# Patient Record
Sex: MALE | Race: WHITE | NOT HISPANIC OR LATINO | Employment: OTHER | URBAN - METROPOLITAN AREA
[De-identification: names, ages, dates, MRNs, and addresses within clinical notes are randomized per-mention and may not be internally consistent; named-entity substitution may affect disease eponyms.]

---

## 2017-01-06 ENCOUNTER — HOSPITAL ENCOUNTER (EMERGENCY)
Facility: HOSPITAL | Age: 72
Discharge: HOME | End: 2017-01-06
Attending: EMERGENCY MEDICINE | Admitting: EMERGENCY MEDICINE

## 2017-01-06 ENCOUNTER — APPOINTMENT (OUTPATIENT)
Dept: GENERAL RADIOLOGY | Facility: HOSPITAL | Age: 72
End: 2017-01-06

## 2017-01-06 VITALS
RESPIRATION RATE: 16 BRPM | HEIGHT: 70 IN | SYSTOLIC BLOOD PRESSURE: 130 MMHG | DIASTOLIC BLOOD PRESSURE: 70 MMHG | BODY MASS INDEX: 40.09 KG/M2 | TEMPERATURE: 97.9 F | HEART RATE: 55 BPM | WEIGHT: 280 LBS | OXYGEN SATURATION: 94 %

## 2017-01-06 DIAGNOSIS — R07.2 PRECORDIAL PAIN: Primary | ICD-10-CM

## 2017-01-06 PROBLEM — R07.9 CHEST PAIN: Status: ACTIVE | Noted: 2017-01-06

## 2017-01-06 PROBLEM — R00.1 SINUS BRADYCARDIA: Status: ACTIVE | Noted: 2017-01-06

## 2017-01-06 LAB
ALBUMIN SERPL-MCNC: 4.3 G/DL (ref 3.2–4.8)
ALBUMIN/GLOB SERPL: 1.4 G/DL (ref 1.5–2.5)
ALP SERPL-CCNC: 64 U/L (ref 25–100)
ALT SERPL W P-5'-P-CCNC: 16 U/L (ref 7–40)
ANION GAP SERPL CALCULATED.3IONS-SCNC: 10 MMOL/L (ref 3–11)
AST SERPL-CCNC: 20 U/L (ref 0–33)
BASOPHILS # BLD AUTO: 0.04 10*3/MM3 (ref 0–0.2)
BASOPHILS NFR BLD AUTO: 0.6 % (ref 0–1)
BILIRUB SERPL-MCNC: 0.9 MG/DL (ref 0.3–1.2)
BNP SERPL-MCNC: 119 PG/ML (ref 0–100)
BUN BLD-MCNC: 13 MG/DL (ref 9–23)
BUN/CREAT SERPL: 8.1 (ref 7–25)
CALCIUM SPEC-SCNC: 9.3 MG/DL (ref 8.7–10.4)
CHLORIDE SERPL-SCNC: 100 MMOL/L (ref 99–109)
CO2 SERPL-SCNC: 30 MMOL/L (ref 20–31)
CREAT BLD-MCNC: 1.6 MG/DL (ref 0.6–1.3)
DEPRECATED RDW RBC AUTO: 51.7 FL (ref 37–54)
EOSINOPHIL # BLD AUTO: 0.04 10*3/MM3 (ref 0.1–0.3)
EOSINOPHIL NFR BLD AUTO: 0.6 % (ref 0–3)
ERYTHROCYTE [DISTWIDTH] IN BLOOD BY AUTOMATED COUNT: 15.3 % (ref 11.3–14.5)
GFR SERPL CREATININE-BSD FRML MDRD: 43 ML/MIN/1.73
GLOBULIN UR ELPH-MCNC: 3 GM/DL
GLUCOSE BLD-MCNC: 132 MG/DL (ref 70–100)
HCT VFR BLD AUTO: 36.9 % (ref 38.9–50.9)
HGB BLD-MCNC: 12.1 G/DL (ref 13.1–17.5)
HOLD SPECIMEN: NORMAL
HOLD SPECIMEN: NORMAL
IMM GRANULOCYTES # BLD: 0.02 10*3/MM3 (ref 0–0.03)
IMM GRANULOCYTES NFR BLD: 0.3 % (ref 0–0.6)
LIPASE SERPL-CCNC: 26 U/L (ref 6–51)
LYMPHOCYTES # BLD AUTO: 1.95 10*3/MM3 (ref 0.6–4.8)
LYMPHOCYTES NFR BLD AUTO: 31.5 % (ref 24–44)
MCH RBC QN AUTO: 30.2 PG (ref 27–31)
MCHC RBC AUTO-ENTMCNC: 32.8 G/DL (ref 32–36)
MCV RBC AUTO: 92 FL (ref 80–99)
MONOCYTES # BLD AUTO: 0.48 10*3/MM3 (ref 0–1)
MONOCYTES NFR BLD AUTO: 7.8 % (ref 0–12)
NEUTROPHILS # BLD AUTO: 3.66 10*3/MM3 (ref 1.5–8.3)
NEUTROPHILS NFR BLD AUTO: 59.2 % (ref 41–71)
PLATELET # BLD AUTO: 159 10*3/MM3 (ref 150–450)
PMV BLD AUTO: 10.4 FL (ref 6–12)
POTASSIUM BLD-SCNC: 3.5 MMOL/L (ref 3.5–5.5)
PROT SERPL-MCNC: 7.3 G/DL (ref 5.7–8.2)
RBC # BLD AUTO: 4.01 10*6/MM3 (ref 4.2–5.76)
SODIUM BLD-SCNC: 140 MMOL/L (ref 132–146)
TROPONIN I SERPL-MCNC: 0 NG/ML (ref 0–0.07)
TROPONIN I SERPL-MCNC: 0.01 NG/ML (ref 0–0.07)
WBC NRBC COR # BLD: 6.19 10*3/MM3 (ref 3.5–10.8)
WHOLE BLOOD HOLD SPECIMEN: NORMAL
WHOLE BLOOD HOLD SPECIMEN: NORMAL

## 2017-01-06 PROCEDURE — 93005 ELECTROCARDIOGRAM TRACING: CPT | Performed by: EMERGENCY MEDICINE

## 2017-01-06 PROCEDURE — 83880 ASSAY OF NATRIURETIC PEPTIDE: CPT | Performed by: EMERGENCY MEDICINE

## 2017-01-06 PROCEDURE — 84484 ASSAY OF TROPONIN QUANT: CPT

## 2017-01-06 PROCEDURE — 25010000002 MORPHINE PER 10 MG: Performed by: EMERGENCY MEDICINE

## 2017-01-06 PROCEDURE — 96375 TX/PRO/DX INJ NEW DRUG ADDON: CPT

## 2017-01-06 PROCEDURE — 25010000002 ONDANSETRON PER 1 MG: Performed by: EMERGENCY MEDICINE

## 2017-01-06 PROCEDURE — 99284 EMERGENCY DEPT VISIT MOD MDM: CPT

## 2017-01-06 PROCEDURE — 96374 THER/PROPH/DIAG INJ IV PUSH: CPT

## 2017-01-06 PROCEDURE — 80053 COMPREHEN METABOLIC PANEL: CPT | Performed by: EMERGENCY MEDICINE

## 2017-01-06 PROCEDURE — 96361 HYDRATE IV INFUSION ADD-ON: CPT

## 2017-01-06 PROCEDURE — 83690 ASSAY OF LIPASE: CPT | Performed by: EMERGENCY MEDICINE

## 2017-01-06 PROCEDURE — 99282 EMERGENCY DEPT VISIT SF MDM: CPT | Performed by: INTERNAL MEDICINE

## 2017-01-06 PROCEDURE — 71010 HC CHEST PA OR AP: CPT

## 2017-01-06 PROCEDURE — 85025 COMPLETE CBC W/AUTO DIFF WBC: CPT | Performed by: EMERGENCY MEDICINE

## 2017-01-06 RX ORDER — ASPIRIN 81 MG/1
324 TABLET, CHEWABLE ORAL ONCE
Status: COMPLETED | OUTPATIENT
Start: 2017-01-06 | End: 2017-01-06

## 2017-01-06 RX ORDER — CIPROFLOXACIN 500 MG/1
500 TABLET, FILM COATED ORAL 2 TIMES DAILY
COMMUNITY
End: 2017-02-28

## 2017-01-06 RX ORDER — SODIUM CHLORIDE 0.9 % (FLUSH) 0.9 %
10 SYRINGE (ML) INJECTION AS NEEDED
Status: DISCONTINUED | OUTPATIENT
Start: 2017-01-06 | End: 2017-01-06 | Stop reason: HOSPADM

## 2017-01-06 RX ORDER — MORPHINE SULFATE 4 MG/ML
4 INJECTION, SOLUTION INTRAMUSCULAR; INTRAVENOUS ONCE
Status: COMPLETED | OUTPATIENT
Start: 2017-01-06 | End: 2017-01-06

## 2017-01-06 RX ORDER — ONDANSETRON 2 MG/ML
4 INJECTION INTRAMUSCULAR; INTRAVENOUS ONCE
Status: COMPLETED | OUTPATIENT
Start: 2017-01-06 | End: 2017-01-06

## 2017-01-06 RX ADMIN — ASPIRIN 81 MG 324 MG: 81 TABLET ORAL at 06:23

## 2017-01-06 RX ADMIN — SODIUM CHLORIDE 500 ML: 9 INJECTION, SOLUTION INTRAVENOUS at 06:28

## 2017-01-06 RX ADMIN — MORPHINE SULFATE 4 MG: 4 INJECTION, SOLUTION INTRAMUSCULAR; INTRAVENOUS at 06:24

## 2017-01-06 RX ADMIN — ONDANSETRON 4 MG: 2 INJECTION INTRAMUSCULAR; INTRAVENOUS at 12:56

## 2017-01-06 NOTE — CONSULTS
[facility] at [facility] - Cardiology Consult    Rodo Rodriguez  1945  12/12    Admit Date:  1/6/2017    PCP:  MD Sohail Call MD:  No ref. provider found    01/06/17  Reason for Consult: chest pain     Active Problems:    Chest pain    Sinus bradycardia      Allergies:  is allergic to atorvastatin and sulfa antibiotics.      (Not in a hospital admission)    HPI:      Patient is a 72 yo male with a hx of CAD s/p PCI x 3 that presented to our ER this AM with complaints of chest pain. Patient stated it started early this AM and awoke him from sleep. It is left sided and does not radiate. He described it as an aching feeling. He took 3 sublingual ntg at home and received ASA in ER. Chest pain is now completely resolved. He stated this pain does not feel like the pain he experienced before previous interventions. No associated symptoms with chest pain. Has not had any prior to last night. Last LHC was in 7/2015 that revealed patent stents to LAD and received PTCA to ramus for in-stent restenosis. Reports his HR is generally in the 40s and 50s and does not have any symptoms of bradycardia. Also takes Atenolol 25mg daily. Denies any issues with SOA, n/v, diaphoresis, dizziness, fatigue, presyncope, syncope.   Patient states that prior to each of his multiple PCI's he had exertional upper chest and shoulder/arm pain.  He has that none of that recently.  He is to awoke this morning with epigastric and mid apical epigastric discomfort that spontaneous relieved in approximately 2 hours.  He has not had this before.  Says the pain primarily is located now near his umbilicus.  He has some nausea but no vomiting.  History of PUD.  No dizziness lightheadedness or syncope    Social History     Social History   • Marital status:      Spouse name: N/A   • Number of children: N/A   • Years of education: N/A     Occupational History   • Not on file.     Social History Main Topics   • Smoking  "status: Former Smoker     Packs/day: 1.00     Types: Cigarettes     Quit date: 1991   • Smokeless tobacco: Never Used   • Alcohol use No   • Drug use: No   • Sexual activity: Not on file     Other Topics Concern   • Not on file     Social History Narrative     Family History   Problem Relation Age of Onset   • Diabetes Mother    • Breast cancer Sister      60s   • Colon cancer Sister      59   • Skin cancer Sister 45   • Diabetes Other      Past Surgical History   Procedure Laterality Date   • Carotid stent  1991   • Kidney stone surgery  1976   • Knee surgery Bilateral 2010   • Other surgical history  1967     ulcer   • Ankle surgery     • Back surgery     • Bone marrow biopsy       ROS: Pertinent items are noted in HPI, all other systems reviewed and negative     Objective     height is 70\" (177.8 cm) and weight is 280 lb (127 kg). His oral temperature is 97.9 °F (36.6 °C). His blood pressure is 150/90 and his pulse is 39 (abnormal). His respiration is 15 and oxygen saturation is 96%.      Intake/Output Summary (Last 24 hours) at 01/06/17 1105  Last data filed at 01/06/17 0739   Gross per 24 hour   Intake    500 ml   Output      0 ml   Net    500 ml         Physical Exam:  General Appearance:    Alert, cooperative, in no acute distress   Head:    Normocephalic, without obvious abnormality, atraumatic   Eyes:            Lids and lashes normal, conjunctivae and sclerae normal, no   icterus, no pallor, corneas clear, PERRLA   Ears:    Ears appear intact with no abnormalities noted   Throat:   No oral lesions, no thrush, oral mucosa moist   Neck:   No adenopathy, supple, trachea midline, no thyromegaly, no     carotid bruit, no JVD   Back:     No kyphosis present, no scoliosis present, no skin lesions,       erythema or scars, no tenderness to percussion or                   palpation,   range of motion normal   Lungs:     Clear to auscultation,respirations regular, even and                   unlabored his best sounds " right base     Heart:    Regular rhythm and normal rate, normal S1 and S2, no            murmur, no gallop, no rub, no click       Abdomen:     Normal bowel sounds, no masses, no organomegaly, soft        non-tender, non-distended, no guarding, no rebound                 tenderness       Extremities:   Moves all extremities well,  no cyanosis, no redness, no edema   Pulses:   Pulses palpable and equal bilaterally   Skin:   No bleeding, bruising or rash       Neurologic:   Cranial nerves 2 - 12 grossly intact      I have examined the patient and agree with the above findings.    Results Review:  I personally reviewed the patient's clinical results.    Results from last 7 days  Lab Units 01/06/17  0745   WBC 10*3/mm3 6.19   HEMOGLOBIN g/dL 12.1*   HEMATOCRIT % 36.9*   PLATELETS 10*3/mm3 159       Results from last 7 days  Lab Units 01/06/17  0858   SODIUM mmol/L 140   POTASSIUM mmol/L 3.5   CHLORIDE mmol/L 100   TOTAL CO2 mmol/L 30.0   BUN mg/dL 13   CREATININE mg/dL 1.60*   CALCIUM mg/dL 9.3   BILIRUBIN mg/dL 0.9   ALK PHOS U/L 64   ALT (SGPT) U/L 16   AST (SGOT) U/L 20   GLUCOSE mg/dL 132*                           Tele:  Sinus bradycardia    Assessment/Plan     1.  Chest pain, atypical in nature   - pain did resolve with ntg and is no longer present. Troponins x 2 negative and no acute EKG changes.   - patient is ok to be discharged home with follow-up with primary cardiologist next week as he does wish to defer any intervention at this time.     2.  Asymptomatic Bradycardia  - continue Atenolol for now as patient is not symptomatic. Can discuss with primary cardiologist about discontinuing.  We'll continue current medical therapy.  Is no indication for stopping his atenolol given his coronary artery disease history at this time.  Will discuss with his primary cardiologist.    Coronary artery disease: Status post multiple PCI's.  Now with mid epigastric/abdominal pain which is not similar to his previous angina.   Normal EKG and biomarkers.  Patient can be safely discharged home with outpatient follow-up with his primary cardiologist (Dr. Kole Charlton at [place] [address]) and follow-up with his primary care physician next week.  Scribed for Juan Pablo Luna MD by Beth Champagne PA-C. 1/6/2017  11:06 AM      PATRIC Bar  01/06/17  11:05 AM      I, Juan Pablo Luna have reviewed the note in full and agree with all aspects of the above including physical exam, assessment, labs and plan with changes made accordingly.     Juan Pablo Luna MD  01/06/17  12:53 PM

## 2017-01-06 NOTE — ED PROVIDER NOTES
Subjective   HPI Comments: Pt woke from sleep with left sided nonradiating chest pain about 2 hours ago.  Short of breath.  No dizziness, diaphoresis or nausea.  Took 3-4 NTG and it subsided but has not resolved.  Denies URI symptoms.  Last time he had this kind of pain was about a year ago and at that time he came in and a stent was placed.    Patient is a 71 y.o. male presenting with chest pain.   History provided by:  Patient  Chest Pain   Pain location:  L chest  Pain quality: aching    Pain radiates to:  Does not radiate  Pain severity:  Mild  Onset quality:  Gradual  Duration:  2 hours  Timing:  Constant  Progression:  Improving  Chronicity:  Recurrent  Context: at rest    Relieved by:  Nitroglycerin  Worsened by:  Nothing  Associated symptoms: shortness of breath    Associated symptoms: no abdominal pain, no cough, no fatigue, no headache, no nausea, no numbness, no vomiting and no weakness    Risk factors: coronary artery disease        Review of Systems   Constitutional: Negative for chills and fatigue.   HENT: Negative for rhinorrhea and sore throat.    Respiratory: Positive for shortness of breath. Negative for cough.    Cardiovascular: Positive for chest pain.   Gastrointestinal: Negative for abdominal pain, diarrhea, nausea and vomiting.   Genitourinary: Negative for dysuria.   Skin: Negative for rash.   Neurological: Negative for weakness, numbness and headaches.   All other systems reviewed and are negative.      Past Medical History   Diagnosis Date   • Acute kidney injury    • Acute postthoracotomy pain    • Acute tubular necrosis    • Acute tubular necrosis    • Anemia    • Arthritis    • B12 deficiency    • Bone pain    • Carcinoma of lung      stage IA   • Chest pain      occasional   • Confusion, postoperative    • Coronary disease    • Dehydration    • Depression    • Diabetes mellitus    • Diarrhea      due to meds   • Dry mouth    • Enlarged glands    • Fatigue    • Fractures    • Gastritis       with stomach ulcers   • H/O colonoscopy 03/2016   • HBP (high blood pressure)    • Heartburn    • History of BPH    • Hyperlipidemia    • Impotence    • Joint pain    • Kidney infection    • Kidney stones    • Leaking of urine    • Lung cancer 01/2016   • Pain      postthoracotomy - mild pain    • Peptic ulceration    • Reflux esophagitis    • UTI (urinary tract infection)    • Weakness        Allergies   Allergen Reactions   • Atorvastatin    • Sulfa Antibiotics        Past Surgical History   Procedure Laterality Date   • Carotid stent  1991   • Kidney stone surgery  1976   • Knee surgery Bilateral 2010   • Other surgical history  1967     ulcer   • Ankle surgery     • Back surgery     • Bone marrow biopsy         Family History   Problem Relation Age of Onset   • Diabetes Mother    • Breast cancer Sister      60s   • Colon cancer Sister      59   • Skin cancer Sister 45   • Diabetes Other        Social History     Social History   • Marital status:      Spouse name: N/A   • Number of children: N/A   • Years of education: N/A     Social History Main Topics   • Smoking status: Former Smoker     Packs/day: 1.00     Types: Cigarettes     Quit date: 1991   • Smokeless tobacco: Never Used   • Alcohol use No   • Drug use: No   • Sexual activity: Not Asked     Other Topics Concern   • None     Social History Narrative           Objective   Physical Exam   Constitutional: He appears well-developed and well-nourished. He is cooperative.  Non-toxic appearance. No distress.   HENT:   Head: Normocephalic and atraumatic.   Mouth/Throat: Oropharynx is clear and moist and mucous membranes are normal.   Eyes: Conjunctivae and EOM are normal. No scleral icterus.   Neck: Normal range of motion. Neck supple.   Cardiovascular: Regular rhythm and normal heart sounds.  Bradycardia present.    No murmur heard.  Pulmonary/Chest: Breath sounds normal. No respiratory distress. He has no wheezes. He has no rhonchi. He has no  rales.   Abdominal: Soft. Bowel sounds are normal. There is no tenderness. There is no rebound and no guarding.   Musculoskeletal: Normal range of motion.   Neurological: He is alert. He has normal strength.   Skin: Skin is warm and dry. No rash noted.   Psychiatric: He has a normal mood and affect. His speech is normal and behavior is normal.   Nursing note and vitals reviewed.      Procedures         ED Course  ED Course    EKG sinus loretta, 1st deg block, unchanged from prior EKGs.  Trops negative.  CXR negative.  Cardiology consulted and they recommended outpatient evaluation.              MDM  Number of Diagnoses or Management Options     Amount and/or Complexity of Data Reviewed  Clinical lab tests: ordered and reviewed  Tests in the radiology section of CPT®: ordered and reviewed  Decide to obtain previous medical records or to obtain history from someone other than the patient: yes  Independent visualization of images, tracings, or specimens: yes        Final diagnoses:   Precordial pain            Lance Steward MD  01/06/17 9015

## 2017-02-03 ENCOUNTER — HOSPITAL ENCOUNTER (EMERGENCY)
Facility: HOSPITAL | Age: 72
Discharge: HOME | End: 2017-02-03
Attending: EMERGENCY MEDICINE | Admitting: EMERGENCY MEDICINE

## 2017-02-03 ENCOUNTER — APPOINTMENT (OUTPATIENT)
Dept: MRI IMAGING | Facility: HOSPITAL | Age: 72
End: 2017-02-03

## 2017-02-03 VITALS
SYSTOLIC BLOOD PRESSURE: 108 MMHG | BODY MASS INDEX: 36.36 KG/M2 | RESPIRATION RATE: 20 BRPM | HEIGHT: 70 IN | OXYGEN SATURATION: 94 % | HEART RATE: 74 BPM | DIASTOLIC BLOOD PRESSURE: 72 MMHG | WEIGHT: 254 LBS | TEMPERATURE: 97.5 F

## 2017-02-03 DIAGNOSIS — H83.09 LABYRINTHITIS, UNSPECIFIED LATERALITY: Primary | ICD-10-CM

## 2017-02-03 LAB
ALBUMIN SERPL-MCNC: 4.5 G/DL (ref 3.2–4.8)
ALBUMIN/GLOB SERPL: 1.4 G/DL (ref 1.5–2.5)
ALP SERPL-CCNC: 64 U/L (ref 25–100)
ALT SERPL W P-5'-P-CCNC: 10 U/L (ref 7–40)
ANION GAP SERPL CALCULATED.3IONS-SCNC: 9 MMOL/L (ref 3–11)
AST SERPL-CCNC: 15 U/L (ref 0–33)
BASOPHILS # BLD AUTO: 0.05 10*3/MM3 (ref 0–0.2)
BASOPHILS NFR BLD AUTO: 0.7 % (ref 0–1)
BILIRUB SERPL-MCNC: 0.5 MG/DL (ref 0.3–1.2)
BUN BLD-MCNC: 15 MG/DL (ref 9–23)
BUN/CREAT SERPL: 8.8 (ref 7–25)
CALCIUM SPEC-SCNC: 9.1 MG/DL (ref 8.7–10.4)
CHLORIDE SERPL-SCNC: 97 MMOL/L (ref 99–109)
CO2 SERPL-SCNC: 30 MMOL/L (ref 20–31)
CREAT BLD-MCNC: 1.7 MG/DL (ref 0.6–1.3)
DEPRECATED RDW RBC AUTO: 51.1 FL (ref 37–54)
EOSINOPHIL # BLD AUTO: 0.14 10*3/MM3 (ref 0.1–0.3)
EOSINOPHIL NFR BLD AUTO: 1.8 % (ref 0–3)
ERYTHROCYTE [DISTWIDTH] IN BLOOD BY AUTOMATED COUNT: 15 % (ref 11.3–14.5)
GFR SERPL CREATININE-BSD FRML MDRD: 40 ML/MIN/1.73
GLOBULIN UR ELPH-MCNC: 3.3 GM/DL
GLUCOSE BLD-MCNC: 294 MG/DL (ref 70–100)
HCT VFR BLD AUTO: 39.8 % (ref 38.9–50.9)
HGB BLD-MCNC: 12.8 G/DL (ref 13.1–17.5)
HOLD SPECIMEN: NORMAL
HOLD SPECIMEN: NORMAL
IMM GRANULOCYTES # BLD: 0.03 10*3/MM3 (ref 0–0.03)
IMM GRANULOCYTES NFR BLD: 0.4 % (ref 0–0.6)
LYMPHOCYTES # BLD AUTO: 1.71 10*3/MM3 (ref 0.6–4.8)
LYMPHOCYTES NFR BLD AUTO: 22.5 % (ref 24–44)
MCH RBC QN AUTO: 30 PG (ref 27–31)
MCHC RBC AUTO-ENTMCNC: 32.2 G/DL (ref 32–36)
MCV RBC AUTO: 93.2 FL (ref 80–99)
MONOCYTES # BLD AUTO: 0.56 10*3/MM3 (ref 0–1)
MONOCYTES NFR BLD AUTO: 7.4 % (ref 0–12)
NEUTROPHILS # BLD AUTO: 5.11 10*3/MM3 (ref 1.5–8.3)
NEUTROPHILS NFR BLD AUTO: 67.2 % (ref 41–71)
PLATELET # BLD AUTO: 155 10*3/MM3 (ref 150–450)
PMV BLD AUTO: 10.4 FL (ref 6–12)
POTASSIUM BLD-SCNC: 3.3 MMOL/L (ref 3.5–5.5)
PROT SERPL-MCNC: 7.8 G/DL (ref 5.7–8.2)
RBC # BLD AUTO: 4.27 10*6/MM3 (ref 4.2–5.76)
SODIUM BLD-SCNC: 136 MMOL/L (ref 132–146)
TROPONIN I SERPL-MCNC: 0 NG/ML (ref 0–0.07)
WBC NRBC COR # BLD: 7.6 10*3/MM3 (ref 3.5–10.8)
WHOLE BLOOD HOLD SPECIMEN: NORMAL
WHOLE BLOOD HOLD SPECIMEN: NORMAL

## 2017-02-03 PROCEDURE — 99284 EMERGENCY DEPT VISIT MOD MDM: CPT

## 2017-02-03 PROCEDURE — 0 GADOBENATE DIMEGLUMINE 529 MG/ML SOLUTION: Performed by: EMERGENCY MEDICINE

## 2017-02-03 PROCEDURE — A9577 INJ MULTIHANCE: HCPCS | Performed by: EMERGENCY MEDICINE

## 2017-02-03 PROCEDURE — 85025 COMPLETE CBC W/AUTO DIFF WBC: CPT | Performed by: EMERGENCY MEDICINE

## 2017-02-03 PROCEDURE — 84484 ASSAY OF TROPONIN QUANT: CPT

## 2017-02-03 PROCEDURE — 70553 MRI BRAIN STEM W/O & W/DYE: CPT

## 2017-02-03 PROCEDURE — 80053 COMPREHEN METABOLIC PANEL: CPT | Performed by: EMERGENCY MEDICINE

## 2017-02-03 RX ORDER — NITROFURANTOIN 25; 75 MG/1; MG/1
100 CAPSULE ORAL 2 TIMES DAILY
COMMUNITY
Start: 2017-01-31 | End: 2017-02-07

## 2017-02-03 RX ORDER — MECLIZINE HCL 25MG 25 MG/1
TABLET, CHEWABLE ORAL
Status: DISCONTINUED
Start: 2017-02-03 | End: 2017-02-03 | Stop reason: HOSPADM

## 2017-02-03 RX ORDER — MECLIZINE HCL 25MG 25 MG/1
25 TABLET, CHEWABLE ORAL ONCE
Status: DISCONTINUED | OUTPATIENT
Start: 2017-02-03 | End: 2017-02-03

## 2017-02-03 RX ORDER — SODIUM CHLORIDE 0.9 % (FLUSH) 0.9 %
10 SYRINGE (ML) INJECTION AS NEEDED
Status: DISCONTINUED | OUTPATIENT
Start: 2017-02-03 | End: 2017-02-03 | Stop reason: HOSPADM

## 2017-02-03 RX ORDER — MECLIZINE HYDROCHLORIDE 25 MG/1
25 TABLET ORAL ONCE
Status: COMPLETED | OUTPATIENT
Start: 2017-02-03 | End: 2017-02-03

## 2017-02-03 RX ORDER — SULFAMETHOXAZOLE AND TRIMETHOPRIM 800; 160 MG/1; MG/1
1 TABLET ORAL 2 TIMES DAILY
COMMUNITY
Start: 2017-01-31 | End: 2017-02-10

## 2017-02-03 RX ORDER — MECLIZINE HYDROCHLORIDE 25 MG/1
25 TABLET ORAL 3 TIMES DAILY PRN
Qty: 20 TABLET | Refills: 0 | Status: SHIPPED | OUTPATIENT
Start: 2017-02-03 | End: 2017-04-09

## 2017-02-03 RX ADMIN — GADOBENATE DIMEGLUMINE 15 ML: 529 INJECTION, SOLUTION INTRAVENOUS at 08:46

## 2017-02-03 RX ADMIN — MECLIZINE 25 MG: 25 TABLET ORAL at 11:08

## 2017-02-03 NOTE — ED NOTES
"Wife reports pt has had some \"confusion at home. Like what day it is or if he has or hasn't had his medication. You know, simple things.\"      Sophie Benitez RN  02/03/17 0917    "

## 2017-02-03 NOTE — ED PROVIDER NOTES
Subjective   Patient is a 71 y.o. male presenting with neurologic complaint.   History provided by:  Patient  Neurologic Problem   The patient's primary symptoms include a loss of balance. This is a new problem. The current episode started yesterday. The neurological problem developed suddenly. Last Known Well Instant: Yesterday morning.  The problem has been gradually worsening since onset. Affected Side: He falls to the right when he walks. Associated symptoms include dizziness (Minimal). Pertinent negatives include no abdominal pain, bladder incontinence, fever, headaches, light-headedness, nausea or shortness of breath. Past treatments include nothing. There is no history of a CVA or head trauma.       Review of Systems   Constitutional: Negative.  Negative for fever.   HENT: Negative.    Respiratory: Negative.  Negative for chest tightness and shortness of breath.    Cardiovascular: Negative.    Gastrointestinal: Negative.  Negative for abdominal pain and nausea.        No trouble swallowing   Genitourinary: Negative for bladder incontinence.        Currently being treated for UTI.  Main symptom was difficulty urinating.  No improvement after a round of Cipro but now improving on a combination of Macrobid and Septra.   Neurological: Positive for dizziness (Minimal) and loss of balance. Negative for light-headedness and headaches.   Psychiatric/Behavioral: Negative.    All other systems reviewed and are negative.      Past Medical History   Diagnosis Date   • Acute kidney injury    • Acute postthoracotomy pain    • Acute tubular necrosis    • Acute tubular necrosis    • Anemia    • Arthritis    • B12 deficiency    • Bone pain    • Carcinoma of lung      stage IA   • Chest pain      occasional   • Confusion, postoperative    • Coronary disease    • Dehydration    • Depression    • Diabetes mellitus    • Diarrhea      due to meds   • Dry mouth    • Enlarged glands    • Fatigue    • Fractures    • Gastritis       with stomach ulcers   • H/O colonoscopy 03/2016   • HBP (high blood pressure)    • Heartburn    • History of BPH    • Hyperlipidemia    • Impotence    • Joint pain    • Kidney infection    • Kidney stones    • Leaking of urine    • Lung cancer 01/2016   • Pain      postthoracotomy - mild pain    • Peptic ulceration    • Reflux esophagitis    • UTI (urinary tract infection)    • Weakness        Allergies   Allergen Reactions   • Atorvastatin    • Sulfa Antibiotics        Past Surgical History   Procedure Laterality Date   • Carotid stent  1991   • Kidney stone surgery  1976   • Knee surgery Bilateral 2010   • Other surgical history  1967     ulcer   • Ankle surgery     • Back surgery     • Bone marrow biopsy     • Lung removal, partial Right        Family History   Problem Relation Age of Onset   • Diabetes Mother    • Breast cancer Sister      60s   • Colon cancer Sister      59   • Skin cancer Sister 45   • Diabetes Other        Social History     Social History   • Marital status:      Spouse name: N/A   • Number of children: N/A   • Years of education: N/A     Social History Main Topics   • Smoking status: Former Smoker     Packs/day: 1.00     Types: Cigarettes     Quit date: 1991   • Smokeless tobacco: Never Used   • Alcohol use No   • Drug use: No   • Sexual activity: Defer     Other Topics Concern   • None     Social History Narrative   • None           Objective   Physical Exam   Constitutional: He is oriented to person, place, and time. He appears well-developed and well-nourished. No distress.   HENT:   Head: Atraumatic.   Mouth/Throat: Oropharynx is clear and moist.   Airway patent   Eyes: Conjunctivae are normal.   Neck: Phonation normal. Neck supple.   Cardiovascular: Normal rate and regular rhythm.    Pulmonary/Chest: Effort normal and breath sounds normal. No respiratory distress.   Abdominal: Soft. There is no tenderness.   Musculoskeletal: He exhibits no edema.   Lymphadenopathy:     He has no  cervical adenopathy.   Neurological: He is alert and oriented to person, place, and time. No cranial nerve deficit. He exhibits normal muscle tone.   Skin: Skin is warm and dry.   Psychiatric: He has a normal mood and affect. His behavior is normal.   Nursing note and vitals reviewed.      Procedures         ED Course  ED Course                  MDM    Final diagnoses:   Labyrinthitis, unspecified laterality            Thaddeus Nam MD  02/03/17 1968

## 2017-02-28 ENCOUNTER — LAB (OUTPATIENT)
Dept: LAB | Facility: HOSPITAL | Age: 72
End: 2017-02-28

## 2017-02-28 ENCOUNTER — OFFICE VISIT (OUTPATIENT)
Dept: ONCOLOGY | Facility: CLINIC | Age: 72
End: 2017-02-28

## 2017-02-28 ENCOUNTER — DOCUMENTATION (OUTPATIENT)
Dept: ONCOLOGY | Facility: CLINIC | Age: 72
End: 2017-02-28

## 2017-02-28 VITALS
RESPIRATION RATE: 16 BRPM | DIASTOLIC BLOOD PRESSURE: 70 MMHG | SYSTOLIC BLOOD PRESSURE: 135 MMHG | HEART RATE: 67 BPM | WEIGHT: 259 LBS | TEMPERATURE: 97.8 F | HEIGHT: 70 IN | BODY MASS INDEX: 37.08 KG/M2

## 2017-02-28 DIAGNOSIS — C34.91 CARCINOMA OF RIGHT LUNG (HCC): ICD-10-CM

## 2017-02-28 DIAGNOSIS — D47.2 MONOCLONAL GAMMOPATHY: Primary | ICD-10-CM

## 2017-02-28 DIAGNOSIS — N17.9 ACUTE KIDNEY INJURY (HCC): ICD-10-CM

## 2017-02-28 DIAGNOSIS — D47.2 MONOCLONAL GAMMOPATHY: ICD-10-CM

## 2017-02-28 LAB
ALBUMIN SERPL-MCNC: 4.2 G/DL (ref 3.2–4.8)
ALBUMIN/GLOB SERPL: 1.4 G/DL (ref 1.5–2.5)
ALP SERPL-CCNC: 62 U/L (ref 25–100)
ALT SERPL W P-5'-P-CCNC: 11 U/L (ref 7–40)
ANION GAP SERPL CALCULATED.3IONS-SCNC: 4 MMOL/L (ref 3–11)
AST SERPL-CCNC: 13 U/L (ref 0–33)
BILIRUB SERPL-MCNC: 0.3 MG/DL (ref 0.3–1.2)
BUN BLD-MCNC: 15 MG/DL (ref 9–23)
BUN/CREAT SERPL: 10 (ref 7–25)
CALCIUM SPEC-SCNC: 8.7 MG/DL (ref 8.7–10.4)
CHLORIDE SERPL-SCNC: 100 MMOL/L (ref 99–109)
CO2 SERPL-SCNC: 34 MMOL/L (ref 20–31)
CREAT BLD-MCNC: 1.5 MG/DL (ref 0.6–1.3)
ERYTHROCYTE [DISTWIDTH] IN BLOOD BY AUTOMATED COUNT: 15.3 % (ref 11.3–14.5)
ERYTHROCYTE [SEDIMENTATION RATE] IN BLOOD: 13 MM/HR (ref 0–20)
GFR SERPL CREATININE-BSD FRML MDRD: 46 ML/MIN/1.73
GLOBULIN UR ELPH-MCNC: 3 GM/DL
GLUCOSE BLD-MCNC: 171 MG/DL (ref 70–100)
HCT VFR BLD AUTO: 39.8 % (ref 38.9–50.9)
HGB BLD-MCNC: 12.5 G/DL (ref 13.1–17.5)
LYMPHOCYTES # BLD AUTO: 2.5 10*3/MM3 (ref 0.6–4.8)
LYMPHOCYTES NFR BLD AUTO: 29.6 % (ref 24–44)
MCH RBC QN AUTO: 29 PG (ref 27–31)
MCHC RBC AUTO-ENTMCNC: 31.5 G/DL (ref 32–36)
MCV RBC AUTO: 92.1 FL (ref 80–99)
MONOCYTES # BLD AUTO: 0.6 10*3/MM3 (ref 0–1)
MONOCYTES NFR BLD AUTO: 7.4 % (ref 0–12)
NEUTROPHILS # BLD AUTO: 5.4 10*3/MM3 (ref 1.5–8.3)
NEUTROPHILS NFR BLD AUTO: 63 % (ref 41–71)
PLATELET # BLD AUTO: 181 10*3/MM3 (ref 150–450)
PMV BLD AUTO: 7.5 FL (ref 6–12)
POTASSIUM BLD-SCNC: 3.9 MMOL/L (ref 3.5–5.5)
PROT SERPL-MCNC: 7.2 G/DL (ref 5.7–8.2)
RBC # BLD AUTO: 4.32 10*6/MM3 (ref 4.2–5.76)
SODIUM BLD-SCNC: 138 MMOL/L (ref 132–146)
WBC NRBC COR # BLD: 8.5 10*3/MM3 (ref 3.5–10.8)

## 2017-02-28 PROCEDURE — 80053 COMPREHEN METABOLIC PANEL: CPT | Performed by: INTERNAL MEDICINE

## 2017-02-28 PROCEDURE — 36415 COLL VENOUS BLD VENIPUNCTURE: CPT

## 2017-02-28 PROCEDURE — 99213 OFFICE O/P EST LOW 20 MIN: CPT | Performed by: INTERNAL MEDICINE

## 2017-02-28 PROCEDURE — 84155 ASSAY OF PROTEIN SERUM: CPT | Performed by: INTERNAL MEDICINE

## 2017-02-28 PROCEDURE — 86334 IMMUNOFIX E-PHORESIS SERUM: CPT | Performed by: INTERNAL MEDICINE

## 2017-02-28 PROCEDURE — 83883 ASSAY NEPHELOMETRY NOT SPEC: CPT | Performed by: INTERNAL MEDICINE

## 2017-02-28 PROCEDURE — 84165 PROTEIN E-PHORESIS SERUM: CPT | Performed by: INTERNAL MEDICINE

## 2017-02-28 PROCEDURE — 85025 COMPLETE CBC W/AUTO DIFF WBC: CPT

## 2017-02-28 PROCEDURE — 85652 RBC SED RATE AUTOMATED: CPT | Performed by: INTERNAL MEDICINE

## 2017-02-28 RX ORDER — SULFAMETHOXAZOLE AND TRIMETHOPRIM 800; 160 MG/1; MG/1
TABLET ORAL
Refills: 2 | COMMUNITY
Start: 2017-02-23 | End: 2019-10-19 | Stop reason: HOSPADM

## 2017-02-28 RX ORDER — MEMANTINE HYDROCHLORIDE 10 MG/1
TABLET ORAL
Refills: 2 | COMMUNITY
Start: 2016-12-14 | End: 2017-04-09

## 2017-02-28 RX ORDER — HYDRALAZINE HYDROCHLORIDE 25 MG/1
TABLET, FILM COATED ORAL
Refills: 2 | COMMUNITY
Start: 2017-02-07 | End: 2017-04-11 | Stop reason: HOSPADM

## 2017-02-28 RX ORDER — LIDOCAINE AND PRILOCAINE 25; 25 MG/G; MG/G
CREAM TOPICAL
COMMUNITY
Start: 2017-01-23 | End: 2017-04-09

## 2017-02-28 RX ORDER — CITALOPRAM 20 MG/1
TABLET ORAL
Refills: 5 | COMMUNITY
Start: 2017-02-07 | End: 2019-10-17

## 2017-02-28 RX ORDER — CYANOCOBALAMIN 1000 UG/ML
INJECTION, SOLUTION INTRAMUSCULAR; SUBCUTANEOUS
Refills: 1 | COMMUNITY
Start: 2016-12-29 | End: 2017-04-09

## 2017-02-28 RX ORDER — PROMETHAZINE HYDROCHLORIDE 25 MG/1
TABLET ORAL
Refills: 1 | COMMUNITY
Start: 2017-02-16 | End: 2019-10-17

## 2017-02-28 RX ORDER — RANITIDINE 150 MG/1
TABLET ORAL
Refills: 10 | COMMUNITY
Start: 2017-02-07 | End: 2019-10-17

## 2017-02-28 RX ORDER — BLOOD SUGAR DIAGNOSTIC
STRIP MISCELLANEOUS
Refills: 2 | COMMUNITY
Start: 2017-01-30 | End: 2017-04-09

## 2017-02-28 NOTE — PROGRESS NOTES
CHIEF COMPLAINT: Follow-up lung cancer    Problem List:  Oncology/Hematology History    1. Probable synchronous primary stage IA carcinomas of the lung.  a) Right upper lobe wedge showing moderately differentiated adenocarcinoma and  the right lower lobe showing well differentiated adenocarcinoma presumably  different from each other with a preoperative PET that was negative and  postoperative CT of the head was negative.   2. Acute kidney injury with elevated kappa and lambda light chains of 86 and  44 respectively with a normal kappa to lambda ratio and no monoclonal proteins  in the urine or serum and no increase in plasma cells on bone marrow biopsy  with positive iron stores on bone marrow biopsy.   3. Anemia with hemoglobin into the 8's postoperatively: B12 was low at 208,  lower limit of normal 211 and B12 was started and had just before his admission  for surgery been started on B12 by his primary care. His iron level was normal  at the low end at 26 with a ferritin of 86 and reticular count of 2.3%, total  iron binding capacity normal at 244 and a EGD and colonoscopy by [facility]  showed tubulovillous adenoma and gastritis with a recommendation to repeat  colonoscopy in 2019.   4. Acute tubular necrosis secondary to dehydration presumably postoperatively,  started Procrit with renal doctors along with Ferrlecit.   5. E. coli urinary tract infection postoperatively.   6. Confusion postoperatively. Resolved when placed back on morphine and  clonazepam, which he had been on chronically.         Carcinoma of right lung    2/16/2016 Initial Diagnosis    Carcinoma of right lung         HISTORY OF PRESENT ILLNESS:  The patient is a 71 y.o. male, here for follow up on management of lung cancer.  See above oncology hematology history for details.  February 3, 2017 emergency room with dizziness MRI brain was negative creatinine was 1.7 with a hemoglobin 12.8.  January 6, 2017 he was in the emergency room with  asymptomatic bradycardia and atypical chest pain with a negative chest x-ray.  November 20, 2016 in the emergency room with headache and shortness of breath.  Had a negative ventilation/perfusion scan and negative CT of the head.  October 11 and 14th he was in the emergency room with urinary tract infections nausea vomiting and a CT of the abdomen and pelvis was negative.  August 30, 2016 his kappa light chains were down to 38 with a lambda of 31 and a normal ratio and a sedimentation rate of 16 and no monoclonal spike in the serum.  September 15, 2016 he had a CT of his chest that showed a 4 mm right lower lobe nodule that was noncalcified and slightly larger by comparison at [facility]      Past Medical History   Diagnosis Date   • Acute kidney injury    • Acute postthoracotomy pain    • Acute tubular necrosis    • Acute tubular necrosis    • Anemia    • Arthritis    • B12 deficiency    • Bone pain    • Carcinoma of lung      stage IA   • Chest pain      occasional   • Confusion, postoperative    • Coronary disease    • Dehydration    • Depression    • Diabetes mellitus    • Diarrhea      due to meds   • Dry mouth    • Enlarged glands    • Fatigue    • Fractures    • Gastritis      with stomach ulcers   • H/O colonoscopy 03/2016   • HBP (high blood pressure)    • Heartburn    • History of BPH    • Hyperlipidemia    • Impotence    • Joint pain    • Kidney infection    • Kidney stones    • Leaking of urine    • Lung cancer 01/2016   • Pain      postthoracotomy - mild pain    • Peptic ulceration    • Reflux esophagitis    • UTI (urinary tract infection)    • Weakness      Past Surgical History   Procedure Laterality Date   • Carotid stent  1991   • Kidney stone surgery  1976   • Knee surgery Bilateral 2010   • Other surgical history  1967     ulcer   • Ankle surgery     • Back surgery     • Bone marrow biopsy     • Lung removal, partial Right        Allergies   Allergen Reactions   • Atorvastatin    • Sulfa  "Antibiotics        Family History and Social History reviewed and changed as necessary      REVIEW OF SYSTEM:   Review of Systems   Constitutional: Negative for appetite change, chills, diaphoresis, fatigue, fever and unexpected weight change.   HENT:   Negative for mouth sores, sore throat and trouble swallowing.    Eyes: Negative for icterus.   Respiratory: Negative for cough, hemoptysis and shortness of breath.    Cardiovascular: Negative for chest pain, leg swelling and palpitations.   Gastrointestinal: Negative for abdominal distention, abdominal pain, blood in stool, constipation, diarrhea, nausea and vomiting.   Endocrine: Negative for hot flashes.   Genitourinary: Negative for bladder incontinence, difficulty urinating, dysuria, frequency and hematuria.    Musculoskeletal: Negative for gait problem, neck pain and neck stiffness.   Skin: Negative for rash.   Neurological: Negative for dizziness, gait problem, headaches, light-headedness and numbness.   Hematological: Negative for adenopathy. Does not bruise/bleed easily.   Psychiatric/Behavioral: Negative for depression. The patient is not nervous/anxious.    All other systems reviewed and are negative.       PHYSICAL EXAM    Vitals:    02/28/17 1046   BP: 135/70   Pulse: 67   Resp: 16   Temp: 97.8 °F (36.6 °C)   TempSrc: Temporal Artery    Weight: 259 lb (117 kg)   Height: 70\" (177.8 cm)     Constitutional: Appears well-developed and well-nourished. No distress.   ECOG: (0) Fully active, able to carry on all predisease performance without restriction  HENT:   Head: Normocephalic.   Mouth/Throat: Oropharynx is clear and moist.   Eyes: Conjunctivae are normal. Pupils are equal, round, and reactive to light. No scleral icterus.   Neck: Neck supple. No JVD present. No thyromegaly present.   Cardiovascular: Normal rate, regular rhythm and normal heart sounds.    Pulmonary/Chest: Breath sounds normal. No respiratory distress.   Abdominal: Soft. Exhibits no " distension and no mass. There is no hepatosplenomegaly. There is no tenderness. There is no rebound and no guarding.   Musculoskeletal:Exhibits no edema, tenderness or deformity.   Neurological: Alert and oriented to person, place, and time. Exhibits normal muscle tone.   Skin: No ecchymosis, no petechiae and no rash noted. Not diaphoretic. No cyanosis. Nails show no clubbing.   Psychiatric: Normal mood and affect.   Vitals reviewed.      Lab on 02/28/2017   Component Date Value Ref Range Status   • WBC 02/28/2017 8.50  3.50 - 10.80 10*3/mm3 Final   • RBC 02/28/2017 4.32  4.20 - 5.76 10*6/mm3 Final   • Hemoglobin 02/28/2017 12.5* 13.1 - 17.5 g/dL Final   • Hematocrit 02/28/2017 39.8  38.9 - 50.9 % Final   • RDW 02/28/2017 15.3* 11.3 - 14.5 % Final   • MCV 02/28/2017 92.1  80.0 - 99.0 fL Final   • MCH 02/28/2017 29.0  27.0 - 31.0 pg Final   • MCHC 02/28/2017 31.5* 32.0 - 36.0 g/dL Final   • MPV 02/28/2017 7.5  6.0 - 12.0 fL Final   • Platelets 02/28/2017 181  150 - 450 10*3/mm3 Final   • Neutrophil % 02/28/2017 63.0  41.0 - 71.0 % Final   • Lymphocyte % 02/28/2017 29.6  24.0 - 44.0 % Final   • Monocyte % 02/28/2017 7.4  0.0 - 12.0 % Final   • Neutrophils, Absolute 02/28/2017 5.40  1.50 - 8.30 10*3/mm3 Final   • Lymphocytes, Absolute 02/28/2017 2.50  0.60 - 4.80 10*3/mm3 Final   • Monocytes, Absolute 02/28/2017 0.60  0.00 - 1.00 10*3/mm3 Final   Admission on 02/03/2017, Discharged on 02/03/2017   Component Date Value Ref Range Status   • Extra Tube 02/03/2017 hold for add-on   Final    Auto resulted   • Extra Tube 02/03/2017 Hold for add-ons.   Final    Auto resulted.   • Extra Tube 02/03/2017 hold for add-on   Final    Auto resulted   • Extra Tube 02/03/2017 Hold for add-ons.   Final    Auto resulted.   • Glucose 02/03/2017 294* 70 - 100 mg/dL Final   • BUN 02/03/2017 15  9 - 23 mg/dL Final   • Creatinine 02/03/2017 1.70* 0.60 - 1.30 mg/dL Final   • Sodium 02/03/2017 136  132 - 146 mmol/L Final   • Potassium  02/03/2017 3.3* 3.5 - 5.5 mmol/L Final   • Chloride 02/03/2017 97* 99 - 109 mmol/L Final   • CO2 02/03/2017 30.0  20.0 - 31.0 mmol/L Final   • Calcium 02/03/2017 9.1  8.7 - 10.4 mg/dL Final   • Total Protein 02/03/2017 7.8  5.7 - 8.2 g/dL Final   • Albumin 02/03/2017 4.50  3.20 - 4.80 g/dL Final   • ALT (SGPT) 02/03/2017 10  7 - 40 U/L Final   • AST (SGOT) 02/03/2017 15  0 - 33 U/L Final   • Alkaline Phosphatase 02/03/2017 64  25 - 100 U/L Final   • Total Bilirubin 02/03/2017 0.5  0.3 - 1.2 mg/dL Final   • eGFR Non African Amer 02/03/2017 40* >60 mL/min/1.73 Final   • Globulin 02/03/2017 3.3  gm/dL Final   • A/G Ratio 02/03/2017 1.4* 1.5 - 2.5 g/dL Final   • BUN/Creatinine Ratio 02/03/2017 8.8  7.0 - 25.0 Final   • Anion Gap 02/03/2017 9.0  3.0 - 11.0 mmol/L Final   • WBC 02/03/2017 7.60  3.50 - 10.80 10*3/mm3 Final   • RBC 02/03/2017 4.27  4.20 - 5.76 10*6/mm3 Final   • Hemoglobin 02/03/2017 12.8* 13.1 - 17.5 g/dL Final   • Hematocrit 02/03/2017 39.8  38.9 - 50.9 % Final   • MCV 02/03/2017 93.2  80.0 - 99.0 fL Final   • MCH 02/03/2017 30.0  27.0 - 31.0 pg Final   • MCHC 02/03/2017 32.2  32.0 - 36.0 g/dL Final   • RDW 02/03/2017 15.0* 11.3 - 14.5 % Final   • RDW-SD 02/03/2017 51.1  37.0 - 54.0 fl Final   • MPV 02/03/2017 10.4  6.0 - 12.0 fL Final   • Platelets 02/03/2017 155  150 - 450 10*3/mm3 Final   • Neutrophil % 02/03/2017 67.2  41.0 - 71.0 % Final   • Lymphocyte % 02/03/2017 22.5* 24.0 - 44.0 % Final   • Monocyte % 02/03/2017 7.4  0.0 - 12.0 % Final   • Eosinophil % 02/03/2017 1.8  0.0 - 3.0 % Final   • Basophil % 02/03/2017 0.7  0.0 - 1.0 % Final   • Immature Grans % 02/03/2017 0.4  0.0 - 0.6 % Final   • Neutrophils, Absolute 02/03/2017 5.11  1.50 - 8.30 10*3/mm3 Final   • Lymphocytes, Absolute 02/03/2017 1.71  0.60 - 4.80 10*3/mm3 Final   • Monocytes, Absolute 02/03/2017 0.56  0.00 - 1.00 10*3/mm3 Final   • Eosinophils, Absolute 02/03/2017 0.14  0.10 - 0.30 10*3/mm3 Final   • Basophils, Absolute 02/03/2017  0.05  0.00 - 0.20 10*3/mm3 Final   • Immature Grans, Absolute 02/03/2017 0.03  0.00 - 0.03 10*3/mm3 Final   • Troponin I 02/03/2017 0.00  0.00 - 0.07 ng/mL Final    Serial Number: [id number]    : [id number]       Assessment/Plan     1.  History of lung cancer  2. 4 mm right lower lobe lung nodule  3. Monoclonal gammopathy  4. cki    Discussion: We'll get a CT of his chest now at [facility] and have my nurse practitioner call him the results.  If this is enlarging he will need a PET scan and possible biopsies if feasible.  If stable, we'll scan him at least annually.  We will have him see my nurse practitioner back in 6 months to make sure he is otherwise doing well.  Kappa and lambda light chains are balance and only minimally elevated at this junction.  The kappa chains come down from 86 over year ago to 38 presently in the lambda is down from 4331 and he had no monoclonal spike in the serum and normal immunoglobulin G, A, and M levels as well as a normal sedimentation rate.  I think this is an inconsequential finding and probably unrelated to his creatinine of 1.7.       Errors in dictation may reflect use of voice recognition software and not all errors in transcription may have been detected prior to signing.    Miles Bui MD    02/28/2017

## 2017-03-01 LAB
ALBUMIN SERPL-MCNC: 3.7 G/DL (ref 2.9–4.4)
ALBUMIN/GLOB SERPL: 1.1 {RATIO} (ref 0.7–1.7)
ALPHA1 GLOB FLD ELPH-MCNC: 0.3 G/DL (ref 0–0.4)
ALPHA2 GLOB SERPL ELPH-MCNC: 0.9 G/DL (ref 0.4–1)
B-GLOBULIN SERPL ELPH-MCNC: 1 G/DL (ref 0.7–1.3)
GAMMA GLOB SERPL ELPH-MCNC: 1.3 G/DL (ref 0.4–1.8)
GLOBULIN SER CALC-MCNC: 3.4 G/DL (ref 2.2–3.9)
IGA SERPL-MCNC: 117 MG/DL (ref 61–437)
IGG SERPL-MCNC: 1170 MG/DL (ref 700–1600)
IGM SERPL-MCNC: 35 MG/DL (ref 20–172)
INTERPRETATION SERPL IEP-IMP: NORMAL
KAPPA LC SERPL-MCNC: 41.34 MG/L (ref 3.3–19.4)
KAPPA LC/LAMBDA SER: 1.42 {RATIO} (ref 0.26–1.65)
LAMBDA LC FREE SERPL-MCNC: 29.05 MG/L (ref 5.71–26.3)
Lab: NORMAL
M-SPIKE: NORMAL G/DL
PROT SERPL-MCNC: 7.1 G/DL (ref 6–8.5)

## 2017-04-09 ENCOUNTER — APPOINTMENT (OUTPATIENT)
Dept: CARDIOLOGY | Facility: HOSPITAL | Age: 72
End: 2017-04-09

## 2017-04-09 ENCOUNTER — APPOINTMENT (OUTPATIENT)
Dept: CT IMAGING | Facility: HOSPITAL | Age: 72
End: 2017-04-09

## 2017-04-09 ENCOUNTER — HOSPITAL ENCOUNTER (INPATIENT)
Facility: HOSPITAL | Age: 72
LOS: 2 days | Discharge: HOME | End: 2017-04-11
Attending: EMERGENCY MEDICINE | Admitting: INTERNAL MEDICINE

## 2017-04-09 ENCOUNTER — APPOINTMENT (OUTPATIENT)
Dept: GENERAL RADIOLOGY | Facility: HOSPITAL | Age: 72
End: 2017-04-09

## 2017-04-09 DIAGNOSIS — S09.90XA HEAD INJURY, INITIAL ENCOUNTER: ICD-10-CM

## 2017-04-09 DIAGNOSIS — Z74.09 IMPAIRED FUNCTIONAL MOBILITY, BALANCE, GAIT, AND ENDURANCE: ICD-10-CM

## 2017-04-09 DIAGNOSIS — Z74.09 IMPAIRED MOBILITY AND ADLS: ICD-10-CM

## 2017-04-09 DIAGNOSIS — Z78.9 IMPAIRED MOBILITY AND ADLS: ICD-10-CM

## 2017-04-09 DIAGNOSIS — R22.2 CHEST MASS: Primary | ICD-10-CM

## 2017-04-09 DIAGNOSIS — R09.02 HYPOXIA: ICD-10-CM

## 2017-04-09 DIAGNOSIS — J44.9 CHRONIC OBSTRUCTIVE PULMONARY DISEASE, UNSPECIFIED COPD TYPE (HCC): ICD-10-CM

## 2017-04-09 DIAGNOSIS — N28.9 RENAL INSUFFICIENCY: ICD-10-CM

## 2017-04-09 PROBLEM — E11.9 TYPE 2 DIABETES MELLITUS WITHOUT COMPLICATION, WITHOUT LONG-TERM CURRENT USE OF INSULIN (HCC): Status: ACTIVE | Noted: 2017-04-09

## 2017-04-09 PROBLEM — K21.00 GASTROESOPHAGEAL REFLUX DISEASE WITH ESOPHAGITIS: Status: ACTIVE | Noted: 2017-04-09

## 2017-04-09 PROBLEM — N18.30 CKD (CHRONIC KIDNEY DISEASE), STAGE III (HCC): Status: ACTIVE | Noted: 2017-04-09

## 2017-04-09 PROBLEM — I10 ESSENTIAL HYPERTENSION: Status: ACTIVE | Noted: 2017-04-09

## 2017-04-09 PROBLEM — R91.8 LUNG MASS: Status: ACTIVE | Noted: 2017-04-09

## 2017-04-09 LAB
ALBUMIN SERPL-MCNC: 4.5 G/DL (ref 3.2–4.8)
ALBUMIN/GLOB SERPL: 1.4 G/DL (ref 1.5–2.5)
ALP SERPL-CCNC: 71 U/L (ref 25–100)
ALT SERPL W P-5'-P-CCNC: 12 U/L (ref 7–40)
ANION GAP SERPL CALCULATED.3IONS-SCNC: 7 MMOL/L (ref 3–11)
AST SERPL-CCNC: 19 U/L (ref 0–33)
BACTERIA UR QL AUTO: ABNORMAL /HPF
BASOPHILS # BLD AUTO: 0.04 10*3/MM3 (ref 0–0.2)
BASOPHILS NFR BLD AUTO: 0.5 % (ref 0–1)
BH CV ECHO MEAS - AO ROOT AREA (BSA CORRECTED): 1.3
BH CV ECHO MEAS - AO ROOT AREA: 7.5 CM^2
BH CV ECHO MEAS - AO ROOT DIAM: 3.1 CM
BH CV ECHO MEAS - BSA(HAYCOCK): 2.4 M^2
BH CV ECHO MEAS - BSA: 2.3 M^2
BH CV ECHO MEAS - BZI_BMI: 36.3 KILOGRAMS/M^2
BH CV ECHO MEAS - BZI_METRIC_HEIGHT: 177.8 CM
BH CV ECHO MEAS - BZI_METRIC_WEIGHT: 114.8 KG
BH CV ECHO MEAS - CONTRAST EF (2CH): 79.4 ML/M^2
BH CV ECHO MEAS - CONTRAST EF 4CH: 79 ML/M^2
BH CV ECHO MEAS - EDV(CUBED): 317.2 ML
BH CV ECHO MEAS - EDV(MOD-SP2): 160 ML
BH CV ECHO MEAS - EDV(MOD-SP4): 157 ML
BH CV ECHO MEAS - EDV(TEICH): 240.8 ML
BH CV ECHO MEAS - EF(CUBED): 74.1 %
BH CV ECHO MEAS - EF(MOD-SP2): 79.4 %
BH CV ECHO MEAS - EF(MOD-SP4): 79 %
BH CV ECHO MEAS - EF(TEICH): 64.6 %
BH CV ECHO MEAS - ESV(CUBED): 82.3 ML
BH CV ECHO MEAS - ESV(MOD-SP2): 33 ML
BH CV ECHO MEAS - ESV(MOD-SP4): 33 ML
BH CV ECHO MEAS - ESV(TEICH): 85.4 ML
BH CV ECHO MEAS - FS: 36.2 %
BH CV ECHO MEAS - IVS/LVPW: 0.81
BH CV ECHO MEAS - IVSD: 0.71 CM
BH CV ECHO MEAS - LA DIMENSION: 4.1 CM
BH CV ECHO MEAS - LA/AO: 1.3
BH CV ECHO MEAS - LAT PEAK E' VEL: 15.6 CM/SEC
BH CV ECHO MEAS - LV DIASTOLIC VOL/BSA (35-75): 68.1 ML/M^2
BH CV ECHO MEAS - LV MASS(C)D: 229.1 GRAMS
BH CV ECHO MEAS - LV MASS(C)DI: 99.3 GRAMS/M^2
BH CV ECHO MEAS - LV SYSTOLIC VOL/BSA (12-30): 14.3 ML/M^2
BH CV ECHO MEAS - LVIDD: 6.8 CM
BH CV ECHO MEAS - LVIDS: 4.4 CM
BH CV ECHO MEAS - LVLD AP2: 7.8 CM
BH CV ECHO MEAS - LVLD AP4: 7.9 CM
BH CV ECHO MEAS - LVLS AP2: 6.7 CM
BH CV ECHO MEAS - LVLS AP4: 6.6 CM
BH CV ECHO MEAS - LVPWD: 0.87 CM
BH CV ECHO MEAS - MED PEAK E' VEL: 12.7 CM/SEC
BH CV ECHO MEAS - MV A MAX VEL: 101 CM/SEC
BH CV ECHO MEAS - MV E MAX VEL: 155 CM/SEC
BH CV ECHO MEAS - MV E/A: 1.5
BH CV ECHO MEAS - PA ACC SLOPE: 412 CM/SEC^2
BH CV ECHO MEAS - PA ACC TIME: 0.2 SEC
BH CV ECHO MEAS - PA PR(ACCEL): -12.8 MMHG
BH CV ECHO MEAS - RAP SYSTOLE: 8 MMHG
BH CV ECHO MEAS - RVDD: 3.1 CM
BH CV ECHO MEAS - RVSP: 73.3 MMHG
BH CV ECHO MEAS - SI(CUBED): 101.8 ML/M^2
BH CV ECHO MEAS - SI(MOD-SP2): 55.1 ML/M^2
BH CV ECHO MEAS - SI(MOD-SP4): 53.8 ML/M^2
BH CV ECHO MEAS - SI(TEICH): 67.4 ML/M^2
BH CV ECHO MEAS - SV(CUBED): 234.9 ML
BH CV ECHO MEAS - SV(MOD-SP2): 127 ML
BH CV ECHO MEAS - SV(MOD-SP4): 124 ML
BH CV ECHO MEAS - SV(TEICH): 155.5 ML
BH CV ECHO MEAS - TAPSE (>1.6): 1.9 CM2
BH CV ECHO MEAS - TR MAX VEL: 404 CM/SEC
BH CV XLRA - RV BASE: 5.8 CM
BH CV XLRA - RV LENGTH: 8.5 CM
BH CV XLRA - RV MID: 4.6 CM
BH CV XLRA - TDI S': 22.9 CM/SEC
BILIRUB SERPL-MCNC: 0.4 MG/DL (ref 0.3–1.2)
BILIRUB UR QL STRIP: NEGATIVE
BNP SERPL-MCNC: 527 PG/ML (ref 0–100)
BUN BLD-MCNC: 15 MG/DL (ref 9–23)
BUN/CREAT SERPL: 8.8 (ref 7–25)
CALCIUM SPEC-SCNC: 8.8 MG/DL (ref 8.7–10.4)
CHLORIDE SERPL-SCNC: 101 MMOL/L (ref 99–109)
CLARITY UR: ABNORMAL
CO2 SERPL-SCNC: 29 MMOL/L (ref 20–31)
COLOR UR: YELLOW
CREAT BLD-MCNC: 1.7 MG/DL (ref 0.6–1.3)
DEPRECATED RDW RBC AUTO: 58.9 FL (ref 37–54)
EOSINOPHIL # BLD AUTO: 0.11 10*3/MM3 (ref 0.1–0.3)
EOSINOPHIL NFR BLD AUTO: 1.3 % (ref 0–3)
ERYTHROCYTE [DISTWIDTH] IN BLOOD BY AUTOMATED COUNT: 15.9 % (ref 11.3–14.5)
GFR SERPL CREATININE-BSD FRML MDRD: 40 ML/MIN/1.73
GLOBULIN UR ELPH-MCNC: 3.2 GM/DL
GLUCOSE BLD-MCNC: 202 MG/DL (ref 70–100)
GLUCOSE BLDC GLUCOMTR-MCNC: 113 MG/DL (ref 70–130)
GLUCOSE BLDC GLUCOMTR-MCNC: 153 MG/DL (ref 70–130)
GLUCOSE UR STRIP-MCNC: NEGATIVE MG/DL
HCT VFR BLD AUTO: 36.9 % (ref 38.9–50.9)
HGB BLD-MCNC: 11.3 G/DL (ref 13.1–17.5)
HGB UR QL STRIP.AUTO: NEGATIVE
HOLD SPECIMEN: NORMAL
HOLD SPECIMEN: NORMAL
HYALINE CASTS UR QL AUTO: ABNORMAL /LPF
IMM GRANULOCYTES # BLD: 0.04 10*3/MM3 (ref 0–0.03)
IMM GRANULOCYTES NFR BLD: 0.5 % (ref 0–0.6)
KETONES UR QL STRIP: NEGATIVE
LEFT ATRIUM VOLUME INDEX: 31.6 ML/M2
LEFT ATRIUM VOLUME: 73 CM3
LEUKOCYTE ESTERASE UR QL STRIP.AUTO: ABNORMAL
LYMPHOCYTES # BLD AUTO: 1.61 10*3/MM3 (ref 0.6–4.8)
LYMPHOCYTES NFR BLD AUTO: 18.6 % (ref 24–44)
MCH RBC QN AUTO: 30.8 PG (ref 27–31)
MCHC RBC AUTO-ENTMCNC: 30.6 G/DL (ref 32–36)
MCV RBC AUTO: 100.5 FL (ref 80–99)
MONOCYTES # BLD AUTO: 0.51 10*3/MM3 (ref 0–1)
MONOCYTES NFR BLD AUTO: 5.9 % (ref 0–12)
NEUTROPHILS # BLD AUTO: 6.35 10*3/MM3 (ref 1.5–8.3)
NEUTROPHILS NFR BLD AUTO: 73.2 % (ref 41–71)
NITRITE UR QL STRIP: NEGATIVE
PH UR STRIP.AUTO: 6.5 [PH] (ref 5–8)
PLATELET # BLD AUTO: 173 10*3/MM3 (ref 150–450)
PMV BLD AUTO: 10.2 FL (ref 6–12)
POTASSIUM BLD-SCNC: 4.3 MMOL/L (ref 3.5–5.5)
PROT SERPL-MCNC: 7.7 G/DL (ref 5.7–8.2)
PROT UR QL STRIP: ABNORMAL
RBC # BLD AUTO: 3.67 10*6/MM3 (ref 4.2–5.76)
RBC # UR: ABNORMAL /HPF
REF LAB TEST METHOD: ABNORMAL
SODIUM BLD-SCNC: 137 MMOL/L (ref 132–146)
SP GR UR STRIP: 1.02 (ref 1–1.03)
SQUAMOUS #/AREA URNS HPF: ABNORMAL /HPF
TROPONIN I SERPL-MCNC: 0.01 NG/ML (ref 0–0.07)
UROBILINOGEN UR QL STRIP: ABNORMAL
WBC NRBC COR # BLD: 8.66 10*3/MM3 (ref 3.5–10.8)
WBC UR QL AUTO: ABNORMAL /HPF
WHOLE BLOOD HOLD SPECIMEN: NORMAL
WHOLE BLOOD HOLD SPECIMEN: NORMAL

## 2017-04-09 PROCEDURE — C8929 TTE W OR WO FOL WCON,DOPPLER: HCPCS

## 2017-04-09 PROCEDURE — 25010000002 HEPARIN (PORCINE) PER 1000 UNITS: Performed by: PHYSICIAN ASSISTANT

## 2017-04-09 PROCEDURE — 25010000002 ONDANSETRON PER 1 MG: Performed by: PHYSICIAN ASSISTANT

## 2017-04-09 PROCEDURE — 94640 AIRWAY INHALATION TREATMENT: CPT

## 2017-04-09 PROCEDURE — 70450 CT HEAD/BRAIN W/O DYE: CPT

## 2017-04-09 PROCEDURE — 82962 GLUCOSE BLOOD TEST: CPT

## 2017-04-09 PROCEDURE — 94799 UNLISTED PULMONARY SVC/PX: CPT

## 2017-04-09 PROCEDURE — 25010000002 SULFUR HEXAFLUORIDE MICROSPH 60.7-25 MG RECONSTITUTED SUSPENSION: Performed by: INTERNAL MEDICINE

## 2017-04-09 PROCEDURE — 71010 HC CHEST PA OR AP: CPT

## 2017-04-09 PROCEDURE — 93306 TTE W/DOPPLER COMPLETE: CPT | Performed by: INTERNAL MEDICINE

## 2017-04-09 PROCEDURE — 84484 ASSAY OF TROPONIN QUANT: CPT

## 2017-04-09 PROCEDURE — 87077 CULTURE AEROBIC IDENTIFY: CPT | Performed by: NURSE PRACTITIONER

## 2017-04-09 PROCEDURE — 87186 SC STD MICRODIL/AGAR DIL: CPT | Performed by: NURSE PRACTITIONER

## 2017-04-09 PROCEDURE — 83880 ASSAY OF NATRIURETIC PEPTIDE: CPT

## 2017-04-09 PROCEDURE — 85025 COMPLETE CBC W/AUTO DIFF WBC: CPT

## 2017-04-09 PROCEDURE — 80053 COMPREHEN METABOLIC PANEL: CPT

## 2017-04-09 PROCEDURE — 36415 COLL VENOUS BLD VENIPUNCTURE: CPT

## 2017-04-09 PROCEDURE — 87086 URINE CULTURE/COLONY COUNT: CPT | Performed by: NURSE PRACTITIONER

## 2017-04-09 PROCEDURE — 93005 ELECTROCARDIOGRAM TRACING: CPT

## 2017-04-09 PROCEDURE — 25010000002 SULFUR HEXAFLUORIDE MICROSPH 60.7-25 MG RECONSTITUTED SUSPENSION

## 2017-04-09 PROCEDURE — 63710000001 INSULIN LISPRO (HUMAN) PER 5 UNITS: Performed by: PHYSICIAN ASSISTANT

## 2017-04-09 PROCEDURE — 81001 URINALYSIS AUTO W/SCOPE: CPT | Performed by: NURSE PRACTITIONER

## 2017-04-09 PROCEDURE — 99223 1ST HOSP IP/OBS HIGH 75: CPT | Performed by: INTERNAL MEDICINE

## 2017-04-09 PROCEDURE — 99283 EMERGENCY DEPT VISIT LOW MDM: CPT

## 2017-04-09 PROCEDURE — 87040 BLOOD CULTURE FOR BACTERIA: CPT | Performed by: NURSE PRACTITIONER

## 2017-04-09 RX ORDER — MORPHINE SULFATE 100 MG/1
200 TABLET ORAL EVERY 12 HOURS SCHEDULED
Status: DISCONTINUED | OUTPATIENT
Start: 2017-04-09 | End: 2017-04-11 | Stop reason: HOSPADM

## 2017-04-09 RX ORDER — ATENOLOL 25 MG/1
12.5 TABLET ORAL DAILY
Status: DISCONTINUED | OUTPATIENT
Start: 2017-04-09 | End: 2017-04-10

## 2017-04-09 RX ORDER — DOXAZOSIN MESYLATE 4 MG/1
4 TABLET ORAL NIGHTLY
Status: DISCONTINUED | OUTPATIENT
Start: 2017-04-09 | End: 2017-04-11 | Stop reason: HOSPADM

## 2017-04-09 RX ORDER — ONDANSETRON 2 MG/ML
4 INJECTION INTRAMUSCULAR; INTRAVENOUS EVERY 6 HOURS PRN
Status: DISCONTINUED | OUTPATIENT
Start: 2017-04-09 | End: 2017-04-11 | Stop reason: HOSPADM

## 2017-04-09 RX ORDER — HEPARIN SODIUM 5000 [USP'U]/ML
5000 INJECTION, SOLUTION INTRAVENOUS; SUBCUTANEOUS EVERY 8 HOURS SCHEDULED
Status: DISCONTINUED | OUTPATIENT
Start: 2017-04-09 | End: 2017-04-11 | Stop reason: HOSPADM

## 2017-04-09 RX ORDER — CITALOPRAM 20 MG/1
20 TABLET ORAL DAILY
Status: DISCONTINUED | OUTPATIENT
Start: 2017-04-09 | End: 2017-04-11 | Stop reason: HOSPADM

## 2017-04-09 RX ORDER — PANTOPRAZOLE SODIUM 40 MG/1
40 TABLET, DELAYED RELEASE ORAL 2 TIMES DAILY
COMMUNITY

## 2017-04-09 RX ORDER — PANTOPRAZOLE SODIUM 40 MG/1
40 TABLET, DELAYED RELEASE ORAL EVERY MORNING
Status: DISCONTINUED | OUTPATIENT
Start: 2017-04-10 | End: 2017-04-11 | Stop reason: HOSPADM

## 2017-04-09 RX ORDER — SODIUM CHLORIDE 0.9 % (FLUSH) 0.9 %
10 SYRINGE (ML) INJECTION AS NEEDED
Status: DISCONTINUED | OUTPATIENT
Start: 2017-04-09 | End: 2017-04-11 | Stop reason: HOSPADM

## 2017-04-09 RX ORDER — FAMOTIDINE 20 MG/1
20 TABLET, FILM COATED ORAL 2 TIMES DAILY
Status: DISCONTINUED | OUTPATIENT
Start: 2017-04-09 | End: 2017-04-11 | Stop reason: HOSPADM

## 2017-04-09 RX ORDER — NICOTINE POLACRILEX 4 MG
15 LOZENGE BUCCAL
Status: DISCONTINUED | OUTPATIENT
Start: 2017-04-09 | End: 2017-04-11 | Stop reason: HOSPADM

## 2017-04-09 RX ORDER — SENNA AND DOCUSATE SODIUM 50; 8.6 MG/1; MG/1
2 TABLET, FILM COATED ORAL 2 TIMES DAILY
Status: DISCONTINUED | OUTPATIENT
Start: 2017-04-09 | End: 2017-04-11 | Stop reason: HOSPADM

## 2017-04-09 RX ORDER — PRAVASTATIN SODIUM 40 MG
80 TABLET ORAL NIGHTLY
Status: DISCONTINUED | OUTPATIENT
Start: 2017-04-09 | End: 2017-04-11 | Stop reason: HOSPADM

## 2017-04-09 RX ORDER — ONDANSETRON 4 MG/1
4 TABLET, FILM COATED ORAL EVERY 6 HOURS PRN
Status: DISCONTINUED | OUTPATIENT
Start: 2017-04-09 | End: 2017-04-11 | Stop reason: HOSPADM

## 2017-04-09 RX ORDER — DOCUSATE SODIUM 100 MG/1
100 CAPSULE, LIQUID FILLED ORAL 2 TIMES DAILY
COMMUNITY

## 2017-04-09 RX ORDER — FINASTERIDE 5 MG/1
5 TABLET, FILM COATED ORAL DAILY
Status: DISCONTINUED | OUTPATIENT
Start: 2017-04-09 | End: 2017-04-11 | Stop reason: HOSPADM

## 2017-04-09 RX ORDER — DEXTROSE MONOHYDRATE 25 G/50ML
25 INJECTION, SOLUTION INTRAVENOUS
Status: DISCONTINUED | OUTPATIENT
Start: 2017-04-09 | End: 2017-04-11 | Stop reason: HOSPADM

## 2017-04-09 RX ORDER — ACETAMINOPHEN 325 MG/1
650 TABLET ORAL EVERY 4 HOURS PRN
Status: DISCONTINUED | OUTPATIENT
Start: 2017-04-09 | End: 2017-04-11 | Stop reason: HOSPADM

## 2017-04-09 RX ORDER — BUMETANIDE 0.25 MG/ML
1 INJECTION INTRAMUSCULAR; INTRAVENOUS 2 TIMES DAILY
Status: DISCONTINUED | OUTPATIENT
Start: 2017-04-09 | End: 2017-04-11 | Stop reason: HOSPADM

## 2017-04-09 RX ORDER — IPRATROPIUM BROMIDE AND ALBUTEROL SULFATE 2.5; .5 MG/3ML; MG/3ML
3 SOLUTION RESPIRATORY (INHALATION) ONCE
Status: COMPLETED | OUTPATIENT
Start: 2017-04-09 | End: 2017-04-09

## 2017-04-09 RX ORDER — SODIUM CHLORIDE 0.9 % (FLUSH) 0.9 %
1-10 SYRINGE (ML) INJECTION AS NEEDED
Status: DISCONTINUED | OUTPATIENT
Start: 2017-04-09 | End: 2017-04-11 | Stop reason: HOSPADM

## 2017-04-09 RX ORDER — DIAZEPAM 5 MG/1
10 TABLET ORAL EVERY 8 HOURS PRN
Status: DISCONTINUED | OUTPATIENT
Start: 2017-04-09 | End: 2017-04-11 | Stop reason: HOSPADM

## 2017-04-09 RX ADMIN — IPRATROPIUM BROMIDE AND ALBUTEROL SULFATE 3 ML: .5; 3 SOLUTION RESPIRATORY (INHALATION) at 09:20

## 2017-04-09 RX ADMIN — FAMOTIDINE 20 MG: 20 TABLET ORAL at 18:31

## 2017-04-09 RX ADMIN — CITALOPRAM HYDROBROMIDE 20 MG: 20 TABLET ORAL at 14:43

## 2017-04-09 RX ADMIN — DOXAZOSIN MESYLATE 4 MG: 4 TABLET ORAL at 21:03

## 2017-04-09 RX ADMIN — FINASTERIDE 5 MG: 5 TABLET, FILM COATED ORAL at 14:43

## 2017-04-09 RX ADMIN — PRAVASTATIN SODIUM 80 MG: 40 TABLET ORAL at 21:02

## 2017-04-09 RX ADMIN — MORPHINE SULFATE 200 MG: 100 TABLET, FILM COATED, EXTENDED RELEASE ORAL at 21:01

## 2017-04-09 RX ADMIN — HEPARIN SODIUM 5000 UNITS: 5000 INJECTION, SOLUTION INTRAVENOUS; SUBCUTANEOUS at 21:01

## 2017-04-09 RX ADMIN — ONDANSETRON 4 MG: 2 INJECTION INTRAMUSCULAR; INTRAVENOUS at 18:44

## 2017-04-09 RX ADMIN — Medication 2 TABLET: at 18:31

## 2017-04-09 RX ADMIN — BUMETANIDE 1 MG: 0.25 INJECTION, SOLUTION INTRAMUSCULAR; INTRAVENOUS at 18:31

## 2017-04-09 RX ADMIN — SULFUR HEXAFLUORIDE 3 ML: KIT at 05:20

## 2017-04-09 RX ADMIN — INSULIN LISPRO 2 UNITS: 100 INJECTION, SOLUTION INTRAVENOUS; SUBCUTANEOUS at 18:31

## 2017-04-09 RX ADMIN — ACETAMINOPHEN 650 MG: 325 TABLET, FILM COATED ORAL at 21:02

## 2017-04-09 NOTE — H&P
I performed a history and physical examination of the patient independent of the physician assistant.  I reviewed the patient's laboratory and radiological data.  I discussed the plan care with the patient and his wife as well as with the physician assistant    This is a 71-year-old obese  male former smoker, resident of [facility] with a past medical history significant for long-standing essential hypertension, hyperlipidemia, coronary artery disease with prior angioplasty and stenting, chronic kidney disease stage III possibly due to hypertensive nephrosclerosis, lung cancer diagnosed in February 2016 status post right upper and lower wedge resection for stage I a synchronous adenocarcinoma of the lung without adjuvant radiation or chemotherapy, 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  The heart is enlarged.  Aorta is calcified and ectatic.  There is calcification of coronary arteries.  There is a 4.6×2.2 cm right peritracheal mass and possibly large lymph node.  There is linear scarring in the right upper lobe posteriorly and in the right lower lobe.  There is a right pleural effusion and pleural thickening    Upon presentation to the emergency room his saturation was 87%      PHYSICAL EXAMINATION:  Vital signs reviewed   Gen. appearance: Pleasant  male in no distress. He is awake and alert  HEENT: Pupils reactive and responsive to light. Extraocular muscles are intact.   Chest : Decreased breath sounds over bases.  No  wheezing, rales  Cardiovascular: Regular rate and rhythm. No murmurs rubs or gallop no increased jugular venous pulsation  Abdomen: Soft. Non tender to palpation. No palpable masses. No CVA tenderness. Normal bowel sounds.   Extremities: No skin lesions or rashes. No edema. Intact peripheral pulses  Neurologic examination: Motor tone and strength are normal. Intact deep tendon reflexes. No focal neurological deficit.  Psychiatric: appropriate affect      Impression  1. Acute hypoxia most likely due to decompensated hypertensive diastolic heart failure with volume overload  Incidental finding of right peritracheal mass possibly representing recurrence of his lung cancer  Plan:  Start intravenous Bumex 1 mg twice a day.  Fluid restriction.  Daily weight.  Strict in and out.  Transthoracic echocardiogram    2.  History of stage I a synchronous adenocarcinoma the lung with 2 wedge resection of the right upper lobe and right lower lobe and upper 2016 without the need for adjuvant chemotherapy or radiation treatment now with evidence of a 4.6×2.2 cm right paratracheal mass most likely representing recurrence of his lung cancer  This mass should be easily accessible for transbronchial lung biopsy and we will ask CT surgery to evaluate in a.m.    3.  History of chronic kidney disease. KDIGO G3BA2 most likely due to hypertensive nephrosclerosis    4.  History of coronary artery disease with prior angioplasty and stenting.  Last left heart catheterization from July 2015 revealed patent stents to LAD.  The patient underwent PTCA to ramus for in-stent restenosis  No anginal symptoms    5.  Type 2 diabetes mellitus on oral hypoglycemics    6.  Senile dementia of mild to moderate severity stable without superimposed behavioral disorder

## 2017-04-09 NOTE — ED PROVIDER NOTES
"Subjective   HPI Comments: Worsening difficulty breathing over last 2 weeks. Much worse recently. Had a CTA chest on Friday ordered by his PCP to ro pe but a chest \"mass\" was found. Old records requested. Hx of lung cancer with resection over year ago. Dr. Bui Oncologist. Is not on chemo or radiation.    Has also been falling recently. Left eye and forehead hematoma. Wife tells me the swelling has gotten worse since falling 5 days ago.    Patient is a 71 y.o. male presenting with shortness of breath.   Shortness of Breath   Severity:  Moderate  Onset quality:  Gradual  Timing:  Constant  Progression:  Worsening  Chronicity:  New  Relieved by:  Nothing  Worsened by:  Exertion  Associated symptoms: cough and wheezing    Associated symptoms: no abdominal pain and no fever    Risk factors: hx of cancer        Review of Systems   Constitutional: Negative for fever.   Respiratory: Positive for cough, shortness of breath and wheezing.    Gastrointestinal: Negative for abdominal pain.   All other systems reviewed and are negative.      Past Medical History:   Diagnosis Date   • Acute kidney injury    • Acute postthoracotomy pain    • Acute tubular necrosis    • Acute tubular necrosis    • Anemia    • Arthritis    • B12 deficiency    • Bone pain    • Carcinoma of lung     stage IA   • Chest pain     occasional   • Confusion, postoperative    • Coronary disease    • Dehydration    • Depression    • Diabetes mellitus    • Diarrhea     due to meds   • Dry mouth    • Enlarged glands    • Fatigue    • Fractures    • Gastritis     with stomach ulcers   • H/O colonoscopy 03/2016   • HBP (high blood pressure)    • Heartburn    • History of BPH    • Hyperlipidemia    • Impotence    • Joint pain    • Kidney infection    • Kidney stones    • Leaking of urine    • Lung cancer 01/2016   • Pain     postthoracotomy - mild pain    • Peptic ulceration    • Reflux esophagitis    • UTI (urinary tract infection)    • Weakness        Allergies "   Allergen Reactions   • Atorvastatin    • Sulfa Antibiotics        Past Surgical History:   Procedure Laterality Date   • ANKLE SURGERY     • BACK SURGERY     • BONE MARROW BIOPSY     • CAROTID STENT  1991   • KIDNEY STONE SURGERY  1976   • KNEE SURGERY Bilateral 2010   • LUNG REMOVAL, PARTIAL Right    • OTHER SURGICAL HISTORY  1967    ulcer       Family History   Problem Relation Age of Onset   • Diabetes Mother    • Breast cancer Sister      60s   • Colon cancer Sister      59   • Skin cancer Sister 45   • Diabetes Other        Social History     Social History   • Marital status:      Spouse name: N/A   • Number of children: N/A   • Years of education: N/A     Social History Main Topics   • Smoking status: Former Smoker     Packs/day: 1.00     Types: Cigarettes     Quit date: 1991   • Smokeless tobacco: Never Used   • Alcohol use No   • Drug use: No   • Sexual activity: Defer     Other Topics Concern   • None     Social History Narrative           Objective   Physical Exam   Constitutional: He is oriented to person, place, and time. He appears well-developed and well-nourished. He appears distressed.   HENT:   Head: Normocephalic and atraumatic.       Right Ear: External ear normal.   Left Ear: External ear normal.   Nose: Nose normal.   Mouth/Throat: Oropharynx is clear and moist.   Eyes: Conjunctivae and EOM are normal. Pupils are equal, round, and reactive to light.   Neck: Normal range of motion. Neck supple.   Cardiovascular: Normal rate, regular rhythm, normal heart sounds and intact distal pulses.    Pulmonary/Chest: He is in respiratory distress. He has wheezes.   Abdominal: Soft. Bowel sounds are normal.   Musculoskeletal: Normal range of motion.   Neurological: He is alert and oriented to person, place, and time.   Skin: Skin is warm and dry.   Psychiatric: He has a normal mood and affect. His behavior is normal. Judgment normal.       Critical Care  Performed by: PAMELA HUMPHREYS  Authorized by:  HENNY KELLY   Critical care was necessary to treat or prevent imminent or life-threatening deterioration of the following conditions: respiratory failure.  Critical care was time spent personally by me on the following activities: examination of patient, re-evaluation of patient's condition, pulse oximetry, review of old charts, evaluation of patient's response to treatment and discussions with consultants.               ED Course  ED Course   Comment By Time   Consulted Dr. De Dios and he will admit YIMI Alcala 04/09 1101   CT report from [facility] for pe. Non specific righth paratracheal mass. Recommend PET. YIMI Alcala 04/09 1101                  MDM    Final diagnoses:   Chest mass   Hypoxia   Chronic obstructive pulmonary disease, unspecified COPD type   Renal insufficiency   Head injury, initial encounter            YIMI Alcala  04/09/17 1102

## 2017-04-10 LAB
ANION GAP SERPL CALCULATED.3IONS-SCNC: 7 MMOL/L (ref 3–11)
BUN BLD-MCNC: 14 MG/DL (ref 9–23)
BUN/CREAT SERPL: 8.8 (ref 7–25)
CALCIUM SPEC-SCNC: 9 MG/DL (ref 8.7–10.4)
CHLORIDE SERPL-SCNC: 100 MMOL/L (ref 99–109)
CO2 SERPL-SCNC: 35 MMOL/L (ref 20–31)
CREAT BLD-MCNC: 1.6 MG/DL (ref 0.6–1.3)
DEPRECATED RDW RBC AUTO: 57 FL (ref 37–54)
ERYTHROCYTE [DISTWIDTH] IN BLOOD BY AUTOMATED COUNT: 15.6 % (ref 11.3–14.5)
GFR SERPL CREATININE-BSD FRML MDRD: 43 ML/MIN/1.73
GLUCOSE BLD-MCNC: 112 MG/DL (ref 70–100)
GLUCOSE BLDC GLUCOMTR-MCNC: 105 MG/DL (ref 70–130)
GLUCOSE BLDC GLUCOMTR-MCNC: 122 MG/DL (ref 70–130)
GLUCOSE BLDC GLUCOMTR-MCNC: 129 MG/DL (ref 70–130)
HBA1C MFR BLD: 5.9 % (ref 4.8–5.6)
HCT VFR BLD AUTO: 36.3 % (ref 38.9–50.9)
HGB BLD-MCNC: 11.1 G/DL (ref 13.1–17.5)
MCH RBC QN AUTO: 30.3 PG (ref 27–31)
MCHC RBC AUTO-ENTMCNC: 30.6 G/DL (ref 32–36)
MCV RBC AUTO: 99.2 FL (ref 80–99)
PLATELET # BLD AUTO: 148 10*3/MM3 (ref 150–450)
PMV BLD AUTO: 10 FL (ref 6–12)
POTASSIUM BLD-SCNC: 3.6 MMOL/L (ref 3.5–5.5)
RBC # BLD AUTO: 3.66 10*6/MM3 (ref 4.2–5.76)
SODIUM BLD-SCNC: 142 MMOL/L (ref 132–146)
TSH SERPL DL<=0.05 MIU/L-ACNC: 1.53 MIU/ML (ref 0.35–5.35)
WBC NRBC COR # BLD: 5.12 10*3/MM3 (ref 3.5–10.8)

## 2017-04-10 PROCEDURE — 99222 1ST HOSP IP/OBS MODERATE 55: CPT | Performed by: INTERNAL MEDICINE

## 2017-04-10 PROCEDURE — 85027 COMPLETE CBC AUTOMATED: CPT | Performed by: PHYSICIAN ASSISTANT

## 2017-04-10 PROCEDURE — 83036 HEMOGLOBIN GLYCOSYLATED A1C: CPT | Performed by: PHYSICIAN ASSISTANT

## 2017-04-10 PROCEDURE — 25010000002 ONDANSETRON PER 1 MG: Performed by: PHYSICIAN ASSISTANT

## 2017-04-10 PROCEDURE — 97161 PT EVAL LOW COMPLEX 20 MIN: CPT

## 2017-04-10 PROCEDURE — 80048 BASIC METABOLIC PNL TOTAL CA: CPT | Performed by: PHYSICIAN ASSISTANT

## 2017-04-10 PROCEDURE — 82962 GLUCOSE BLOOD TEST: CPT

## 2017-04-10 PROCEDURE — 84443 ASSAY THYROID STIM HORMONE: CPT | Performed by: PHYSICIAN ASSISTANT

## 2017-04-10 PROCEDURE — 25010000002 HEPARIN (PORCINE) PER 1000 UNITS: Performed by: PHYSICIAN ASSISTANT

## 2017-04-10 PROCEDURE — 99232 SBSQ HOSP IP/OBS MODERATE 35: CPT | Performed by: INTERNAL MEDICINE

## 2017-04-10 RX ADMIN — DOXAZOSIN MESYLATE 4 MG: 4 TABLET ORAL at 21:12

## 2017-04-10 RX ADMIN — PRAVASTATIN SODIUM 80 MG: 40 TABLET ORAL at 21:11

## 2017-04-10 RX ADMIN — FINASTERIDE 5 MG: 5 TABLET, FILM COATED ORAL at 08:31

## 2017-04-10 RX ADMIN — Medication 2 TABLET: at 08:32

## 2017-04-10 RX ADMIN — BUMETANIDE 1 MG: 0.25 INJECTION, SOLUTION INTRAMUSCULAR; INTRAVENOUS at 08:32

## 2017-04-10 RX ADMIN — DIAZEPAM 10 MG: 5 TABLET ORAL at 13:30

## 2017-04-10 RX ADMIN — ACETAMINOPHEN 650 MG: 325 TABLET, FILM COATED ORAL at 13:30

## 2017-04-10 RX ADMIN — CITALOPRAM HYDROBROMIDE 20 MG: 20 TABLET ORAL at 08:32

## 2017-04-10 RX ADMIN — HEPARIN SODIUM 5000 UNITS: 5000 INJECTION, SOLUTION INTRAVENOUS; SUBCUTANEOUS at 06:08

## 2017-04-10 RX ADMIN — BUMETANIDE 1 MG: 0.25 INJECTION, SOLUTION INTRAMUSCULAR; INTRAVENOUS at 17:24

## 2017-04-10 RX ADMIN — FAMOTIDINE 20 MG: 20 TABLET ORAL at 17:24

## 2017-04-10 RX ADMIN — MORPHINE SULFATE 200 MG: 100 TABLET, FILM COATED, EXTENDED RELEASE ORAL at 08:31

## 2017-04-10 RX ADMIN — PANTOPRAZOLE SODIUM 40 MG: 40 TABLET, DELAYED RELEASE ORAL at 06:08

## 2017-04-10 RX ADMIN — ONDANSETRON 4 MG: 4 TABLET, FILM COATED ORAL at 08:32

## 2017-04-10 RX ADMIN — Medication 2 TABLET: at 17:24

## 2017-04-10 RX ADMIN — HEPARIN SODIUM 5000 UNITS: 5000 INJECTION, SOLUTION INTRAVENOUS; SUBCUTANEOUS at 13:18

## 2017-04-10 RX ADMIN — HEPARIN SODIUM 5000 UNITS: 5000 INJECTION, SOLUTION INTRAVENOUS; SUBCUTANEOUS at 21:11

## 2017-04-10 RX ADMIN — FAMOTIDINE 20 MG: 20 TABLET ORAL at 08:31

## 2017-04-10 RX ADMIN — MORPHINE SULFATE 200 MG: 100 TABLET, FILM COATED, EXTENDED RELEASE ORAL at 21:11

## 2017-04-10 RX ADMIN — ONDANSETRON 4 MG: 2 INJECTION INTRAMUSCULAR; INTRAVENOUS at 17:30

## 2017-04-10 NOTE — CONSULTS
INTENSIVIST / PULMONARY INITIAL VISIT (CONSULT / H&P) NOTE     Hospital:  LOS: 1 day   Mr. Rodo Rodriguez, 71 y.o. male is followed for:   Chief Complaint   Patient presents with   • Shortness of Breath     Principal Problem:    Hypoxia  Active Problems:    CKD (chronic kidney disease), stage III    Lung mass    Type 2 diabetes mellitus without complication, without long-term current use of insulin    Essential hypertension    Gastroesophageal reflux disease with esophagitis         History of Present Illness   70 y/o WM w/ h/o HTN, GERD, T2DM, CKD3, COPD, former tobacco 1ppd, NSCLC s/p RUL and RLL Wedge with synchronous 1A adenocarcinoma primaries, admitted 4/9 w/ worsening dyspnea.  Had recent CT Angiogram 4/7 that reportedly showed 4.6x2.2 cm right paratracheal mass w/ right pleural effusion and pleural thickening.  Patient's breathing has improved since admission.  Of note he takes plavix for CAD, last dose Saturday morning, hasn't had a stent for a couple years.    Past Medical History:   Diagnosis Date   • Acute kidney injury    • Acute tubular necrosis    • Anemia    • Arthritis    • B12 deficiency    • Bone pain    • Carcinoma of lung     stage IA   • Chronic kidney disease (CKD), stage III (moderate)    • Confusion, postoperative    • COPD (chronic obstructive pulmonary disease)    • Coronary artery disease s/p stent    • Depression    • Diabetes mellitus    • Fatigue    • Fractures    • GERD (gastroesophageal reflux disease)    • H/O colonoscopy 03/2016   • History of BPH    • Hyperlipidemia    • Hypertension    • Impotence    • Kidney stones    • Leaking of urine    • Lung cancer 01/2016   • Peptic ulcer disease    • Post-thoracotomy pain, mild      Past Surgical History:   Procedure Laterality Date   • ANKLE SURGERY     • BACK SURGERY     • BONE MARROW BIOPSY     • CAROTID STENT  1991   • CORONARY STENT PLACEMENT     • KIDNEY STONE SURGERY  1976   • KNEE SURGERY Bilateral 2010   • LUNG REMOVAL, PARTIAL  Right     Right upper and lower lobe wedge resections (2016)     Family History   Problem Relation Age of Onset   • Diabetes Mother    • Breast cancer Sister      60s   • Colon cancer Sister      59   • Skin cancer Sister 45   • Diabetes Other      Social History     Social History   • Marital status:      Spouse name: N/A   • Number of children: N/A   • Years of education: N/A     Occupational History   • Not on file.     Social History Main Topics   • Smoking status: Former Smoker     Packs/day: 1.00     Types: Cigarettes     Quit date: 1991   • Smokeless tobacco: Never Used   • Alcohol use No   • Drug use: No   • Sexual activity: Defer     Other Topics Concern   • Not on file     Social History Narrative   • No narrative on file     Allergies   Allergen Reactions   • Atorvastatin    • Sulfa Antibiotics      No current facility-administered medications on file prior to encounter.      Current Outpatient Prescriptions on File Prior to Encounter   Medication Sig Dispense Refill   • amLODIPine (NORVASC) 10 MG tablet Take 1 tablet by mouth daily.     • atenolol (TENORMIN) 25 MG tablet Take 12.5 mg by mouth Daily.     • citalopram (CeleXA) 20 MG tablet TAKE 1 TABLET BY MOUTH DAILY  5   • clopidogrel (PLAVIX) 75 MG tablet Take 1 tablet by mouth daily.     • diazepam (VALIUM) 10 MG tablet Take 1 tablet by mouth 4 (Four) Times a Day.     • doxazosin (CARDURA) 4 MG tablet TAKE 1 TABLET BY MOUTH DAILY  5   • finasteride (PROSCAR) 5 MG tablet Take 5 mg by mouth Daily.     • hydrALAZINE (APRESOLINE) 25 MG tablet TAKE 1 TABLET BY MOUTH 3 TIMES A DAY  2   • isosorbide mononitrate (IMDUR) 30 MG 24 hr tablet Take 30 mg by mouth daily.     • linagliptin (TRADJENTA) 5 MG tablet tablet Take 5 mg by mouth daily.     • morphine (MS CONTIN) 100 MG 12 hr tablet Take 200 mg by mouth 2 (Two) Times a Day.     • pravastatin (PRAVACHOL) 80 MG tablet Take 1 tablet by mouth daily.     • promethazine (PHENERGAN) 25 MG tablet TAKE 1  TABLET BY MOUTH EVERY 4 TO 6 HOURS  1   • raNITIdine (ZANTAC) 150 MG tablet TAKE 1 TABLET BY MOUTH TWICE A DAY  10   • sulfamethoxazole-trimethoprim (BACTRIM DS,SEPTRA DS) 800-160 MG per tablet TAKE 1 TABLET BY MOUTH EVERY 12 HOURS  2       ROS:  Per HPI, all other systems were reviewed and were negative        OBJECTIVE     Vitals:    04/10/17 1221   BP: 143/69   Pulse: (!) 47   Resp: 16   Temp: 97.7 °F (36.5 °C)   SpO2: (!) 81%     General Appearance:  Conversant, in no acute distress  Eyes:  No scleral icterus or pallor, PERRLA  Ears, Nose, Mouth, Throat:  Atraumatic, oropharynx clear  Neck:  Trachea midline, thyroid normal  Respiratory:  Clear to auscultation bilaterally, normal effort  Cardiovascular:  Regular rate and rhythm, no murmurs, no peripheral edema  Gastrointestinal:  Soft, non-tender, non-distended, no hepatosplenomegaly  Skin:  Normal temperature, no rash  Psychiatric:  Alert and oriented x 3, normal judgement and insight  Neuro:  No focal neurologic deficits observed    Relevant imaging studies and labs from 04/10/17 were reviewed and interpreted by me    Assessment/Plan   IMPRESSION / PLAN     Inpatient Problem List:  71 y.o.male:  Principal Problem:    Hypoxia  Active Problems:    CKD (chronic kidney disease), stage III    Lung mass    Type 2 diabetes mellitus without complication, without long-term current use of insulin    Essential hypertension    Gastroesophageal reflux disease with esophagitis       Impression:  70 y/o WM w/ h/o HTN, GERD, T2DM, CKD3, COPD, former tobacco 1ppd, NSCLC s/p RUL and RLL Wedge with synchronous 1A adenocarcinoma primaries, admitted 4/9 w/ worsening dyspnea.  Had recent CT Angiogram 4/7 that reportedly showed 4.6x2.2 cm right paratracheal mass w/ right pleural effusion and pleural thickening concerning for malignancy.    Plan:  -needs to be off Plavix 5 days for any procedure  -images not available for review, wife is going to bring CD tonight and I will review it  tomorrow  -likely EBUS Thursday or Friday.  This can be arranged as inpatient or outpatient.  -further recs after reviewed CT tomorrow         Kingsley Hodge MD  Intensive Care Medicine  04/10/17 2:21 PM

## 2017-04-10 NOTE — PROGRESS NOTES
[facility] Medicine Services  INPATIENT PROGRESS NOTE    Date of Admission: 4/9/2017  Length of Stay: 1  Primary Care Physician: Dandy Bailey MD    Subjective   CC: dyspnea    HPI:  Feels better today.  Has been voiding a lot.  No leg swelling  No chest pain      Review Of Systems:   Review of Systems   Quit tob 30 yrs ago  Therese meyer at [facility]  Fell one time, no LOC, two weeks ago    Objective      Temp:  [97.4 °F (36.3 °C)-99.4 °F (37.4 °C)] 98.1 °F (36.7 °C)  Heart Rate:  [40-54] 42  Resp:  [16-22] 16  BP: (114-166)/(47-95) 145/95  Physical Exam    Gen:  WD/WN in bed on RA  Neuro: alert and oriented, clear speech, follows commands, grossly nonfocal  HEENT:   Healing ecchymosis L periorbit, PERRL, OP benign  Neck:  Supple, no LAD  Heart RR loretta no murmur, rub, or gallop  Lungs slightly distant but no w r r,nonlabored at rest on RA.     Abd:  Soft, nontender, no rebound or guarding, pos BS  Extrem:  No c/c/e    Results Review:    I have reviewed the labs, radiology results and diagnostic studies.      Results from last 7 days  Lab Units 04/10/17  0725   WBC 10*3/mm3 5.12   HEMOGLOBIN g/dL 11.1*   PLATELETS 10*3/mm3 148*       Results from last 7 days  Lab Units 04/10/17  0725   SODIUM mmol/L 142   POTASSIUM mmol/L 3.6   CHLORIDE mmol/L 100   TOTAL CO2 mmol/L 35.0*   BUN mg/dL 14   CREATININE mg/dL 1.60*   GLUCOSE mg/dL 112*   CALCIUM mg/dL 9.0       Culture Data: Cultures:    Blood Culture   Date Value Ref Range Status   04/09/2017 No growth at 24 hours  Preliminary   04/09/2017 No growth at 24 hours  Preliminary     Urine Culture   Date Value Ref Range Status   04/09/2017 Culture in progress  Preliminary       Radiology Data:     I have reviewed the medications.    Assessment/Plan     Problem List  Hospital Problem List     * (Principal)Hypoxia    Overview Addendum 4/9/2017  1:22 PM by PATRIC Franklin     - Due to VQ mismatch from pulmonary edema from suspected acute  diastolic HF  - RA sat 87% ER          CKD (chronic kidney disease), stage III    Overview Signed 4/9/2017  1:23 PM by PATRIC Franklin     - Baseline creatinine 1.4-1.6 (4/9/17)         Lung mass    Overview Signed 4/9/2017  1:23 PM by PATRIC Franklin     - Right paratracheal mass noted on CT angiogram (4/7/17)         Type 2 diabetes mellitus without complication, without long-term current use of insulin    Essential hypertension    Gastroesophageal reflux disease with esophagitis           71 yr old s/p lobectomy for synchronous stage i1 adenoCa.  Recent CT-A at OSH with fluids given.  Now acute soa.    Assessment/Plan:    1. Acute hypoxia   -- likely decompensated hypertensive diastolic heart failure with volume overload  -- bumex  -- symptoms improved  Daily wt  - echo:  4 chamber enlargement and PHTN - RVSP over 55    incidental finding of right peritracheal mass 4.6x2.2  --history of stage I a synchronous adenocarcinoma the lung with 2 wedge resection of the right upper lobe and right lower lobe and upper 2016 without the need for adjuvant chemotherapy or radiation treatment   -- CT surgery to evaluate     chronic kidney disease.   -- KDIGO G3BA2 most likely due to hypertensive nephrosclerosis  -- 1.6     coronary artery disease  --  with prior angioplasty and stenting.   -- Last left heart catheterization from July 2015 revealed patent stents to LAD.   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    Await consultant opinion on bx of paratrach mass  Continue diuresis.  F/u w dr mitch De La Garza MD   04/10/17   11:51 AM    Please note that portions of this note may have been completed with a voice  recognition program. Efforts were made to edit the dictations, but occasionally words are mistranscribed.

## 2017-04-10 NOTE — PLAN OF CARE
Problem: Respiratory Insufficiency (Adult)  Goal: Identify Related Risk Factors and Signs and Symptoms  Outcome: Ongoing (interventions implemented as appropriate)  Goal: Acid/Base Balance  Outcome: Ongoing (interventions implemented as appropriate)  Goal: Effective Ventilation  Outcome: Ongoing (interventions implemented as appropriate)    Problem: Fall Risk (Adult)  Goal: Identify Related Risk Factors and Signs and Symptoms  Outcome: Ongoing (interventions implemented as appropriate)  Goal: Absence of Falls  Outcome: Ongoing (interventions implemented as appropriate)    Problem: Patient Care Overview (Adult)  Goal: Plan of Care Review  Outcome: Ongoing (interventions implemented as appropriate)  Goal: Adult Individualization and Mutuality  Outcome: Ongoing (interventions implemented as appropriate)  Goal: Discharge Needs Assessment  Outcome: Ongoing (interventions implemented as appropriate)

## 2017-04-10 NOTE — PLAN OF CARE
Problem: Respiratory Insufficiency (Adult)  Goal: Identify Related Risk Factors and Signs and Symptoms  Outcome: Ongoing (interventions implemented as appropriate)    04/10/17 0416   Respiratory Insufficiency   Related Risk Factors (Respiratory Insufficiency) activity intolerance;obesity   Signs and Symptoms (Respiratory Insufficiency) abnormal breath sounds;shortness of breath       Goal: Acid/Base Balance  Outcome: Ongoing (interventions implemented as appropriate)    04/10/17 0416   Respiratory Insufficiency (Adult)   Acid/Base Balance making progress toward outcome       Goal: Effective Ventilation  Outcome: Ongoing (interventions implemented as appropriate)    04/10/17 0416   Respiratory Insufficiency (Adult)   Effective Ventilation making progress toward outcome         Problem: Fall Risk (Adult)  Goal: Identify Related Risk Factors and Signs and Symptoms  Outcome: Ongoing (interventions implemented as appropriate)    04/10/17 0416   Fall Risk   Fall Risk: Related Risk Factors age-related changes;history of falls;environment unfamiliar   Fall Risk: Signs and Symptoms presence of risk factors       Goal: Absence of Falls  Outcome: Ongoing (interventions implemented as appropriate)    04/10/17 0416   Fall Risk (Adult)   Absence of Falls making progress toward outcome         Problem: Patient Care Overview (Adult)  Goal: Plan of Care Review  Outcome: Ongoing (interventions implemented as appropriate)    04/10/17 0416   Coping/Psychosocial Response Interventions   Plan Of Care Reviewed With patient

## 2017-04-10 NOTE — THERAPY DISCHARGE NOTE
Acute Care - Physical Therapy Initial Eval/Discharge  [facility]     Patient Name: Rodo Rodriguez  : 1945  MRN: [id number]  Today's Date: 4/10/2017   Onset of Illness/Injury or Date of Surgery Date: 17  Date of Referral to PT: 17  Referring Physician: MD Holli      Admit Date: 2017    Visit Dx:    ICD-10-CM ICD-9-CM   1. Chest mass R22.2 786.6   2. Hypoxia R09.02 799.02   3. Chronic obstructive pulmonary disease, unspecified COPD type J44.9 496   4. Renal insufficiency N28.9 593.9   5. Head injury, initial encounter S09.90XA 959.01   6. Impaired functional mobility, balance, gait, and endurance Z74.09 V49.89     Patient Active Problem List   Diagnosis   • Carcinoma of right lung   • Acute kidney injury   • Anemia   • Acute tubular necrosis   • B12 deficiency   • Monoclonal gammopathy   • Chest pain   • Sinus bradycardia   • Hypoxia   • CKD (chronic kidney disease), stage III   • Lung mass   • Type 2 diabetes mellitus without complication, without long-term current use of insulin   • Essential hypertension   • Gastroesophageal reflux disease with esophagitis     Past Medical History:   Diagnosis Date   • Acute kidney injury    • Acute tubular necrosis    • Anemia    • Arthritis    • B12 deficiency    • Bone pain    • Carcinoma of lung     stage IA   • Chronic kidney disease (CKD), stage III (moderate)    • Confusion, postoperative    • COPD (chronic obstructive pulmonary disease)    • Coronary artery disease s/p stent    • Depression    • Diabetes mellitus    • Fatigue    • Fractures    • GERD (gastroesophageal reflux disease)    • H/O colonoscopy 2016   • History of BPH    • Hyperlipidemia    • Hypertension    • Impotence    • Kidney stones    • Leaking of urine    • Lung cancer 2016   • Peptic ulcer disease    • Post-thoracotomy pain, mild      Past Surgical History:   Procedure Laterality Date   • ANKLE SURGERY     • BACK SURGERY     • BONE MARROW BIOPSY     • CAROTID STENT      • CORONARY STENT PLACEMENT     • KIDNEY STONE SURGERY  1976   • KNEE SURGERY Bilateral 2010   • LUNG REMOVAL, PARTIAL Right     Right upper and lower lobe wedge resections (2016)          PT ASSESSMENT (last 72 hours)      PT Evaluation       04/10/17 1340 04/10/17 1226    Rehab Evaluation    Document Type evaluation;discharge summary  -REEMA     Subjective Information no complaints;agree to therapy  -REEMA     Patient Effort, Rehab Treatment excellent  -REEMA     Symptoms Noted During/After Treatment none  -REEMA     General Information    Patient Profile Review yes  -REEMA     Onset of Illness/Injury or Date of Surgery Date 04/09/17  -REEMA     Referring Physician MD Holli  -REEMA     General Observations Pt sitting [facility] upon arrival, physician present, wife present, RN consent for therapy.  -REEMA     Pertinent History Of Current Problem Pt presented to ED with difficulty breathing over last 2 weeks, SOB. Pt with recent fall. Pt with h/o lung cancer and lung resection.  -REEMA     Precautions/Limitations fall precautions  -REEMA     Prior Level of Function independent:;all household mobility;community mobility;gait;ADL's;driving;bathing;dressing;grooming  -REEMA     Equipment Currently Used at Home cane, quad;cane, straight;walker, standard;shower chair   does not use AD at home, only when feels he needs to  -REEMA cane, straight;cane, quad;shower chair;walker, rolling  -ML    Plans/Goals Discussed With patient;spouse/S.O.;agreed upon  -REEMA     Risks Reviewed LOB;dizziness;change in vital signs  -REEMA     Benefits Reviewed patient:;spouse/S.O.:;increase knowledge;increase balance  -REEMA     Living Environment    Lives With spouse;child(shayy), adult;other (see comments)   grandchild  -REEMA spouse;child(shayy), adult;other (see comments)   grandson  -ML    Living Arrangements house  -REEMA house  -ML    Home Accessibility stairs to enter home;stairs within home  -REEMA     Number of Stairs to Enter Home 3  -REEMA     Number of Stairs Within Home 1  -REEMA     Stair  Railings at Home outside, present on right side  -REEMA     Transportation Available  car;family or friend will provide  -    Living Environment Comment pt only has one step within the home that he is independent with using, pt usually only difficulty with getting up to go to bathroom during the night.  -REEMA     Clinical Impression    Date of Referral to PT 04/09/17  -REEMA     PT Diagnosis s/p fall  -REEMA     Patient/Family Goals Statement to return home  -REEMA     Criteria for Skilled Therapeutic Interventions Met no;no problems identified which require skilled intervention;current level of function same as previous level of function  -REEMA     Vital Signs    Post Systolic BP Rehab 149  -REEMA     Post Treatment Diastolic BP 94  -REEMA     Posttreatment Heart Rate (beats/min) 61  -REEMA     Pre SpO2 (%) 96  -REEMA     O2 Delivery Pre Treatment room air  -REEMA     O2 Delivery Intra Treatment room air  -REEMA     Post SpO2 (%) 93  -REEMA     O2 Delivery Post Treatment room air  -REEMA     Pre Patient Position Sitting  -REEMA     Intra Patient Position Standing  -REEMA     Post Patient Position Sitting  -REEMA     Pain Assessment    Pain Assessment No/denies pain  -REEMA     Cognitive Assessment/Intervention    Current Cognitive/Communication Assessment functional  -REEMA     Orientation Status oriented x 4  -REEMA     Follows Commands/Answers Questions 100% of the time  -REEMA     Personal Safety WNL/WFL  -REEMA     Personal Safety Interventions gait belt;nonskid shoes/slippers when out of bed;fall prevention program maintained  -REEMA     ROM (Range of Motion)    General ROM no range of motion deficits identified  -REEMA     MMT (Manual Muscle Testing)    General MMT Assessment no strength deficits identified  -REEMA     Bed Mobility, Assessment/Treatment    Bed Mobility, Comment deferred, pt sitting UIC  -REEMA     Transfer Assessment/Treatment    Transfers, Sit-Stand Gaston supervision required  -REEMA     Transfers, Stand-Sit Gaston supervision required  -REEMA     Toilet  Transfer, Anderson independent  -     Gait Assessment/Treatment    Gait, Anderson Level contact guard assist  -REEMA     Gait, Distance (Feet) 45  -REEMA     Gait, Gait Pattern Analysis swing-through gait  -     Gait, Comment Pt occasionally grazing furniture ior wall in room at times. Wife and pt report this is baseline.  -REEMA     Sensory Assessment/Intervention    Light Touch LUE;RUE;LLE;RLE  -REEMA     LUE Light Touch WNL  -REEMA     RUE Light Touch WNL  -REEMA     LLE Light Touch WNL  -REEMA     RLE Light Touch WNL  -REEMA     Positioning and Restraints    Pre-Treatment Position sitting in chair/recliner  -     Post Treatment Position chair  -REEMA     In Chair notified nsg;sitting;call light within reach;encouraged to call for assist;with family/caregiver  -       04/09/17 1633 04/09/17 1624    General Information    Equipment Currently Used at Home  wheelchair;shower chair;crutches;walker, rolling  -    Living Environment    Lives With child(shayy), dependent;spouse  -     Living Arrangements house  -     Home Accessibility no concerns  -     Stair Railings at Home outside, present on right side  -     Type of Financial/Environmental Concern unable to afford medication(s)  -     Transportation Available car  -     Living Environment Comment na  -       User Key  (r) = Recorded By, (t) = Taken By, (c) = Cosigned By    Initials Name Provider Type    REEMA Silva, PT Physical Therapist    RC Cooper      Kellie Pittman, RN Registered Nurse          Physical Therapy Education     Title: PT OT SLP Therapies (Done)     Topic: Physical Therapy (Done)     Point: Mobility training (Done)    Learning Progress Summary    Learner Readiness Method Response Comment Documented by Status   Patient Acceptance E JOSE F UP issued gait belt and educated on use for safety. Pt and wife report grazing furniture at baseline. PT recommended using walker for more suport.  04/10/17 1415 Done    Family Acceptance E VU,DU issued gait belt and educated on use for safety. Pt and wife report grazing furniture at baseline. PT recommended using walker for more suport. REEMA 04/10/17 1415 Done               Point: Body mechanics (Done)    Learning Progress Summary    Learner Readiness Method Response Comment Documented by Status   Patient Acceptance E VU,DU issued gait belt and educated on use for safety. Pt and wife report grazing furniture at baseline. PT recommended using walker for more suport. REEMA 04/10/17 1415 Done   Family Acceptance E VU,DU issued gait belt and educated on use for safety. Pt and wife report grazing furniture at baseline. PT recommended using walker for more suport. REEMA 04/10/17 1415 Done               Point: Precautions (Done)    Learning Progress Summary    Learner Readiness Method Response Comment Documented by Status   Patient Acceptance E VU,DU issued gait belt and educated on use for safety. Pt and wife report grazing furniture at baseline. PT recommended using walker for more suport.  04/10/17 1415 Done   Family Acceptance E VU,DU issued gait belt and educated on use for safety. Pt and wife report grazing furniture at baseline. PT recommended using walker for more suport.  04/10/17 1415 Done                      User Key     Initials Effective Dates Name Provider Type Discipline     06/19/15 -  Tawanna Silva, PT Physical Therapist PT                PT Recommendation and Plan  Anticipated Discharge Disposition: home with assist  Plan of Care Review  Plan Of Care Reviewed With: patient, spouse  Outcome Summary/Follow up Plan: PT eval completed. Pt presents at baseline of function in which skilled PT in the inaptient setting is not needed at this time. PT recommended and notified nursing that pt would benefit from getting up and walking with nursing as able throughout the day. Nursing and pt support of plan of care. PT to D/C pt from inapatient PT at this time.               Outcome Measures       04/10/17 1340          How much help from another person do you currently need...    Turning from your back to your side while in flat bed without using bedrails? 4  -REEMA      Moving from lying on back to sitting on the side of a flat bed without bedrails? 4  -REEMA      Moving to and from a bed to a chair (including a wheelchair)? 4  -REEMA      Standing up from a chair using your arms (e.g., wheelchair, bedside chair)? 4  -REEMA      Climbing 3-5 steps with a railing? 3  -REEMA      To walk in hospital room? 3  -REEMA      AM-PAC 6 Clicks Score 22  -REEMA      Functional Assessment    Outcome Measure Options AM-PAC 6 Clicks Basic Mobility (PT)  -REEMA        User Key  (r) = Recorded By, (t) = Taken By, (c) = Cosigned By    Initials Name Provider Type    REEMA Silva PT Physical Therapist           Time Calculation:         PT Charges       04/10/17 1420          Time Calculation    Start Time 1340  -REEMA      PT Received On 04/10/17  -      Time Calculation- PT    Total Timed Code Minutes- PT 0 minute(s)  -        User Key  (r) = Recorded By, (t) = Taken By, (c) = Cosigned By    Initials Name Provider Type    REEMA Silva PT Physical Therapist          Therapy Charges for Today     Code Description Service Date Service Provider Modifiers Qty    [id number]  PT EVAL LOW COMPLEXITY 4 4/10/2017 Tawanna Silva PT GP 1          PT G-Codes  Outcome Measure Options: AM-PAC 6 Clicks Basic Mobility (PT)    PT Discharge Summary  Anticipated Discharge Disposition: home with assist  Reason for Discharge: At baseline function  Outcomes Achieved: Other (pt presents at baseline of function)    Tawanna Silva PT  4/10/2017

## 2017-04-10 NOTE — PROGRESS NOTES
Discharge Planning Assessment  [facility]     Patient Name: Rodo Rodriguez  MRN: [id number]  Today's Date: 4/10/2017    Admit Date: 4/9/2017          Discharge Needs Assessment       04/10/17 1228    Living Environment    Lives With spouse;child(shayy), adult;other (see comments)   grandson    Living Arrangements house    Provides Primary Care For no one, unable/limited ability to care for self    Quality Of Family Relationships supportive;helpful    Able to Return to Prior Living Arrangements yes    Discharge Needs Assessment    Concerns To Be Addressed discharge planning concerns    Readmission Within The Last 30 Days no previous admission in last 30 days    Equipment Currently Used at Home cane, straight;cane, quad;shower chair;walker, rolling    Transportation Available car;family or friend will provide    Discharge Disposition still a patient    Discharge Contact Information if Applicable Yvette Rodriguez, wife, [phone]            Discharge Plan       04/10/17 2197    Case Management/Social Work Plan    Plan Home    Patient/Family In Agreement With Plan yes    Additional Comments Met with patient in the room to initiate discharge planning. Patient lives with his wife, daughter, and grandson in a home with main-floor living in [facility]. He has been independent with ADLs but states that in the past month he has been using a cane and walker more frequently due to fatigue/weakness. He is not followed by any home health. Patient's goal is home at discharge, and he would be open to home health or outpatient PT at discharge because he would like to improve his strength and endurance. Patient may require oxygen at discharge. CM will continue to follow for needs as patient's POC evolves.         Discharge Placement     No information found        Expected Discharge Date and Time     Expected Discharge Date Expected Discharge Time    Apr 12, 2017               Demographic Summary       04/10/17 1223    Referral  Information    Referral Source admission list    Reason For Consult discharge planning    Record Reviewed history and physical;medical record    Contact Information    Permission Granted to Share Information With ;family/designee   wife, Yvette    Primary Care Physician Information    Name Dandy Bailey            Functional Status       04/10/17 1225    Functional Status Prior    Ambulation 1-->assistive equipment    Transferring 1-->assistive equipment    Toileting 1-->assistive equipment    Bathing 1-->assistive equipment    Dressing 0-->independent    Eating 0-->independent    Communication 0-->understands/communicates without difficulty    Swallowing 2-->difficulty swallowing foods   States he coughs with solid food    Employment/Financial    Employment/Finance Comments Medical and rx coverage through [employer]; no issues affording meds; uses [facility] in [place]            Psychosocial     None            Abuse/Neglect     None            Legal     None            Substance Abuse     None            Patient Forms     None          Kerry Cooper

## 2017-04-10 NOTE — PLAN OF CARE
Problem: Patient Care Overview (Adult)  Goal: Plan of Care Review    04/10/17 1340   Coping/Psychosocial Response Interventions   Plan Of Care Reviewed With patient;spouse   Outcome Evaluation   Outcome Summary/Follow up Plan PT eval completed. Pt presents at baseline of function in which skilled PT in the inaptient setting is not needed at this time. PT recommended and notified nursing that pt would benefit from getting up and walking with nursing as able throughout the day. Nursing and pt support of plan of care. PT to D/C pt from inapatient PT at this time.

## 2017-04-11 VITALS
SYSTOLIC BLOOD PRESSURE: 139 MMHG | WEIGHT: 246.2 LBS | HEART RATE: 70 BPM | TEMPERATURE: 98.6 F | HEIGHT: 70 IN | OXYGEN SATURATION: 80 % | RESPIRATION RATE: 18 BRPM | BODY MASS INDEX: 35.24 KG/M2 | DIASTOLIC BLOOD PRESSURE: 95 MMHG

## 2017-04-11 LAB
ANION GAP SERPL CALCULATED.3IONS-SCNC: 7 MMOL/L (ref 3–11)
BUN BLD-MCNC: 17 MG/DL (ref 9–23)
BUN/CREAT SERPL: 10 (ref 7–25)
CALCIUM SPEC-SCNC: 9.3 MG/DL (ref 8.7–10.4)
CHLORIDE SERPL-SCNC: 97 MMOL/L (ref 99–109)
CO2 SERPL-SCNC: 37 MMOL/L (ref 20–31)
CREAT BLD-MCNC: 1.7 MG/DL (ref 0.6–1.3)
GFR SERPL CREATININE-BSD FRML MDRD: 40 ML/MIN/1.73
GLUCOSE BLD-MCNC: 98 MG/DL (ref 70–100)
GLUCOSE BLDC GLUCOMTR-MCNC: 126 MG/DL (ref 70–130)
GLUCOSE BLDC GLUCOMTR-MCNC: 135 MG/DL (ref 70–130)
POTASSIUM BLD-SCNC: 3.4 MMOL/L (ref 3.5–5.5)
SODIUM BLD-SCNC: 141 MMOL/L (ref 132–146)

## 2017-04-11 PROCEDURE — 80048 BASIC METABOLIC PNL TOTAL CA: CPT | Performed by: INTERNAL MEDICINE

## 2017-04-11 PROCEDURE — 97530 THERAPEUTIC ACTIVITIES: CPT

## 2017-04-11 PROCEDURE — 99239 HOSP IP/OBS DSCHRG MGMT >30: CPT | Performed by: NURSE PRACTITIONER

## 2017-04-11 PROCEDURE — 82962 GLUCOSE BLOOD TEST: CPT

## 2017-04-11 PROCEDURE — 97165 OT EVAL LOW COMPLEX 30 MIN: CPT

## 2017-04-11 PROCEDURE — 25010000002 HEPARIN (PORCINE) PER 1000 UNITS: Performed by: PHYSICIAN ASSISTANT

## 2017-04-11 RX ORDER — POTASSIUM CHLORIDE 750 MG/1
20 CAPSULE, EXTENDED RELEASE ORAL ONCE
Status: COMPLETED | OUTPATIENT
Start: 2017-04-11 | End: 2017-04-11

## 2017-04-11 RX ADMIN — FINASTERIDE 5 MG: 5 TABLET, FILM COATED ORAL at 09:41

## 2017-04-11 RX ADMIN — FAMOTIDINE 20 MG: 20 TABLET ORAL at 09:38

## 2017-04-11 RX ADMIN — DIAZEPAM 10 MG: 5 TABLET ORAL at 01:55

## 2017-04-11 RX ADMIN — MORPHINE SULFATE 200 MG: 100 TABLET, FILM COATED, EXTENDED RELEASE ORAL at 09:38

## 2017-04-11 RX ADMIN — BUMETANIDE 1 MG: 0.25 INJECTION, SOLUTION INTRAMUSCULAR; INTRAVENOUS at 09:57

## 2017-04-11 RX ADMIN — ACETAMINOPHEN 650 MG: 325 TABLET, FILM COATED ORAL at 01:55

## 2017-04-11 RX ADMIN — PANTOPRAZOLE SODIUM 40 MG: 40 TABLET, DELAYED RELEASE ORAL at 06:36

## 2017-04-11 RX ADMIN — Medication 2 TABLET: at 09:38

## 2017-04-11 RX ADMIN — HEPARIN SODIUM 5000 UNITS: 5000 INJECTION, SOLUTION INTRAVENOUS; SUBCUTANEOUS at 06:36

## 2017-04-11 RX ADMIN — CITALOPRAM HYDROBROMIDE 20 MG: 20 TABLET ORAL at 09:38

## 2017-04-11 RX ADMIN — HEPARIN SODIUM 5000 UNITS: 5000 INJECTION, SOLUTION INTRAVENOUS; SUBCUTANEOUS at 13:36

## 2017-04-11 RX ADMIN — POTASSIUM CHLORIDE 20 MEQ: 750 CAPSULE, EXTENDED RELEASE ORAL at 09:57

## 2017-04-11 RX ADMIN — ONDANSETRON 4 MG: 4 TABLET, FILM COATED ORAL at 09:38

## 2017-04-11 NOTE — PLAN OF CARE
Problem: Respiratory Insufficiency (Adult)  Goal: Identify Related Risk Factors and Signs and Symptoms  Outcome: Ongoing (interventions implemented as appropriate)    04/11/17 0413   Respiratory Insufficiency   Related Risk Factors (Respiratory Insufficiency) activity intolerance;medication effects;motivation lacking;obesity;pain   Signs and Symptoms (Respiratory Insufficiency) abnormal breath sounds;shortness of breath         Problem: Fall Risk (Adult)  Goal: Identify Related Risk Factors and Signs and Symptoms  Outcome: Ongoing (interventions implemented as appropriate)    04/11/17 0413   Fall Risk   Fall Risk: Related Risk Factors culprit medication(s);gait/mobility problems   Fall Risk: Signs and Symptoms presence of risk factors       Goal: Absence of Falls  Outcome: Ongoing (interventions implemented as appropriate)    04/11/17 0413   Fall Risk (Adult)   Absence of Falls making progress toward outcome         Problem: Patient Care Overview (Adult)  Goal: Plan of Care Review  Outcome: Ongoing (interventions implemented as appropriate)    04/11/17 0413   Coping/Psychosocial Response Interventions   Plan Of Care Reviewed With patient   Patient Care Overview   Progress progress toward functional goals is gradual

## 2017-04-11 NOTE — THERAPY DISCHARGE NOTE
Acute Care - Occupational Therapy Initial Eval/Discharge  [facility]     Patient Name: Rodo Rodriguez  : 1945  MRN: [id number]  Today's Date: 2017  Onset of Illness/Injury or Date of Surgery Date: 17  Date of Referral to OT: 17  Referring Physician: PATRIC Guerra      Admit Date: 2017       ICD-10-CM ICD-9-CM   1. Chest mass R22.2 786.6   2. Hypoxia R09.02 799.02   3. Chronic obstructive pulmonary disease, unspecified COPD type J44.9 496   4. Renal insufficiency N28.9 593.9   5. Head injury, initial encounter S09.90XA 959.01   6. Impaired functional mobility, balance, gait, and endurance Z74.09 V49.89   7. Impaired mobility and ADLs Z74.09 799.89     Patient Active Problem List   Diagnosis   • Carcinoma of right lung   • Acute kidney injury   • Anemia   • Acute tubular necrosis   • B12 deficiency   • Monoclonal gammopathy   • Chest pain   • Sinus bradycardia   • Hypoxia   • CKD (chronic kidney disease), stage III   • Lung mass   • Type 2 diabetes mellitus without complication, without long-term current use of insulin   • Essential hypertension   • Gastroesophageal reflux disease with esophagitis     Past Medical History:   Diagnosis Date   • Acute kidney injury    • Acute tubular necrosis    • Anemia    • Arthritis    • B12 deficiency    • Bone pain    • Carcinoma of lung     stage IA   • Chronic kidney disease (CKD), stage III (moderate)    • Confusion, postoperative    • COPD (chronic obstructive pulmonary disease)    • Coronary artery disease s/p stent    • Depression    • Diabetes mellitus    • Fatigue    • Fractures    • GERD (gastroesophageal reflux disease)    • H/O colonoscopy 2016   • History of BPH    • Hyperlipidemia    • Hypertension    • Impotence    • Kidney stones    • Leaking of urine    • Lung cancer 2016   • Peptic ulcer disease    • Post-thoracotomy pain, mild      Past Surgical History:   Procedure Laterality Date   • ANKLE SURGERY     • BACK SURGERY     • BONE  MARROW BIOPSY     • CAROTID STENT  1991   • CORONARY STENT PLACEMENT     • KIDNEY STONE SURGERY  1976   • KNEE SURGERY Bilateral 2010   • LUNG REMOVAL, PARTIAL Right     Right upper and lower lobe wedge resections (2016)          OT ASSESSMENT FLOWSHEET (last 72 hours)      OT Evaluation       04/11/17 1508 04/11/17 1409 04/11/17 0925 04/11/17 0800 04/10/17 1340    Rehab Evaluation    Document Type  evaluation;discharge summary  -CL   evaluation;discharge summary  -REEMA    Subjective Information  agree to therapy;no complaints  -CL   no complaints;agree to therapy  -REEMA    Evaluation Not Performed   patient/family decline, not feeling well   Pt declined d/t nausea, will check back in PM.   -CL      Patient Effort, Rehab Treatment  good  -CL   excellent  -REEMA    Symptoms Noted During/After Treatment     none  -REEMA    General Information    Patient Profile Review  yes  -CL   yes  -REEMA    Onset of Illness/Injury or Date of Surgery Date  04/09/17  -CL   04/09/17  -REEMA    Referring Physician  PATRIC Guerra  -CL   MD Holli  -REEMA    General Observations     Pt sitting [facility] upon arrival, physician present, wife present, RN consent for therapy.  -REEMA    Pertinent History Of Current Problem  Pt presented to ED w/ progressive dyspnea x3 wks. Pt dx w/ lung CA in 02/2016. Pt found to have acute hypoxia and R peritracheal mass. PMH: dementia  -CL   Pt presented to ED with difficulty breathing over last 2 weeks, SOB. Pt with recent fall. Pt with h/o lung cancer and lung resection.  -REEMA    Precautions/Limitations  fall precautions  -CL   fall precautions  -REEMA    Prior Level of Function  independent:;all household mobility;community mobility;transfer;ADL's;dressing;bathing  -CL   independent:;all household mobility;community mobility;gait;ADL's;driving;bathing;dressing;grooming  -REEMA    Equipment Currently Used at Home  commode;wheelchair;walker, rolling;shower chair;cane, quad;cane, straight   Not using any PTA  -CL   cane, quad;cane,  straight;walker, standard;shower chair   does not use AD at home, only when feels he needs to  -REEMA    Plans/Goals Discussed With  patient and family;agreed upon  -CL   patient;spouse/S.O.;agreed upon  -REEMA    Risks Reviewed  patient and family:;LOB;nausea/vomiting;dizziness;increased discomfort;lines disloged;change in vital signs  -CL   LOB;dizziness;change in vital signs  -REEMA    Benefits Reviewed  patient and family:;improve function;increase independence;increase strength;increase balance;decrease pain;increase knowledge  -CL   patient:;spouse/S.O.:;increase knowledge;increase balance  -REEMA    Barriers to Rehab  medically complex  -CL       Living Environment    Lives With  spouse;child(shayy), dependent   grandson  -CL   spouse;child(shayy), adult;other (see comments)   grandchild  -REEMA    Living Arrangements  house  -CL   house  -REEMA    Home Accessibility  tub/shower is not walk in;stairs to enter home  -CL   stairs to enter home;stairs within home  -REEMA    Number of Stairs to Enter Home  3  -CL   3  -REEMA    Number of Stairs Within Home     1  -REEMA    Stair Railings at Home     outside, present on right side  -REEMA    Living Environment Comment  Spouse available for 24/7 assist.   -CL   pt only has one step within the home that he is independent with using, pt usually only difficulty with getting up to go to bathroom during the night.  -REEMA    Clinical Impression    Date of Referral to OT  04/09/17  -CL       OT Diagnosis  Decreased independence in ADLs.   -CL       Patient/Family Goals Statement  Return home w/ assist.   -CL       Criteria for Skilled Therapeutic Interventions Met  no;no problems identified which require skilled intervention   pt preparing for DC home.   -CL       Anticipated Discharge Disposition home with assist  -CL home with assist  -CL       Vital Signs    Post Systolic BP Rehab     149  -REEMA    Post Treatment Diastolic BP     94  -REEMA    Posttreatment Heart Rate (beats/min)     61  -REEMA    Pre SpO2 (%)   92  -CL   96  -REEMA    O2 Delivery Pre Treatment  room air  -CL   room air  -REEMA    Intra SpO2 (%)  84   briefing upon sitting EOB and briefly while ambulating  -CL       O2 Delivery Intra Treatment  room air   O2 sats quickly recover w/ PLB.   -CL   room air  -REEMA    Post SpO2 (%)  95  -CL   93  -REEMA    O2 Delivery Post Treatment  room air  -CL   room air  -REEMA    Pre Patient Position  Supine  -CL   Sitting  -REEMA    Intra Patient Position  Standing  -CL   Standing  -REEMA    Post Patient Position  Sitting  -CL   Sitting  -REEMA    Pain Assessment    Pain Assessment  No/denies pain  -CL   No/denies pain  -REEMA    Cognitive Assessment/Intervention    Current Cognitive/Communication Assessment  functional  -CL   functional  -REEMA    Orientation Status  oriented x 4  -CL   oriented x 4  -REEMA    Follows Commands/Answers Questions  100% of the time  -CL   100% of the time  -REEMA    Personal Safety  WNL/WFL  -CL   WNL/WFL  -REEMA    Personal Safety Interventions  fall prevention program maintained;gait belt;nonskid shoes/slippers when out of bed  -CL   gait belt;nonskid shoes/slippers when out of bed;fall prevention program maintained  -REEMA    ROM (Range of Motion)    General ROM  no range of motion deficits identified  -CL   no range of motion deficits identified  -REEMA    MMT (Manual Muscle Testing)    General MMT Assessment  no strength deficits identified  -CL   no strength deficits identified  -REEMA    Bed Mobility, Assessment/Treatment    Bed Mob, Supine to Sit, [place]  independent  -CL       Bed Mob, Sit to Supine, [place]  not tested  -CL       Bed Mobility, Comment     deferred, pt sitting UIC  -REEMA    Transfer Assessment/Treatment    Transfers, Sit-Stand [place]  supervision required  -CL   supervision required  -REEMA    Transfers, Stand-Sit [place]  supervision required  -CL   supervision required  -REEMA    Toilet Transfer, [place]     independent  -REEMA    Transfer, Comment  Supervision for lines only.   -CL        Functional Mobility    Functional Mobility- Ind. Level  supervision required;verbal cues required  -CL       Functional Mobility-Distance (Feet)  120  -CL       Functional Mobility- Comment  Pt ambulated back and forth in room w/ standing rest breaks only to monitor O2 sats, no c/o of SOA or dyspnea.   -CL       Motor Skills/Interventions    Additional Documentation  Balance Skills Training (Group)  -CL       Balance Skills Training    Sitting-Level of Assistance  Independent  -CL       Sitting-Balance Support  Right upper extremity supported;Left upper extremity supported;Feet supported  -CL       Sitting-Balance Activities  Forward lean;Reaching for objects;Trunk control activities  -CL       Standing-Level of Assistance  Close supervision  -CL       Static Standing Balance Support  No upper extremity supported  -CL       Standing-Balance Activities  Weight Shift A-P;Weight Shift R-L;Reaching for objects  -CL       Gait Balance-Level of Assistance  Close supervision  -CL       Gait Balance Support  assistive device  -CL       Gait Balance Activities  side-stepping;scanning environment R/L  -CL       Sensory Assessment/Intervention    Light Touch  LUE;RUE  -CL  LUE;RUE;LLE;RLE  -TH LUE;RUE;LLE;RLE  -REEMA    LUE Light Touch  WNL  -CL  WNL  -TH WNL  -REEMA    RUE Light Touch  WNL  -CL  WNL  -TH WNL  -REEMA    LLE Light Touch    WNL  -TH WNL  -REEMA    RLE Light Touch    WNL  -TH WNL  -REEMA    Positioning and Restraints    Pre-Treatment Position  sitting in chair/recliner  -CL   sitting in chair/recliner  -REEMA    Post Treatment Position  chair  -CL   chair  -REEMA    In Chair  notified nsg;reclined;call light within reach;encouraged to call for assist;with family/caregiver   notified APRN  -CL   notified nsg;sitting;call light within reach;encouraged to call for assist;with family/caregiver  -REEMA      04/10/17 1226 04/10/17 1225 04/09/17 1633 04/09/17 1624       General Information    Equipment Currently Used at Home cane,  straight;cane, quad;shower chair;walker, rolling  -ML   wheelchair;shower chair;crutches;walker, rolling  -MC     Living Environment    Lives With spouse;child(shayy), adult;other (see comments)   grandson  -  child(shayy), dependent;spouse  -      Living Arrangements house  -ML  house  -MC      Home Accessibility   no concerns  -      Stair Railings at Home   outside, present on right side  -      Type of Financial/Environmental Concern   unable to afford medication(s)  -      Transportation Available car;family or friend will provide  -  car  -      Living Environment Comment   na  -      Functional Level Prior    Ambulation  1-->assistive equipment  -ML  1-->assistive equipment  -MC     Transferring  1-->assistive equipment  -ML  1-->assistive equipment  -MC     Toileting  1-->assistive equipment  -ML  1-->assistive equipment  -MC     Bathing  1-->assistive equipment  -ML  1-->assistive equipment  -MC     Dressing  0-->independent  -ML  0-->independent  -MC     Eating  0-->independent  -ML  0-->independent  -MC     Communication  0-->understands/communicates without difficulty  -ML  0-->understands/communicates without difficulty  -MC     Swallowing  2-->difficulty swallowing foods   States he coughs with solid food  -ML  0-->swallows foods/liquids without difficulty  -MC     Prior Functional Level Comment    na  -       User Key  (r) = Recorded By, (t) = Taken By, (c) = Cosigned By    Initials Name Effective Dates    REEMA Tawanna Silva, PT 06/19/15 -      Kerry Cooper 05/02/16 -      Thelma Saavedra RN 06/16/16 -      Kellie Pittman RN 06/16/16 -      Adali Aquino, OT 06/08/16 -           Occupational Therapy Education     Title: PT OT SLP Therapies (Done)     Topic: Occupational Therapy (Done)     Point: ADL training (Done)    Description: Instruct learner(s) on proper safety adaptation and remediation techniques during self care or transfers.   Instruct in proper use  of assistive devices.    Learning Progress Summary    Learner Readiness Method Response Comment Documented by Status   Patient Acceptance E,D VU Pt educated on PLB, appropriate safety precautions, appropriate t/f techniques, and benefits of therapy. CL 04/11/17 1504 Done   Family Acceptance E,D VU Pt educated on PLB, appropriate safety precautions, appropriate t/f techniques, and benefits of therapy. CL 04/11/17 1504 Done               Point: Precautions (Done)    Description: Instruct learner(s) on prescribed precautions during self-care and functional transfers.    Learning Progress Summary    Learner Readiness Method Response Comment Documented by Status   Patient Acceptance E,D VU Pt educated on PLB, appropriate safety precautions, appropriate t/f techniques, and benefits of therapy. CL 04/11/17 1504 Done   Family Acceptance E,D VU Pt educated on PLB, appropriate safety precautions, appropriate t/f techniques, and benefits of therapy. CL 04/11/17 1504 Done               Point: Body mechanics (Done)    Description: Instruct learner(s) on proper positioning and spine alignment during self-care, functional mobility activities and/or exercises.    Learning Progress Summary    Learner Readiness Method Response Comment Documented by Status   Patient Acceptance E,D VU Pt educated on PLB, appropriate safety precautions, appropriate t/f techniques, and benefits of therapy. CL 04/11/17 1504 Done   Family Acceptance E,D VU Pt educated on PLB, appropriate safety precautions, appropriate t/f techniques, and benefits of therapy. CL 04/11/17 1504 Done                      User Key     Initials Effective Dates Name Provider Type Discipline    CL 06/08/16 -  Adali Aquino OT Occupational Therapist OT                OT Recommendation and Plan  Anticipated Discharge Disposition: home with assist  Plan of Care Review  Plan Of Care Reviewed With: patient  Progress: improving  Outcome Summary/Follow up Plan: OT eval complete. Pt  presents at baseline function, no further IPOT needed at this time. Pt w/ occasional desat w/ functional mobility, though quickly improves w/ PLB, pt to get home O2 and spouse reports owns a pulse ox. Recommend pt DC home w/ assist, possible DC today. OT signing off.            OT Goals       04/11/17 1505          Transfer Training OT LTG    Transfer Training OT LTG, Date Established 04/11/17  -CL      Transfer Training OT LTG, Time to Achieve by discharge  -CL      Transfer Training OT LTG, Activity Type sit to stand/stand to sit  -CL      Transfer Training OT LTG, Luana Level supervision required  -CL      Transfer Training OT LTG, Outcome goal met  -CL      Functional Mobility OT LTG    Functional Mobility Goal OT LTG, Date Established 04/11/17  -CL      Functional Mobility Goal OT LTG, Time to Achieve by discharge  -CL      Functional Mobility Goal OT LTG, Luana Level supervision  -CL      Functional Mobility Goal OT LTG, Distance to Achieve to the bathroom  -CL      Functional Mobility Goal OT LTG, Outcome goal met  -CL        User Key  (r) = Recorded By, (t) = Taken By, (c) = Cosigned By    Initials Name Provider Type    CL Adali Aquino OT Occupational Therapist                Outcome Measures       04/11/17 1409 04/10/17 1340       How much help from another person do you currently need...    Turning from your back to your side while in flat bed without using bedrails?  4  -REEMA     Moving from lying on back to sitting on the side of a flat bed without bedrails?  4  -REEMA     Moving to and from a bed to a chair (including a wheelchair)?  4  -REEMA     Standing up from a chair using your arms (e.g., wheelchair, bedside chair)?  4  -REEMA     Climbing 3-5 steps with a railing?  3  -REEMA     To walk in hospital room?  3  -REEMA     AM-PAC 6 Clicks Score  22  -REEMA     How much help from another is currently needed...    Putting on and taking off regular lower body clothing? 3  -CL      Bathing (including washing,  rinsing, and drying) 3  -CL      Toileting (which includes using toilet bed pan or urinal) 4  -CL      Putting on and taking off regular upper body clothing 4  -CL      Taking care of personal grooming (such as brushing teeth) 4  -CL      Eating meals 4  -CL      Score 22  -CL      Functional Assessment    Outcome Measure Options AM-PAC 6 Clicks Daily Activity (OT)  -CL AM-PAC 6 Clicks Basic Mobility (PT)  -REEMA       User Key  (r) = Recorded By, (t) = Taken By, (c) = Cosigned By    Initials Name Provider Type    REEMA Silva, PT Physical Therapist    CL Adali Aquino OT Occupational Therapist          Time Calculation:         Time Calculation- OT       04/11/17 1509          Time Calculation- OT    OT Start Time 1409  -CL      Total Timed Code Minutes- OT 10 minute(s)  -CL      OT Received On 04/11/17  -CL        User Key  (r) = Recorded By, (t) = Taken By, (c) = Cosigned By    Initials Name Provider Type    CL Adali Aquino OT Occupational Therapist          Therapy Charges for Today     Code Description Service Date Service Provider Modifiers Qty    [id number]  OT EVAL LOW COMPLEXITY 4 4/11/2017 Adali Aquino OT GO 1    [id number]  OT THER SUPP EA 15 MIN 4/11/2017 Adali Aquino OT GO 2    [id number]  OT THERAPEUTIC ACT EA 15 MIN 4/11/2017 Adali Aquino OT GO 1               OT Discharge Summary  Anticipated Discharge Disposition: home with assist  Reason for Discharge: At baseline function, Discharge from facility  Outcomes Achieved: Refer to plan of care for updates on goals achieved  Discharge Destination: Home with assist    Adali Aquino OT  4/11/2017

## 2017-04-11 NOTE — PROGRESS NOTES
CT Reviewed. Enlarged right paratracheal lymph node noted.  Needs EBUS, continue to hold plavix.  Patient scheduled for Thursday at 11 am, needs to arrive by 10 am.  Will resume plavix after procedure.  I took disc of CT to radiology to get scanned in the system.  No further recs until after procedure.    Kingsley Hodge MD  Pulmonology and Critical Care Medicine  04/11/17 1:42 PM  Electronically Signed

## 2017-04-11 NOTE — DISCHARGE SUMMARY
[facility] Medicine Services  DISCHARGE SUMMARY       Date of Admission: 4/9/2017  Date of Discharge:  4/11/2017  Primary Care Physician: Dandy Bailey MD  Consulting Physician(s)     Provider Relationship    Kingsley Hodge MD Consulting Physician          Discharge Diagnoses:  Active Hospital Problems (** Indicates Principal Problem)    Diagnosis Date Noted   • **Hypoxia [R09.02] 04/09/2017     - Due to VQ mismatch from pulmonary edema from suspected acute diastolic HF  - RA sat 87% ER      • CKD (chronic kidney disease), stage III [N18.3] 04/09/2017     - Baseline creatinine 1.4-1.6 (4/9/17)     • Lung mass [R91.8] 04/09/2017     - Right paratracheal mass noted on CT angiogram (4/7/17)     • Type 2 diabetes mellitus without complication, without long-term current use of insulin [E11.9] 04/09/2017   • Essential hypertension [I10] 04/09/2017   • Gastroesophageal reflux disease with esophagitis [K21.0] 04/09/2017      Resolved Hospital Problems    Diagnosis Date Noted Date Resolved   No resolved problems to display.       Presenting Problem/History of Present Illness  Hypoxia [R09.02]     Chief Complaint on Day of Discharge: dyspnea    History of Present Illness on Day of Discharge:   Patient is sitting up in bed and states he is ready to go home.  Breathing is better and at baseline.  Plan for biopsy on Thursday of right paratracheal lymph node via EBUS.  Wife at bedside  Hospital Course  Patient is a 71 y.o. 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  He presented to [facility] ED  with progressive dyspnea and at least 3 months duration with acute worsening over 48 hours accompanied by orthopnea without fever, cough, hemoptysis or chest pain.  Of note patient underwent a CT chest PE protocol on 4/7/17 (ordered by PCP) with hydration protocol with normal saline and his symptoms worsened after that test.  The scan showed an incidental finding of right paratracheal mass.  Patient was admitted to the hospital medicine service for further evaluation and management of hypoxia likely decompensated hypertensive diastolic heart failure with volume overload.  Pulmonary was consulted and reviewed the CT scan from 4/7/17 and plans to proceed with an EBUS on Thursday as an outpatient.  His Plavix is currently on hold for procedure and will resume after procedure.  Patient was diuresed with IV Bumex and has improved clinically.  He is medically stable and ready for discharge home today with wife.  CM was consulted to arrange home oxygen for use at night.  Patient's oxygen level would intermittently drop at night requiring oxygen although he denied shortness of breath.  Patient's wife reported that patient has had multiple falls about one per week.  CT of head done on admission was negative for any acute findings.  Patient will be discharged home on current blood pressure medications.  Some were held upon admission and patient's BP has remained stable.  Beta blocker was stopped due to bradycardia.  Will defer to PCP to adjust medications as needed.  Encouraged wife to monitor BP and oxygen level and will follow up with PCP within 1 week or sooner if needed.  Discharge plans and instructions were reviewed with patient and wife and they both verbalize an understanding.      Procedures Performed:  None         Consults:   Consults     Date and Time Order Name Status Description    4/10/2017 8167 Inpatient Consult to Pulmonology Completed     4/9/2017 8962 Inpatient Consult to Cardiothoracic Surgery           Pertinent Test Results:    Results from last 7 days  Lab Units 04/10/17  0725 04/09/17  0849   WBC 10*3/mm3 5.12 8.66   HEMOGLOBIN g/dL 11.1* 11.3*   HEMATOCRIT % 36.3* 36.9*   PLATELETS 10*3/mm3 148* 173       Results from last 7 days  Lab Units 04/11/17  0535 04/10/17  0725 04/09/17  0849   SODIUM mmol/L 141 142 137   POTASSIUM mmol/L 3.4* 3.6 4.3   CHLORIDE mmol/L 97* 100 101   TOTAL CO2 mmol/L 37.0* 35.0* 29.0   BUN mg/dL 17 14 15   CREATININE mg/dL 1.70* 1.60* 1.70*   GLUCOSE mg/dL 98 112* 202*   CALCIUM mg/dL 9.3 9.0 8.8     Imaging Results (last 72 hours)     Procedure Component Value Units Date/Time    XR Chest 1 View [[id number]] Collected:  04/10/17 0746     Updated:  04/10/17 1053    Narrative:       EXAMINATION: XR CHEST 1 VW-      INDICATION: Shortness of air triage protocol.      COMPARISON: 01/06/2017 portable chest.     FINDINGS: The heart shadow is mildly enlarged. The vasculature is  cephalized. Mild diffuse perihilar interstitial changes are increased  from the previous study. There is stable mild elevation of the right  hemidiaphragm. No definite effusion or evidence of pneumothorax is seen.            Impression:       Mild CHF.     D:  04/09/2017  E:  04/10/2017     This report was finalized on 4/10/2017 10:51 AM by DR. David Rivera MD.       CT Head Without Contrast [[id number]] Collected:  04/09/17 1437     Updated:  04/10/17 1126    Narrative:       EXAMINATION: CT HEAD WO CONTRAST - 04/09/2017     INDICATION: Trauma.      TECHNIQUE: 5 mm unenhanced images through the brain.     The radiation dose reduction device was turned on for each scan per the  ALARA (As Low as Reasonably Achievable) protocol.     COMPARISON: 11/20/2016 head CT scan.     FINDINGS: By report, the previous head CT scan was negative. Subsequent  MRI study from 2/03/2017 was, by report, negative as well. History today  indicates dyspnea and lung cancer. Recent falls. Forehead hematoma.      There is slight stranding of the  "subcutaneous tissues of the forehead,  but no well-defined hematoma here or elsewhere. No gross abnormalities  are seen of the orbits. Paranasal sinuses are mostly clear with mild  mucosal thickening. There is opacification of all of the left mastoid  air cell complex, most of the right mastoid air cell complex, and some  fluid or soft tissue thickening in the left middle ear. Right mastoid  disease appears similar to the November 2016 exam, but left mastoid  disease is clearly increased and presumed otitis media is new. The  calvarium appears intact. No fracture is identified.     The brain appears stable with expected generalized cerebral atrophy for  age. There are some normal variant basal ganglia mineralization. There  is no evidence of hemorrhage, contusion, edema, mass or mass effect,  acute or old infarct, hydrocephalus, or abnormal extra-axial collection.       Impression:       1. Brain appears within normal limits for age.  2. No evidence of acute trauma is seen.  3. Chronic or recurrent right mastoid disease and new and extensive left  mastoid disease with some middle ear disease compared to the 11/20/2016  exam.     DICTATED:     04/09/2017  EDITED:         04/09/2017     This report was finalized on 4/10/2017 11:24 AM by DR. David Rivera MD.           ECHO 4/9/17:  · Left ventricular function is hyperdynamic (EF > 70).  · The left ventricular cavity is moderately dilated.  · Right ventricular cavity is moderately dilated.  · Bi-atrial enlargement is seen.  · Mild mitral valve regurgitation is present  · Mild tricuspid valve regurgitation is present.  · The estimated RVSP is > 55 mmHg.      Condition on Discharge:  Stable at baseline    Physical Exam on Discharge:/95  Pulse 70  Temp 98.6 °F (37 °C) (Oral)   Resp 18  Ht 70\" (177.8 cm)  Wt 246 lb 3.2 oz (112 kg)  SpO2 92%  BMI 35.33 kg/m2  Physical Exam  Gen-no acute distress  HEENT-ecchymosis noted around left eyelid and left " forehead-improving  CV-RRR, S1 S2 normal, no m/r/g  Resp-CTAB, no wheezes, non labored on RA  Abd-soft, NT, ND, +BS  Ext-no edema  Neuro-A&Ox3, no focal deficits  Psych-appropriate mood    Discharge Disposition  Home or Self Care    Discharge Medications   Rodo Rodriguez   Home Medication Instructions BARAK:[id number]    Printed on:04/11/17 8289   Medication Information                      citalopram (CeleXA) 20 MG tablet  TAKE 1 TABLET BY MOUTH DAILY             Coenzyme Q10 (CO Q 10) 100 MG capsule  Take 200 mg by mouth Daily.             diazepam (VALIUM) 10 MG tablet  Take 1 tablet by mouth 4 (Four) Times a Day.             docusate sodium (COLACE) 100 MG capsule  Take 100 mg by mouth 2 (Two) Times a Day.             doxazosin (CARDURA) 4 MG tablet  TAKE 1 TABLET BY MOUTH DAILY             finasteride (PROSCAR) 5 MG tablet  Take 5 mg by mouth Daily.             linagliptin (TRADJENTA) 5 MG tablet tablet  Take 5 mg by mouth daily.             morphine (MS CONTIN) 100 MG 12 hr tablet  Take 200 mg by mouth 2 (Two) Times a Day.             pantoprazole (PROTONIX) 40 MG EC tablet  Take 40 mg by mouth Daily.             pravastatin (PRAVACHOL) 80 MG tablet  Take 1 tablet by mouth daily.             promethazine (PHENERGAN) 25 MG tablet  TAKE 1 TABLET BY MOUTH EVERY 4 TO 6 HOURS             raNITIdine (ZANTAC) 150 MG tablet  TAKE 1 TABLET BY MOUTH TWICE A DAY             sulfamethoxazole-trimethoprim (BACTRIM DS,SEPTRA DS) 800-160 MG per tablet  TAKE 1 TABLET BY MOUTH EVERY 12 HOURS                 Discharge Diet:   Diet Instructions     Diet: Regular, Consistent Carbohydrate, Cardiac; Thin Liquids, No Restrictions       Discharge Diet:   Regular  Consistent Carbohydrate  Cardiac      Fluid Consistency:  Thin Liquids, No Restrictions                 Discharge Care Plan / Instructions:    Activity at Discharge:   Activity Instructions     Activity as Tolerated                     Follow-up Appointments  Future  Appointments  Date Time Provider Department Center   8/30/2017 11:00 AM YIMI Epps [facility]     Additional Instructions for the Follow-ups that You Need to Schedule     Discharge Follow-up with PCP    As directed    Follow Up Details:  within 1 week for hospital follow up       Discharge Follow-up with Specialty    As directed    Specialty:  Scientologist pulmonary   Follow Up:  1 Month                 Test Results Pending at Discharge   Order Current Status    Blood Culture Preliminary result    Blood Culture Preliminary result    Urine Culture Preliminary result           YIMI Katz 04/11/17 2:41 PM    Time: Discharge 45 min    Please note that portions of this note may have been completed with a voice recognition program. Efforts were made to edit the dictations, but occasionally words are mistranscribed.

## 2017-04-11 NOTE — DISCHARGE INSTR - LAB
[facility]  Pulmonology [phone]   [address]  [facility] [address]    Next Steps: Schedule an appointment as soon as possible for a visit in 1 month(s)  May 24, 2017 @1230    Dandy Bailey MD  PCP - General   Family Medicine   [phone] [address] [facility] [address]    Instructions: within 1 week for hospital follow up  4/14/2017 @1100

## 2017-04-11 NOTE — DISCHARGE PLACEMENT REQUEST
"Rodo Rodriguez (71 y.o. Male)     17 0800  98.1 (36.7)  59  18  92/82  room air   80          [address]  [address] [facility] [address]  Phone: [phone]  Fax:  Date Ordered: 2017         Patient: Rodo Rodriguez MRN: [id number]   [address] [facility] [address] : 1945  SSN:    Phone: [phone] Sex: M     Weight: 246 lb 3.2 oz (112 kg)         Ht Readings from Last 1 Encounters:   17 70\" (177.8 cm)         Oxygen Therapy (Order ID: [id number])   Diagnosis: Chronic obstructive pulmonary disease, unspecified COPD type (J44.9 [ICD-10-CM] 496 [ICD-9-CM])   Quantity: 1     Delivery Modality: Nasal Cannula  Liters Per Minute: 2  Duration: During Sleep  Equipment:  Oxygen Concentrator &  &  Portable Gaseous Oxygen System & Portable Oxygen Contents Gaseous &  Conserving Regulator  Length of Need (99 Months = Lifetime): 3 Months            Authorizing Provider:YIMI Katz  Order Entered By: Katia Crespo RN 2017 1:56 PM     Electronically signed by: YIMI Katz (NPI: [id number]) 2017 1:56 PM                Date of Birth Social Security Number Address Home Phone MRN    1945  [address] [facility] [address] [phone] [id number]    Confucianism Marital Status          Anabaptism        Admission Date Admission Type Admitting Provider Attending Provider Department, Room/Bed    17 Emergency Lucita De La Garza MD Mini, Jocelyn, MD [facility] 6B, N632/1    Discharge Date Discharge Disposition Discharge Destination                      Attending Provider: Lucita De La Garza MD     Allergies:  Atorvastatin, Sulfa Antibiotics    Isolation:  Contact   Infection:  ESBL E coli (16)   Code Status:  FULL    Ht:  70\" (177.8 cm)   Wt:  246 lb 3.2 oz (112 kg)    Admission Cmt:  None   Principal Problem:  Hypoxia [R09.02] More...                 Active Insurance as of 2017     " Primary Coverage     Payor Plan Insurance Group Employer/Plan Group    HUMANA MEDICARE REPLACEMENT HUMANA MEDICARE REPL [id number]     Payor Plan Address Payor Plan Phone Number Effective From Effective To    [address] [address] [phone] 1/1/2013     [place] [address]       Subscriber Name Subscriber Birth Date Member ID       PAMELA RODRIGUEZ 1945 [id number]                 Emergency Contacts      (Rel.) Home Phone Work Phone Mobile Phone    Yvette Rodriguez (Spouse) [phone] -- --            Insurance Information                HUMANA MEDICARE REPLACEMENT/HUMANA MEDICARE REPL Phone: [phone]    Subscriber: Pamela Rodriguez Subscriber#: [id number]    Group#: [id number] Precert#:              History & Physical      Narinder Stallworth MD at 4/9/2017  1:06 PM          I performed a history and physical examination of the patient independent of the physician assistant.  I reviewed the patient's laboratory and radiological data.  I discussed the plan care with the patient and his wife as well as with the physician assistant    This is a 71-year-old obese  male former smoker, resident of [facility] with a past medical history significant for long-standing essential hypertension, hyperlipidemia, coronary artery disease with prior angioplasty and stenting, chronic kidney disease stage III possibly due to hypertensive nephrosclerosis, lung cancer diagnosed in February 2016 status post right upper and lower wedge resection for stage I a synchronous adenocarcinoma of the lung without adjuvant radiation or chemotherapy, senile dementia of mild to moderate severity who presents with progressive dyspnea of at least 3 months duration with acute worsening over the past 48 hours accompanied by orthopnea without fever, cough, hemoptysis or chest pain    Of note the patient underwent a CT chest PE protocol on Friday, April 07, 2017 with hydration protocol with normal saline and his symptoms got  worse after that  Review of the report from [facility] shows no evidence of pulmonary embolism or aortic dissection or aneurysm.  The heart is enlarged.  Aorta is calcified and ectatic.  There is calcification of coronary arteries.  There is a 4.6×2.2 cm right peritracheal mass and possibly large lymph node.  There is linear scarring in the right upper lobe posteriorly and in the right lower lobe.  There is a right pleural effusion and pleural thickening    Upon presentation to the emergency room his saturation was 87%      PHYSICAL EXAMINATION:  Vital signs reviewed   Gen. appearance: Pleasant  male in no distress. He is awake and alert  HEENT: Pupils reactive and responsive to light. Extraocular muscles are intact.   Chest : Decreased breath sounds over bases.  No wheezing, rales  Cardiovascular: Regular rate and rhythm. No murmurs rubs or gallop no increased jugular venous pulsation  Abdomen: Soft. Non tender to palpation. No palpable masses. No CVA tenderness. Normal bowel sounds.   Extremities: No skin lesions or rashes. No edema. Intact peripheral pulses  Neurologic examination: Motor tone and strength are normal. Intact deep tendon reflexes. No focal neurological deficit.  Psychiatric: appropriate affect      Impression  1. Acute hypoxia most likely due to decompensated hypertensive diastolic heart failure with volume overload  Incidental finding of right peritracheal mass possibly representing recurrence of his lung cancer  Plan:  Start intravenous Bumex 1 mg twice a day.  Fluid restriction.  Daily weight.  Strict in and out.  Transthoracic echocardiogram    2.  History of stage I a synchronous adenocarcinoma the lung with 2 wedge resection of the right upper lobe and right lower lobe and upper 2016 without the need for adjuvant chemotherapy or radiation treatment now with evidence of a 4.6×2.2 cm right paratracheal mass most likely representing recurrence of his lung cancer  This  mass should be easily accessible for transbronchial lung biopsy and we will ask CT surgery to evaluate in a.m.    3.  History of chronic kidney disease. KDIGO G3BA2 most likely due to hypertensive nephrosclerosis    4.  History of coronary artery disease with prior angioplasty and stenting.  Last left heart catheterization from July 2015 revealed patent stents to LAD.  The patient underwent PTCA to ramus for in-stent restenosis  No anginal symptoms    5.  Type 2 diabetes mellitus on oral hypoglycemics    6.  Senile dementia of mild to moderate severity stable without superimposed behavioral disorder       Electronically signed by Narinder Stallworth MD at 4/9/2017  1:19 PM

## 2017-04-11 NOTE — PLAN OF CARE
Problem: Respiratory Insufficiency (Adult)  Goal: Identify Related Risk Factors and Signs and Symptoms  Outcome: Outcome(s) achieved Date Met:  04/11/17  Goal: Acid/Base Balance  Outcome: Outcome(s) achieved Date Met:  04/11/17  Goal: Effective Ventilation  Outcome: Outcome(s) achieved Date Met:  04/11/17    Problem: Fall Risk (Adult)  Goal: Identify Related Risk Factors and Signs and Symptoms  Outcome: Outcome(s) achieved Date Met:  04/11/17  Goal: Absence of Falls  Outcome: Outcome(s) achieved Date Met:  04/11/17

## 2017-04-11 NOTE — PROGRESS NOTES
Continued Stay Note  [facility]     Patient Name: Rodo Rodriguez  MRN: [id number]  Today's Date: 4/11/2017    Admit Date: 4/9/2017          Discharge Plan       04/11/17 1405    Case Management/Social Work Plan    Plan Home    Patient/Family In Agreement With Plan yes    Additional Comments Spoke with patient at bedside regarding going home on oxygen. He is agreeable and requested [employer] to provide. A referral has been made and they will f/u with patient to arrange set up at home. CM will follow.               Discharge Codes     None        Expected Discharge Date and Time     Expected Discharge Date Expected Discharge Time    Apr 12, 2017             Katia Crespo RN

## 2017-04-11 NOTE — PLAN OF CARE
Problem: Patient Care Overview (Adult)  Goal: Plan of Care Review  Outcome: Ongoing (interventions implemented as appropriate)    04/11/17 1505   Coping/Psychosocial Response Interventions   Plan Of Care Reviewed With patient   Outcome Evaluation   Outcome Summary/Follow up Plan OT kobi complete. Pt presents at baseline function, no further IPOT needed at this time. Pt w/ occasional desat w/ functional mobility, though quickly improves w/ PLB, pt to get home O2 and spouse reports owns a pulse ox. Recommend pt DC home w/ assist, possible DC today. OT signing off.    Patient Care Overview   Progress improving         Problem: Inpatient Occupational Therapy  Goal: Transfer Training Goal 1 LTG- OT  Outcome: Outcome(s) achieved Date Met:  04/11/17 04/11/17 1505   Transfer Training OT LTG   Transfer Training OT LTG, Date Established 04/11/17   Transfer Training OT LTG, Time to Achieve by discharge   Transfer Training OT LTG, Activity Type sit to stand/stand to sit   Transfer Training OT LTG, Bremer Level supervision required   Transfer Training OT LTG, Outcome goal met       Goal: Functional Mobility Goal LTG- OT  Outcome: Outcome(s) achieved Date Met:  04/11/17 04/11/17 1505   Functional Mobility OT LTG   Functional Mobility Goal OT LTG, Date Established 04/11/17   Functional Mobility Goal OT LTG, Time to Achieve by discharge   Functional Mobility Goal OT LTG, Bremer Level supervision   Functional Mobility Goal OT LTG, Distance to Achieve to the bathroom   Functional Mobility Goal OT LTG, Outcome goal met

## 2017-04-12 LAB — BACTERIA SPEC AEROBE CULT: ABNORMAL

## 2017-04-13 ENCOUNTER — ANESTHESIA (OUTPATIENT)
Dept: GASTROENTEROLOGY | Facility: HOSPITAL | Age: 72
End: 2017-04-13

## 2017-04-13 ENCOUNTER — HOSPITAL ENCOUNTER (OUTPATIENT)
Facility: HOSPITAL | Age: 72
Setting detail: HOSPITAL OUTPATIENT SURGERY
Discharge: HOME | End: 2017-04-13
Attending: INTERNAL MEDICINE | Admitting: INTERNAL MEDICINE

## 2017-04-13 ENCOUNTER — ANESTHESIA EVENT (OUTPATIENT)
Dept: GASTROENTEROLOGY | Facility: HOSPITAL | Age: 72
End: 2017-04-13

## 2017-04-13 VITALS
OXYGEN SATURATION: 96 % | SYSTOLIC BLOOD PRESSURE: 164 MMHG | RESPIRATION RATE: 18 BRPM | DIASTOLIC BLOOD PRESSURE: 82 MMHG | HEART RATE: 66 BPM | TEMPERATURE: 97.9 F

## 2017-04-13 DIAGNOSIS — R93.89 ABNORMAL CT SCAN: ICD-10-CM

## 2017-04-13 DIAGNOSIS — C34.91 CARCINOMA OF RIGHT LUNG (HCC): Primary | ICD-10-CM

## 2017-04-13 LAB
GLUCOSE BLDC GLUCOMTR-MCNC: 151 MG/DL (ref 70–130)
GLUCOSE BLDC GLUCOMTR-MCNC: 153 MG/DL (ref 70–130)

## 2017-04-13 PROCEDURE — 82962 GLUCOSE BLOOD TEST: CPT

## 2017-04-13 PROCEDURE — 25010000002 ONDANSETRON PER 1 MG: Performed by: NURSE ANESTHETIST, CERTIFIED REGISTERED

## 2017-04-13 PROCEDURE — 31652 BRONCH EBUS SAMPLNG 1/2 NODE: CPT | Performed by: INTERNAL MEDICINE

## 2017-04-13 PROCEDURE — 25010000002 PROPOFOL 10 MG/ML EMULSION: Performed by: NURSE ANESTHETIST, CERTIFIED REGISTERED

## 2017-04-13 PROCEDURE — C1726 CATH, BAL DIL, NON-VASCULAR: HCPCS | Performed by: INTERNAL MEDICINE

## 2017-04-13 PROCEDURE — 25010000002 NEOSTIGMINE PER 0.5 MG: Performed by: NURSE ANESTHETIST, CERTIFIED REGISTERED

## 2017-04-13 PROCEDURE — 25010000002 DEXAMETHASONE PER 1 MG: Performed by: NURSE ANESTHETIST, CERTIFIED REGISTERED

## 2017-04-13 PROCEDURE — 25010000002 HYDRALAZINE PER 20 MG: Performed by: NURSE ANESTHETIST, CERTIFIED REGISTERED

## 2017-04-13 RX ORDER — ATRACURIUM BESYLATE 10 MG/ML
INJECTION, SOLUTION INTRAVENOUS AS NEEDED
Status: DISCONTINUED | OUTPATIENT
Start: 2017-04-13 | End: 2017-04-13 | Stop reason: SURG

## 2017-04-13 RX ORDER — SODIUM CHLORIDE 0.9 % (FLUSH) 0.9 %
1-10 SYRINGE (ML) INJECTION AS NEEDED
Status: DISCONTINUED | OUTPATIENT
Start: 2017-04-13 | End: 2017-04-13 | Stop reason: HOSPADM

## 2017-04-13 RX ORDER — LIDOCAINE HYDROCHLORIDE 10 MG/ML
INJECTION, SOLUTION INFILTRATION; PERINEURAL AS NEEDED
Status: DISCONTINUED | OUTPATIENT
Start: 2017-04-13 | End: 2017-04-13 | Stop reason: SURG

## 2017-04-13 RX ORDER — FAMOTIDINE 20 MG/1
20 TABLET, FILM COATED ORAL ONCE
Status: CANCELLED | OUTPATIENT
Start: 2017-04-13 | End: 2017-04-13

## 2017-04-13 RX ORDER — CLOPIDOGREL BISULFATE 75 MG/1
75 TABLET ORAL DAILY
Qty: 30 TABLET | Refills: 0 | Status: SHIPPED | OUTPATIENT
Start: 2017-04-13 | End: 2017-05-12 | Stop reason: SDUPTHER

## 2017-04-13 RX ORDER — LIDOCAINE HYDROCHLORIDE 10 MG/ML
1 INJECTION, SOLUTION EPIDURAL; INFILTRATION; INTRACAUDAL; PERINEURAL ONCE
Status: CANCELLED | OUTPATIENT
Start: 2017-04-13 | End: 2017-04-13

## 2017-04-13 RX ORDER — ONDANSETRON 2 MG/ML
4 INJECTION INTRAMUSCULAR; INTRAVENOUS ONCE AS NEEDED
Status: DISCONTINUED | OUTPATIENT
Start: 2017-04-13 | End: 2017-04-13 | Stop reason: HOSPADM

## 2017-04-13 RX ORDER — HYDRALAZINE HYDROCHLORIDE 20 MG/ML
5 INJECTION INTRAMUSCULAR; INTRAVENOUS ONCE
Status: COMPLETED | OUTPATIENT
Start: 2017-04-13 | End: 2017-04-13

## 2017-04-13 RX ORDER — GLYCOPYRROLATE 0.2 MG/ML
INJECTION INTRAMUSCULAR; INTRAVENOUS AS NEEDED
Status: DISCONTINUED | OUTPATIENT
Start: 2017-04-13 | End: 2017-04-13 | Stop reason: SURG

## 2017-04-13 RX ORDER — DEXAMETHASONE SODIUM PHOSPHATE 4 MG/ML
INJECTION, SOLUTION INTRA-ARTICULAR; INTRALESIONAL; INTRAMUSCULAR; INTRAVENOUS; SOFT TISSUE AS NEEDED
Status: DISCONTINUED | OUTPATIENT
Start: 2017-04-13 | End: 2017-04-13 | Stop reason: SURG

## 2017-04-13 RX ORDER — FENTANYL CITRATE 50 UG/ML
50 INJECTION, SOLUTION INTRAMUSCULAR; INTRAVENOUS
Status: DISCONTINUED | OUTPATIENT
Start: 2017-04-13 | End: 2017-04-13 | Stop reason: HOSPADM

## 2017-04-13 RX ORDER — PROPOFOL 10 MG/ML
VIAL (ML) INTRAVENOUS AS NEEDED
Status: DISCONTINUED | OUTPATIENT
Start: 2017-04-13 | End: 2017-04-13 | Stop reason: SURG

## 2017-04-13 RX ORDER — SODIUM CHLORIDE 9 MG/ML
9 INJECTION, SOLUTION INTRAVENOUS CONTINUOUS
Status: CANCELLED | OUTPATIENT
Start: 2017-04-13

## 2017-04-13 RX ORDER — SODIUM CHLORIDE, SODIUM LACTATE, POTASSIUM CHLORIDE, CALCIUM CHLORIDE 600; 310; 30; 20 MG/100ML; MG/100ML; MG/100ML; MG/100ML
9 INJECTION, SOLUTION INTRAVENOUS CONTINUOUS
Status: DISCONTINUED | OUTPATIENT
Start: 2017-04-13 | End: 2017-04-13 | Stop reason: HOSPADM

## 2017-04-13 RX ORDER — FAMOTIDINE 10 MG/ML
20 INJECTION, SOLUTION INTRAVENOUS ONCE
Status: CANCELLED | OUTPATIENT
Start: 2017-04-13 | End: 2017-04-13

## 2017-04-13 RX ORDER — ONDANSETRON 2 MG/ML
INJECTION INTRAMUSCULAR; INTRAVENOUS AS NEEDED
Status: DISCONTINUED | OUTPATIENT
Start: 2017-04-13 | End: 2017-04-13 | Stop reason: SURG

## 2017-04-13 RX ADMIN — ONDANSETRON 4 MG: 2 INJECTION INTRAMUSCULAR; INTRAVENOUS at 11:40

## 2017-04-13 RX ADMIN — Medication 3 MG: at 11:40

## 2017-04-13 RX ADMIN — PROPOFOL 150 MG: 10 INJECTION, EMULSION INTRAVENOUS at 11:11

## 2017-04-13 RX ADMIN — SODIUM CHLORIDE, POTASSIUM CHLORIDE, SODIUM LACTATE AND CALCIUM CHLORIDE: 600; 310; 30; 20 INJECTION, SOLUTION INTRAVENOUS at 11:08

## 2017-04-13 RX ADMIN — DEXAMETHASONE SODIUM PHOSPHATE 8 MG: 4 INJECTION, SOLUTION INTRAMUSCULAR; INTRAVENOUS at 11:20

## 2017-04-13 RX ADMIN — LIDOCAINE HYDROCHLORIDE 50 MG: 10 INJECTION, SOLUTION INFILTRATION; PERINEURAL at 11:11

## 2017-04-13 RX ADMIN — ROBINUL 0.4 MG: 0.2 INJECTION INTRAMUSCULAR; INTRAVENOUS at 11:40

## 2017-04-13 RX ADMIN — HYDRALAZINE HYDROCHLORIDE 5 MG: 20 INJECTION INTRAMUSCULAR; INTRAVENOUS at 12:39

## 2017-04-13 RX ADMIN — ATRACURIUM BESYLATE 50 MG: 10 INJECTION, SOLUTION INTRAVENOUS at 11:11

## 2017-04-13 NOTE — H&P (VIEW-ONLY)
INTENSIVIST / PULMONARY INITIAL VISIT (CONSULT / H&P) NOTE     Hospital:  LOS: 1 day   Mr. Rodo Rodriguez, 71 y.o. male is followed for:   Chief Complaint   Patient presents with   • Shortness of Breath     Principal Problem:    Hypoxia  Active Problems:    CKD (chronic kidney disease), stage III    Lung mass    Type 2 diabetes mellitus without complication, without long-term current use of insulin    Essential hypertension    Gastroesophageal reflux disease with esophagitis         History of Present Illness   70 y/o WM w/ h/o HTN, GERD, T2DM, CKD3, COPD, former tobacco 1ppd, NSCLC s/p RUL and RLL Wedge with synchronous 1A adenocarcinoma primaries, admitted 4/9 w/ worsening dyspnea.  Had recent CT Angiogram 4/7 that reportedly showed 4.6x2.2 cm right paratracheal mass w/ right pleural effusion and pleural thickening.  Patient's breathing has improved since admission.  Of note he takes plavix for CAD, last dose Saturday morning, hasn't had a stent for a couple years.    Past Medical History:   Diagnosis Date   • Acute kidney injury    • Acute tubular necrosis    • Anemia    • Arthritis    • B12 deficiency    • Bone pain    • Carcinoma of lung     stage IA   • Chronic kidney disease (CKD), stage III (moderate)    • Confusion, postoperative    • COPD (chronic obstructive pulmonary disease)    • Coronary artery disease s/p stent    • Depression    • Diabetes mellitus    • Fatigue    • Fractures    • GERD (gastroesophageal reflux disease)    • H/O colonoscopy 03/2016   • History of BPH    • Hyperlipidemia    • Hypertension    • Impotence    • Kidney stones    • Leaking of urine    • Lung cancer 01/2016   • Peptic ulcer disease    • Post-thoracotomy pain, mild      Past Surgical History:   Procedure Laterality Date   • ANKLE SURGERY     • BACK SURGERY     • BONE MARROW BIOPSY     • CAROTID STENT  1991   • CORONARY STENT PLACEMENT     • KIDNEY STONE SURGERY  1976   • KNEE SURGERY Bilateral 2010   • LUNG REMOVAL, PARTIAL  Right     Right upper and lower lobe wedge resections (2016)     Family History   Problem Relation Age of Onset   • Diabetes Mother    • Breast cancer Sister      60s   • Colon cancer Sister      59   • Skin cancer Sister 45   • Diabetes Other      Social History     Social History   • Marital status:      Spouse name: N/A   • Number of children: N/A   • Years of education: N/A     Occupational History   • Not on file.     Social History Main Topics   • Smoking status: Former Smoker     Packs/day: 1.00     Types: Cigarettes     Quit date: 1991   • Smokeless tobacco: Never Used   • Alcohol use No   • Drug use: No   • Sexual activity: Defer     Other Topics Concern   • Not on file     Social History Narrative   • No narrative on file     Allergies   Allergen Reactions   • Atorvastatin    • Sulfa Antibiotics      No current facility-administered medications on file prior to encounter.      Current Outpatient Prescriptions on File Prior to Encounter   Medication Sig Dispense Refill   • amLODIPine (NORVASC) 10 MG tablet Take 1 tablet by mouth daily.     • atenolol (TENORMIN) 25 MG tablet Take 12.5 mg by mouth Daily.     • citalopram (CeleXA) 20 MG tablet TAKE 1 TABLET BY MOUTH DAILY  5   • clopidogrel (PLAVIX) 75 MG tablet Take 1 tablet by mouth daily.     • diazepam (VALIUM) 10 MG tablet Take 1 tablet by mouth 4 (Four) Times a Day.     • doxazosin (CARDURA) 4 MG tablet TAKE 1 TABLET BY MOUTH DAILY  5   • finasteride (PROSCAR) 5 MG tablet Take 5 mg by mouth Daily.     • hydrALAZINE (APRESOLINE) 25 MG tablet TAKE 1 TABLET BY MOUTH 3 TIMES A DAY  2   • isosorbide mononitrate (IMDUR) 30 MG 24 hr tablet Take 30 mg by mouth daily.     • linagliptin (TRADJENTA) 5 MG tablet tablet Take 5 mg by mouth daily.     • morphine (MS CONTIN) 100 MG 12 hr tablet Take 200 mg by mouth 2 (Two) Times a Day.     • pravastatin (PRAVACHOL) 80 MG tablet Take 1 tablet by mouth daily.     • promethazine (PHENERGAN) 25 MG tablet TAKE 1  TABLET BY MOUTH EVERY 4 TO 6 HOURS  1   • raNITIdine (ZANTAC) 150 MG tablet TAKE 1 TABLET BY MOUTH TWICE A DAY  10   • sulfamethoxazole-trimethoprim (BACTRIM DS,SEPTRA DS) 800-160 MG per tablet TAKE 1 TABLET BY MOUTH EVERY 12 HOURS  2       ROS:  Per HPI, all other systems were reviewed and were negative        OBJECTIVE     Vitals:    04/10/17 1221   BP: 143/69   Pulse: (!) 47   Resp: 16   Temp: 97.7 °F (36.5 °C)   SpO2: (!) 81%     General Appearance:  Conversant, in no acute distress  Eyes:  No scleral icterus or pallor, PERRLA  Ears, Nose, Mouth, Throat:  Atraumatic, oropharynx clear  Neck:  Trachea midline, thyroid normal  Respiratory:  Clear to auscultation bilaterally, normal effort  Cardiovascular:  Regular rate and rhythm, no murmurs, no peripheral edema  Gastrointestinal:  Soft, non-tender, non-distended, no hepatosplenomegaly  Skin:  Normal temperature, no rash  Psychiatric:  Alert and oriented x 3, normal judgement and insight  Neuro:  No focal neurologic deficits observed    Relevant imaging studies and labs from 04/10/17 were reviewed and interpreted by me    Assessment/Plan   IMPRESSION / PLAN     Inpatient Problem List:  71 y.o.male:  Principal Problem:    Hypoxia  Active Problems:    CKD (chronic kidney disease), stage III    Lung mass    Type 2 diabetes mellitus without complication, without long-term current use of insulin    Essential hypertension    Gastroesophageal reflux disease with esophagitis       Impression:  72 y/o WM w/ h/o HTN, GERD, T2DM, CKD3, COPD, former tobacco 1ppd, NSCLC s/p RUL and RLL Wedge with synchronous 1A adenocarcinoma primaries, admitted 4/9 w/ worsening dyspnea.  Had recent CT Angiogram 4/7 that reportedly showed 4.6x2.2 cm right paratracheal mass w/ right pleural effusion and pleural thickening concerning for malignancy.    Plan:  -needs to be off Plavix 5 days for any procedure  -images not available for review, wife is going to bring CD tonight and I will review it  tomorrow  -likely EBUS Thursday or Friday.  This can be arranged as inpatient or outpatient.  -further recs after reviewed CT tomorrow         Kingsley Hodge MD  Intensive Care Medicine  04/10/17 2:21 PM

## 2017-04-13 NOTE — PLAN OF CARE
Problem: Bronchoscopy (Adult)  Goal: Signs and Symptoms of Listed Potential Problems Will be Absent or Manageable (Bronchoscopy)  Outcome: Outcome(s) achieved Date Met:  04/13/17 04/13/17 1336   Bronchoscopy   Problems Assessed (Bronchoscopy) pain   Problems Present (Bronchoscopy) none

## 2017-04-13 NOTE — ANESTHESIA PROCEDURE NOTES
Airway  Airway not difficult    General Information and Staff    Patient location during procedure: OR  CRNA: GABRIEL SAMSON    Indications and Patient Condition  Indications for airway management: airway protection    Preoxygenated: yes  MILS not maintained throughout  Mask difficulty assessment: 1 - vent by mask    Final Airway Details  Final airway type: endotracheal airway      Successful airway: ETT  Cuffed: yes   Successful intubation technique: direct laryngoscopy  Endotracheal tube insertion site: oral  Blade: Morris  Blade size: #2  ETT size: 9.0 mm  Cormack-Lehane Classification: grade I - full view of glottis  Placement verified by: chest auscultation and capnometry   Inital cuff pressure (cm H2O): 22  Measured from: lips  ETT to lips (cm): 20  Number of attempts at approach: 1    Additional Comments  Negative epigastric sounds, Breath sound equal bilaterally with symmetric chest rise and fall.  Teeth Intact

## 2017-04-13 NOTE — ANESTHESIA POSTPROCEDURE EVALUATION
Patient: Rodo Rodriguez    Procedure Summary     Date Anesthesia Start Anesthesia Stop Room / Location    04/13/17 1108 1200  [facility] /  [facility]       Procedure Diagnosis Surgeon Provider    BRONCHOSCOPY WITH ENDOBRONCHIAL ULTRASOUND (N/A Bronchus) No diagnosis on file. MD Jonh Sandoval MD          Anesthesia Type: general  Last vitals  BP     Temp      Pulse     Resp      SpO2        Post Anesthesia Care and Evaluation    Patient location during evaluation: PACU  Patient participation: complete - patient participated  Level of consciousness: awake and alert  Pain score: 0  Pain management: adequate  Airway patency: patent  Anesthetic complications: No anesthetic complications  PONV Status: none  Cardiovascular status: hemodynamically stable and acceptable  Respiratory status: nonlabored ventilation, acceptable and nasal cannula  Hydration status: acceptable

## 2017-04-13 NOTE — INTERVAL H&P NOTE
CT w/ enlarged right paratracheal LN, proceed w/ bronch.    Kingsley Hodge MD  Pulmonology and Critical Care Medicine  04/13/17 11:04 AM  Electronically Signed

## 2017-04-13 NOTE — PERIOPERATIVE NURSING NOTE
Pt and wife says that he was just discharged from hospital with home O2.  They did not bring their oxygen here today.  He has been wearing it since he has been home and feeling better.   notified for need for transport O2 to home.

## 2017-04-13 NOTE — PROGRESS NOTES
Case Management/Social Work    Patient Name:  Rodo Rodriguez  YOB: 1945  MRN: [id number]  Admit Date:  4/13/2017    Received order from Dr. Hodge to arrange home O2 with current vendor for O2 at 2L/NC continuously with portable O2 for use while ambulating in the home and for MD office visits.  Call to Anila at [facility].  Provided Room air saturation of 87% and physician's order.  Portable O2 delivered to bedside.  Continued Stay Note  [facility]     Patient Name: Rodo Rodriguez  MRN: [id number]  Today's Date: 4/13/2017    Admit Date: 4/13/2017          Discharge Plan     None              Discharge Codes     None            Gerri Ramey RN            Electronically signed by:  Gerri Ramey RN  04/13/17 11:22 AM

## 2017-04-13 NOTE — SIGNIFICANT NOTE
04/13/17 0943   Vital Signs   Heart Rate 68   Heart Rate Source Monitor   Resp 24   Resp Rate Source Visual   Oxygen Therapy   SpO2 (!) 87 %   Pulse Oximetry Type Continuous   O2 Device room air

## 2017-04-13 NOTE — OP NOTE
PREOPERATIVE DIAGNOSIS:  Enlarged right peritracheal lymph node.    POSTOPERATIVE DIAGNOSIS: Enlarged right peritracheal lymph node.    PROCEDURES PERFORMED:    1.  Diagnostic flexible bronchoscopy.  2.  A 19-guage linear endobronchial ultrasound transbronchial needle aspiration of lymph node station 4R.    SURGEON:  Kingsley Hodge MD    INDICATIONS FOR PROCEDURE:  Rule out malignancy.    DESCRIPTION OF PROCEDURE:  The patient was placed under general endotracheal anesthesia. The bronchoscope was introduced through the endotracheal tube. The distal trachea, mainstem bronchi, right upper lobe, right middle lobe, right lower lobe, left upper lobe, lingula, and left lower lobe were examined. The airways were anatomically normal to the segmental level except for some mild areas of hypervascularity.  The regular bronchoscope was then removed.  The EBUS scope was placed.  Lymph node stations 4R, 4L, 7, 10R, 10L, 11R, and 11L were examined.  The only enlarged lymph node   that could be identified was station 4R. We performed multiple passes with the 19 gauge EBUS needle with good procurement of tissue.  There was minimal bleeding.  The patient tolerated the procedure well. No immediate postprocedure complications.    RECOMMENDATIONS: Followup final pathology.

## 2017-04-13 NOTE — ANESTHESIA PREPROCEDURE EVALUATION
Anesthesia Evaluation     Patient summary reviewed and Nursing notes reviewed   NPO Status: > 8 hours   Airway   Mallampati: III  TM distance: >3 FB  Neck ROM: full  possible difficult intubation  Dental      Pulmonary    (+) COPD (No RX MENDOZA- O2 NC- sats 95%),   Smoker: stable NO MDI   Cardiovascular     (+) hypertension, CAD (recent stents 2010 approx ; several before), hyperlipidemia  MENDOZA: 5 years.    ROS comment: EKGJunctional rhythm  When compared with ECG of 06-JAN-2017 08:35,  Junctional rhythm has replaced Sinus rhythm  Nonspecific T wave abnormality now evident in Inferior leads  Nonspecific T wave abnormality no longer evident in Anterior leads        ECHO   · EF > 70).  ·The L and R  vent Cav moderately dilated.  ·Bi-atrial enlargement is seen.  ·Mild MR  ·The estimated RVSP is > 55 mmHg.    Neuro/Psych  GI/Hepatic/Renal/Endo    (+)  GERD, chronic renal disease (Stage III ) CRI, diabetes mellitus,     Musculoskeletal     Abdominal    Substance History      OB/GYN          Other        ROS/Med Hx Other: CREATNINE 1.7  Black coffee 0600                           Anesthesia Plan    ASA 4     general   (Glidescope SB)  intravenous induction   Anesthetic plan and risks discussed with patient.    Plan discussed with CRNA.

## 2017-04-14 LAB
BACTERIA SPEC AEROBE CULT: NORMAL
BACTERIA SPEC AEROBE CULT: NORMAL

## 2017-04-17 ENCOUNTER — TELEPHONE (OUTPATIENT)
Dept: PULMONOLOGY | Facility: CLINIC | Age: 72
End: 2017-04-17

## 2017-04-17 DIAGNOSIS — R59.0 MEDIASTINAL LYMPHADENOPATHY: Primary | ICD-10-CM

## 2017-04-17 LAB
LAB AP CASE REPORT: NORMAL
Lab: NORMAL
PATH REPORT.FINAL DX SPEC: NORMAL

## 2017-04-17 NOTE — TELEPHONE ENCOUNTER
"Called with results of bronchoscopy.  Spoke with his wife.  \"benign\" lymph node.  Will see back in a few months with a repeat CT Chest.    Kingsley Hodge MD  Pulmonology and Critical Care Medicine  04/17/17 10:52 AM  Electronically Signed    "

## 2017-05-12 RX ORDER — CLOPIDOGREL BISULFATE 75 MG/1
TABLET ORAL
Qty: 30 TABLET | Refills: 0 | Status: SHIPPED | OUTPATIENT
Start: 2017-05-12 | End: 2017-06-15 | Stop reason: SDUPTHER

## 2017-06-15 RX ORDER — CLOPIDOGREL BISULFATE 75 MG/1
TABLET ORAL
Qty: 30 TABLET | Refills: 0 | Status: SHIPPED | OUTPATIENT
Start: 2017-06-15 | End: 2017-07-20 | Stop reason: SDUPTHER

## 2017-07-21 RX ORDER — CLOPIDOGREL BISULFATE 75 MG/1
TABLET ORAL
Qty: 30 TABLET | Refills: 0 | Status: SHIPPED | OUTPATIENT
Start: 2017-07-21 | End: 2017-09-04 | Stop reason: SDUPTHER

## 2017-08-28 ENCOUNTER — OFFICE VISIT (OUTPATIENT)
Dept: PULMONOLOGY | Facility: CLINIC | Age: 72
End: 2017-08-28

## 2017-08-28 VITALS
DIASTOLIC BLOOD PRESSURE: 86 MMHG | OXYGEN SATURATION: 93 % | WEIGHT: 254 LBS | SYSTOLIC BLOOD PRESSURE: 150 MMHG | RESPIRATION RATE: 16 BRPM | HEART RATE: 64 BPM | TEMPERATURE: 97.3 F | HEIGHT: 70 IN | BODY MASS INDEX: 36.36 KG/M2

## 2017-08-28 DIAGNOSIS — G47.33 OSA (OBSTRUCTIVE SLEEP APNEA): ICD-10-CM

## 2017-08-28 DIAGNOSIS — R93.89 ABNORMAL CT OF THE CHEST: ICD-10-CM

## 2017-08-28 DIAGNOSIS — R06.02 SOB (SHORTNESS OF BREATH): Primary | ICD-10-CM

## 2017-08-28 DIAGNOSIS — G47.30 SLEEP-DISORDERED BREATHING: ICD-10-CM

## 2017-08-28 DIAGNOSIS — J44.9 CHRONIC OBSTRUCTIVE PULMONARY DISEASE, UNSPECIFIED COPD TYPE (HCC): ICD-10-CM

## 2017-08-28 DIAGNOSIS — R91.8 LUNG MASS: Primary | ICD-10-CM

## 2017-08-28 PROCEDURE — 99214 OFFICE O/P EST MOD 30 MIN: CPT | Performed by: INTERNAL MEDICINE

## 2017-08-28 PROCEDURE — 71020 CHG CHEST X-RAY 2 VW: CPT | Performed by: INTERNAL MEDICINE

## 2017-08-28 NOTE — PROGRESS NOTES
CC:  Follow up for COPD and abnormal CT    HPI:    70 y/o WM w/ h/o HTN, GERD, T2DM, CKD3, COPD, 45 py smoking quit 1991, prior lung cancer (synchronous primaries 1A adenocarcinomas s/p RUL & RLL wedge (2/2016) who was admitted to the hospital for diastolic HF.  Part of his evaluation revealed an enlarged 4.6x2.2 cm right paratracheal mass per report at OSH.  After holding plavix for 5 days, on 4/13/17 I performed EBUS on the 4R lymph node which was benign.  Patient returns today for follow up.  His breathing is stable.  No new complaints.    PMH  HTN  GERD  T2DM  CKD 3  COPD  45 py smoking - quit 1991  Diastolic HF  Stage 1A lung cancer x 2 (synchronous primaries)  BPH    PSH  RUL and RLL Wedge Resection  Carotid stent  Lithotripsy  Knee  Ankle  Back    SH  45 py smoking, quit 1991    FH  No lung disease    ROS  Per HPI o/w reviewed and negative    Physical Exam   Constitutional: Oriented to person, place, and time. Appears well-developed and well-nourished.   Head: Normocephalic and atraumatic.   Nose: Nose normal.   Mouth/Throat: Oropharynx is clear and moist.   Eyes: Conjunctivae are normal.   Neck: No tracheal deviation present.   Cardiovascular: Normal rate, regular rhythm, normal heart sounds and intact distal pulses.  Exam reveals no gallop and no friction rub.    No murmur heard.  Pulmonary/Chest: Effort normal and breath sounds normal. No stridor. No respiratory distress. No wheezes. No rales. No tenderness.   Abdominal: Soft. Bowel sounds are normal. No distension. No tenderness. There is no guarding.   Musculoskeletal: Normal range of motion. No edema.   Lymphadenopathy:  No cervical adenopathy.   Neurological: Alert and oriented to person, place, and time.  No Focal Neurological Deficits Observed   Skin: Skin is warm and dry. No rash noted.   Psychiatric: Normal mood and affect.  Behavior is normal. Judgment normal.     DIAGNOSTIC DATA (reviewed and interpreted by me):    CXR 8/28/17 - no acute lung  disease, elevated right caridad-diaphragm    Impression & Plan    1)  Paratracheal Mass - benign on EBUS w/ good sample.  Will follow with repeat CT now then [facility].  Unfortunately his disc from OSH wouldn't scan in the system here.    2) COPD - start Anoro    3) Sleep Disordered Breathing - home sleep study    RTC 3 months    Kingsley Hodge MD  Pulmonology and Critical Care Medicine  08/28/17 1:30 PM  Electronically Signed

## 2017-09-01 ENCOUNTER — HOSPITAL ENCOUNTER (OUTPATIENT)
Dept: CT IMAGING | Facility: HOSPITAL | Age: 72
Discharge: HOME | End: 2017-09-01
Attending: INTERNAL MEDICINE | Admitting: INTERNAL MEDICINE

## 2017-09-01 DIAGNOSIS — R91.8 LUNG MASS: ICD-10-CM

## 2017-09-01 PROCEDURE — 71250 CT THORAX DX C-: CPT

## 2017-09-05 RX ORDER — CLOPIDOGREL BISULFATE 75 MG/1
TABLET ORAL
Qty: 30 TABLET | Refills: 11 | Status: SHIPPED | OUTPATIENT
Start: 2017-09-05 | End: 2018-09-07 | Stop reason: SDUPTHER

## 2017-10-17 DIAGNOSIS — Z85.118 HISTORY OF LUNG CANCER: ICD-10-CM

## 2017-10-17 DIAGNOSIS — R91.1 LUNG NODULE: Primary | ICD-10-CM

## 2018-01-29 ENCOUNTER — OFFICE VISIT (OUTPATIENT)
Dept: PULMONOLOGY | Facility: CLINIC | Age: 73
End: 2018-01-29

## 2018-01-29 VITALS
DIASTOLIC BLOOD PRESSURE: 88 MMHG | HEIGHT: 70 IN | HEART RATE: 68 BPM | OXYGEN SATURATION: 95 % | BODY MASS INDEX: 38.08 KG/M2 | SYSTOLIC BLOOD PRESSURE: 164 MMHG | RESPIRATION RATE: 16 BRPM | WEIGHT: 266 LBS | TEMPERATURE: 98.5 F

## 2018-01-29 DIAGNOSIS — J44.9 CHRONIC OBSTRUCTIVE PULMONARY DISEASE, UNSPECIFIED COPD TYPE (HCC): Primary | ICD-10-CM

## 2018-01-29 DIAGNOSIS — G47.30 SLEEP-DISORDERED BREATHING: ICD-10-CM

## 2018-01-29 DIAGNOSIS — R91.1 LUNG NODULE: ICD-10-CM

## 2018-01-29 DIAGNOSIS — R91.1 LUNG NODULE: Primary | ICD-10-CM

## 2018-01-29 PROCEDURE — 99214 OFFICE O/P EST MOD 30 MIN: CPT | Performed by: INTERNAL MEDICINE

## 2018-01-29 RX ORDER — MECLIZINE HYDROCHLORIDE 25 MG/1
25 TABLET ORAL AS NEEDED
Refills: 1 | COMMUNITY
Start: 2018-01-23 | End: 2019-10-17

## 2018-01-29 RX ORDER — BUMETANIDE 1 MG/1
1 TABLET ORAL DAILY
Refills: 5 | Status: ON HOLD | COMMUNITY
Start: 2018-01-17 | End: 2020-04-06 | Stop reason: SDUPTHER

## 2018-01-29 NOTE — PROGRESS NOTES
CC:  Follow up for COPD and abnormal CT    HPI:    70 y/o WM w/ h/o HTN, GERD, T2DM, CKD3, COPD, 45 py smoking quit 1991, prior lung cancer (synchronous primaries 1A adenocarcinomas s/p RUL & RLL wedge (2/2016) who was admitted to the hospital for diastolic HF.  Part of his evaluation revealed an enlarged 4.6x2.2 cm right paratracheal mass per report at OSH.  After holding plavix for 5 days, on 4/13/17 I performed EBUS on the 4R lymph node which was benign.      INTERVAL HISTORY:    Returns today for follow up.  Patient continues to be SOB even with minimal activity (showering).  Does get wheezing with exertion.  Unable to pay for Anoro.  No new complaints.    PMH  HTN  GERD  T2DM  CKD 3  COPD  45 py smoking - quit 1991  Diastolic HF  Stage 1A lung cancer x 2 (synchronous primaries)  BPH    PSH  RUL and RLL Wedge Resection  Carotid stent  Lithotripsy  Knee  Ankle  Back    SH  45 py smoking, quit 1991    FH  No lung disease    ROS  Per HPI o/w reviewed and negative    Physical Exam   Constitutional: Oriented to person, place, and time. Appears well-developed and well-nourished.   Head: Normocephalic and atraumatic.   Nose: Nose normal.   Mouth/Throat: Oropharynx is clear and moist.   Eyes: Conjunctivae are normal.   Neck: No tracheal deviation present.   Cardiovascular: Normal rate, regular rhythm, normal heart sounds and intact distal pulses.  Exam reveals no gallop and no friction rub.    No murmur heard.  Pulmonary/Chest: Effort normal and breath sounds normal. No stridor. No respiratory distress. No wheezes. No rales. No tenderness.   Abdominal: Soft. Bowel sounds are normal. No distension. No tenderness. There is no guarding.   Musculoskeletal: Normal range of motion. No edema.   Lymphadenopathy:  No cervical adenopathy.   Neurological: Alert and oriented to person, place, and time.  No Focal Neurological Deficits Observed   Skin: Skin is warm and dry. No rash noted.   Psychiatric: Normal mood and affect.   Behavior is normal. Judgment normal.     DIAGNOSTIC DATA (reviewed and interpreted by me):    CXR 8/28/17 - no acute lung disease, elevated right caridad-diaphragm    CT Chest 9/1/17 - right paratracheal mass resolved, 6-7 mm nodule RLL, scarring along staple lines    Impression & Plan    1)  Paratracheal Mass / Lung Nodule - benign on EBUS w/ good sample, appears resolved on repeat imaging though there is a new RLL nodule that needs follow up.  Will get CT now (has been over 3 months), then repeat 6 months from now if stable.    2) COPD - unable to pay for anoro, get PA for Stiolto, PFT next visit    3) Sleep Disordered Breathing - refuses sleep study despite multiple risk factors    RTC 6 months    Kingsley Hodge MD  Pulmonology and Critical Care Medicine  01/29/18 3:30 PM  Electronically Signed

## 2018-02-01 ENCOUNTER — HOSPITAL ENCOUNTER (OUTPATIENT)
Dept: CT IMAGING | Facility: HOSPITAL | Age: 73
Discharge: HOME | End: 2018-02-01
Attending: INTERNAL MEDICINE | Admitting: INTERNAL MEDICINE

## 2018-02-01 DIAGNOSIS — R91.1 LUNG NODULE: ICD-10-CM

## 2018-02-01 PROCEDURE — 71250 CT THORAX DX C-: CPT

## 2018-06-28 NOTE — TELEPHONE ENCOUNTER
Jyoti from [facility] LVM to refill Rx Plavix 75 mg. Spoke with Jyoti and advised her to reach out to pt's PCP to refill Rx. Jyoti verbalized understanding.

## 2018-07-25 RX ORDER — CLOPIDOGREL BISULFATE 75 MG/1
TABLET ORAL
Qty: 30 TABLET | Refills: 9 | OUTPATIENT
Start: 2018-07-25

## 2018-07-30 ENCOUNTER — HOSPITAL ENCOUNTER (OUTPATIENT)
Dept: CT IMAGING | Facility: HOSPITAL | Age: 73
Discharge: HOME | End: 2018-07-30
Attending: INTERNAL MEDICINE | Admitting: INTERNAL MEDICINE

## 2018-07-30 DIAGNOSIS — R91.1 LUNG NODULE: ICD-10-CM

## 2018-07-30 PROCEDURE — 71250 CT THORAX DX C-: CPT

## 2018-08-01 ENCOUNTER — OFFICE VISIT (OUTPATIENT)
Dept: PULMONOLOGY | Facility: CLINIC | Age: 73
End: 2018-08-01

## 2018-08-01 VITALS
TEMPERATURE: 98.4 F | SYSTOLIC BLOOD PRESSURE: 134 MMHG | DIASTOLIC BLOOD PRESSURE: 86 MMHG | WEIGHT: 268 LBS | HEIGHT: 70 IN | BODY MASS INDEX: 38.37 KG/M2 | HEART RATE: 78 BPM | OXYGEN SATURATION: 96 %

## 2018-08-01 DIAGNOSIS — R91.1 LUNG NODULE: Primary | ICD-10-CM

## 2018-08-01 DIAGNOSIS — J41.0 SIMPLE CHRONIC BRONCHITIS (HCC): ICD-10-CM

## 2018-08-01 DIAGNOSIS — R06.02 SOB (SHORTNESS OF BREATH): Primary | ICD-10-CM

## 2018-08-01 DIAGNOSIS — R93.89 ABNORMAL CT SCAN, CHEST: ICD-10-CM

## 2018-08-01 PROCEDURE — 94060 EVALUATION OF WHEEZING: CPT | Performed by: INTERNAL MEDICINE

## 2018-08-01 PROCEDURE — 94729 DIFFUSING CAPACITY: CPT | Performed by: INTERNAL MEDICINE

## 2018-08-01 PROCEDURE — 94726 PLETHYSMOGRAPHY LUNG VOLUMES: CPT | Performed by: INTERNAL MEDICINE

## 2018-08-01 PROCEDURE — 99214 OFFICE O/P EST MOD 30 MIN: CPT | Performed by: INTERNAL MEDICINE

## 2018-08-01 RX ORDER — ALBUTEROL SULFATE 90 UG/1
4 AEROSOL, METERED RESPIRATORY (INHALATION) ONCE
Status: COMPLETED | OUTPATIENT
Start: 2018-08-01 | End: 2018-08-01

## 2018-08-01 RX ORDER — ALBUTEROL SULFATE 90 UG/1
2 AEROSOL, METERED RESPIRATORY (INHALATION) EVERY 4 HOURS PRN
Qty: 6.7 G | Refills: 11 | Status: SHIPPED | OUTPATIENT
Start: 2018-08-01 | End: 2019-02-28 | Stop reason: SDUPTHER

## 2018-08-01 RX ADMIN — ALBUTEROL SULFATE 4 PUFF: 90 AEROSOL, METERED RESPIRATORY (INHALATION) at 11:07

## 2018-08-01 NOTE — PROGRESS NOTES
CC:  Follow up for COPD and abnormal CT    HPI:    70 y/o WM w/ h/o HTN, GERD, T2DM, CKD3, COPD, 45 py smoking quit 1991, prior lung cancer (synchronous primaries 1A adenocarcinomas s/p RUL & RLL wedge (2/2016) who was admitted to the hospital for diastolic HF.  Part of his evaluation revealed an enlarged 4.6x2.2 cm right paratracheal mass per report at OSH.  After holding plavix for 5 days, on 4/13/17 I performed EBUS on the 4R lymph node which was benign.      INTERVAL HISTORY:    Still complains of shortness of breath.  No interest in CPAP.  Remains tobacco free. Unable to afford any inhalers even with $30 co-pay.    PMH  HTN  GERD  T2DM  CKD 3  COPD  45 py smoking - quit 1991  Diastolic HF  Stage 1A lung cancer x 2 (synchronous primaries)  BPH    PSH  RUL and RLL Wedge Resection  Carotid stent  Lithotripsy  Knee  Ankle  Back    SH  45 py smoking, quit 1991    FH  No lung disease    ROS  Per HPI o/w reviewed and negative    Physical Exam   Constitutional: Oriented to person, place, and time. Appears well-developed and well-nourished.   Head: Normocephalic and atraumatic.   Nose: Nose normal.   Mouth/Throat: Oropharynx is clear and moist.   Eyes: Conjunctivae are normal.   Neck: No tracheal deviation present.   Cardiovascular: Normal rate, regular rhythm, normal heart sounds and intact distal pulses.  Exam reveals no gallop and no friction rub.    + murmur heard.  Pulmonary/Chest: Effort normal and breath sounds normal. No stridor. No respiratory distress. No wheezes. No rales. No tenderness.   Abdominal: Soft. Bowel sounds are normal. No distension. No tenderness. There is no guarding.   Musculoskeletal: Normal range of motion. No edema.   Lymphadenopathy:  No cervical adenopathy.   Neurological: Alert and oriented to person, place, and time.  No Focal Neurological Deficits Observed   Skin: Skin is warm and dry. No rash noted.   Psychiatric: Normal mood and affect.  Behavior is normal. Judgment normal.      DIAGNOSTIC DATA (reviewed and interpreted by me):    CXR 8/28/17 - no acute lung disease, elevated right caridad-diaphragm    CT Chest 9/1/17 - right paratracheal mass resolved, 6-7 mm nodule RLL, scarring along staple lines    CT Chest 7/30/18 - stable scarring, RLL nodule, subtle lingular gg nodule    PFT 8/1/18 - moderate obstruction, no change w BD, air trapping, unable to do DLCO    Impression & Plan    1)  Paratracheal Mass / Lung Nodule / NSCLC - benign on EBUS w/ good sample, appears resolved on repeat imaging though there is a new RLL nodule and lingular GG nodule that remain stable..  At this point will get CT every 6 months x 5 years for lung cancer (resected 2/2016).  Will complete that 2021 assuming no changes.    2) COPD - unable to afford any inhalers.  Rarely has wheezing.  Right now will hold off other than prescribing a rescue inhaler.    3) Sleep Disordered Breathing - no interest in pursuing CPAP    4) Murmur - well known, no concerns on Echo from 2017    RTC 6 months w/ CT    Kingsley Hodge MD  Pulmonology and Critical Care Medicine  08/01/18 11:22 AM  Electronically Signed

## 2018-09-10 RX ORDER — CLOPIDOGREL BISULFATE 75 MG/1
TABLET ORAL
Qty: 30 TABLET | Refills: 0 | Status: SHIPPED | OUTPATIENT
Start: 2018-09-10 | End: 2018-10-05 | Stop reason: SDUPTHER

## 2018-10-05 RX ORDER — CLOPIDOGREL BISULFATE 75 MG/1
75 TABLET ORAL DAILY
Qty: 90 TABLET | Refills: 0 | Status: ON HOLD | OUTPATIENT
Start: 2018-10-05 | End: 2020-04-06 | Stop reason: SDUPTHER

## 2018-10-05 NOTE — TELEPHONE ENCOUNTER
Fax request for Rx Clopidogrel 75 mg for 90 day supply. Refilled Rx Clopidogrel 75 mg per chart via fax for 90 day supply.

## 2018-10-11 RX ORDER — CLOPIDOGREL BISULFATE 75 MG/1
TABLET ORAL
Qty: 30 TABLET | Refills: 0 | OUTPATIENT
Start: 2018-10-11

## 2018-10-15 RX ORDER — CLOPIDOGREL BISULFATE 75 MG/1
TABLET ORAL
Qty: 30 TABLET | Refills: 0 | OUTPATIENT
Start: 2018-10-15

## 2018-10-23 RX ORDER — CLOPIDOGREL BISULFATE 75 MG/1
TABLET ORAL
Qty: 30 TABLET | Refills: 0 | OUTPATIENT
Start: 2018-10-23

## 2018-11-05 RX ORDER — CLOPIDOGREL BISULFATE 75 MG/1
TABLET ORAL
Qty: 30 TABLET | Refills: 0 | Status: SHIPPED | OUTPATIENT
Start: 2018-11-05 | End: 2019-02-28 | Stop reason: SDUPTHER

## 2019-02-13 ENCOUNTER — HOSPITAL ENCOUNTER (OUTPATIENT)
Dept: CT IMAGING | Facility: HOSPITAL | Age: 74
Discharge: HOME | End: 2019-02-13
Attending: INTERNAL MEDICINE | Admitting: INTERNAL MEDICINE

## 2019-02-13 DIAGNOSIS — R91.1 LUNG NODULE: ICD-10-CM

## 2019-02-13 PROCEDURE — 71250 CT THORAX DX C-: CPT

## 2019-02-28 ENCOUNTER — OFFICE VISIT (OUTPATIENT)
Dept: PULMONOLOGY | Facility: CLINIC | Age: 74
End: 2019-02-28

## 2019-02-28 VITALS
HEART RATE: 88 BPM | SYSTOLIC BLOOD PRESSURE: 176 MMHG | DIASTOLIC BLOOD PRESSURE: 84 MMHG | RESPIRATION RATE: 18 BRPM | OXYGEN SATURATION: 91 % | BODY MASS INDEX: 37.08 KG/M2 | TEMPERATURE: 99 F | HEIGHT: 70 IN | WEIGHT: 259 LBS

## 2019-02-28 DIAGNOSIS — R91.8 LUNG MASS: ICD-10-CM

## 2019-02-28 DIAGNOSIS — K21.00 GASTROESOPHAGEAL REFLUX DISEASE WITH ESOPHAGITIS: ICD-10-CM

## 2019-02-28 DIAGNOSIS — J44.9 CHRONIC OBSTRUCTIVE PULMONARY DISEASE, UNSPECIFIED COPD TYPE (HCC): ICD-10-CM

## 2019-02-28 DIAGNOSIS — R06.02 SHORTNESS OF BREATH: Primary | ICD-10-CM

## 2019-02-28 PROCEDURE — 99214 OFFICE O/P EST MOD 30 MIN: CPT | Performed by: NURSE PRACTITIONER

## 2019-02-28 RX ORDER — ALBUTEROL SULFATE 90 UG/1
2 AEROSOL, METERED RESPIRATORY (INHALATION) EVERY 4 HOURS PRN
Qty: 6.7 G | Refills: 11 | Status: SHIPPED | OUTPATIENT
Start: 2019-02-28

## 2019-02-28 RX ORDER — NITROGLYCERIN 0.4 MG/1
TABLET SUBLINGUAL
Refills: 5 | COMMUNITY
Start: 2019-02-01

## 2019-02-28 RX ORDER — CYCLOBENZAPRINE HCL 10 MG
TABLET ORAL
Refills: 11 | COMMUNITY
Start: 2019-01-21 | End: 2019-10-17

## 2019-02-28 RX ORDER — LEVOFLOXACIN 500 MG/1
TABLET, FILM COATED ORAL
Refills: 0 | COMMUNITY
Start: 2019-01-14 | End: 2019-10-17

## 2019-02-28 NOTE — PROGRESS NOTES
[facility]monary Follow up    CHIEF COMPLAINT    COPD/history of lung cancer post radiation    HISTORY OF PRESENT ILLNESS    Joseph Rodriguez is a 73 y.o.male here today for follow-up after a 6-month CT scan that he had performed earlier this month.  He was last seen in our office in August by Dr. Hodge. 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  He has CT scans performed every 6 months to follow-up on this.  He states that he has been doing well since his last visit.  He denies any respiratory illnesses or exacerbations.    He continues to have shortness of breath with activity but recovers quickly with rest.  In the past he has been offered inhalers but has declined treatment.  His income is low and he does not have extra funds for inhalers.  He has never used a rescue inhaler for shortness of breath.  He states that he would be willing to try this if it would help.    Since his last visit he has had a colonoscopy performed and was told that it was negative and he would need a repeat colonoscopy in 2 years.  He has not seen his PCP in some time and states that he has had some issues with high blood pressure recently.  He states that he is under increased stress at home and contributes this to his high blood pressure.    He denies fever, chills, sputum production, hemoptysis, night sweats, weight loss, chest pain or palpitations.  He denies any sinus or allergy difficulties.  He takes Protonix daily for GERD and denies reflux symptoms.  Patient Active Problem List   Diagnosis   • Carcinoma of right lung (CMS/HCC)   • Acute kidney injury (CMS/HCC)   • Anemia   • Acute tubular necrosis (CMS/HCC)   • B12 deficiency   • Monoclonal gammopathy   • Chest pain   • Sinus bradycardia   • Hypoxia   • CKD (chronic kidney disease), stage III (CMS/HCC)   • Lung mass   • Type 2 diabetes mellitus without complication, without long-term current use of insulin  (CMS/[facility])   • Essential hypertension   • Gastroesophageal reflux disease with esophagitis   • Chronic obstructive pulmonary disease (CMS/[facility])   • Lung nodule   • Sleep-disordered breathing       Allergies   Allergen Reactions   • Atorvastatin    • Sulfa Antibiotics        Current Outpatient Medications:   •  albuterol sulfate  (90 Base) MCG/ACT inhaler, Inhale 2 puffs Every 4 (Four) Hours As Needed for Wheezing., Disp: 6.7 g, Rfl: 11  •  bumetanide (BUMEX) 1 MG tablet, Take 1 mg by mouth Daily., Disp: , Rfl: 5  •  citalopram (CeleXA) 20 MG tablet, TAKE 1 TABLET BY MOUTH DAILY, Disp: , Rfl: 5  •  clopidogrel (PLAVIX) 75 MG tablet, Take 1 tablet by mouth Daily., Disp: 90 tablet, Rfl: 0  •  Coenzyme Q10 (CO Q 10) 100 MG capsule, Take 200 mg by mouth Daily., Disp: , Rfl:   •  cyclobenzaprine (FLEXERIL) 10 MG tablet, TAKE 1/2 - 1 BY MOUTH 3 TIMES A DAY AS NEEDED FOR PAIN, Disp: , Rfl: 11  •  diazepam (VALIUM) 10 MG tablet, Take 1 tablet by mouth 4 (Four) Times a Day., Disp: , Rfl:   •  docusate sodium (COLACE) 100 MG capsule, Take 100 mg by mouth 2 (Two) Times a Day., Disp: , Rfl:   •  doxazosin (CARDURA) 4 MG tablet, TAKE 1 TABLET BY MOUTH DAILY, Disp: , Rfl: 5  •  levoFLOXacin (LEVAQUIN) 500 MG tablet, TAKE 1 TABLET BY MOUTH EVERY DAY FOR 10 DAYS, Disp: , Rfl: 0  •  linagliptin (TRADJENTA) 5 MG tablet tablet, Take 5 mg by mouth daily., Disp: , Rfl:   •  meclizine (ANTIVERT) 25 MG tablet, Take 25 mg by mouth As Needed., Disp: , Rfl: 1  •  morphine (MS CONTIN) 100 MG 12 hr tablet, Take 200 mg by mouth 2 (Two) Times a Day., Disp: , Rfl:   •  nitroglycerin (NITROSTAT) 0.4 MG SL tablet, DISSOLVE 1 TABLET(S) UNDER THE TONGUE MAY REPEAT EVERY 5 MINUTES. MAXIMUM OF 3 DOSES IN 15 MINUTES, Disp: , Rfl: 5  •  pantoprazole (PROTONIX) 40 MG EC tablet, Take 40 mg by mouth Daily., Disp: , Rfl:   •  pravastatin (PRAVACHOL) 80 MG tablet, Take 80 mg by mouth Daily., Disp: , Rfl:   •  promethazine (PHENERGAN) 25 MG tablet, TAKE 1  TABLET BY MOUTH EVERY 4 TO 6 HOURS, Disp: , Rfl: 1  •  raNITIdine (ZANTAC) 150 MG tablet, TAKE 1 TABLET BY MOUTH TWICE A DAY, Disp: , Rfl: 10  •  sulfamethoxazole-trimethoprim (BACTRIM DS,SEPTRA DS) 800-160 MG per tablet, TAKE 1 TABLET BY MOUTH EVERY 12 HOURS, Disp: , Rfl: 2  •  tiotropium bromide-olodaterol (STIOLTO RESPIMAT) 2.5-2.5 MCG/ACT aerosol solution inhaler, Inhale 2 puffs Daily., Disp: 1 inhaler, Rfl: 11  MEDICATION LIST AND ALLERGIES REVIEWED.    Social History     Tobacco Use   • Smoking status: Former Smoker     Packs/day: 1.00     Types: Cigarettes     Last attempt to quit:      Years since quittin.1   • Smokeless tobacco: Never Used   Substance Use Topics   • Alcohol use: No   • Drug use: No       FAMILY AND SOCIAL HISTORY REVIEWED.    Review of Systems   Constitutional: Negative for activity change, appetite change, fatigue, fever and unexpected weight change.   HENT: Negative for congestion, postnasal drip, rhinorrhea, sinus pressure, sore throat and voice change.    Eyes: Negative for visual disturbance.   Respiratory: Positive for cough and shortness of breath. Negative for chest tightness and wheezing.    Cardiovascular: Negative for chest pain, palpitations and leg swelling.   Gastrointestinal: Negative for abdominal distention, abdominal pain, nausea and vomiting.   Endocrine: Negative for cold intolerance and heat intolerance.   Genitourinary: Negative for difficulty urinating and urgency.   Musculoskeletal: Negative for arthralgias, back pain and neck pain.   Skin: Negative for color change and pallor.   Allergic/Immunologic: Negative for environmental allergies and food allergies.   Neurological: Negative for dizziness, syncope, weakness and light-headedness.   Hematological: Negative for adenopathy. Does not bruise/bleed easily.   Psychiatric/Behavioral: Negative for agitation and behavioral problems.   .    /84 (BP Location: Left arm, Patient Position: Sitting, Cuff Size:  "Adult)   Pulse 88   Temp 99 °F (37.2 °C)   Resp 18   Ht 177.8 cm (70\")   Wt 117 kg (259 lb)   SpO2 91% Comment: room air at rest  BMI 37.16 kg/m²       There is no immunization history on file for this patient.    Physical Exam   Constitutional: He is oriented to person, place, and time. He appears well-developed and well-nourished.   HENT:   Head: Normocephalic and atraumatic.   Eyes: Pupils are equal, round, and reactive to light.   Neck: Normal range of motion. Neck supple. No thyromegaly present.   Cardiovascular: Normal rate, regular rhythm and intact distal pulses. Exam reveals no gallop and no friction rub.   Murmur heard.  Pulmonary/Chest: Effort normal and breath sounds normal. No respiratory distress. He has no wheezes. He has no rales. He exhibits no tenderness.   Abdominal: Soft. Bowel sounds are normal. There is no tenderness.   Musculoskeletal: Normal range of motion.   Lymphadenopathy:     He has no cervical adenopathy.   Neurological: He is alert and oriented to person, place, and time.   Skin: Skin is warm and dry. Capillary refill takes less than 2 seconds. He is not diaphoretic.   Psychiatric: He has a normal mood and affect. His behavior is normal.   Nursing note and vitals reviewed.        RESULTS    Ct Chest Without Contrast    Result Date: 2/13/2019  Stable postsurgical scarring and micronodularity within the right lung base without progressive parenchymal disease.  D:  02/13/2019 E:  02/13/2019    This report was finalized on 2/13/2019 4:35 PM by Dr. Álvaro Cooper.        PROBLEM LIST    Problem List Items Addressed This Visit        Respiratory    Lung mass    Overview     - Right paratracheal mass noted on CT angiogram (4/7/17)         Chronic obstructive pulmonary disease (CMS/HCC)    Relevant Medications    albuterol sulfate  (90 Base) MCG/ACT inhaler       Digestive    Gastroesophageal reflux disease with esophagitis      Other Visit Diagnoses     Shortness of breath    -  " Primary    Relevant Medications    albuterol sulfate  (90 Base) MCG/ACT inhaler            DISCUSSION    Mr. Rodriguez was here for follow-up of his CT scan that he had earlier this month.  We reviewed the scan in detail today and I showed him the images.  His CT scan remained stable and he will need a repeat CT scan in 6 months.  He would like to have the scan performed in [place] if possible.  I will place this order and we will schedule this for him.    His blood pressure was 174/85 in the office today and I encouraged him to make an appointment with his PCP to have this evaluated.  He states that he has been running high at home but he does not check it frequently.  I advised him to get an appointment sooner than later because this is something that needs to be assessed.    He is willing to try a rescue inhaler for his COPD.  I discussed with him how to use this inhaler and to use it when he has shortness of breath.  He states that he will try this.  He will follow-up in 6 months or sooner if he has worsening of symptoms.  I spent 25 minutes with the patient. I spent > 50% percent of this time counseling and discussing diagnosis, prognosis, diagnostic testing, evaluation, current status, treatment options and management.    Ivaniatracey Delcid, APRN  02/28/201911:36 AM  Electronically signed     Please note that portions of this note were completed with a voice recognition program. Efforts were made to edit the dictations, but occasionally words are mistranscribed.      CC: Dandy Bailey MD

## 2019-08-12 RX ORDER — ALBUTEROL SULFATE 90 UG/1
AEROSOL, METERED RESPIRATORY (INHALATION)
Qty: 18 INHALER | Refills: 11 | Status: SHIPPED | OUTPATIENT
Start: 2019-08-12 | End: 2019-10-17

## 2019-08-13 ENCOUNTER — HOSPITAL ENCOUNTER (OUTPATIENT)
Dept: CT IMAGING | Facility: HOSPITAL | Age: 74
Discharge: HOME | End: 2019-08-13
Admitting: NURSE PRACTITIONER

## 2019-08-13 DIAGNOSIS — R91.8 LUNG MASS: ICD-10-CM

## 2019-08-13 PROCEDURE — 71250 CT THORAX DX C-: CPT

## 2019-08-15 DIAGNOSIS — C34.91 CARCINOMA OF RIGHT LUNG (HCC): Primary | ICD-10-CM

## 2019-10-17 ENCOUNTER — APPOINTMENT (OUTPATIENT)
Dept: GENERAL RADIOLOGY | Facility: HOSPITAL | Age: 74
End: 2019-10-17

## 2019-10-17 ENCOUNTER — APPOINTMENT (OUTPATIENT)
Dept: CT IMAGING | Facility: HOSPITAL | Age: 74
End: 2019-10-17

## 2019-10-17 ENCOUNTER — HOSPITAL ENCOUNTER (INPATIENT)
Facility: HOSPITAL | Age: 74
LOS: 2 days | Discharge: HOME | End: 2019-10-19
Attending: EMERGENCY MEDICINE | Admitting: HOSPITALIST

## 2019-10-17 DIAGNOSIS — Z85.118 HISTORY OF LUNG CANCER: ICD-10-CM

## 2019-10-17 DIAGNOSIS — J44.1 COPD EXACERBATION (HCC): Primary | ICD-10-CM

## 2019-10-17 DIAGNOSIS — N18.9 CHRONIC KIDNEY DISEASE, UNSPECIFIED CKD STAGE: ICD-10-CM

## 2019-10-17 DIAGNOSIS — J96.01 ACUTE RESPIRATORY FAILURE WITH HYPOXIA (HCC): ICD-10-CM

## 2019-10-17 DIAGNOSIS — E87.6 HYPOKALEMIA: ICD-10-CM

## 2019-10-17 PROBLEM — I25.10 CORONARY ARTERY DISEASE: Chronic | Status: ACTIVE | Noted: 2019-10-17

## 2019-10-17 PROBLEM — R79.89 ELEVATED LACTIC ACID LEVEL: Status: ACTIVE | Noted: 2019-10-17

## 2019-10-17 PROBLEM — E87.1 HYPONATREMIA: Status: ACTIVE | Noted: 2019-10-17

## 2019-10-17 PROBLEM — D72.829 LEUKOCYTOSIS: Status: ACTIVE | Noted: 2019-10-17

## 2019-10-17 PROBLEM — E87.20 LACTIC ACIDOSIS: Status: ACTIVE | Noted: 2019-10-17

## 2019-10-17 PROBLEM — E66.9 OBESITY (BMI 30-39.9): Chronic | Status: ACTIVE | Noted: 2019-10-17

## 2019-10-17 LAB
ALBUMIN SERPL-MCNC: 4.5 G/DL (ref 3.5–5.2)
ALBUMIN/GLOB SERPL: 1.3 G/DL
ALP SERPL-CCNC: 98 U/L (ref 39–117)
ALT SERPL W P-5'-P-CCNC: 12 U/L (ref 1–41)
ANION GAP SERPL CALCULATED.3IONS-SCNC: 13 MMOL/L (ref 5–15)
AST SERPL-CCNC: 17 U/L (ref 1–40)
BACTERIA UR QL AUTO: NORMAL /HPF
BASOPHILS # BLD AUTO: 0.02 10*3/MM3 (ref 0–0.2)
BASOPHILS NFR BLD AUTO: 0.3 % (ref 0–1.5)
BILIRUB SERPL-MCNC: 0.3 MG/DL (ref 0.2–1.2)
BILIRUB UR QL STRIP: NEGATIVE
BUN BLD-MCNC: 25 MG/DL (ref 8–23)
BUN/CREAT SERPL: 13.6 (ref 7–25)
CALCIUM SPEC-SCNC: 8.8 MG/DL (ref 8.6–10.5)
CHLORIDE SERPL-SCNC: 90 MMOL/L (ref 98–107)
CLARITY UR: CLEAR
CO2 SERPL-SCNC: 30 MMOL/L (ref 22–29)
COLOR UR: YELLOW
CREAT BLD-MCNC: 1.84 MG/DL (ref 0.76–1.27)
D-LACTATE SERPL-SCNC: 1.1 MMOL/L (ref 0.5–2)
D-LACTATE SERPL-SCNC: 3.1 MMOL/L (ref 0.5–2)
DEPRECATED RDW RBC AUTO: 52.4 FL (ref 37–54)
EOSINOPHIL # BLD AUTO: 0.01 10*3/MM3 (ref 0–0.4)
EOSINOPHIL NFR BLD AUTO: 0.2 % (ref 0.3–6.2)
ERYTHROCYTE [DISTWIDTH] IN BLOOD BY AUTOMATED COUNT: 16.2 % (ref 12.3–15.4)
GFR SERPL CREATININE-BSD FRML MDRD: 36 ML/MIN/1.73
GLOBULIN UR ELPH-MCNC: 3.5 GM/DL
GLUCOSE BLD-MCNC: 383 MG/DL (ref 65–99)
GLUCOSE BLDC GLUCOMTR-MCNC: 302 MG/DL (ref 70–130)
GLUCOSE UR STRIP-MCNC: ABNORMAL MG/DL
HCT VFR BLD AUTO: 37.6 % (ref 37.5–51)
HGB BLD-MCNC: 11.4 G/DL (ref 13–17.7)
HGB UR QL STRIP.AUTO: ABNORMAL
HOLD SPECIMEN: NORMAL
HYALINE CASTS UR QL AUTO: NORMAL /LPF
IMM GRANULOCYTES # BLD AUTO: 0.07 10*3/MM3 (ref 0–0.05)
IMM GRANULOCYTES NFR BLD AUTO: 1.2 % (ref 0–0.5)
KETONES UR QL STRIP: NEGATIVE
LEUKOCYTE ESTERASE UR QL STRIP.AUTO: NEGATIVE
LYMPHOCYTES # BLD AUTO: 1.28 10*3/MM3 (ref 0.7–3.1)
LYMPHOCYTES NFR BLD AUTO: 21.3 % (ref 19.6–45.3)
MCH RBC QN AUTO: 26.8 PG (ref 26.6–33)
MCHC RBC AUTO-ENTMCNC: 30.3 G/DL (ref 31.5–35.7)
MCV RBC AUTO: 88.5 FL (ref 79–97)
MONOCYTES # BLD AUTO: 0.23 10*3/MM3 (ref 0.1–0.9)
MONOCYTES NFR BLD AUTO: 3.8 % (ref 5–12)
NEUTROPHILS # BLD AUTO: 4.39 10*3/MM3 (ref 1.7–7)
NEUTROPHILS NFR BLD AUTO: 73.2 % (ref 42.7–76)
NITRITE UR QL STRIP: NEGATIVE
NRBC BLD AUTO-RTO: 0 /100 WBC (ref 0–0.2)
NT-PROBNP SERPL-MCNC: 195.9 PG/ML (ref 5–900)
PH UR STRIP.AUTO: 7.5 [PH] (ref 5–8)
PLATELET # BLD AUTO: 155 10*3/MM3 (ref 140–450)
PMV BLD AUTO: 10 FL (ref 6–12)
POTASSIUM BLD-SCNC: 3.3 MMOL/L (ref 3.5–5.2)
PROCALCITONIN SERPL-MCNC: 0.13 NG/ML (ref 0.1–0.25)
PROT SERPL-MCNC: 8 G/DL (ref 6–8.5)
PROT UR QL STRIP: ABNORMAL
RBC # BLD AUTO: 4.25 10*6/MM3 (ref 4.14–5.8)
RBC # UR: NORMAL /HPF
REF LAB TEST METHOD: NORMAL
SODIUM BLD-SCNC: 133 MMOL/L (ref 136–145)
SP GR UR STRIP: 1.02 (ref 1–1.03)
SQUAMOUS #/AREA URNS HPF: NORMAL /HPF
TROPONIN T SERPL-MCNC: <0.01 NG/ML (ref 0–0.03)
UROBILINOGEN UR QL STRIP: ABNORMAL
WBC NRBC COR # BLD: 6 10*3/MM3 (ref 3.4–10.8)
WBC UR QL AUTO: NORMAL /HPF
WHOLE BLOOD HOLD SPECIMEN: NORMAL
WHOLE BLOOD HOLD SPECIMEN: NORMAL

## 2019-10-17 PROCEDURE — 94799 UNLISTED PULMONARY SVC/PX: CPT

## 2019-10-17 PROCEDURE — 99284 EMERGENCY DEPT VISIT MOD MDM: CPT

## 2019-10-17 PROCEDURE — 93005 ELECTROCARDIOGRAM TRACING: CPT

## 2019-10-17 PROCEDURE — 25010000002 AZITHROMYCIN PER 500 MG: Performed by: PHYSICIAN ASSISTANT

## 2019-10-17 PROCEDURE — 85025 COMPLETE CBC W/AUTO DIFF WBC: CPT | Performed by: EMERGENCY MEDICINE

## 2019-10-17 PROCEDURE — G0378 HOSPITAL OBSERVATION PER HR: HCPCS

## 2019-10-17 PROCEDURE — 83880 ASSAY OF NATRIURETIC PEPTIDE: CPT | Performed by: EMERGENCY MEDICINE

## 2019-10-17 PROCEDURE — 25010000002 METHYLPREDNISOLONE PER 40 MG: Performed by: NURSE PRACTITIONER

## 2019-10-17 PROCEDURE — 83605 ASSAY OF LACTIC ACID: CPT | Performed by: EMERGENCY MEDICINE

## 2019-10-17 PROCEDURE — 93005 ELECTROCARDIOGRAM TRACING: CPT | Performed by: EMERGENCY MEDICINE

## 2019-10-17 PROCEDURE — 84484 ASSAY OF TROPONIN QUANT: CPT | Performed by: EMERGENCY MEDICINE

## 2019-10-17 PROCEDURE — 81001 URINALYSIS AUTO W/SCOPE: CPT | Performed by: PHYSICIAN ASSISTANT

## 2019-10-17 PROCEDURE — 25010000002 METHYLPREDNISOLONE PER 125 MG: Performed by: PHYSICIAN ASSISTANT

## 2019-10-17 PROCEDURE — 71250 CT THORAX DX C-: CPT

## 2019-10-17 PROCEDURE — 80053 COMPREHEN METABOLIC PANEL: CPT | Performed by: EMERGENCY MEDICINE

## 2019-10-17 PROCEDURE — 87040 BLOOD CULTURE FOR BACTERIA: CPT | Performed by: EMERGENCY MEDICINE

## 2019-10-17 PROCEDURE — 71045 X-RAY EXAM CHEST 1 VIEW: CPT

## 2019-10-17 PROCEDURE — 82962 GLUCOSE BLOOD TEST: CPT

## 2019-10-17 PROCEDURE — 84145 PROCALCITONIN (PCT): CPT | Performed by: PHYSICIAN ASSISTANT

## 2019-10-17 PROCEDURE — 63710000001 INSULIN LISPRO (HUMAN) PER 5 UNITS: Performed by: NURSE PRACTITIONER

## 2019-10-17 PROCEDURE — 25010000002 CEFTRIAXONE PER 250 MG: Performed by: PHYSICIAN ASSISTANT

## 2019-10-17 PROCEDURE — 99223 1ST HOSP IP/OBS HIGH 75: CPT | Performed by: HOSPITALIST

## 2019-10-17 PROCEDURE — 94640 AIRWAY INHALATION TREATMENT: CPT

## 2019-10-17 RX ORDER — DOXYCYCLINE 100 MG/1
100 CAPSULE ORAL EVERY 12 HOURS SCHEDULED
Status: DISCONTINUED | OUTPATIENT
Start: 2019-10-17 | End: 2019-10-19 | Stop reason: HOSPADM

## 2019-10-17 RX ORDER — ACETAMINOPHEN 160 MG/5ML
650 SOLUTION ORAL EVERY 4 HOURS PRN
Status: DISCONTINUED | OUTPATIENT
Start: 2019-10-17 | End: 2019-10-19 | Stop reason: HOSPADM

## 2019-10-17 RX ORDER — METHYLPREDNISOLONE SODIUM SUCCINATE 125 MG/2ML
125 INJECTION, POWDER, LYOPHILIZED, FOR SOLUTION INTRAMUSCULAR; INTRAVENOUS ONCE
Status: COMPLETED | OUTPATIENT
Start: 2019-10-17 | End: 2019-10-17

## 2019-10-17 RX ORDER — PREDNISONE 10 MG/1
10 TABLET ORAL
COMMUNITY
Start: 2019-10-16 | End: 2019-10-19 | Stop reason: HOSPADM

## 2019-10-17 RX ORDER — SODIUM CHLORIDE 0.9 % (FLUSH) 0.9 %
10 SYRINGE (ML) INJECTION AS NEEDED
Status: DISCONTINUED | OUTPATIENT
Start: 2019-10-17 | End: 2019-10-19 | Stop reason: HOSPADM

## 2019-10-17 RX ORDER — FINASTERIDE 5 MG/1
5 TABLET, FILM COATED ORAL DAILY
Status: DISCONTINUED | OUTPATIENT
Start: 2019-10-18 | End: 2019-10-19 | Stop reason: HOSPADM

## 2019-10-17 RX ORDER — BUMETANIDE 1 MG/1
1 TABLET ORAL DAILY
Status: DISCONTINUED | OUTPATIENT
Start: 2019-10-18 | End: 2019-10-19 | Stop reason: HOSPADM

## 2019-10-17 RX ORDER — FINASTERIDE 5 MG/1
5 TABLET, FILM COATED ORAL DAILY
COMMUNITY

## 2019-10-17 RX ORDER — ASPIRIN 81 MG/1
81 TABLET ORAL DAILY
COMMUNITY
End: 2020-02-04 | Stop reason: HOSPADM

## 2019-10-17 RX ORDER — DONEPEZIL HYDROCHLORIDE 10 MG/1
10 TABLET, FILM COATED ORAL NIGHTLY
COMMUNITY

## 2019-10-17 RX ORDER — ACETAMINOPHEN 325 MG/1
650 TABLET ORAL EVERY 4 HOURS PRN
Status: DISCONTINUED | OUTPATIENT
Start: 2019-10-17 | End: 2019-10-19 | Stop reason: HOSPADM

## 2019-10-17 RX ORDER — NITROGLYCERIN 0.4 MG/1
0.4 TABLET SUBLINGUAL
Status: DISCONTINUED | OUTPATIENT
Start: 2019-10-17 | End: 2019-10-19 | Stop reason: HOSPADM

## 2019-10-17 RX ORDER — POTASSIUM CHLORIDE 750 MG/1
40 CAPSULE, EXTENDED RELEASE ORAL ONCE
Status: COMPLETED | OUTPATIENT
Start: 2019-10-17 | End: 2019-10-17

## 2019-10-17 RX ORDER — CLOPIDOGREL BISULFATE 75 MG/1
75 TABLET ORAL DAILY
Status: DISCONTINUED | OUTPATIENT
Start: 2019-10-18 | End: 2019-10-19 | Stop reason: HOSPADM

## 2019-10-17 RX ORDER — CHOLECALCIFEROL (VITAMIN D3) 125 MCG
5 CAPSULE ORAL NIGHTLY
COMMUNITY

## 2019-10-17 RX ORDER — METHYLPREDNISOLONE SODIUM SUCCINATE 40 MG/ML
40 INJECTION, POWDER, LYOPHILIZED, FOR SOLUTION INTRAMUSCULAR; INTRAVENOUS EVERY 12 HOURS
Status: DISCONTINUED | OUTPATIENT
Start: 2019-10-17 | End: 2019-10-18

## 2019-10-17 RX ORDER — GABAPENTIN 600 MG/1
600 TABLET ORAL NIGHTLY
COMMUNITY
End: 2019-10-17

## 2019-10-17 RX ORDER — DOCUSATE SODIUM 100 MG/1
100 CAPSULE, LIQUID FILLED ORAL DAILY PRN
Status: DISCONTINUED | OUTPATIENT
Start: 2019-10-17 | End: 2019-10-19 | Stop reason: HOSPADM

## 2019-10-17 RX ORDER — SODIUM CHLORIDE 0.9 % (FLUSH) 0.9 %
10 SYRINGE (ML) INJECTION EVERY 12 HOURS SCHEDULED
Status: DISCONTINUED | OUTPATIENT
Start: 2019-10-17 | End: 2019-10-19 | Stop reason: HOSPADM

## 2019-10-17 RX ORDER — TERAZOSIN 5 MG/1
5 CAPSULE ORAL NIGHTLY
Status: DISCONTINUED | OUTPATIENT
Start: 2019-10-17 | End: 2019-10-19 | Stop reason: HOSPADM

## 2019-10-17 RX ORDER — IPRATROPIUM BROMIDE AND ALBUTEROL SULFATE 2.5; .5 MG/3ML; MG/3ML
3 SOLUTION RESPIRATORY (INHALATION) ONCE
Status: COMPLETED | OUTPATIENT
Start: 2019-10-17 | End: 2019-10-17

## 2019-10-17 RX ORDER — ACETAMINOPHEN 650 MG/1
650 SUPPOSITORY RECTAL EVERY 4 HOURS PRN
Status: DISCONTINUED | OUTPATIENT
Start: 2019-10-17 | End: 2019-10-19 | Stop reason: HOSPADM

## 2019-10-17 RX ORDER — IPRATROPIUM BROMIDE AND ALBUTEROL SULFATE 2.5; .5 MG/3ML; MG/3ML
3 SOLUTION RESPIRATORY (INHALATION)
Status: DISCONTINUED | OUTPATIENT
Start: 2019-10-17 | End: 2019-10-19 | Stop reason: HOSPADM

## 2019-10-17 RX ORDER — DONEPEZIL HYDROCHLORIDE 10 MG/1
10 TABLET, FILM COATED ORAL NIGHTLY
Status: DISCONTINUED | OUTPATIENT
Start: 2019-10-17 | End: 2019-10-19 | Stop reason: HOSPADM

## 2019-10-17 RX ORDER — LEVOFLOXACIN 500 MG/1
500 TABLET, FILM COATED ORAL DAILY
COMMUNITY
Start: 2019-10-16 | End: 2019-10-17

## 2019-10-17 RX ORDER — ASPIRIN 81 MG/1
81 TABLET ORAL DAILY
Status: DISCONTINUED | OUTPATIENT
Start: 2019-10-18 | End: 2019-10-19 | Stop reason: HOSPADM

## 2019-10-17 RX ORDER — DIAZEPAM 5 MG/1
10 TABLET ORAL EVERY 6 HOURS PRN
Status: DISCONTINUED | OUTPATIENT
Start: 2019-10-17 | End: 2019-10-19 | Stop reason: HOSPADM

## 2019-10-17 RX ORDER — PENICILLIN V POTASSIUM 500 MG/1
500 TABLET ORAL EVERY 8 HOURS
COMMUNITY
Start: 2019-10-12 | End: 2019-10-19 | Stop reason: HOSPADM

## 2019-10-17 RX ORDER — SODIUM CHLORIDE 9 MG/ML
100 INJECTION, SOLUTION INTRAVENOUS CONTINUOUS
Status: DISCONTINUED | OUTPATIENT
Start: 2019-10-17 | End: 2019-10-18

## 2019-10-17 RX ORDER — DEXTROSE MONOHYDRATE 25 G/50ML
25 INJECTION, SOLUTION INTRAVENOUS
Status: DISCONTINUED | OUTPATIENT
Start: 2019-10-17 | End: 2019-10-19 | Stop reason: HOSPADM

## 2019-10-17 RX ORDER — NICOTINE POLACRILEX 4 MG
15 LOZENGE BUCCAL
Status: DISCONTINUED | OUTPATIENT
Start: 2019-10-17 | End: 2019-10-19 | Stop reason: HOSPADM

## 2019-10-17 RX ADMIN — METHYLPREDNISOLONE SODIUM SUCCINATE 40 MG: 40 INJECTION, POWDER, FOR SOLUTION INTRAMUSCULAR; INTRAVENOUS at 23:06

## 2019-10-17 RX ADMIN — AZITHROMYCIN 500 MG: 500 INJECTION, POWDER, LYOPHILIZED, FOR SOLUTION INTRAVENOUS at 14:34

## 2019-10-17 RX ADMIN — DONEPEZIL HYDROCHLORIDE 10 MG: 10 TABLET, FILM COATED ORAL at 21:18

## 2019-10-17 RX ADMIN — SODIUM CHLORIDE, PRESERVATIVE FREE 10 ML: 5 INJECTION INTRAVENOUS at 21:18

## 2019-10-17 RX ADMIN — SODIUM CHLORIDE 1000 ML: 9 INJECTION, SOLUTION INTRAVENOUS at 13:29

## 2019-10-17 RX ADMIN — METHYLPREDNISOLONE SODIUM SUCCINATE 125 MG: 125 INJECTION, POWDER, FOR SOLUTION INTRAMUSCULAR; INTRAVENOUS at 13:01

## 2019-10-17 RX ADMIN — CEFTRIAXONE 1 G: 1 INJECTION, POWDER, FOR SOLUTION INTRAMUSCULAR; INTRAVENOUS at 16:06

## 2019-10-17 RX ADMIN — INSULIN LISPRO 5 UNITS: 100 INJECTION, SOLUTION INTRAVENOUS; SUBCUTANEOUS at 21:22

## 2019-10-17 RX ADMIN — DIAZEPAM 10 MG: 5 TABLET ORAL at 21:22

## 2019-10-17 RX ADMIN — TERAZOSIN HYDROCHLORIDE ANHYDROUS 5 MG: 5 CAPSULE ORAL at 21:19

## 2019-10-17 RX ADMIN — IPRATROPIUM BROMIDE AND ALBUTEROL SULFATE 3 ML: 2.5; .5 SOLUTION RESPIRATORY (INHALATION) at 13:27

## 2019-10-17 RX ADMIN — SODIUM CHLORIDE 100 ML/HR: 9 INJECTION, SOLUTION INTRAVENOUS at 21:18

## 2019-10-17 RX ADMIN — IPRATROPIUM BROMIDE AND ALBUTEROL SULFATE 3 ML: 2.5; .5 SOLUTION RESPIRATORY (INHALATION) at 20:22

## 2019-10-17 RX ADMIN — POTASSIUM CHLORIDE 40 MEQ: 750 CAPSULE, EXTENDED RELEASE ORAL at 16:07

## 2019-10-17 RX ADMIN — DOXYCYCLINE 100 MG: 100 CAPSULE ORAL at 21:18

## 2019-10-17 NOTE — H&P
"    [facility] Medicine Services  HISTORY AND PHYSICAL    Patient Name: Joseph Rodriguez  : 1945  MRN: [id number]  Primary Care Physician: Dandy Bailey MD  Date of admission: 10/17/2019      Subjective   Subjective     Chief Complaint:  SOA and cough    HPI:  Joseph Rodriguez is a 74 y.o. male with hx of HTN, HLD, obesity, GERD, COPD, Ex smoker (quit ), CAD s/p multiple stents, CKDIII, DM type II and Lung cancer s/p RUL lobectomy in 2016.  No home oxygen use.  Pt takes bactrim prophylaxis bid. Pt was started on gabapentin 1 week ago by PCP for BLE pain.  To ER the next day with confusion/dizziness.  Gabapentin dcd.  Then developed cough and soa the next day.  Progerssive worseing. Seen by PCP yesterday and was started on Levaquin and prednisone and had an outpt CXR done in local ER.  Was called today and told CXR appeared as PNA and possible mass.  To ER for eval.  CXR no acute finding.  CT chest no PNA.  CT did show several new \"micronodules\" not there on 19. Exam consistent with acute resp failure with hypoxia and COPD exac with sats of 86% on RA.  Was given azith/rocephin, steroids, and nebs/oxygen in ER with some improvement.  Will admit to hospitalist for further mgmt.        Review of Systems   Constitutional: Positive for activity change, appetite change and fatigue. Negative for chills, diaphoresis, fever and unexpected weight change.   HENT: Positive for congestion. Negative for rhinorrhea, sinus pain, sneezing, sore throat and trouble swallowing.    Eyes: Negative.    Respiratory: Positive for cough, chest tightness, shortness of breath and wheezing. Negative for apnea and stridor.    Cardiovascular: Negative for chest pain and leg swelling.   Gastrointestinal: Positive for nausea. Negative for abdominal distention, blood in stool, diarrhea and vomiting.   Endocrine: Negative.    Genitourinary: Negative.    Musculoskeletal: Positive for arthralgias.   Skin: " Negative.    Allergic/Immunologic: Negative.    Neurological: Negative for tremors, speech difficulty and headaches.   Hematological: Negative.    Psychiatric/Behavioral: Negative.           All other systems reviewed and are negative.     Personal History     Past Medical History:   Diagnosis Date   • Acute kidney injury (CMS/HCC)    • Acute tubular necrosis (CMS/HCC)    • Anemia    • Arthritis    • B12 deficiency    • Bone pain    • Carcinoma of lung (CMS/HCC)     stage IA   • Chronic kidney disease (CKD), stage III (moderate) (CMS/HCC)    • Confusion, postoperative    • COPD (chronic obstructive pulmonary disease) (CMS/HCC)    • Coronary artery disease s/p stent    • Depression    • Diabetes mellitus (CMS/HCC)    • Fatigue    • Fractures    • GERD (gastroesophageal reflux disease)    • H/O colonoscopy 03/2016   • History of BPH    • Hyperlipidemia    • Hypertension    • Impotence    • Kidney stones    • Leaking of urine    • Lung cancer (CMS/HCC) 01/2016   • Lung mass    • Obesity    • Peptic ulcer disease    • Post-thoracotomy pain, mild        Past Surgical History:   Procedure Laterality Date   • ANKLE SURGERY     • BACK SURGERY     • BONE MARROW BIOPSY     • BRONCHOSCOPY N/A 4/13/2017    Procedure: BRONCHOSCOPY WITH ENDOBRONCHIAL ULTRASOUND;  Surgeon: Kingsley Hodge MD;  Location: [facility] ENDOSCOPY;  Service:    • CAROTID STENT  1991   • CORONARY STENT PLACEMENT     • KIDNEY STONE SURGERY  1976   • KNEE SURGERY Bilateral 2010   • LUNG REMOVAL, PARTIAL Right     Right upper and lower lobe wedge resections (2016)   • THORACOTOMY      RT THORACOTOMY.  WIDE WEDGE EXCISION OG RT UPPER LOBE. WIDE WEDGE EXCISION OF RIGHT LOWER LOBE.       Family History: family history includes Aneurysm in his father; Breast cancer in his sister; Cancer in his sister; Colon cancer in his sister; Diabetes in his brother, brother, mother, and another family member; Heart attack in his brother; Heart disease in his brother  and brother; Skin cancer (age of onset: 45) in his sister. Otherwise pertinent FHx was reviewed and unremarkable.     Social History:  reports that he quit smoking about 28 years ago. His smoking use included cigarettes. He smoked 1.00 pack per day. He has never used smokeless tobacco. He reports that he does not drink alcohol or use drugs.  Social History     Social History Narrative    , lives with wife. Denies home oxygen, hh or dme.         Medications:    Available home medication information reviewed.  Medications Prior to Admission   Medication Sig Dispense Refill Last Dose   • albuterol sulfate  (90 Base) MCG/ACT inhaler Inhale 2 puffs Every 4 (Four) Hours As Needed for Wheezing. 6.7 g 11    • aspirin 81 MG EC tablet Take 81 mg by mouth Daily.      • bumetanide (BUMEX) 1 MG tablet Take 1 mg by mouth Daily.  5 Taking   • clopidogrel (PLAVIX) 75 MG tablet Take 1 tablet by mouth Daily. 90 tablet 0 Taking   • Coenzyme Q10 (CO Q 10) 100 MG capsule Take 200 mg by mouth Daily.   Taking   • diazepam (VALIUM) 10 MG tablet Take 1 tablet by mouth 4 (Four) Times a Day.   Taking   • donepezil (ARICEPT) 10 MG tablet Take 10 mg by mouth Every Night.      • doxazosin (CARDURA) 4 MG tablet TAKE 1 TABLET BY MOUTH DAILY  5 Taking   • finasteride (PROSCAR) 5 MG tablet Take 5 mg by mouth Daily.      • linagliptin (TRADJENTA) 5 MG tablet tablet Take 5 mg by mouth daily.   Taking   • melatonin 5 MG tablet tablet Take 5 mg by mouth Every Night.      • morphine (MS CONTIN) 100 MG 12 hr tablet Take 200 mg by mouth 2 (Two) Times a Day.   Taking   • nitroglycerin (NITROSTAT) 0.4 MG SL tablet DISSOLVE 1 TABLET(S) UNDER THE TONGUE MAY REPEAT EVERY 5 MINUTES. MAXIMUM OF 3 DOSES IN 15 MINUTES  5 Taking   • pantoprazole (PROTONIX) 40 MG EC tablet Take 40 mg by mouth Daily.   Taking   • penicillin v potassium (VEETID) 500 MG tablet Take 500 mg by mouth Every 8 (Eight) Hours.      • pravastatin (PRAVACHOL) 80 MG tablet Take 80  mg by mouth Daily.   Taking   • predniSONE (DELTASONE) 10 MG tablet Take 10 mg by mouth. 4 tabs daily for 4 days, 2 tabs daily for 4 days, and then 1 tab daily for 4 days      • sulfamethoxazole-trimethoprim (BACTRIM DS,SEPTRA DS) 800-160 MG per tablet TAKE 1 TABLET BY MOUTH EVERY 12 HOURS  2 Taking   • docusate sodium (COLACE) 100 MG capsule Take 100 mg by mouth 2 (Two) Times a Day.   Taking       Allergies   Allergen Reactions   • Atorvastatin    • Sulfa Antibiotics        Objective   Objective     Vital Signs:   Temp:  [97.5 °F (36.4 °C)] 97.5 °F (36.4 °C)  Heart Rate:  [66-97] 69  Resp:  [24] 24  BP: (142-182)/() 182/88        Physical Exam   Constitutional: No acute distress, awake, alert. Sitting up in chair in ER.  Wife at bs.   Eyes: PERRLA, sclerae anicteric, no conjunctival injection  HENT: NCAT, mucous membranes moist  Neck: Supple, no thyromegaly, no lymphadenopathy, trachea midline  Respiratory: occ scatter exp wheeze.  Coarse rhonchi throughout, nonlabored respirations on RA currently with sats 88-92   Cardiovascular: RRR, no murmurs, rubs, or gallops, palpable pedal pulses bilaterally  Gastrointestinal: Positive bowel sounds, soft, nontender, nondistended. Obesity   Musculoskeletal: No bilateral ankle edema, no clubbing or cyanosis to extremities.  RODRIGUEZ spontaneously  Psychiatric: Appropriate affect, cooperative and calm   Neurologic: Oriented x 3 but slightly slow responses, strength symmetric in all extremities, Cranial Nerves grossly intact to confrontation, speech clear and appropriate.  Follows commands   Skin: No rashes      Results Reviewed:  I have personally reviewed current lab and radiology data.    Results from last 7 days   Lab Units 10/17/19  1226   WBC 10*3/mm3 6.00   HEMOGLOBIN g/dL 11.4*   HEMATOCRIT % 37.6   PLATELETS 10*3/mm3 155     Results from last 7 days   Lab Units 10/17/19  1737 10/17/19  1226   SODIUM mmol/L  --  133*   POTASSIUM mmol/L  --  3.3*   CHLORIDE mmol/L  --   90*   CO2 mmol/L  --  30.0*   BUN mg/dL  --  25*   CREATININE mg/dL  --  1.84*   GLUCOSE mg/dL  --  383*   CALCIUM mg/dL  --  8.8   ALT (SGPT) U/L  --  12   AST (SGOT) U/L  --  17   TROPONIN T ng/mL  --  <0.010   PROBNP pg/mL  --  195.9   LACTATE mmol/L 1.1 3.1*   PROCALCITONIN ng/mL  --  0.13     Estimated Creatinine Clearance: 44.2 mL/min (A) (by C-G formula based on SCr of 1.84 mg/dL (H)).  Brief Urine Lab Results  (Last result in the past 365 days)      Color   Clarity   Blood   Leuk Est   Nitrite   Protein   CREAT   Urine HCG        10/17/19 1627 Yellow Clear Small (1+) Negative Negative 100 mg/dL (2+)             Imaging Results (last 24 hours)     Procedure Component Value Units Date/Time    XR Chest 1 View [[id number]] Collected:  10/17/19 1224     Updated:  10/17/19 0658    Narrative:       EXAMINATION: XR CHEST 1 VW-10/17/2019:      INDICATION: SOA, triage protocol.      COMPARISON: 08/28/2017.     FINDINGS: There is a normal cardiac silhouette. There is mild elevation  of the right diaphragm with blunting of the right costophrenic angle.  All findings are stable and unchanged when compared to the previous  examination.           Impression:       Stable chronic pulmonary changes.     D:  10/17/2019  E:  10/17/2019     This report was finalized on 10/17/2019 3:56 PM by Dr. Jong Booker MD.       CT Chest Without Contrast [[id number]] Collected:  10/17/19 1412     Updated:  10/17/19 9212    Narrative:       EXAMINATION: CT CHEST WO CONTRAST - 10/17/2019     INDICATION: Cough.     TECHNIQUE: CT scan of the chest performed without contrast.     The radiation dose reduction device was turned on for each scan per the  ALARA (As Low as Reasonably Achievable) protocol.     COMPARISON: 08/13/2019     FINDINGS: There is no axillary lymphadenopathy. There is no mediastinal  or hilar adenopathy. There is no pericardial or pleural effusion.  Limited images of the upper abdomen reveal no significant finding.  Images  "displayed at lung window settings demonstrate postsurgical and  postinflammatory change in the right upper lung region posteromedially  and in the retrohilar region on the right. There is a subcentimeter  nodule in the right lower lobe which is stable. There are several small  micronodules in the lingula which represent interval change.       Impression:       There are postoperative and postinflammatory changes in the  right lung as described above, stable and unchanged when compared to the  previous examination. There is a pattern of several small \"micronodules\"  in the lingula. These were not present on the previous exam of  08/13/2019. A pattern such as this usually reflects postinflammatory  scarring. There is no evidence of consolidation, mass or effusion.     DICTATED:   10/17/2019  EDITED/ls :   10/17/2019         This report was finalized on 10/17/2019 3:56 PM by Dr. Jong Booker MD.           Results for orders placed during the hospital encounter of 04/09/17   Adult Transthoracic Echo Complete With Contrast    Narrative · Left ventricular function is hyperdynamic (EF > 70).  · The left ventricular cavity is moderately dilated.  · Right ventricular cavity is moderately dilated.  · Bi-atrial enlargement is seen.  · Mild mitral valve regurgitation is present  · Mild tricuspid valve regurgitation is present.  · The estimated RVSP is > 55 mmHg.          Assessment/Plan   Assessment / Plan     Active Hospital Problems    Diagnosis POA   • **COPD exacerbation (CMS/HCC) [J44.1] Yes   • Coronary artery disease [I25.10] Unknown   • Obesity (BMI 30-39.9) [E66.9] Unknown   • History of lung cancer [Z85.118] Not Applicable     S/p Rt upper lobe lobectomy 2016      • Hypokalemia [E87.6] Unknown   • Acute respiratory failure with hypoxia (CMS/HCC) [J96.01] Unknown   • Elevated lactic acid level [R79.89] Unknown   • Hyponatremia [E87.1] Unknown   • Lactic acidosis [E87.2] Unknown   • Leukocytosis [D72.829] Unknown   • " "Sleep-disordered breathing [G47.30] Yes   • CKD (chronic kidney disease), stage III (CMS/HCC) [N18.3] Yes     - Baseline creatinine 1.4-1.6 (4/9/17)     • Hypoxia [R09.02] Yes     - Due to VQ mismatch from pulmonary edema from suspected acute diastolic HF  - RA sat 87% ER      • Type 2 diabetes mellitus without complication, without long-term current use of insulin (CMS/HCC) [E11.9] Yes   • Gastroesophageal reflux disease with esophagitis [K21.0] Yes   • Essential hypertension [I10] Yes   • Carcinoma of right lung (CMS/HCC) [C34.91] Yes            73 yo male with hx of HTN, HLD, obesity, GERD, COPD, Ex smoker (quit 1991), CAD s/p multiple stents, CKDIII, DM type II and Lung cancer s/p RUL lobectomy in 2016.  No home oxygen use.  Pt takes bactrim prophylaxis bid.  Pt was seen in ER today with cc of SOA and cough x 5 days.   Progressive worseing. Seen by PCP yesterday and was started on Levaquin and prednisone.  Outpt CXR done in local ER.  Was called today and told CXR appeared as PNA and possible mass.  To ER for eval.  CXR no acute finding.  CT chest no PNA.  Exam consistent with acute resp failure with hypoxia and COPD exac with sats of 86% on RA.  Was given azith/rocephin in ER.  Will admit to hospitalist for further mgmt.      Assessment/Plan:    Acute respiratory failure with hypoxia  Acute COPD exac/acute bronchitis  Several new \"micronodules\" on CT (c/t film from 8/13/19)  Hx of lung cancer s/p RUL lobectomy  --no home oxygen use  --s/p roceph/azith in ER.  No PNA evident. Will change to doxy monotherapy for probable bronchitis.  Patient is afebrile, nontoxic, and had reassuring procalcitonin level.  He does have leukocytosis, which is likely related to steroid therapy given by outside facility.  --continue steroids, nebs, oxygen, flutter valve/respirex  --blood cxs pending   --sputum cx  --was followed by pulm Dr JM Hodge, will need f/u of nodules  --COPD navigator consultation in the morning    Lower " extremity size discrepancy   -Right calf larger than left calf  -Right lower extremity duplex to rule out DVT    Hypoglycemia   -No history of diabetes, last A1c was 2 years ago and within normal limits.  This may be reactive/nonfasting, however will check hemoglobin A1c to rule out diabetes    Hx CKD III with mild elevation of Creatinine  --s/p IVFs  --monitor labs     Elevated lactate  --3.1, mild dehydration  --no f/c  --s/p 1 liter IVF in ER, continue IVFs overnight and monitor  --reflex lactate pending    Hypokalemia  --replaced in ER  --ck am labs     Hyponatremia  -Mild, possibly due to congestive heart failure/diuretics therapy, monitor for now    HTN/HLD  Hx CAD s/p stents  --home meds     Obesity  -Patient likely has underlying obstructive sleep apnea, recommend outpatient polysomnography      DVT prophylaxis:    Teds/seqs  Home meds    CODE STATUS:    Code Status and Medical Interventions:   Ordered at: 10/17/19 8098     Level Of Support Discussed With:    Patient     Code Status:    CPR     Medical Interventions (Level of Support Prior to Arrest):    Full       Admission Status:  I believe this patient meets OBSERVATION status, however if further evaluation or treatment plans warrant, status may change.  Based upon current information, I predict patient's care encounter to be less than or equal to 2 midnights.      Electronically signed by YIMI Alves, 10/17/19, 5:30 PM.        Brief Attending Admission Attestation     I have seen and examined the patient, performing an independent face-to-face diagnostic evaluation with plan of care reviewed and developed with the advanced practice clinician (APC).      Brief Summary Statement:   Joseph Rodriguez is a 74 y.o. male with past medical history significant for morbid obesity, remote tobacco abuse-quit in 1991, non-oxygen dependent COPD, CAD-status post multiple stents, CKD 3, diabetes type 2, hypertension/hyperlipidemia, GERD, and lung  cancer- status post right upper lobectomy in 2016, who reported that he had not been feeling well for the last 4 to 5 days, specifically complaining of malaise, nonproductive cough, and shortness of air.  He stated that he went to his PCP and was given some medications that made him fall.  He cannot recall which medications.  According to his records, he was on penicillin, which was later Bactrim DS and started on steroid as well 4 days ago.  Despite the antibiotics and steroid, patient did not see any improvement in his symptoms.  He continues to report shortness of air and cough/chest congestions, however he is unable to cough up any phlegm.  He denies any chest pain, however reported a discomfort over the left chest that is chronic and relieved with over-the-counter cream (Mobcil?)  Which he purchased on [employer].  He denies any pleurisy, fever, or chills.      Remainder of detailed HPI is as noted above and has been reviewed and/or edited by me for completeness.      Attending Physical Exam:  General Assessment: No acute cardiopulmonary distress. Well developed and well nourished.  Nontoxic appearing.    HEENT: NCAT, PERRL, MM moist    Neck: Supple, no carotid bruit bilat    CVS: RRR, S1S2 normal, no murmurs    Resp: Good air movement bilaterally, mild crackles bilaterally, no wheezing, respiration is nonlabored, however patient is satting at around 87 to 88% on room air.    Abd: Obese, soft, NT, ND, normal BS, no guarding or peritoneal signs    Ext: 1+ pitting edema, right lower leg is larger than left lower leg    Neuro: Oriented x4, face symmetric, speech clear, moving all extremities and symmetric    Skin: W/D/I. No rash.    Psych: Affect is appropriate      Brief Assessment/Plan :  See above for further detailed assessment and plan developed with APC which I have reviewed and/or edited for completeness.      Electronically signed by Priscila Vallejo MD, 10/17/19, 8:05 PM.

## 2019-10-17 NOTE — ED PROVIDER NOTES
"Subjective   Joseph Rodriguez is a 74 y.o. male who presents to the ED with c/o shortness of breath. In 2016 the patient had right upper and lower lobe wedge resections and a thoracotomy due to lung cancer for which he did not receive chemo radiation therapy and there has not been any recurrent cancer. 1 week ago he began having shortness of breath with a mildly productive cough and difficulty breathing. 6 days ago the patient was seen at [facility] for an \"infection\" and prescribed  \"Penicillin\". The patient went to [facility] yesterday and had a chest x-ray performed then saw his primary care provider, Dr. Bailey, at his office. Dr. Bailey prescribed him Levaquin and Prednisone. He was contacted today by [facility] with the x-ray results revealing a pneumonia diagnosis, prompting him to present to the ED. The patient complains of chest pain due to his shortness of breath and cough but denies abnormal urinary symptoms, fever, leg swelling, vomiting, diarrhea, and abdominal pain. He currently does not wear oxygen at home and he does not have CPAP, BiPAP, or a nebulizer. He uses a Ventolin inhaler as needed for his wheezing. The patient has a past medical history of hypertension, hyperlipidemia, CAD, CKD and diabetes mellitus (non insulin) as well as a past surgical history of carotid stents and coronary stent placement. He states he received a flu vaccine earlier last week. There are no other acute complaints at this time.        History provided by:  Patient  Shortness of Breath   Severity:  Moderate  Onset quality:  Sudden  Duration:  1 week  Timing:  Constant  Progression:  Worsening  Chronicity:  Recurrent  Context: URI    Context comment:  Patient was recently diagnosed with pneumonia  Relieved by:  None tried  Worsened by:  Nothing  Ineffective treatments:  None tried  Associated symptoms: chest pain, cough (mildly productive) and wheezing    Associated symptoms: no abdominal pain, no fever and " no vomiting    Risk factors: hx of cancer, obesity and tobacco use (former smoker)    Risk factors: no recent alcohol use        Review of Systems   Constitutional: Positive for chills and fatigue. Negative for fever.   HENT: Negative for congestion.    Respiratory: Positive for cough (mildly productive), shortness of breath and wheezing.         Patient complains of difficulty breathing.   Cardiovascular: Positive for chest pain. Negative for leg swelling.   Gastrointestinal: Negative for abdominal pain, nausea and vomiting.   Genitourinary: Negative for decreased urine volume, difficulty urinating, dysuria, flank pain, frequency, hematuria and urgency.   Psychiatric/Behavioral: Negative for confusion.   All other systems reviewed and are negative.      Past Medical History:   Diagnosis Date   • Acute kidney injury (CMS/HCC)    • Acute tubular necrosis (CMS/HCC)    • Anemia    • Arthritis    • B12 deficiency    • Bone pain    • Carcinoma of lung (CMS/HCC)     stage IA   • Chronic kidney disease (CKD), stage III (moderate) (CMS/HCC)    • Confusion, postoperative    • COPD (chronic obstructive pulmonary disease) (CMS/HCC)    • Coronary artery disease s/p stent    • Depression    • Diabetes mellitus (CMS/HCC)    • Fatigue    • Fractures    • GERD (gastroesophageal reflux disease)    • H/O colonoscopy 03/2016   • History of BPH    • Hyperlipidemia    • Hypertension    • Impotence    • Kidney stones    • Leaking of urine    • Lung cancer (CMS/HCC) 01/2016   • Lung mass    • Obesity    • Peptic ulcer disease    • Post-thoracotomy pain, mild        Allergies   Allergen Reactions   • Atorvastatin    • Sulfa Antibiotics        Past Surgical History:   Procedure Laterality Date   • ANKLE SURGERY     • BACK SURGERY     • BONE MARROW BIOPSY     • BRONCHOSCOPY N/A 4/13/2017    Procedure: BRONCHOSCOPY WITH ENDOBRONCHIAL ULTRASOUND;  Surgeon: Kingsley Hodge MD;  Location: [facility] ENDOSCOPY;  Service:    • CAROTID  STENT     • CORONARY STENT PLACEMENT     • KIDNEY STONE SURGERY     • KNEE SURGERY Bilateral    • LUNG REMOVAL, PARTIAL Right     Right upper and lower lobe wedge resections ()   • THORACOTOMY      RT THORACOTOMY.  WIDE WEDGE EXCISION OG RT UPPER LOBE. WIDE WEDGE EXCISION OF RIGHT LOWER LOBE.       Family History   Problem Relation Age of Onset   • Diabetes Mother    • Breast cancer Sister         60s   • Colon cancer Sister         59   • Skin cancer Sister 45   • Cancer Sister    • Diabetes Other    • Aneurysm Father    • Heart attack Brother    • Heart disease Brother    • Heart disease Brother    • Diabetes Brother    • Diabetes Brother        Social History     Socioeconomic History   • Marital status:      Spouse name: Not on file   • Number of children: Not on file   • Years of education: Not on file   • Highest education level: Not on file   Tobacco Use   • Smoking status: Former Smoker     Packs/day: 1.00     Types: Cigarettes     Last attempt to quit:      Years since quittin.8   • Smokeless tobacco: Never Used   Substance and Sexual Activity   • Alcohol use: No   • Drug use: No   • Sexual activity: Defer   Social History Narrative    , lives with wife. Denies home oxygen, hh or dme.           Objective   Physical Exam   Constitutional: He is oriented to person, place, and time. He appears well-developed and well-nourished. No distress.   HENT:   Head: Normocephalic and atraumatic.   Eyes: Conjunctivae are normal. No scleral icterus.   Neck: Normal range of motion. Neck supple.   Cardiovascular: Normal rate, regular rhythm and normal heart sounds.   No murmur heard.  Pulmonary/Chest: Effort normal. Tachypnea noted. No respiratory distress. He has wheezes.   Diffuse wheezes.  Tachypneic.   Abdominal: Soft. There is no tenderness.   Musculoskeletal: Normal range of motion. He exhibits no edema.   Neurological: He is alert and oriented to person, place, and time.   Skin:  Skin is warm and dry.   Psychiatric: He has a normal mood and affect. His behavior is normal.   Nursing note and vitals reviewed.      Procedures         ED Course  ED Course as of Oct 19 0710   Thu Oct 17, 2019   1425 Paged hospitalist for admission   [RT]   1432 Joselin SPIVEY is bedside re-evaluating the patient and updating him on the plan for admission.  [BS]   7105 No call back from hospitalist, paged again   [RT]   1621 Spoke to hospitalist Dr. DAVILA regarding admission.  [RT]      ED Course User Index  [BS] ReeneDale venegas  [RT] Joselin Rebolledo PA      Re-examined patient several times in ED. Pt's O2 dropped to 86% on RA and patient does not wear O2 at home. Pt given Duo Neb and Solumedrol. Rocephin and Azithromycin initiated. Potassium replaced and patient hydrated with NS. Discussed results and plan for admission.     Discussed patient with Dr. Steven who is agreeable with ED course.     Discussed patient with hospitalist Dr. DAVILA    Reviewed old records.     Recent Results (from the past 24 hour(s))   POC Glucose Once    Collection Time: 10/18/19  7:47 AM   Result Value Ref Range    Glucose 223 (H) 70 - 130 mg/dL   POC Glucose Once    Collection Time: 10/18/19 11:13 AM   Result Value Ref Range    Glucose 223 (H) 70 - 130 mg/dL   Duplex Venous Lower Extremity - Right CAR    Collection Time: 10/18/19  4:41 PM   Result Value Ref Range    BSA 2.3 m^2     CV ECHO CARLOS - BZI_BMI 37.4 kilograms/m^2     CV ECHO CARLOS - BSA(HAYCOCK) 2.5 m^2     CV ECHO CARLOS - BZI_METRIC_WEIGHT 118.4 kg     CV ECHO CARLOS - BZI_METRIC_HEIGHT 177.8 cm    Right Common Femoral Spont Y     Right Common Femoral Phasic Y     Right Common Femoral Augment Y     Right Common Femoral Compress C     Right Saphenofemoral Junction Spont Y     Right Saphenofemoral Junction Phasic Y     Right Saphenofemoral Junction Augment Y     Right Saphenofemoral Junction Compress C     Right Profunda Femoral Spont Y     Right Profunda Femoral Phasic Y     Right  Profunda Femoral Augment Y     Right Proximal Femoral Spont Y     Right Proximal Femoral Phasic Y     Right Proximal Femoral Augment Y     Right Proximal Femoral Compress C     Right Mid Femoral Spont Y     Right Mid Femoral Phasic Y     Right Mid Femoral Augment Y     Right Mid Femoral Compress C     Right Distal Femoral Spont Y     Right Distal Femoral Phasic Y     Right Distal Femoral Augment Y     Right Distal Femoral Compress C     Right Popliteal Spont Y     Right Popliteal Phasic Y     Right Popliteal Augment Y     Right Popliteal Compress C     Right Posterior Tibial Augment Y     Right Posterior Tibial Compress C     Right Peroneal Augment Y     Right Peroneal Compress C     Right GastronemiusSoleal Compress C     Right Greater Saph AK Compress C     Right Greater Saph BK Compress C     Right Lesser Saph Compress C     Left Common Femoral Spont Y     Left Common Femoral Phasic Y     Left Common Femoral Augment Y     Left Common Femoral Compress C     Left Saphenofemoral Junction Spont Y     Left Saphenofemoral Junction Phasic Y     Left Saphenofemoral Junction Augment Y     Left Saphenofemoral Junction Compress C    POC Glucose Once    Collection Time: 10/18/19  4:48 PM   Result Value Ref Range    Glucose 345 (H) 70 - 130 mg/dL   POC Glucose Once    Collection Time: 10/18/19  9:08 PM   Result Value Ref Range    Glucose 255 (H) 70 - 130 mg/dL     Note: In addition to lab results from this visit, the labs listed above may include labs taken at another facility or during a different encounter within the last 24 hours. Please correlate lab times with ED admission and discharge times for further clarification of the services performed during this visit.    XR Chest 1 View   Final Result   Stable chronic pulmonary changes.       D:  10/17/2019   E:  10/17/2019       This report was finalized on 10/17/2019 3:56 PM by Dr. Jong Booker MD.          CT Chest Without Contrast   Final Result   There are postoperative  "and postinflammatory changes in the   right lung as described above, stable and unchanged when compared to the   previous examination. There is a pattern of several small \"micronodules\"   in the lingula. These were not present on the previous exam of   08/13/2019. A pattern such as this usually reflects postinflammatory   scarring. There is no evidence of consolidation, mass or effusion.       DICTATED:   10/17/2019   EDITED/ls :   10/17/2019            This report was finalized on 10/17/2019 3:56 PM by Dr. Jong Booker MD.            Vitals:    10/18/19 1906 10/18/19 1920 10/18/19 2212 10/19/19 0341   BP:  140/82 (!) 191/88 169/83   BP Location:  Left arm  Left arm   Patient Position:  Lying  Lying   Pulse: 63 69 65 56   Resp: 17 18 16   Temp:  97.9 °F (36.6 °C)  98.1 °F (36.7 °C)   TempSrc:  Oral  Oral   SpO2: 96% 92%  94%   Weight:       Height:         Medications   sodium chloride 0.9 % flush 10 mL (not administered)   aspirin EC tablet 81 mg (81 mg Oral Given 10/18/19 0804)   bumetanide (BUMEX) tablet 1 mg (1 mg Oral Given 10/18/19 0804)   clopidogrel (PLAVIX) tablet 75 mg (75 mg Oral Given 10/18/19 0804)   diazePAM (VALIUM) tablet 10 mg (10 mg Oral Given 10/18/19 1741)   docusate sodium (COLACE) capsule 100 mg (not administered)   donepezil (ARICEPT) tablet 10 mg (10 mg Oral Given 10/18/19 2212)   terazosin (HYTRIN) capsule 5 mg (5 mg Oral Given 10/18/19 2212)   finasteride (PROSCAR) tablet 5 mg (5 mg Oral Given 10/18/19 0805)   nitroglycerin (NITROSTAT) SL tablet 0.4 mg (not administered)   sodium chloride 0.9 % flush 10 mL ( Intravenous Canceled Entry 10/19/19 0438)   sodium chloride 0.9 % flush 10 mL (not administered)   acetaminophen (TYLENOL) tablet 650 mg (not administered)     Or   acetaminophen (TYLENOL) 160 MG/5ML solution 650 mg (not administered)     Or   acetaminophen (TYLENOL) suppository 650 mg (not administered)   doxycycline (MONODOX) capsule 100 mg (100 mg Oral Given 10/18/19 5192) "   dextrose (GLUTOSE) oral gel 15 g (not administered)   dextrose (D50W) 25 g/ 50mL Intravenous Solution 25 g (not administered)   glucagon (human recombinant) (GLUCAGEN DIAGNOSTIC) injection 1 mg (not administered)   insulin lispro (humaLOG) injection 0-7 Units (4 Units Subcutaneous Given 10/18/19 2211)   ipratropium-albuterol (DUO-NEB) nebulizer solution 3 mL ( Nebulization Canceled Entry 10/18/19 2030)   Pharmacy Consult - MTM ( Does not apply Not Given 10/18/19 0959)   predniSONE (DELTASONE) tablet 30 mg (30 mg Oral Given 10/18/19 0958)   heparin (porcine) 5000 UNIT/ML injection 5,000 Units (5,000 Units Subcutaneous Given 10/19/19 0700)   Morphine (MS CONTIN) 12 hr tablet 100 mg (100 mg Oral Given 10/18/19 2212)   ipratropium-albuterol (DUO-NEB) nebulizer solution 3 mL (3 mL Nebulization Given 10/17/19 1327)   methylPREDNISolone sodium succinate (SOLU-Medrol) injection 125 mg (125 mg Intravenous Given 10/17/19 1301)   sodium chloride 0.9 % bolus 1,000 mL (0 mL Intravenous Stopped 10/17/19 1554)   potassium chloride (MICRO-K) CR capsule 40 mEq (40 mEq Oral Given 10/17/19 1607)   azithromycin (ZITHROMAX) 500 mg in 250mL NS IVPB (0 mg Intravenous Stopped 10/17/19 1554)   cefTRIAXone (ROCEPHIN) 1 g/100 mL 0.9% NS (MBP) (0 g Intravenous Stopped 10/17/19 1637)     ECG/EMG Results (last 24 hours)     Procedure Component Value Units Date/Time    ECG 12 Lead [[id number]] Collected:  10/17/19 1209     Updated:  10/17/19 1251        ECG 12 Lead   Preliminary Result   Test Reason : chest p[ain   Blood Pressure : **/** mmHG   Vent. Rate : 071 BPM     Atrial Rate : 068 BPM      P-R Int : 000 ms          QRS Dur : 088 ms       QT Int : 402 ms       P-R-T Axes : 000 044 052 degrees      QTc Int : 436 ms      Atrial fibrillation   Cannot rule out Anterior infarct , age undetermined   Abnormal ECG   When compared with ECG of 17-OCT-2019 12:09,   Previous ECG has undetermined rhythm, needs review      Referred By:              Confirmed By:       ECG 12 Lead   Final Result   Test Reason : SOA Protocol   Blood Pressure : **/** mmHG   Vent. Rate : 083 BPM     Atrial Rate : 080 BPM      P-R Int : 000 ms          QRS Dur : 090 ms       QT Int : 352 ms       P-R-T Axes : 083 049 066 degrees      QTc Int : 413 ms      sinus rhythm with marked sinus arrhythmia   Otherwise normal ECG      Confirmed by MD Maurizio, Emeterio ([id number]) on 10/17/2019 9:37:19 PM      Referred By:  EDMD           Confirmed By:Emeterio Steven MD                          [facility]    Final diagnoses:   COPD exacerbation (CMS/HCC)   Acute respiratory failure with hypoxia (CMS/HCC)   Chronic kidney disease, unspecified CKD stage   Hypokalemia   History of lung cancer       Documentation assistance provided by philippe Duran.  Information recorded by the philippe was done at my direction and has been verified and validated by me.     Dale Duran  10/17/19 1327       Joselin Rebolledo PA  10/19/19 5662

## 2019-10-18 ENCOUNTER — APPOINTMENT (OUTPATIENT)
Dept: CARDIOLOGY | Facility: HOSPITAL | Age: 74
End: 2019-10-18

## 2019-10-18 LAB
ANION GAP SERPL CALCULATED.3IONS-SCNC: 12 MMOL/L (ref 5–15)
BASOPHILS # BLD AUTO: 0.01 10*3/MM3 (ref 0–0.2)
BASOPHILS NFR BLD AUTO: 0.2 % (ref 0–1.5)
BH CV ECHO MEAS - BSA(HAYCOCK): 2.5 M^2
BH CV ECHO MEAS - BSA: 2.3 M^2
BH CV ECHO MEAS - BZI_BMI: 37.4 KILOGRAMS/M^2
BH CV ECHO MEAS - BZI_METRIC_HEIGHT: 177.8 CM
BH CV ECHO MEAS - BZI_METRIC_WEIGHT: 118.4 KG
BH CV LOWER VASCULAR LEFT COMMON FEMORAL AUGMENT: NORMAL
BH CV LOWER VASCULAR LEFT COMMON FEMORAL COMPRESS: NORMAL
BH CV LOWER VASCULAR LEFT COMMON FEMORAL PHASIC: NORMAL
BH CV LOWER VASCULAR LEFT COMMON FEMORAL SPONT: NORMAL
BH CV LOWER VASCULAR LEFT SAPHENOFEMORAL JUNCTION AUGMENT: NORMAL
BH CV LOWER VASCULAR LEFT SAPHENOFEMORAL JUNCTION COMPRESS: NORMAL
BH CV LOWER VASCULAR LEFT SAPHENOFEMORAL JUNCTION PHASIC: NORMAL
BH CV LOWER VASCULAR LEFT SAPHENOFEMORAL JUNCTION SPONT: NORMAL
BH CV LOWER VASCULAR RIGHT COMMON FEMORAL AUGMENT: NORMAL
BH CV LOWER VASCULAR RIGHT COMMON FEMORAL COMPRESS: NORMAL
BH CV LOWER VASCULAR RIGHT COMMON FEMORAL PHASIC: NORMAL
BH CV LOWER VASCULAR RIGHT COMMON FEMORAL SPONT: NORMAL
BH CV LOWER VASCULAR RIGHT DISTAL FEMORAL AUGMENT: NORMAL
BH CV LOWER VASCULAR RIGHT DISTAL FEMORAL COMPRESS: NORMAL
BH CV LOWER VASCULAR RIGHT DISTAL FEMORAL PHASIC: NORMAL
BH CV LOWER VASCULAR RIGHT DISTAL FEMORAL SPONT: NORMAL
BH CV LOWER VASCULAR RIGHT GASTRONEMIUS COMPRESS: NORMAL
BH CV LOWER VASCULAR RIGHT GREATER SAPH AK COMPRESS: NORMAL
BH CV LOWER VASCULAR RIGHT GREATER SAPH BK COMPRESS: NORMAL
BH CV LOWER VASCULAR RIGHT LESSER SAPH COMPRESS: NORMAL
BH CV LOWER VASCULAR RIGHT MID FEMORAL AUGMENT: NORMAL
BH CV LOWER VASCULAR RIGHT MID FEMORAL COMPRESS: NORMAL
BH CV LOWER VASCULAR RIGHT MID FEMORAL PHASIC: NORMAL
BH CV LOWER VASCULAR RIGHT MID FEMORAL SPONT: NORMAL
BH CV LOWER VASCULAR RIGHT PERONEAL AUGMENT: NORMAL
BH CV LOWER VASCULAR RIGHT PERONEAL COMPRESS: NORMAL
BH CV LOWER VASCULAR RIGHT POPLITEAL AUGMENT: NORMAL
BH CV LOWER VASCULAR RIGHT POPLITEAL COMPRESS: NORMAL
BH CV LOWER VASCULAR RIGHT POPLITEAL PHASIC: NORMAL
BH CV LOWER VASCULAR RIGHT POPLITEAL SPONT: NORMAL
BH CV LOWER VASCULAR RIGHT POSTERIOR TIBIAL AUGMENT: NORMAL
BH CV LOWER VASCULAR RIGHT POSTERIOR TIBIAL COMPRESS: NORMAL
BH CV LOWER VASCULAR RIGHT PROFUNDA FEMORAL AUGMENT: NORMAL
BH CV LOWER VASCULAR RIGHT PROFUNDA FEMORAL PHASIC: NORMAL
BH CV LOWER VASCULAR RIGHT PROFUNDA FEMORAL SPONT: NORMAL
BH CV LOWER VASCULAR RIGHT PROXIMAL FEMORAL AUGMENT: NORMAL
BH CV LOWER VASCULAR RIGHT PROXIMAL FEMORAL COMPRESS: NORMAL
BH CV LOWER VASCULAR RIGHT PROXIMAL FEMORAL PHASIC: NORMAL
BH CV LOWER VASCULAR RIGHT PROXIMAL FEMORAL SPONT: NORMAL
BH CV LOWER VASCULAR RIGHT SAPHENOFEMORAL JUNCTION AUGMENT: NORMAL
BH CV LOWER VASCULAR RIGHT SAPHENOFEMORAL JUNCTION COMPRESS: NORMAL
BH CV LOWER VASCULAR RIGHT SAPHENOFEMORAL JUNCTION PHASIC: NORMAL
BH CV LOWER VASCULAR RIGHT SAPHENOFEMORAL JUNCTION SPONT: NORMAL
BUN BLD-MCNC: 25 MG/DL (ref 8–23)
BUN/CREAT SERPL: 14.8 (ref 7–25)
CALCIUM SPEC-SCNC: 8.3 MG/DL (ref 8.6–10.5)
CHLORIDE SERPL-SCNC: 99 MMOL/L (ref 98–107)
CHOLEST SERPL-MCNC: 162 MG/DL (ref 0–200)
CO2 SERPL-SCNC: 28 MMOL/L (ref 22–29)
CREAT BLD-MCNC: 1.69 MG/DL (ref 0.76–1.27)
DEPRECATED RDW RBC AUTO: 52.8 FL (ref 37–54)
EOSINOPHIL # BLD AUTO: 0 10*3/MM3 (ref 0–0.4)
EOSINOPHIL NFR BLD AUTO: 0 % (ref 0.3–6.2)
ERYTHROCYTE [DISTWIDTH] IN BLOOD BY AUTOMATED COUNT: 16.4 % (ref 12.3–15.4)
GFR SERPL CREATININE-BSD FRML MDRD: 40 ML/MIN/1.73
GLUCOSE BLD-MCNC: 237 MG/DL (ref 65–99)
GLUCOSE BLDC GLUCOMTR-MCNC: 223 MG/DL (ref 70–130)
GLUCOSE BLDC GLUCOMTR-MCNC: 223 MG/DL (ref 70–130)
GLUCOSE BLDC GLUCOMTR-MCNC: 255 MG/DL (ref 70–130)
GLUCOSE BLDC GLUCOMTR-MCNC: 345 MG/DL (ref 70–130)
HBA1C MFR BLD: 9.2 % (ref 4.8–5.6)
HCT VFR BLD AUTO: 34.3 % (ref 37.5–51)
HDLC SERPL-MCNC: 49 MG/DL (ref 40–60)
HGB BLD-MCNC: 10.4 G/DL (ref 13–17.7)
IMM GRANULOCYTES # BLD AUTO: 0.04 10*3/MM3 (ref 0–0.05)
IMM GRANULOCYTES NFR BLD AUTO: 0.6 % (ref 0–0.5)
LDLC SERPL CALC-MCNC: 90 MG/DL (ref 0–100)
LDLC/HDLC SERPL: 1.84 {RATIO}
LYMPHOCYTES # BLD AUTO: 1.72 10*3/MM3 (ref 0.7–3.1)
LYMPHOCYTES NFR BLD AUTO: 27.1 % (ref 19.6–45.3)
MCH RBC QN AUTO: 27 PG (ref 26.6–33)
MCHC RBC AUTO-ENTMCNC: 30.3 G/DL (ref 31.5–35.7)
MCV RBC AUTO: 89.1 FL (ref 79–97)
MONOCYTES # BLD AUTO: 0.38 10*3/MM3 (ref 0.1–0.9)
MONOCYTES NFR BLD AUTO: 6 % (ref 5–12)
NEUTROPHILS # BLD AUTO: 4.19 10*3/MM3 (ref 1.7–7)
NEUTROPHILS NFR BLD AUTO: 66.1 % (ref 42.7–76)
NRBC BLD AUTO-RTO: 0 /100 WBC (ref 0–0.2)
PLATELET # BLD AUTO: 153 10*3/MM3 (ref 140–450)
PMV BLD AUTO: 10.3 FL (ref 6–12)
POTASSIUM BLD-SCNC: 3.6 MMOL/L (ref 3.5–5.2)
RBC # BLD AUTO: 3.85 10*6/MM3 (ref 4.14–5.8)
SODIUM BLD-SCNC: 139 MMOL/L (ref 136–145)
TRIGL SERPL-MCNC: 113 MG/DL (ref 0–150)
VLDLC SERPL-MCNC: 22.6 MG/DL
WBC NRBC COR # BLD: 6.34 10*3/MM3 (ref 3.4–10.8)

## 2019-10-18 PROCEDURE — 93010 ELECTROCARDIOGRAM REPORT: CPT | Performed by: INTERNAL MEDICINE

## 2019-10-18 PROCEDURE — 93971 EXTREMITY STUDY: CPT | Performed by: INTERNAL MEDICINE

## 2019-10-18 PROCEDURE — 93971 EXTREMITY STUDY: CPT

## 2019-10-18 PROCEDURE — 85025 COMPLETE CBC W/AUTO DIFF WBC: CPT | Performed by: NURSE PRACTITIONER

## 2019-10-18 PROCEDURE — 63710000001 PREDNISONE PER 1 MG: Performed by: HOSPITALIST

## 2019-10-18 PROCEDURE — 93005 ELECTROCARDIOGRAM TRACING: CPT | Performed by: HOSPITALIST

## 2019-10-18 PROCEDURE — 94799 UNLISTED PULMONARY SVC/PX: CPT

## 2019-10-18 PROCEDURE — 99233 SBSQ HOSP IP/OBS HIGH 50: CPT | Performed by: HOSPITALIST

## 2019-10-18 PROCEDURE — 80048 BASIC METABOLIC PNL TOTAL CA: CPT | Performed by: NURSE PRACTITIONER

## 2019-10-18 PROCEDURE — 80061 LIPID PANEL: CPT | Performed by: NURSE PRACTITIONER

## 2019-10-18 PROCEDURE — 25010000002 HEPARIN (PORCINE) PER 1000 UNITS: Performed by: HOSPITALIST

## 2019-10-18 PROCEDURE — 82962 GLUCOSE BLOOD TEST: CPT

## 2019-10-18 PROCEDURE — 63710000001 PREDNISONE PER 5 MG: Performed by: HOSPITALIST

## 2019-10-18 PROCEDURE — 83036 HEMOGLOBIN GLYCOSYLATED A1C: CPT | Performed by: NURSE PRACTITIONER

## 2019-10-18 RX ORDER — MORPHINE SULFATE 100 MG/1
100 TABLET ORAL EVERY 12 HOURS SCHEDULED
Status: DISCONTINUED | OUTPATIENT
Start: 2019-10-18 | End: 2019-10-19 | Stop reason: HOSPADM

## 2019-10-18 RX ORDER — HEPARIN SODIUM 5000 [USP'U]/ML
5000 INJECTION, SOLUTION INTRAVENOUS; SUBCUTANEOUS EVERY 8 HOURS SCHEDULED
Status: DISCONTINUED | OUTPATIENT
Start: 2019-10-18 | End: 2019-10-19 | Stop reason: HOSPADM

## 2019-10-18 RX ADMIN — TERAZOSIN HYDROCHLORIDE ANHYDROUS 5 MG: 5 CAPSULE ORAL at 22:12

## 2019-10-18 RX ADMIN — DIAZEPAM 10 MG: 5 TABLET ORAL at 17:41

## 2019-10-18 RX ADMIN — FINASTERIDE 5 MG: 5 TABLET, FILM COATED ORAL at 08:05

## 2019-10-18 RX ADMIN — BUMETANIDE 1 MG: 1 TABLET ORAL at 08:04

## 2019-10-18 RX ADMIN — HEPARIN SODIUM 5000 UNITS: 5000 INJECTION, SOLUTION INTRAVENOUS; SUBCUTANEOUS at 22:11

## 2019-10-18 RX ADMIN — ASPIRIN 81 MG: 81 TABLET, COATED ORAL at 08:04

## 2019-10-18 RX ADMIN — MORPHINE SULFATE 100 MG: 100 TABLET, EXTENDED RELEASE ORAL at 22:12

## 2019-10-18 RX ADMIN — DOXYCYCLINE 100 MG: 100 CAPSULE ORAL at 22:12

## 2019-10-18 RX ADMIN — INSULIN LISPRO 3 UNITS: 100 INJECTION, SOLUTION INTRAVENOUS; SUBCUTANEOUS at 08:04

## 2019-10-18 RX ADMIN — SODIUM CHLORIDE 100 ML/HR: 9 INJECTION, SOLUTION INTRAVENOUS at 07:24

## 2019-10-18 RX ADMIN — INSULIN LISPRO 5 UNITS: 100 INJECTION, SOLUTION INTRAVENOUS; SUBCUTANEOUS at 17:40

## 2019-10-18 RX ADMIN — DOXYCYCLINE 100 MG: 100 CAPSULE ORAL at 08:04

## 2019-10-18 RX ADMIN — DONEPEZIL HYDROCHLORIDE 10 MG: 10 TABLET, FILM COATED ORAL at 22:12

## 2019-10-18 RX ADMIN — IPRATROPIUM BROMIDE AND ALBUTEROL SULFATE 3 ML: 2.5; .5 SOLUTION RESPIRATORY (INHALATION) at 08:07

## 2019-10-18 RX ADMIN — INSULIN LISPRO 3 UNITS: 100 INJECTION, SOLUTION INTRAVENOUS; SUBCUTANEOUS at 13:17

## 2019-10-18 RX ADMIN — INSULIN LISPRO 4 UNITS: 100 INJECTION, SOLUTION INTRAVENOUS; SUBCUTANEOUS at 22:11

## 2019-10-18 RX ADMIN — PREDNISONE 30 MG: 10 TABLET ORAL at 09:58

## 2019-10-18 RX ADMIN — HEPARIN SODIUM 5000 UNITS: 5000 INJECTION, SOLUTION INTRAVENOUS; SUBCUTANEOUS at 14:39

## 2019-10-18 RX ADMIN — IPRATROPIUM BROMIDE AND ALBUTEROL SULFATE 3 ML: 2.5; .5 SOLUTION RESPIRATORY (INHALATION) at 12:50

## 2019-10-18 RX ADMIN — CLOPIDOGREL BISULFATE 75 MG: 75 TABLET ORAL at 08:04

## 2019-10-18 RX ADMIN — IPRATROPIUM BROMIDE AND ALBUTEROL SULFATE 3 ML: 2.5; .5 SOLUTION RESPIRATORY (INHALATION) at 19:06

## 2019-10-18 RX ADMIN — MORPHINE SULFATE 100 MG: 100 TABLET, EXTENDED RELEASE ORAL at 14:39

## 2019-10-18 NOTE — PLAN OF CARE
Problem: Pain, Chronic (Adult)  Goal: Identify Related Risk Factors and Signs and Symptoms  Outcome: Ongoing (interventions implemented as appropriate)      Problem: Fall Risk (Adult)  Goal: Identify Related Risk Factors and Signs and Symptoms  Outcome: Ongoing (interventions implemented as appropriate)      Problem: Patient Care Overview  Goal: Plan of Care Review  Outcome: Ongoing (interventions implemented as appropriate)

## 2019-10-18 NOTE — PROGRESS NOTES
[facility] Medicine Services  PROGRESS NOTE    Patient Name: Joseph Rodirguez  : 1945  MRN: [id number]    Date of Admission: 10/17/2019  Primary Care Physician: Dandy Bailey MD    Subjective   Subjective     CC:  Dyspnea    HPI:  Feels much better. Continues with cough/congestion, but less tight/wheezy. No f/c. No n/v. No diarrhea. Denies dysuria.    Review of Systems   Constitutional: Positive for activity change, appetite change and fatigue.   HENT: Positive for congestion.    Eyes: Negative.    Respiratory: Positive for cough, chest tightness, shortness of breath and wheezing.    Cardiovascular: Positive for leg swelling.   Gastrointestinal: Negative.    Genitourinary: Negative.    Neurological: Negative.    Hematological: Negative.        All other systems reviewed and negative.     Objective   Objective     Vital Signs:   Temp:  [97.5 °F (36.4 °C)-98.1 °F (36.7 °C)] 97.9 °F (36.6 °C)  Heart Rate:  [62-97] 62  Resp:  [18-24] 18  BP: (124-182)/() 129/89        Physical Exam:  NAD, alert and oriented x 3, normal affect, pleasant  OP clear, MMM  Neck supple  No LAD  RRR  Rales B, with faint expiratory wheezing  +BS, ND, NT, soft, obese  No c/c, tr edema B  No obvious rashes  RODRIGUEZ, no gross neurologic deficits    Results Reviewed:    Results from last 7 days   Lab Units 10/18/19  0700 10/17/19  1226   WBC 10*3/mm3 6.34 6.00   HEMOGLOBIN g/dL 10.4* 11.4*   HEMATOCRIT % 34.3* 37.6   PLATELETS 10*3/mm3 153 155   PROCALCITONIN ng/mL  --  0.13     Results from last 7 days   Lab Units 10/17/19  1226   SODIUM mmol/L 133*   POTASSIUM mmol/L 3.3*   CHLORIDE mmol/L 90*   CO2 mmol/L 30.0*   BUN mg/dL 25*   CREATININE mg/dL 1.84*   GLUCOSE mg/dL 383*   CALCIUM mg/dL 8.8   ALT (SGPT) U/L 12   AST (SGOT) U/L 17   TROPONIN T ng/mL <0.010   PROBNP pg/mL 195.9     Estimated Creatinine Clearance: 46 mL/min (A) (by C-G formula based on SCr of 1.84 mg/dL (H)).    Microbiology Results  "Abnormal     None          Imaging Results (last 24 hours)     Procedure Component Value Units Date/Time    XR Chest 1 View [[id number]] Collected:  10/17/19 1224     Updated:  10/17/19 1559    Narrative:       EXAMINATION: XR CHEST 1 VW-10/17/2019:      INDICATION: SOA, triage protocol.      COMPARISON: 08/28/2017.     FINDINGS: There is a normal cardiac silhouette. There is mild elevation  of the right diaphragm with blunting of the right costophrenic angle.  All findings are stable and unchanged when compared to the previous  examination.           Impression:       Stable chronic pulmonary changes.     D:  10/17/2019  E:  10/17/2019     This report was finalized on 10/17/2019 3:56 PM by Dr. Jong Booker MD.       CT Chest Without Contrast [[id number]] Collected:  10/17/19 1412     Updated:  10/17/19 1559    Narrative:       EXAMINATION: CT CHEST WO CONTRAST - 10/17/2019     INDICATION: Cough.     TECHNIQUE: CT scan of the chest performed without contrast.     The radiation dose reduction device was turned on for each scan per the  ALARA (As Low as Reasonably Achievable) protocol.     COMPARISON: 08/13/2019     FINDINGS: There is no axillary lymphadenopathy. There is no mediastinal  or hilar adenopathy. There is no pericardial or pleural effusion.  Limited images of the upper abdomen reveal no significant finding.  Images displayed at lung window settings demonstrate postsurgical and  postinflammatory change in the right upper lung region posteromedially  and in the retrohilar region on the right. There is a subcentimeter  nodule in the right lower lobe which is stable. There are several small  micronodules in the lingula which represent interval change.       Impression:       There are postoperative and postinflammatory changes in the  right lung as described above, stable and unchanged when compared to the  previous examination. There is a pattern of several small \"micronodules\"  in the lingula. These were not " present on the previous exam of  08/13/2019. A pattern such as this usually reflects postinflammatory  scarring. There is no evidence of consolidation, mass or effusion.     DICTATED:   10/17/2019  EDITED/ls :   10/17/2019         This report was finalized on 10/17/2019 3:56 PM by Dr. Jong Booker MD.             Results for orders placed during the hospital encounter of 04/09/17   Adult Transthoracic Echo Complete With Contrast    Narrative · Left ventricular function is hyperdynamic (EF > 70).  · The left ventricular cavity is moderately dilated.  · Right ventricular cavity is moderately dilated.  · Bi-atrial enlargement is seen.  · Mild mitral valve regurgitation is present  · Mild tricuspid valve regurgitation is present.  · The estimated RVSP is > 55 mmHg.          I have reviewed the medications:  Scheduled Meds:  aspirin 81 mg Oral Daily   bumetanide 1 mg Oral Daily   clopidogrel 75 mg Oral Daily   donepezil 10 mg Oral Nightly   doxycycline 100 mg Oral Q12H   finasteride 5 mg Oral Daily   insulin lispro 0-7 Units Subcutaneous 4x Daily With Meals & Nightly   ipratropium-albuterol 3 mL Nebulization 4x Daily - RT   pharmacy consult - MTM  Does not apply Daily   predniSONE 30 mg Oral Daily With Breakfast   sodium chloride 10 mL Intravenous Q12H   terazosin 5 mg Oral Nightly     Continuous Infusions:   PRN Meds:.•  acetaminophen **OR** acetaminophen **OR** acetaminophen  •  dextrose  •  dextrose  •  diazePAM  •  docusate sodium  •  glucagon (human recombinant)  •  nitroglycerin  •  sodium chloride  •  sodium chloride      Assessment/Plan   Assessment / Plan     Active Hospital Problems    Diagnosis  POA   • **COPD exacerbation (CMS/HCC) [J44.1]  Yes   • Coronary artery disease [I25.10]  Unknown   • Obesity (BMI 30-39.9) [E66.9]  Unknown   • History of lung cancer [Z85.118]  Not Applicable   • Hypokalemia [E87.6]  Unknown   • Acute respiratory failure with hypoxia (CMS/HCC) [J96.01]  Unknown   • Elevated lactic  acid level [R79.89]  Unknown   • Hyponatremia [E87.1]  Unknown   • Lactic acidosis [E87.2]  Unknown   • Leukocytosis [D72.829]  Unknown   • Sleep-disordered breathing [G47.30]  Yes   • CKD (chronic kidney disease), stage III (CMS/HCC) [N18.3]  Yes   • Hypoxia [R09.02]  Yes   • Type 2 diabetes mellitus without complication, without long-term current use of insulin (CMS/HCC) [E11.9]  Yes   • Gastroesophageal reflux disease with esophagitis [K21.0]  Yes   • Essential hypertension [I10]  Yes   • Carcinoma of right lung (CMS/HCC) [C34.91]  Yes      Resolved Hospital Problems   No resolved problems to display.        Brief Hospital Course to date:  Joseph Rodriguez is a 74 y.o. male here with HTN, HL, obesity, GERD, COPD, CAD, CKD, DM, and lung cancer s/p lobectomy in 2016 here with COPD with AE and bronchitis.    COPD with AE/bronchitis  --tapering antibiotics  --on po doxycycline  --wean oxygen    Micronodules on chest CT  --follow up with pulmonary    LLE edema/size discrepency  --duplex pending    Hyperglycemia, DM bases on A1C  --elevated here, but on steroids    CKD  --stable    Hypokalemia  --replace prn    Hyponatremia  --monitor    HTN/HL    Hx of CAD/stents      DVT Prophylaxis:  BECKIE    Disposition: I expect the patient to be discharged TBD.    CODE STATUS:   Code Status and Medical Interventions:   Ordered at: 10/17/19 6907     Level Of Support Discussed With:    Patient     Code Status:    CPR     Medical Interventions (Level of Support Prior to Arrest):    Full         Electronically signed by David Coffman MD, 10/18/19, 8:37 AM.

## 2019-10-18 NOTE — PROGRESS NOTES
Discharge Planning Assessment  [facility]     Patient Name: Joseph Rodriguez  MRN: [id number]  Today's Date: 10/18/2019    Admit Date: 10/17/2019    Discharge Needs Assessment     Row Name 10/18/19 1424       Living Environment    Lives With  spouse;grandchild(shayy); grandson lives there part-time    Current Living Arrangements  home/apartment/condo    Primary Care Provided by  self    Provides Primary Care For  child(shayy)    Family Caregiver if Needed  spouse    Quality of Family Relationships  helpful;involved;supportive    Able to Return to Prior Arrangements  yes       Resource/Environmental Concerns    Resource/Environmental Concerns  none       Transition Planning    Patient/Family Anticipates Transition to  home with family    Patient/Family Anticipated Services at Transition  none    Transportation Anticipated  family or friend will provide       Discharge Needs Assessment    Readmission Within the Last 30 Days  no previous admission in last 30 days    Concerns to be Addressed  no discharge needs identified;denies needs/concerns at this time    Equipment Currently Used at Home  shower chair    Current Discharge Risk  chronically ill        Discharge Plan     Row Name 10/18/19 1424       Plan    Plan  Home    Patient/Family in Agreement with Plan  yes    Plan Comments  Met with patient and his wife in the room to initiate discharge planning. Patient lives with his wife and 10-y.o. grandson in a home in [facility]. He is independent with ADLs and mobility and does not use any DME. His goal is home at discharge, and family will provide his ride. Patient does not anticipate any dishcarge needs at this time. CM will continue to follow.     Final Discharge Disposition Code  01 - home or self-care        Destination      No service coordination in this encounter.      Durable Medical Equipment      No service coordination in this encounter.      Dialysis/Infusion      No service coordination in this encounter.       Home Medical Care      No service coordination in this encounter.      Therapy      No service coordination in this encounter.      Community Resources      No service coordination in this encounter.        Expected Discharge Date and Time     Expected Discharge Date Expected Discharge Time    Oct 20, 2019         Demographic Summary     Row Name 10/18/19 1423       General Information    Admission Type  inpatient    Referral Source  admission list    Reason for Consult  discharge planning    General Information Comments  PCP is Dandy Bailey       Contact Information    Permission Granted to Share Info With  ;family/designee wife, Yvette Rodriguez        Functional Status     Row Name 10/18/19 1423       Functional Status    Usual Activity Tolerance  good       Functional Status, IADL    Medications  independent    Meal Preparation  independent    Housekeeping  independent    Laundry  independent    Shopping  independent       Employment/    Employment/ Comments  Confirmed with patient that he has medical and rx coverage through [employer].         Psychosocial    No documentation.       Abuse/Neglect    No documentation.       Legal    No documentation.       Substance Abuse    No documentation.       Patient Forms    No documentation.           Kerry Cooper, RN

## 2019-10-18 NOTE — CONSULTS
Referring Provider: MD Genevieve  Reason for Consultation: AECOPD    Subjective .   Education:  NN spoke with pt at BS.  Pt alert and able to answer questions appropriately.  Pt O2 sat   95 % on 1 L currently, no home O2 use.  Pt reports the ability to ambulate  at baseline with no impairment .  Pt states use of rescue inhaler rarely.  Former smoker, quit date 1991.  Up to date on flu and PNA vaccines.  Pt reports no previous hx of formal COPD teaching, no understanding of action plan, or NC.  COPD education completed in the form of explanation, handouts, and videos.  COPD Taking Control started at [facility].  No new concerns or questions voiced at this time.  NN will continue to follow as needed.    Age: 74 y.o.  Sex: male  Smoker Status: former, 45 pack years  Pulmonologist: SHUBHAM Hodge MD  FEV1 (PFT): 59%  Home O2: RA    Objective     SpO2 SpO2: 95 % (10/18/19 0807)  Device Device (Oxygen Therapy): nasal cannula (10/18/19 0807)  Flow Flow (L/min): 1 (10/18/19 0807)  Incentive Spirometer    IS Predicted Level (mL)     Number of Repetitions     Level Incentive Spirometer (mL)    Patient Tolerance     Inhaler Treatment Status    Treatment Route        Home Medications:  Medications Prior to Admission   Medication Sig Dispense Refill Last Dose   • albuterol sulfate  (90 Base) MCG/ACT inhaler Inhale 2 puffs Every 4 (Four) Hours As Needed for Wheezing. 6.7 g 11    • aspirin 81 MG EC tablet Take 81 mg by mouth Daily.      • bumetanide (BUMEX) 1 MG tablet Take 1 mg by mouth Daily.  5 Taking   • clopidogrel (PLAVIX) 75 MG tablet Take 1 tablet by mouth Daily. 90 tablet 0 Taking   • Coenzyme Q10 (CO Q 10) 100 MG capsule Take 200 mg by mouth Daily.   Taking   • diazepam (VALIUM) 10 MG tablet Take 1 tablet by mouth 4 (Four) Times a Day.   Taking   • donepezil (ARICEPT) 10 MG tablet Take 10 mg by mouth Every Night.      • doxazosin (CARDURA) 4 MG tablet TAKE 1 TABLET BY MOUTH DAILY  5 Taking   • finasteride (PROSCAR) 5  MG tablet Take 5 mg by mouth Daily.      • linagliptin (TRADJENTA) 5 MG tablet tablet Take 5 mg by mouth daily.   Taking   • melatonin 5 MG tablet tablet Take 5 mg by mouth Every Night.      • morphine (MS CONTIN) 100 MG 12 hr tablet Take 200 mg by mouth 2 (Two) Times a Day.   Taking   • nitroglycerin (NITROSTAT) 0.4 MG SL tablet DISSOLVE 1 TABLET(S) UNDER THE TONGUE MAY REPEAT EVERY 5 MINUTES. MAXIMUM OF 3 DOSES IN 15 MINUTES  5 Taking   • pantoprazole (PROTONIX) 40 MG EC tablet Take 40 mg by mouth Daily.   Taking   • penicillin v potassium (VEETID) 500 MG tablet Take 500 mg by mouth Every 8 (Eight) Hours.      • pravastatin (PRAVACHOL) 80 MG tablet Take 80 mg by mouth Daily.   Taking   • predniSONE (DELTASONE) 10 MG tablet Take 10 mg by mouth. 4 tabs daily for 4 days, 2 tabs daily for 4 days, and then 1 tab daily for 4 days      • sulfamethoxazole-trimethoprim (BACTRIM DS,SEPTRA DS) 800-160 MG per tablet TAKE 1 TABLET BY MOUTH EVERY 12 HOURS  2 Taking   • docusate sodium (COLACE) 100 MG capsule Take 100 mg by mouth 2 (Two) Times a Day.   Taking       Discussion: Per current GOLD Standards, please consider: patient refuses maintenance therapy, pulmonary follow up scheduled for 10/28/2019 @ 9:30 am.      Discussed with attending MD Marla Carrillo RN

## 2019-10-19 VITALS
SYSTOLIC BLOOD PRESSURE: 166 MMHG | RESPIRATION RATE: 20 BRPM | WEIGHT: 261.2 LBS | BODY MASS INDEX: 37.39 KG/M2 | DIASTOLIC BLOOD PRESSURE: 81 MMHG | HEART RATE: 51 BPM | OXYGEN SATURATION: 100 % | HEIGHT: 70 IN | TEMPERATURE: 97.8 F

## 2019-10-19 LAB
GLUCOSE BLDC GLUCOMTR-MCNC: 133 MG/DL (ref 70–130)
GLUCOSE BLDC GLUCOMTR-MCNC: 303 MG/DL (ref 70–130)

## 2019-10-19 PROCEDURE — 25010000002 HEPARIN (PORCINE) PER 1000 UNITS: Performed by: HOSPITALIST

## 2019-10-19 PROCEDURE — 99239 HOSP IP/OBS DSCHRG MGMT >30: CPT | Performed by: HOSPITALIST

## 2019-10-19 PROCEDURE — 94799 UNLISTED PULMONARY SVC/PX: CPT

## 2019-10-19 PROCEDURE — 63710000001 PREDNISONE PER 1 MG: Performed by: HOSPITALIST

## 2019-10-19 PROCEDURE — 82962 GLUCOSE BLOOD TEST: CPT

## 2019-10-19 PROCEDURE — 63710000001 PREDNISONE PER 5 MG: Performed by: HOSPITALIST

## 2019-10-19 RX ORDER — PREDNISONE 10 MG/1
TABLET ORAL
Qty: 13 TABLET | Refills: 0 | Status: SHIPPED | OUTPATIENT
Start: 2019-10-19 | End: 2019-10-26

## 2019-10-19 RX ORDER — DOXYCYCLINE 100 MG/1
100 CAPSULE ORAL EVERY 12 HOURS SCHEDULED
Qty: 10 CAPSULE | Refills: 0 | Status: SHIPPED | OUTPATIENT
Start: 2019-10-19 | End: 2019-10-24

## 2019-10-19 RX ORDER — GLIPIZIDE 5 MG/1
2.5 TABLET ORAL
Qty: 30 TABLET | Refills: 2 | Status: SHIPPED | OUTPATIENT
Start: 2019-10-19 | End: 2019-10-20 | Stop reason: SDUPTHER

## 2019-10-19 RX ADMIN — DOCUSATE SODIUM 100 MG: 100 CAPSULE, LIQUID FILLED ORAL at 08:11

## 2019-10-19 RX ADMIN — MORPHINE SULFATE 100 MG: 100 TABLET, EXTENDED RELEASE ORAL at 08:14

## 2019-10-19 RX ADMIN — HEPARIN SODIUM 5000 UNITS: 5000 INJECTION, SOLUTION INTRAVENOUS; SUBCUTANEOUS at 07:00

## 2019-10-19 RX ADMIN — DOXYCYCLINE 100 MG: 100 CAPSULE ORAL at 08:11

## 2019-10-19 RX ADMIN — FINASTERIDE 5 MG: 5 TABLET, FILM COATED ORAL at 08:11

## 2019-10-19 RX ADMIN — BUMETANIDE 1 MG: 1 TABLET ORAL at 08:11

## 2019-10-19 RX ADMIN — SODIUM CHLORIDE, PRESERVATIVE FREE 10 ML: 5 INJECTION INTRAVENOUS at 08:11

## 2019-10-19 RX ADMIN — INSULIN LISPRO 5 UNITS: 100 INJECTION, SOLUTION INTRAVENOUS; SUBCUTANEOUS at 12:10

## 2019-10-19 RX ADMIN — DIAZEPAM 10 MG: 5 TABLET ORAL at 08:10

## 2019-10-19 RX ADMIN — CLOPIDOGREL BISULFATE 75 MG: 75 TABLET ORAL at 08:10

## 2019-10-19 RX ADMIN — ASPIRIN 81 MG: 81 TABLET, COATED ORAL at 08:10

## 2019-10-19 RX ADMIN — PREDNISONE 30 MG: 10 TABLET ORAL at 08:10

## 2019-10-19 RX ADMIN — IPRATROPIUM BROMIDE AND ALBUTEROL SULFATE 3 ML: 2.5; .5 SOLUTION RESPIRATORY (INHALATION) at 07:26

## 2019-10-19 NOTE — PLAN OF CARE
Problem: Pain, Chronic (Adult)  Goal: Identify Related Risk Factors and Signs and Symptoms  Outcome: Ongoing (interventions implemented as appropriate)      Problem: Fall Risk (Adult)  Goal: Identify Related Risk Factors and Signs and Symptoms  Outcome: Ongoing (interventions implemented as appropriate)      Problem: Patient Care Overview  Goal: Plan of Care Review  Outcome: Ongoing (interventions implemented as appropriate)      Problem: Chronic Obstructive Pulmonary Disease (Adult)  Goal: Signs and Symptoms of Listed Potential Problems Will be Absent, Minimized or Managed (Chronic Obstructive Pulmonary Disease)  Outcome: Ongoing (interventions implemented as appropriate)

## 2019-10-19 NOTE — DISCHARGE SUMMARY
[facility] Medicine Services  DISCHARGE SUMMARY    Patient Name: Joseph Rodriguez  : 1945  MRN: [id number]    Date of Admission: 10/17/2019  Date of Discharge:  10/19/2019  Primary Care Physician: Dandy Bailey MD    Consults     No orders found from 2019 to 10/18/2019.          Hospital Course     Presenting Problem:   COPD exacerbation (CMS/HCC) [J44.1]  COPD exacerbation (CMS/HCC) [J44.1]    Active Hospital Problems    Diagnosis  POA   • **COPD exacerbation (CMS/HCC) [J44.1]  Yes   • Coronary artery disease [I25.10]  Unknown   • Obesity (BMI 30-39.9) [E66.9]  Unknown   • History of lung cancer [Z85.118]  Not Applicable   • Hypokalemia [E87.6]  Unknown   • Acute respiratory failure with hypoxia (CMS/[facility]) [J96.01]  Unknown   • Elevated lactic acid level [R79.89]  Unknown   • Hyponatremia [E87.1]  Unknown   • Lactic acidosis [E87.2]  Unknown   • Leukocytosis [D72.829]  Unknown   • Sleep-disordered breathing [G47.30]  Yes   • CKD (chronic kidney disease), stage III (CMS/[facility]) [N18.3]  Yes   • Hypoxia [R09.02]  Yes   • Type 2 diabetes mellitus without complication, without long-term current use of insulin (CMS/[facility]) [E11.9]  Yes   • Gastroesophageal reflux disease with esophagitis [K21.0]  Yes   • Essential hypertension [I10]  Yes   • Carcinoma of right lung (CMS/[facility]) [C34.91]  Yes      Resolved Hospital Problems   No resolved problems to display.          Hospital Course:  Joseph Rodriguez is a 74 y.o. male here with acute hypoxic respiratory failure secondary to COPD with acute exacerbation and bronchitis. He was treated with antibiotics, nebulizers, steroids, and oxygen. He has been weaned to room air. His wheezing has resolved. He has scant clear sputum only. He will complete a steroid taper and antibiotics as written.    His CT demonstrated micro-nodules and appropriate follow up with pulmonary has been arranged and the patient has been counseled regarding follow  up.    He has DM, that is uncontrolled, likely due to dietary non-compliance. He is on tradjenta at home and while he likely needs insulin he is unwilling to begin injection therapy and glipizide has been added to his regimen.  He has been advised to follow up with his PCP this week. He glucose levels will be elevated with his steroid taper and he has been counseled.    He has CKD and while his creatinine was elevated I suspect this is likely his baseline and he will need close follow up.      Discharge Follow Up Recommendations for labs/diagnostics:  As written below    Day of Discharge     HPI:   Mild cough. No wheezing. No CP. Wants to go home.    Review of Systems  Gen- No fevers, chills  CV- No chest pain, palpitations  Resp- as above  GI- No N/V/D, abd pain        Otherwise ROS is negative except as mentioned in the HPI.    Vital Signs:   Temp:  [97.8 °F (36.6 °C)-98.1 °F (36.7 °C)] 97.8 °F (36.6 °C)  Heart Rate:  [51-79] 51  Resp:  [16-20] 20  BP: (140-191)/(81-88) 166/81     Physical Exam:  NAD, alert and oriented  OP clear, MMM  Neck supple  No LAD  RRR  CTAB  +BS, ND, NT  RODRIGUEZ  NO c/c/e  No obvious rashes  Normal affect  Pertinent  and/or Most Recent Results     Results from last 7 days   Lab Units 10/18/19  0700 10/17/19  1226   WBC 10*3/mm3 6.34 6.00   HEMOGLOBIN g/dL 10.4* 11.4*   HEMATOCRIT % 34.3* 37.6   PLATELETS 10*3/mm3 153 155   SODIUM mmol/L 139 133*   POTASSIUM mmol/L 3.6 3.3*   CHLORIDE mmol/L 99 90*   CO2 mmol/L 28.0 30.0*   BUN mg/dL 25* 25*   CREATININE mg/dL 1.69* 1.84*   GLUCOSE mg/dL 237* 383*   CALCIUM mg/dL 8.3* 8.8     Results from last 7 days   Lab Units 10/17/19  1226   BILIRUBIN mg/dL 0.3   ALK PHOS U/L 98   ALT (SGPT) U/L 12   AST (SGOT) U/L 17     Results from last 7 days   Lab Units 10/18/19  0700   CHOLESTEROL mg/dL 162   TRIGLYCERIDES mg/dL 113   HDL CHOL mg/dL 49     Results from last 7 days   Lab Units 10/18/19  0700 10/17/19  1737 10/17/19  1226   HEMOGLOBIN A1C % 9.20*  --    --    PROBNP pg/mL  --   --  195.9   TROPONIN T ng/mL  --   --  <0.010   PROCALCITONIN ng/mL  --   --  0.13   LACTATE mmol/L  --  1.1 3.1*       Brief Urine Lab Results  (Last result in the past 365 days)      Color   Clarity   Blood   Leuk Est   Nitrite   Protein   CREAT   Urine HCG        10/17/19 1627 Yellow Clear Small (1+) Negative Negative 100 mg/dL (2+)               Microbiology Results Abnormal     Procedure Component Value - Date/Time    Blood Culture - Blood, Arm, Left [[id number]] Collected:  10/17/19 1226    Lab Status:  Preliminary result Specimen:  Blood from Arm, Left Updated:  10/18/19 1345     Blood Culture No growth at 24 hours    Blood Culture - Blood, Arm, Right [[id number]] Collected:  10/17/19 1226    Lab Status:  Preliminary result Specimen:  Blood from Arm, Right Updated:  10/18/19 1330     Blood Culture No growth at 24 hours          Imaging Results (all)     Procedure Component Value Units Date/Time    XR Chest 1 View [[id number]] Collected:  10/17/19 1224     Updated:  10/17/19 155    Narrative:       EXAMINATION: XR CHEST 1 VW-10/17/2019:      INDICATION: SOA, triage protocol.      COMPARISON: 08/28/2017.     FINDINGS: There is a normal cardiac silhouette. There is mild elevation  of the right diaphragm with blunting of the right costophrenic angle.  All findings are stable and unchanged when compared to the previous  examination.           Impression:       Stable chronic pulmonary changes.     D:  10/17/2019  E:  10/17/2019     This report was finalized on 10/17/2019 3:56 PM by Dr. Jong Booker MD.       CT Chest Without Contrast [[id number]] Collected:  10/17/19 1412     Updated:  10/17/19 1558    Narrative:       EXAMINATION: CT CHEST WO CONTRAST - 10/17/2019     INDICATION: Cough.     TECHNIQUE: CT scan of the chest performed without contrast.     The radiation dose reduction device was turned on for each scan per the  ALARA (As Low as Reasonably Achievable) protocol.     COMPARISON:  "08/13/2019     FINDINGS: There is no axillary lymphadenopathy. There is no mediastinal  or hilar adenopathy. There is no pericardial or pleural effusion.  Limited images of the upper abdomen reveal no significant finding.  Images displayed at lung window settings demonstrate postsurgical and  postinflammatory change in the right upper lung region posteromedially  and in the retrohilar region on the right. There is a subcentimeter  nodule in the right lower lobe which is stable. There are several small  micronodules in the lingula which represent interval change.       Impression:       There are postoperative and postinflammatory changes in the  right lung as described above, stable and unchanged when compared to the  previous examination. There is a pattern of several small \"micronodules\"  in the lingula. These were not present on the previous exam of  08/13/2019. A pattern such as this usually reflects postinflammatory  scarring. There is no evidence of consolidation, mass or effusion.     DICTATED:   10/17/2019  EDITED/ls :   10/17/2019         This report was finalized on 10/17/2019 3:56 PM by Dr. Jong Booker MD.             Results for orders placed during the hospital encounter of 10/17/19   Duplex Venous Lower Extremity - Right CAR    Narrative · No evidence of deep or superficial venous thrombosis in the right lower   extremity.          Results for orders placed during the hospital encounter of 10/17/19   Duplex Venous Lower Extremity - Right CAR    Narrative · No evidence of deep or superficial venous thrombosis in the right lower   extremity.          Results for orders placed during the hospital encounter of 04/09/17   Adult Transthoracic Echo Complete With Contrast    Narrative · Left ventricular function is hyperdynamic (EF > 70).  · The left ventricular cavity is moderately dilated.  · Right ventricular cavity is moderately dilated.  · Bi-atrial enlargement is seen.  · Mild mitral valve regurgitation " is present  · Mild tricuspid valve regurgitation is present.  · The estimated RVSP is > 55 mmHg.           Order Current Status    Blood Culture - Blood, Arm, Left Preliminary result    Blood Culture - Blood, Arm, Right Preliminary result        Discharge Details        Discharge Medications      New Medications      Instructions Start Date   doxycycline 100 MG capsule  Commonly known as:  MONODOX   100 mg, Oral, Every 12 Hours Scheduled      glipizide 5 MG tablet  Commonly known as:  GLUCOTROL   2.5 mg, Oral, 2 Times Daily Before Meals      pharmacy consult - MTM   Does not apply, Daily   Start Date:  10/20/2019     PHARMACY MEDS TO BED CONSULT   Does not apply, Daily         Changes to Medications      Instructions Start Date   predniSONE 10 MG tablet  Commonly known as:  DELTASONE  What changed:    · how much to take  · when to take this  · additional instructions   30 mg, Oral, See Admin Instructions, 3 tab daily for 2 days, 2 tab daily for 2 days, 1 tab daily for 3 days then stop         Continue These Medications      Instructions Start Date   albuterol sulfate  (90 Base) MCG/ACT inhaler  Commonly known as:  PROVENTIL HFA;VENTOLIN HFA;PROAIR HFA   2 puffs, Inhalation, Every 4 Hours PRN      aspirin 81 MG EC tablet   81 mg, Oral, Daily      bumetanide 1 MG tablet  Commonly known as:  BUMEX   1 mg, Oral, Daily      clopidogrel 75 MG tablet  Commonly known as:  PLAVIX   75 mg, Oral, Daily      Co Q 10 100 MG capsule   200 mg, Oral, Daily      diazePAM 10 MG tablet  Commonly known as:  VALIUM   1 tablet, Oral, 4 Times Daily      docusate sodium 100 MG capsule  Commonly known as:  COLACE   100 mg, Oral, 2 Times Daily      donepezil 10 MG tablet  Commonly known as:  ARICEPT   10 mg, Oral, Nightly      doxazosin 4 MG tablet  Commonly known as:  CARDURA   TAKE 1 TABLET BY MOUTH DAILY      finasteride 5 MG tablet  Commonly known as:  PROSCAR   5 mg, Oral, Daily      linagliptin 5 MG tablet tablet  Commonly known  as:  TRADJENTA   5 mg, Oral, Daily      melatonin 5 MG tablet tablet   5 mg, Oral, Nightly      Morphine 100 MG 12 hr tablet  Commonly known as:  MS CONTIN   200 mg, Oral, 2 Times Daily      nitroglycerin 0.4 MG SL tablet  Commonly known as:  NITROSTAT   DISSOLVE 1 TABLET(S) UNDER THE TONGUE MAY REPEAT EVERY 5 MINUTES. MAXIMUM OF 3 DOSES IN 15 MINUTES      pantoprazole 40 MG EC tablet  Commonly known as:  PROTONIX   40 mg, Oral, Daily      pravastatin 80 MG tablet  Commonly known as:  PRAVACHOL   80 mg, Oral, Daily         Stop These Medications    penicillin v potassium 500 MG tablet  Commonly known as:  VEETID     sulfamethoxazole-trimethoprim 800-160 MG per tablet  Commonly known as:  BACTRIM DS,SEPTRA DS            Allergies   Allergen Reactions   • Atorvastatin    • Sulfa Antibiotics          Discharge Disposition:  Home or Self Care    Diet:  Hospital:  Diet Order   Procedures   • Diet Regular; Cardiac, Renal, Consistent Carbohydrate     Discharge:   Diet Instructions     Diet: Consistent Carbohydrate      Discharge Diet:  Consistent Carbohydrate          Discharge Activity:   Activity Instructions     Activity as Tolerated              CODE STATUS:    Code Status and Medical Interventions:   Ordered at: 10/17/19 1725     Level Of Support Discussed With:    Patient     Code Status:    CPR     Medical Interventions (Level of Support Prior to Arrest):    Full         Future Appointments   Date Time Provider Department Center   10/28/2019  9:30 AM [facility], PFT LAB 2 [facility] None   10/28/2019 10:00 AM Ivania Delcid APRN [facility] None       Additional Instructions for the Follow-ups that You Need to Schedule     Discharge Follow-up with PCP   As directed       Currently Documented PCP:    Dandy Bailey MD    PCP Phone Number:    [phone]     Follow Up Details:  This week, for DM management         Discharge Follow-up with Specified Provider: [facility] 10/28 as ordered/scheduled    As directed      To:  [facility] 10/28 as ordered/scheduled               Time Spent on Discharge:  38 minutes    Electronically signed by David Coffman MD, 10/19/19, 9:42 AM.

## 2019-10-20 ENCOUNTER — READMISSION MANAGEMENT (OUTPATIENT)
Dept: CALL CENTER | Facility: HOSPITAL | Age: 74
End: 2019-10-20

## 2019-10-20 RX ORDER — GLIPIZIDE 5 MG/1
2.5 TABLET ORAL
Qty: 30 TABLET | Refills: 2 | Status: ON HOLD | OUTPATIENT
Start: 2019-10-20 | End: 2020-04-02

## 2019-10-20 NOTE — OUTREACH NOTE
Prep Survey      Responses   Facility patient discharged from?  [place]   Is patient eligible?  Yes   Discharge diagnosis  COPD exacerbation    Does the patient have one of the following disease processes/diagnoses(primary or secondary)?  COPD/Pneumonia   Does the patient have Home health ordered?  No   Is there a DME ordered?  No   Prep survey completed?  Yes          Tiffanie Golden RN

## 2019-10-22 ENCOUNTER — READMISSION MANAGEMENT (OUTPATIENT)
Dept: CALL CENTER | Facility: HOSPITAL | Age: 74
End: 2019-10-22

## 2019-10-22 LAB
BACTERIA SPEC AEROBE CULT: NORMAL
BACTERIA SPEC AEROBE CULT: NORMAL

## 2019-10-22 NOTE — OUTREACH NOTE
COPD/PN Week 1 Survey      Responses   Facility patient discharged from?  [place]   Does the patient have one of the following disease processes/diagnoses(primary or secondary)?  COPD/Pneumonia   Is there a successful TCM telephone encounter documented?  No   Was the primary reason for admission:  COPD exacerbation   Week 1 attempt successful?  Yes   Call start time  1347   Call end time  1351   Discharge diagnosis  COPD exacerbation    Is patient permission given to speak with other caregiver?  Yes   Person spoke with today (if not patient) and relationship  Spouse   Meds reviewed with patient/caregiver?  Yes   Is the patient having any side effects they believe may be caused by any medication additions or changes?  No   Does the patient have all medications ordered at discharge?  Yes   Is the patient taking all medications as directed (includes completed medication regime)?  Yes   Does the patient have a primary care provider?   Yes   Does the patient have an appointment with their PCP or pulmonologist within 7 days of discharge?  Yes   Has the patient kept scheduled appointments due by today?  Yes   Has home health visited the patient within 72 hours of discharge?  N/A   Psychosocial issues?  No   Did the patient receive a copy of their discharge instructions?  Yes   Nursing interventions  Reviewed instructions with patient   What is the patient's perception of their health status since discharge?  Improving   Nursing Interventions  Nurse provided patient education   Are the patient's immunizations up to date?   Yes   Nursing interventions  Educated on importance of maintaining up to date immunizations as advised by provider   Is the patient/caregiver able to teach back the hierarchy of who to call/visit for symptoms/problems? PCP, Specialist, Home health nurse, Urgent Care, ED, 911  Yes   Is the patient able to teach back COPD zones?  Yes   Patient reports what zone on this call?  Green Zone   Green Zone   Reports doing well, Breathing without shortness of breath   Green Zone interventions:  Take daily medications, Avoid indoor/outdoor triggers   Week 1 call completed?  Yes          Fabian Linder RN

## 2019-10-28 ENCOUNTER — OFFICE VISIT (OUTPATIENT)
Dept: PULMONOLOGY | Facility: CLINIC | Age: 74
End: 2019-10-28

## 2019-10-28 VITALS
HEIGHT: 70 IN | BODY MASS INDEX: 37.68 KG/M2 | TEMPERATURE: 97.5 F | SYSTOLIC BLOOD PRESSURE: 142 MMHG | HEART RATE: 71 BPM | WEIGHT: 263.2 LBS | OXYGEN SATURATION: 93 % | DIASTOLIC BLOOD PRESSURE: 82 MMHG

## 2019-10-28 DIAGNOSIS — C34.91 CARCINOMA OF RIGHT LUNG (HCC): ICD-10-CM

## 2019-10-28 DIAGNOSIS — K21.00 GASTROESOPHAGEAL REFLUX DISEASE WITH ESOPHAGITIS: ICD-10-CM

## 2019-10-28 DIAGNOSIS — J44.9 CHRONIC OBSTRUCTIVE PULMONARY DISEASE, UNSPECIFIED COPD TYPE (HCC): Primary | ICD-10-CM

## 2019-10-28 PROCEDURE — 99214 OFFICE O/P EST MOD 30 MIN: CPT | Performed by: NURSE PRACTITIONER

## 2019-10-28 PROCEDURE — 94060 EVALUATION OF WHEEZING: CPT | Performed by: NURSE PRACTITIONER

## 2019-10-28 RX ORDER — ALBUTEROL SULFATE 90 UG/1
4 AEROSOL, METERED RESPIRATORY (INHALATION) ONCE
Status: COMPLETED | OUTPATIENT
Start: 2019-10-28 | End: 2019-10-28

## 2019-10-28 RX ADMIN — ALBUTEROL SULFATE 4 PUFF: 90 AEROSOL, METERED RESPIRATORY (INHALATION) at 09:47

## 2019-10-28 NOTE — PROGRESS NOTES
[facility], [place], operated by [facility] Follow up    CHIEF COMPLAINT    Hospital follow-up/COPD    HISTORY OF PRESENT ILLNESS    Joseph Rodriguez is a 74 y.o.male here today for a hospital follow-up.  He was hospitalized at [facility] on 10/17 to 10/19 for a COPD exacerbation.  He was treated with antibiotics and steroids.  His symptoms improved and he was discharged home on steroids and antibiotics.  He is completed both of these and states that he is breathing much better.    He was started on Symbicort on 10/16 and states that his insurance is covering this well.  He does not know which dose he was placed on by his PCP.  He uses his Ventolin inhaler a couple times per day for shortness of breath.  He does have shortness of breath with exertion and recovers quickly at rest.    He continues to have daytime somnolence and fatigue.  He wears 2 L nasal cannula occasionally at home.  He refuses to have a sleep study performed.  He had a sleep study in the past but refused to wear CPAP as well.    He denies fever, chills, sputum production, hemoptysis, night sweats, weight loss, chest pain or palpitations.  He denies any lower extremity edema.  He denies any sinus or allergy symptoms.  He denies any reflux symptoms.  He is on Protonix daily.    He is up-to-date on his current vaccinations.  Patient Active Problem List   Diagnosis   • Carcinoma of right lung (CMS/HCC)   • Acute kidney injury (CMS/HCC)   • Anemia   • Acute tubular necrosis (CMS/HCC)   • B12 deficiency   • Monoclonal gammopathy   • Chest pain   • Sinus bradycardia   • Hypoxia   • CKD (chronic kidney disease), stage III (CMS/HCC)   • Lung mass   • Type 2 diabetes mellitus without complication, without long-term current use of insulin (CMS/HCC)   • Essential hypertension   • Gastroesophageal reflux disease with esophagitis   • Chronic obstructive pulmonary disease (CMS/HCC)   • Lung nodule   • Sleep-disordered breathing   • COPD exacerbation (CMS/HCC)   • Coronary  artery disease   • Obesity (BMI 30-39.9)   • History of lung cancer   • Hypokalemia   • Acute respiratory failure with hypoxia (CMS/HCC)   • Elevated lactic acid level   • Hyponatremia   • Lactic acidosis   • Leukocytosis       Allergies   Allergen Reactions   • Atorvastatin    • Gabapentin Dizziness   • Sulfa Antibiotics        Current Outpatient Medications:   •  albuterol sulfate  (90 Base) MCG/ACT inhaler, Inhale 2 puffs Every 4 (Four) Hours As Needed for Wheezing., Disp: 6.7 g, Rfl: 11  •  aspirin 81 MG EC tablet, Take 81 mg by mouth Daily., Disp: , Rfl:   •  bumetanide (BUMEX) 1 MG tablet, Take 1 mg by mouth Daily., Disp: , Rfl: 5  •  clopidogrel (PLAVIX) 75 MG tablet, Take 1 tablet by mouth Daily., Disp: 90 tablet, Rfl: 0  •  Coenzyme Q10 (CO Q 10) 100 MG capsule, Take 200 mg by mouth Daily., Disp: , Rfl:   •  diazepam (VALIUM) 10 MG tablet, Take 1 tablet by mouth 4 (Four) Times a Day., Disp: , Rfl:   •  docusate sodium (COLACE) 100 MG capsule, Take 100 mg by mouth 2 (Two) Times a Day., Disp: , Rfl:   •  donepezil (ARICEPT) 10 MG tablet, Take 10 mg by mouth Every Night., Disp: , Rfl:   •  doxazosin (CARDURA) 4 MG tablet, TAKE 1 TABLET BY MOUTH DAILY, Disp: , Rfl: 5  •  finasteride (PROSCAR) 5 MG tablet, Take 5 mg by mouth Daily., Disp: , Rfl:   •  glipizide (GLUCOTROL) 5 MG tablet, Take 0.5 tablets by mouth 2 (Two) Times a Day Before Meals., Disp: 30 tablet, Rfl: 2  •  linagliptin (TRADJENTA) 5 MG tablet tablet, Take 5 mg by mouth daily., Disp: , Rfl:   •  melatonin 5 MG tablet tablet, Take 5 mg by mouth Every Night., Disp: , Rfl:   •  morphine (MS CONTIN) 100 MG 12 hr tablet, Take 200 mg by mouth 2 (Two) Times a Day., Disp: , Rfl:   •  nitroglycerin (NITROSTAT) 0.4 MG SL tablet, DISSOLVE 1 TABLET(S) UNDER THE TONGUE MAY REPEAT EVERY 5 MINUTES. MAXIMUM OF 3 DOSES IN 15 MINUTES, Disp: , Rfl: 5  •  pantoprazole (PROTONIX) 40 MG EC tablet, Take 40 mg by mouth Daily., Disp: , Rfl:   •  pharmacy consult -  "MTM, Daily., Disp: , Rfl:   •  PHARMACY MEDS TO BED CONSULT, Daily., Disp: , Rfl:   •  pravastatin (PRAVACHOL) 80 MG tablet, Take 80 mg by mouth Daily., Disp: , Rfl:   No current facility-administered medications for this visit.   MEDICATION LIST AND ALLERGIES REVIEWED.    Social History     Tobacco Use   • Smoking status: Former Smoker     Packs/day: 1.00     Types: Cigarettes     Last attempt to quit:      Years since quittin.8   • Smokeless tobacco: Never Used   Substance Use Topics   • Alcohol use: No   • Drug use: No       FAMILY AND SOCIAL HISTORY REVIEWED.    Review of Systems   Constitutional: Positive for fatigue. Negative for activity change, appetite change, fever and unexpected weight change.   HENT: Positive for hearing loss. Negative for congestion, postnasal drip, rhinorrhea, sinus pressure, sore throat and voice change.    Eyes: Negative for visual disturbance.   Respiratory: Positive for shortness of breath and wheezing. Negative for cough and chest tightness.    Cardiovascular: Negative for chest pain, palpitations and leg swelling.   Gastrointestinal: Negative for abdominal distention, abdominal pain, nausea and vomiting.   Endocrine: Positive for heat intolerance. Negative for cold intolerance.   Genitourinary: Negative for difficulty urinating and urgency.   Musculoskeletal: Positive for arthralgias and back pain. Negative for neck pain.   Skin: Negative for color change and pallor.   Allergic/Immunologic: Negative for environmental allergies and food allergies.   Neurological: Negative for dizziness, syncope, weakness and light-headedness.   Hematological: Negative for adenopathy. Does not bruise/bleed easily.   Psychiatric/Behavioral: Negative for agitation and behavioral problems.   .    /82   Pulse 71   Temp 97.5 °F (36.4 °C)   Ht 177.8 cm (70\")   Wt 119 kg (263 lb 3.2 oz)   SpO2 93% Comment: resting, room air  BMI 37.77 kg/m²     Immunization History   Administered " "Date(s) Administered   • Influenza, Unspecified 10/01/2019       Physical Exam   Constitutional: He is oriented to person, place, and time. He appears well-developed and well-nourished.   HENT:   Head: Normocephalic and atraumatic.   Eyes: Pupils are equal, round, and reactive to light.   Neck: Normal range of motion. Neck supple. No thyromegaly present.   Cardiovascular: Normal rate, regular rhythm, normal heart sounds and intact distal pulses. Exam reveals no gallop and no friction rub.   No murmur heard.  Pulmonary/Chest: Effort normal and breath sounds normal. No respiratory distress. He has no wheezes. He has no rales. He exhibits no tenderness.   Abdominal: Soft. Bowel sounds are normal. There is no tenderness.   Musculoskeletal: Normal range of motion.   Lymphadenopathy:     He has no cervical adenopathy.   Neurological: He is alert and oriented to person, place, and time.   Skin: Skin is warm and dry. Capillary refill takes less than 2 seconds. He is not diaphoretic.   Psychiatric: He has a normal mood and affect. His behavior is normal.   Nursing note and vitals reviewed.        RESULTS    PFTS in the office today, read by me.  FVC 3.26 80% predicted, FEV1 1.65 56% predicted, FEV1/FVC 51% predicted, moderate to severe obstruction with no postbronchodilator response.    Ct Chest Without Contrast    Result Date: 10/17/2019  There are postoperative and postinflammatory changes in the right lung as described above, stable and unchanged when compared to the previous examination. There is a pattern of several small \"micronodules\" in the lingula. These were not present on the previous exam of 08/13/2019. A pattern such as this usually reflects postinflammatory scarring. There is no evidence of consolidation, mass or effusion.  DICTATED:   10/17/2019 EDITED/ls :   10/17/2019   This report was finalized on 10/17/2019 3:56 PM by Dr. Jong Booker MD.      PROBLEM LIST    Problem List Items Addressed This Visit        " Respiratory    Carcinoma of right lung (CMS/HCC)    Overview              Relevant Orders    CT Chest Without Contrast    Chronic obstructive pulmonary disease (CMS/HCC) - Primary    Relevant Medications    albuterol sulfate HFA (PROVENTIL HFA;VENTOLIN HFA;PROAIR HFA) inhaler 4 puff (Completed)    Other Relevant Orders    Spirometry With Bronchodilator (Completed)       Digestive    Gastroesophageal reflux disease with esophagitis            DISCUSSION    Mr. Rodriguez was here for a hospital follow-up.  He was treated at [facility] for COPD exacerbation.  He seems to have fully recovered from this hospitalization.  He states that his breathing is back to baseline.  We reviewed his PFTs in detail today and he continues to have moderate to severe obstruction.  He will continue Symbicort 2 puffs twice a day for his COPD.  I did advise him to call our office with the correct dosage of his Symbicort.  He was unable to tell me what dose he was on currently.  He will continue to use his albuterol as needed for shortness of breath.    We reviewed his most recent CT scan that was performed in October which revealed no new nodules or masses concerning for malignancy.  His next CT scan will be due in April 2020.  He is agreeable to have the CT scan performed.      He will continue Protonix daily for GERD.  We also discussed reflux precautions in the office today such as elevating the head of the bed at night, not eating to 3 hours before bed and avoiding foods that trigger reflux symptoms.    He will follow-up in 3 to 6 months or sooner if his symptoms worsen.  He will call with any additional concerns or questions.  I spent 25 minutes with the patient and family. I spent > 50% percent of this time counseling and discussing diagnosis, prognosis, diagnostic testing, evaluation, current status, treatment options and management.    Ivania Delcid, APRN  10/28/100561:54 AM  Electronically signed     Please note that  portions of this note were completed with a voice recognition program. Efforts were made to edit the dictations, but occasionally words are mistranscribed.      CC: Dandy Bailey MD

## 2019-10-29 ENCOUNTER — READMISSION MANAGEMENT (OUTPATIENT)
Dept: CALL CENTER | Facility: HOSPITAL | Age: 74
End: 2019-10-29

## 2019-11-06 ENCOUNTER — READMISSION MANAGEMENT (OUTPATIENT)
Dept: CALL CENTER | Facility: HOSPITAL | Age: 74
End: 2019-11-06

## 2019-11-06 NOTE — OUTREACH NOTE
COPD/PN Week 3 Survey      Responses   Facility patient discharged from?  [facility]   Does the patient have one of the following disease processes/diagnoses(primary or secondary)?  COPD/Pneumonia   Was the primary reason for admission:  COPD exacerbation   Week 3 attempt successful?  No   Unsuccessful attempts  Attempt 1          Fabian Linder RN

## 2019-11-07 ENCOUNTER — READMISSION MANAGEMENT (OUTPATIENT)
Dept: CALL CENTER | Facility: HOSPITAL | Age: 74
End: 2019-11-07

## 2019-11-07 NOTE — OUTREACH NOTE
COPD/PN Week 3 Survey      Responses   Facility patient discharged from?  [facility]   Does the patient have one of the following disease processes/diagnoses(primary or secondary)?  COPD/Pneumonia   Was the primary reason for admission:  COPD exacerbation   Week 3 attempt successful?  Yes   Call start time  1534   Call end time  1535   Discharge diagnosis  COPD exacerbation    Is patient permission given to speak with other caregiver?  Yes   List who call center can speak with  spouse- Yvette   Person spoke with today (if not patient) and relationship  spouse- Yvette   What is the patient's perception of their health status since discharge?  Improving   Additional teach back comments  Per spouse, pt is doing really well, no questions or concerns at this time.   Week 3 call completed?  Yes   Revoked  No further contact(revokes)-requires comment   Graduated/Revoked comments  per spouse, pt would like no further calls.          Irene Hutchins RN

## 2020-01-07 NOTE — PROGRESS NOTES
After patient visit today it was noted that he had CT chest 1/5/17 at [facility].  Report reviewed with Dr. Bui, pulmonary nodules stable, will repeat in 1 year.  Called to let patient know, no answer, left vm.  
complains of pain/discomfort

## 2020-01-30 ENCOUNTER — TELEPHONE (OUTPATIENT)
Dept: PULMONOLOGY | Facility: CLINIC | Age: 75
End: 2020-01-30

## 2020-01-30 DIAGNOSIS — J44.9 CHRONIC OBSTRUCTIVE PULMONARY DISEASE, UNSPECIFIED COPD TYPE (HCC): Primary | ICD-10-CM

## 2020-01-30 RX ORDER — BUDESONIDE AND FORMOTEROL FUMARATE DIHYDRATE 160; 4.5 UG/1; UG/1
2 AEROSOL RESPIRATORY (INHALATION) 2 TIMES DAILY
Qty: 1 INHALER | Refills: 11 | Status: SHIPPED | OUTPATIENT
Start: 2020-01-30

## 2020-02-02 ENCOUNTER — APPOINTMENT (OUTPATIENT)
Dept: CT IMAGING | Facility: HOSPITAL | Age: 75
End: 2020-02-02

## 2020-02-02 ENCOUNTER — APPOINTMENT (OUTPATIENT)
Dept: MRI IMAGING | Facility: HOSPITAL | Age: 75
End: 2020-02-02

## 2020-02-02 ENCOUNTER — HOSPITAL ENCOUNTER (INPATIENT)
Facility: HOSPITAL | Age: 75
LOS: 2 days | Discharge: HOME | End: 2020-02-04
Attending: EMERGENCY MEDICINE | Admitting: INTERNAL MEDICINE

## 2020-02-02 ENCOUNTER — APPOINTMENT (OUTPATIENT)
Dept: CARDIOLOGY | Facility: HOSPITAL | Age: 75
End: 2020-02-02

## 2020-02-02 ENCOUNTER — APPOINTMENT (OUTPATIENT)
Dept: GENERAL RADIOLOGY | Facility: HOSPITAL | Age: 75
End: 2020-02-02

## 2020-02-02 DIAGNOSIS — I65.23 CAROTID STENOSIS, ASYMPTOMATIC, BILATERAL: ICD-10-CM

## 2020-02-02 DIAGNOSIS — R41.841 COGNITIVE COMMUNICATION DEFICIT: ICD-10-CM

## 2020-02-02 DIAGNOSIS — Z74.09 IMPAIRED MOBILITY AND ADLS: ICD-10-CM

## 2020-02-02 DIAGNOSIS — R42 DIZZINESS: Primary | ICD-10-CM

## 2020-02-02 DIAGNOSIS — Z78.9 IMPAIRED MOBILITY AND ADLS: ICD-10-CM

## 2020-02-02 DIAGNOSIS — R27.8 TRUNCAL ATAXIA: ICD-10-CM

## 2020-02-02 PROBLEM — R79.89 ELEVATED LACTIC ACID LEVEL: Status: RESOLVED | Noted: 2019-10-17 | Resolved: 2020-02-02

## 2020-02-02 PROBLEM — J44.1 COPD EXACERBATION (HCC): Status: RESOLVED | Noted: 2019-10-17 | Resolved: 2020-02-02

## 2020-02-02 PROBLEM — R07.9 CHEST PAIN: Status: RESOLVED | Noted: 2017-01-06 | Resolved: 2020-02-02

## 2020-02-02 PROBLEM — R29.6 FREQUENT FALLS: Status: ACTIVE | Noted: 2020-02-02

## 2020-02-02 PROBLEM — N40.0 BPH (BENIGN PROSTATIC HYPERPLASIA): Chronic | Status: ACTIVE | Noted: 2020-02-02

## 2020-02-02 PROBLEM — E87.6 HYPOKALEMIA: Status: RESOLVED | Noted: 2019-10-17 | Resolved: 2020-02-02

## 2020-02-02 PROBLEM — D72.829 LEUKOCYTOSIS: Status: RESOLVED | Noted: 2019-10-17 | Resolved: 2020-02-02

## 2020-02-02 PROBLEM — R09.02 HYPOXIA: Status: RESOLVED | Noted: 2017-04-09 | Resolved: 2020-02-02

## 2020-02-02 PROBLEM — J96.01 ACUTE RESPIRATORY FAILURE WITH HYPOXIA (HCC): Status: RESOLVED | Noted: 2019-10-17 | Resolved: 2020-02-02

## 2020-02-02 PROBLEM — R00.1 SINUS BRADYCARDIA: Status: RESOLVED | Noted: 2017-01-06 | Resolved: 2020-02-02

## 2020-02-02 PROBLEM — E87.20 LACTIC ACIDOSIS: Status: RESOLVED | Noted: 2019-10-17 | Resolved: 2020-02-02

## 2020-02-02 PROBLEM — E87.1 HYPONATREMIA: Status: RESOLVED | Noted: 2019-10-17 | Resolved: 2020-02-02

## 2020-02-02 LAB
ALBUMIN SERPL-MCNC: 3.8 G/DL (ref 3.5–5.2)
ALBUMIN/GLOB SERPL: 1.1 G/DL
ALP SERPL-CCNC: 79 U/L (ref 39–117)
ALT SERPL W P-5'-P-CCNC: 10 U/L (ref 1–41)
ALT SERPL W P-5'-P-CCNC: 10 U/L (ref 1–41)
AMPHET+METHAMPHET UR QL: NEGATIVE
AMPHETAMINES UR QL: NEGATIVE
ANION GAP SERPL CALCULATED.3IONS-SCNC: 15 MMOL/L (ref 5–15)
APTT PPP: 28.4 SECONDS (ref 24–37)
AST SERPL-CCNC: 8 U/L (ref 1–40)
AST SERPL-CCNC: 8 U/L (ref 1–40)
BARBITURATES UR QL SCN: NEGATIVE
BASOPHILS # BLD AUTO: 0.04 10*3/MM3 (ref 0–0.2)
BASOPHILS # BLD AUTO: 0.05 10*3/MM3 (ref 0–0.2)
BASOPHILS NFR BLD AUTO: 0.7 % (ref 0–1.5)
BASOPHILS NFR BLD AUTO: 0.8 % (ref 0–1.5)
BENZODIAZ UR QL SCN: POSITIVE
BH CV ECHO MEAS - AO MAX PG (FULL): 12.1 MMHG
BH CV ECHO MEAS - AO MAX PG: 18.9 MMHG
BH CV ECHO MEAS - AO MEAN PG (FULL): 6.6 MMHG
BH CV ECHO MEAS - AO MEAN PG: 9.8 MMHG
BH CV ECHO MEAS - AO ROOT AREA (BSA CORRECTED): 1.3
BH CV ECHO MEAS - AO ROOT AREA: 7 CM^2
BH CV ECHO MEAS - AO ROOT DIAM: 3 CM
BH CV ECHO MEAS - AO V2 MAX: 217.2 CM/SEC
BH CV ECHO MEAS - AO V2 MEAN: 142.4 CM/SEC
BH CV ECHO MEAS - AO V2 VTI: 41.6 CM
BH CV ECHO MEAS - AVA(I,A): 1.9 CM^2
BH CV ECHO MEAS - AVA(I,D): 1.9 CM^2
BH CV ECHO MEAS - AVA(V,A): 1.9 CM^2
BH CV ECHO MEAS - AVA(V,D): 1.9 CM^2
BH CV ECHO MEAS - BSA(HAYCOCK): 2.5 M^2
BH CV ECHO MEAS - BSA(HAYCOCK): 2.5 M^2
BH CV ECHO MEAS - BSA: 2.4 M^2
BH CV ECHO MEAS - BSA: 2.4 M^2
BH CV ECHO MEAS - BZI_BMI: 38 KILOGRAMS/M^2
BH CV ECHO MEAS - BZI_BMI: 38 KILOGRAMS/M^2
BH CV ECHO MEAS - BZI_METRIC_HEIGHT: 177.8 CM
BH CV ECHO MEAS - BZI_METRIC_HEIGHT: 177.8 CM
BH CV ECHO MEAS - BZI_METRIC_WEIGHT: 120.2 KG
BH CV ECHO MEAS - BZI_METRIC_WEIGHT: 120.2 KG
BH CV ECHO MEAS - EDV(CUBED): 144.8 ML
BH CV ECHO MEAS - EDV(MOD-SP2): 106 ML
BH CV ECHO MEAS - EDV(MOD-SP4): 116 ML
BH CV ECHO MEAS - EDV(TEICH): 132.5 ML
BH CV ECHO MEAS - EF(CUBED): 81.7 %
BH CV ECHO MEAS - EF(MOD-BP): 54 %
BH CV ECHO MEAS - EF(MOD-SP2): 53.8 %
BH CV ECHO MEAS - EF(MOD-SP4): 54.3 %
BH CV ECHO MEAS - EF(TEICH): 74 %
BH CV ECHO MEAS - ESV(CUBED): 26.5 ML
BH CV ECHO MEAS - ESV(MOD-SP2): 49 ML
BH CV ECHO MEAS - ESV(MOD-SP4): 53 ML
BH CV ECHO MEAS - ESV(TEICH): 34.5 ML
BH CV ECHO MEAS - FS: 43.2 %
BH CV ECHO MEAS - IVS/LVPW: 0.97
BH CV ECHO MEAS - IVSD: 1.2 CM
BH CV ECHO MEAS - LA DIMENSION: 5 CM
BH CV ECHO MEAS - LA/AO: 1.9
BH CV ECHO MEAS - LAD MAJOR: 6.9 CM
BH CV ECHO MEAS - LAT PEAK E' VEL: 9 CM/SEC
BH CV ECHO MEAS - LATERAL E/E' RATIO: 11.5
BH CV ECHO MEAS - LV DIASTOLIC VOL/BSA (35-75): 49.3 ML/M^2
BH CV ECHO MEAS - LV MASS(C)D: 253.3 GRAMS
BH CV ECHO MEAS - LV MASS(C)DI: 107.7 GRAMS/M^2
BH CV ECHO MEAS - LV MAX PG: 6.8 MMHG
BH CV ECHO MEAS - LV MEAN PG: 3.2 MMHG
BH CV ECHO MEAS - LV SYSTOLIC VOL/BSA (12-30): 22.5 ML/M^2
BH CV ECHO MEAS - LV V1 MAX: 130.3 CM/SEC
BH CV ECHO MEAS - LV V1 MEAN: 81.6 CM/SEC
BH CV ECHO MEAS - LV V1 VTI: 25.6 CM
BH CV ECHO MEAS - LVIDD: 5.3 CM
BH CV ECHO MEAS - LVIDS: 3 CM
BH CV ECHO MEAS - LVLD AP2: 8.1 CM
BH CV ECHO MEAS - LVLD AP4: 8.2 CM
BH CV ECHO MEAS - LVLS AP2: 6.8 CM
BH CV ECHO MEAS - LVLS AP4: 7.1 CM
BH CV ECHO MEAS - LVOT AREA (M): 3.1 CM^2
BH CV ECHO MEAS - LVOT AREA: 3.1 CM^2
BH CV ECHO MEAS - LVOT DIAM: 2 CM
BH CV ECHO MEAS - LVPWD: 1.2 CM
BH CV ECHO MEAS - MED PEAK E' VEL: 8.8 CM/SEC
BH CV ECHO MEAS - MEDIAL E/E' RATIO: 11.8
BH CV ECHO MEAS - MV A MAX VEL: 98.2 CM/SEC
BH CV ECHO MEAS - MV DEC TIME: 0.3 SEC
BH CV ECHO MEAS - MV E MAX VEL: 105.1 CM/SEC
BH CV ECHO MEAS - MV E/A: 1.1
BH CV ECHO MEAS - PA ACC SLOPE: 533.2 CM/SEC^2
BH CV ECHO MEAS - PA ACC TIME: 0.18 SEC
BH CV ECHO MEAS - PA PR(ACCEL): -1.8 MMHG
BH CV ECHO MEAS - RAP SYSTOLE: 3 MMHG
BH CV ECHO MEAS - RVSP: 17 MMHG
BH CV ECHO MEAS - SI(AO): 124.7 ML/M^2
BH CV ECHO MEAS - SI(CUBED): 50.3 ML/M^2
BH CV ECHO MEAS - SI(LVOT): 33.8 ML/M^2
BH CV ECHO MEAS - SI(MOD-SP2): 24.2 ML/M^2
BH CV ECHO MEAS - SI(MOD-SP4): 26.8 ML/M^2
BH CV ECHO MEAS - SI(TEICH): 41.7 ML/M^2
BH CV ECHO MEAS - SV(AO): 293.3 ML
BH CV ECHO MEAS - SV(CUBED): 118.3 ML
BH CV ECHO MEAS - SV(LVOT): 79.5 ML
BH CV ECHO MEAS - SV(MOD-SP2): 57 ML
BH CV ECHO MEAS - SV(MOD-SP4): 63 ML
BH CV ECHO MEAS - SV(TEICH): 98 ML
BH CV ECHO MEAS - TAPSE (>1.6): 3.1 CM2
BH CV ECHO MEAS - TR MAX PG: 14 MMHG
BH CV ECHO MEAS - TR MAX VEL: 183.5 CM/SEC
BH CV ECHO MEASUREMENTS AVERAGE E/E' RATIO: 11.81
BH CV VAS BP RIGHT ARM: NORMAL MMHG
BH CV XLRA - RV BASE: 5.3 CM
BH CV XLRA - RV LENGTH: 8.9 CM
BH CV XLRA - RV MID: 4.2 CM
BH CV XLRA - TDI S': 15 CM/SEC
BH CV XLRA MEAS LEFT CCA RATIO VEL: 114 CM/SEC
BH CV XLRA MEAS LEFT DIST CCA EDV: 23.3 CM/SEC
BH CV XLRA MEAS LEFT DIST CCA PSV: 88 CM/SEC
BH CV XLRA MEAS LEFT DIST ICA EDV: 28.5 CM/SEC
BH CV XLRA MEAS LEFT DIST ICA PSV: 99.2 CM/SEC
BH CV XLRA MEAS LEFT ICA RATIO VEL: 167 CM/SEC
BH CV XLRA MEAS LEFT ICA/CCA RATIO: 1.5
BH CV XLRA MEAS LEFT MID CCA EDV: 23 CM/SEC
BH CV XLRA MEAS LEFT MID CCA PSV: 114.5 CM/SEC
BH CV XLRA MEAS LEFT MID ICA EDV: 59.9 CM/SEC
BH CV XLRA MEAS LEFT MID ICA PSV: 167.9 CM/SEC
BH CV XLRA MEAS LEFT PROX CCA EDV: 20.3 CM/SEC
BH CV XLRA MEAS LEFT PROX CCA PSV: 118.7 CM/SEC
BH CV XLRA MEAS LEFT PROX ECA EDV: 18.7 CM/SEC
BH CV XLRA MEAS LEFT PROX ECA PSV: 122.8 CM/SEC
BH CV XLRA MEAS LEFT PROX ICA EDV: 16.3 CM/SEC
BH CV XLRA MEAS LEFT PROX ICA PSV: 77.9 CM/SEC
BH CV XLRA MEAS LEFT PROX SCLA PSV: 196.4 CM/SEC
BH CV XLRA MEAS LEFT VERTEBRAL A EDV: 13.4 CM/SEC
BH CV XLRA MEAS LEFT VERTEBRAL A PSV: 47.9 CM/SEC
BH CV XLRA MEAS RIGHT BULB EDV: 13.3 CM/SEC
BH CV XLRA MEAS RIGHT BULB PSV: 75.6 CM/SEC
BH CV XLRA MEAS RIGHT CCA RATIO VEL: 125 CM/SEC
BH CV XLRA MEAS RIGHT DIST CCA EDV: 14.2 CM/SEC
BH CV XLRA MEAS RIGHT DIST CCA PSV: 46.2 CM/SEC
BH CV XLRA MEAS RIGHT DIST ICA EDV: 32.6 CM/SEC
BH CV XLRA MEAS RIGHT DIST ICA PSV: 151.5 CM/SEC
BH CV XLRA MEAS RIGHT ICA RATIO VEL: 193 CM/SEC
BH CV XLRA MEAS RIGHT ICA/CCA RATIO: 1.5
BH CV XLRA MEAS RIGHT MID CCA EDV: 13.8 CM/SEC
BH CV XLRA MEAS RIGHT MID CCA PSV: 125.7 CM/SEC
BH CV XLRA MEAS RIGHT MID ICA EDV: 33.7 CM/SEC
BH CV XLRA MEAS RIGHT MID ICA PSV: 194.2 CM/SEC
BH CV XLRA MEAS RIGHT PROX CCA EDV: 20.6 CM/SEC
BH CV XLRA MEAS RIGHT PROX CCA PSV: 169.9 CM/SEC
BH CV XLRA MEAS RIGHT PROX ECA EDV: 11.2 CM/SEC
BH CV XLRA MEAS RIGHT PROX ECA PSV: 151.5 CM/SEC
BH CV XLRA MEAS RIGHT PROX ICA EDV: 47.1 CM/SEC
BH CV XLRA MEAS RIGHT PROX ICA PSV: 167.2 CM/SEC
BH CV XLRA MEAS RIGHT PROX SCLA PSV: 175.8 CM/SEC
BH CV XLRA MEAS RIGHT VERTEBRAL A EDV: 11 CM/SEC
BH CV XLRA MEAS RIGHT VERTEBRAL A PSV: 57.4 CM/SEC
BILIRUB SERPL-MCNC: 0.3 MG/DL (ref 0.2–1.2)
BUN BLD-MCNC: 21 MG/DL (ref 8–23)
BUN/CREAT SERPL: 11.6 (ref 7–25)
BUPRENORPHINE SERPL-MCNC: NEGATIVE NG/ML
CALCIUM SPEC-SCNC: 8.2 MG/DL (ref 8.6–10.5)
CANNABINOIDS SERPL QL: NEGATIVE
CHLORIDE SERPL-SCNC: 95 MMOL/L (ref 98–107)
CHOLEST SERPL-MCNC: 143 MG/DL (ref 0–200)
CO2 SERPL-SCNC: 28 MMOL/L (ref 22–29)
COCAINE UR QL: NEGATIVE
CREAT BLD-MCNC: 1.81 MG/DL (ref 0.76–1.27)
CREAT BLDA-MCNC: 1.9 MG/DL (ref 0.6–1.3)
DEPRECATED RDW RBC AUTO: 50.8 FL (ref 37–54)
DEPRECATED RDW RBC AUTO: 51.3 FL (ref 37–54)
EOSINOPHIL # BLD AUTO: 0.08 10*3/MM3 (ref 0–0.4)
EOSINOPHIL # BLD AUTO: 0.08 10*3/MM3 (ref 0–0.4)
EOSINOPHIL NFR BLD AUTO: 1.3 % (ref 0.3–6.2)
EOSINOPHIL NFR BLD AUTO: 1.4 % (ref 0.3–6.2)
ERYTHROCYTE [DISTWIDTH] IN BLOOD BY AUTOMATED COUNT: 15.6 % (ref 12.3–15.4)
ERYTHROCYTE [DISTWIDTH] IN BLOOD BY AUTOMATED COUNT: 15.9 % (ref 12.3–15.4)
ETHANOL BLD-MCNC: <10 MG/DL (ref 0–10)
GFR SERPL CREATININE-BSD FRML MDRD: 37 ML/MIN/1.73
GLOBULIN UR ELPH-MCNC: 3.5 GM/DL
GLUCOSE BLD-MCNC: 318 MG/DL (ref 65–99)
GLUCOSE BLDC GLUCOMTR-MCNC: 281 MG/DL (ref 70–130)
GLUCOSE BLDC GLUCOMTR-MCNC: 310 MG/DL (ref 70–130)
GLUCOSE BLDC GLUCOMTR-MCNC: 349 MG/DL (ref 70–130)
GLUCOSE BLDC GLUCOMTR-MCNC: 436 MG/DL (ref 70–130)
GLUCOSE BLDC GLUCOMTR-MCNC: 455 MG/DL (ref 70–130)
HCT VFR BLD AUTO: 31.6 % (ref 37.5–51)
HCT VFR BLD AUTO: 33.3 % (ref 37.5–51)
HDLC SERPL-MCNC: 38 MG/DL (ref 40–60)
HGB BLD-MCNC: 10 G/DL (ref 13–17.7)
HGB BLD-MCNC: 10.6 G/DL (ref 13–17.7)
HOLD SPECIMEN: NORMAL
HOLD SPECIMEN: NORMAL
IMM GRANULOCYTES # BLD AUTO: 0.03 10*3/MM3 (ref 0–0.05)
IMM GRANULOCYTES # BLD AUTO: 0.03 10*3/MM3 (ref 0–0.05)
IMM GRANULOCYTES NFR BLD AUTO: 0.5 % (ref 0–0.5)
IMM GRANULOCYTES NFR BLD AUTO: 0.5 % (ref 0–0.5)
INR PPP: 1.1 (ref 0.8–1.2)
LDLC SERPL CALC-MCNC: 79 MG/DL (ref 0–100)
LDLC/HDLC SERPL: 2.07 {RATIO}
LEFT ATRIUM VOLUME INDEX: 25.1 ML/M^2
LEFT ATRIUM VOLUME: 59 ML
LV EF 2D ECHO EST: 60 %
LYMPHOCYTES # BLD AUTO: 1.4 10*3/MM3 (ref 0.7–3.1)
LYMPHOCYTES # BLD AUTO: 1.62 10*3/MM3 (ref 0.7–3.1)
LYMPHOCYTES NFR BLD AUTO: 24.6 % (ref 19.6–45.3)
LYMPHOCYTES NFR BLD AUTO: 26.7 % (ref 19.6–45.3)
MAGNESIUM SERPL-MCNC: 2.2 MG/DL (ref 1.6–2.4)
MAXIMAL PREDICTED HEART RATE: 147 BPM
MCH RBC QN AUTO: 28.6 PG (ref 26.6–33)
MCH RBC QN AUTO: 28.8 PG (ref 26.6–33)
MCHC RBC AUTO-ENTMCNC: 31.6 G/DL (ref 31.5–35.7)
MCHC RBC AUTO-ENTMCNC: 31.8 G/DL (ref 31.5–35.7)
MCV RBC AUTO: 90.3 FL (ref 79–97)
MCV RBC AUTO: 90.5 FL (ref 79–97)
METHADONE UR QL SCN: NEGATIVE
MONOCYTES # BLD AUTO: 0.37 10*3/MM3 (ref 0.1–0.9)
MONOCYTES # BLD AUTO: 0.51 10*3/MM3 (ref 0.1–0.9)
MONOCYTES NFR BLD AUTO: 6.1 % (ref 5–12)
MONOCYTES NFR BLD AUTO: 9 % (ref 5–12)
NEUTROPHILS # BLD AUTO: 3.62 10*3/MM3 (ref 1.7–7)
NEUTROPHILS # BLD AUTO: 3.92 10*3/MM3 (ref 1.7–7)
NEUTROPHILS NFR BLD AUTO: 63.8 % (ref 42.7–76)
NEUTROPHILS NFR BLD AUTO: 64.6 % (ref 42.7–76)
NRBC BLD AUTO-RTO: 0 /100 WBC (ref 0–0.2)
NRBC BLD AUTO-RTO: 0 /100 WBC (ref 0–0.2)
NT-PROBNP SERPL-MCNC: 195 PG/ML (ref 5–900)
OPIATES UR QL: POSITIVE
OXYCODONE UR QL SCN: NEGATIVE
PCP UR QL SCN: NEGATIVE
PLATELET # BLD AUTO: 140 10*3/MM3 (ref 140–450)
PLATELET # BLD AUTO: 148 10*3/MM3 (ref 140–450)
PMV BLD AUTO: 10.1 FL (ref 6–12)
PMV BLD AUTO: 9.9 FL (ref 6–12)
POTASSIUM BLD-SCNC: 3.3 MMOL/L (ref 3.5–5.2)
PROPOXYPH UR QL: NEGATIVE
PROT SERPL-MCNC: 7.3 G/DL (ref 6–8.5)
PROTHROMBIN TIME: 12.9 SECONDS (ref 12.8–15.2)
RBC # BLD AUTO: 3.5 10*6/MM3 (ref 4.14–5.8)
RBC # BLD AUTO: 3.68 10*6/MM3 (ref 4.14–5.8)
RIGHT ARM BP: NORMAL MMHG
SODIUM BLD-SCNC: 138 MMOL/L (ref 136–145)
STRESS TARGET HR: 125 BPM
TRICYCLICS UR QL SCN: NEGATIVE
TRIGL SERPL-MCNC: 132 MG/DL (ref 0–150)
TROPONIN T SERPL-MCNC: <0.01 NG/ML (ref 0–0.03)
TSH SERPL DL<=0.05 MIU/L-ACNC: 1.99 UIU/ML (ref 0.27–4.2)
VLDLC SERPL-MCNC: 26.4 MG/DL
WBC NRBC COR # BLD: 5.68 10*3/MM3 (ref 3.4–10.8)
WBC NRBC COR # BLD: 6.07 10*3/MM3 (ref 3.4–10.8)
WHOLE BLOOD HOLD SPECIMEN: NORMAL
WHOLE BLOOD HOLD SPECIMEN: NORMAL

## 2020-02-02 PROCEDURE — 83880 ASSAY OF NATRIURETIC PEPTIDE: CPT | Performed by: NURSE PRACTITIONER

## 2020-02-02 PROCEDURE — 71045 X-RAY EXAM CHEST 1 VIEW: CPT

## 2020-02-02 PROCEDURE — G0378 HOSPITAL OBSERVATION PER HR: HCPCS

## 2020-02-02 PROCEDURE — 0 IOPAMIDOL PER 1 ML: Performed by: EMERGENCY MEDICINE

## 2020-02-02 PROCEDURE — 82962 GLUCOSE BLOOD TEST: CPT

## 2020-02-02 PROCEDURE — 70551 MRI BRAIN STEM W/O DYE: CPT

## 2020-02-02 PROCEDURE — 93880 EXTRACRANIAL BILAT STUDY: CPT | Performed by: INTERNAL MEDICINE

## 2020-02-02 PROCEDURE — 83735 ASSAY OF MAGNESIUM: CPT | Performed by: NURSE PRACTITIONER

## 2020-02-02 PROCEDURE — 93306 TTE W/DOPPLER COMPLETE: CPT

## 2020-02-02 PROCEDURE — 85610 PROTHROMBIN TIME: CPT

## 2020-02-02 PROCEDURE — 80061 LIPID PANEL: CPT | Performed by: NURSE PRACTITIONER

## 2020-02-02 PROCEDURE — 84460 ALANINE AMINO (ALT) (SGPT): CPT | Performed by: EMERGENCY MEDICINE

## 2020-02-02 PROCEDURE — 84443 ASSAY THYROID STIM HORMONE: CPT | Performed by: NURSE PRACTITIONER

## 2020-02-02 PROCEDURE — 63710000001 INSULIN LISPRO (HUMAN) PER 5 UNITS: Performed by: NURSE PRACTITIONER

## 2020-02-02 PROCEDURE — 70498 CT ANGIOGRAPHY NECK: CPT

## 2020-02-02 PROCEDURE — 70496 CT ANGIOGRAPHY HEAD: CPT

## 2020-02-02 PROCEDURE — 99285 EMERGENCY DEPT VISIT HI MDM: CPT

## 2020-02-02 PROCEDURE — 80307 DRUG TEST PRSMV CHEM ANLYZR: CPT | Performed by: FAMILY MEDICINE

## 2020-02-02 PROCEDURE — 85730 THROMBOPLASTIN TIME PARTIAL: CPT | Performed by: EMERGENCY MEDICINE

## 2020-02-02 PROCEDURE — 80053 COMPREHEN METABOLIC PANEL: CPT | Performed by: NURSE PRACTITIONER

## 2020-02-02 PROCEDURE — 94799 UNLISTED PULMONARY SVC/PX: CPT

## 2020-02-02 PROCEDURE — 84450 TRANSFERASE (AST) (SGOT): CPT | Performed by: EMERGENCY MEDICINE

## 2020-02-02 PROCEDURE — 93306 TTE W/DOPPLER COMPLETE: CPT | Performed by: INTERNAL MEDICINE

## 2020-02-02 PROCEDURE — 85025 COMPLETE CBC W/AUTO DIFF WBC: CPT | Performed by: EMERGENCY MEDICINE

## 2020-02-02 PROCEDURE — 85025 COMPLETE CBC W/AUTO DIFF WBC: CPT | Performed by: NURSE PRACTITIONER

## 2020-02-02 PROCEDURE — 70450 CT HEAD/BRAIN W/O DYE: CPT

## 2020-02-02 PROCEDURE — 94640 AIRWAY INHALATION TREATMENT: CPT

## 2020-02-02 PROCEDURE — 63710000001 INSULIN DETEMIR PER 5 UNITS: Performed by: FAMILY MEDICINE

## 2020-02-02 PROCEDURE — 99223 1ST HOSP IP/OBS HIGH 75: CPT | Performed by: PSYCHIATRY & NEUROLOGY

## 2020-02-02 PROCEDURE — 84484 ASSAY OF TROPONIN QUANT: CPT | Performed by: EMERGENCY MEDICINE

## 2020-02-02 PROCEDURE — 82565 ASSAY OF CREATININE: CPT

## 2020-02-02 PROCEDURE — 99223 1ST HOSP IP/OBS HIGH 75: CPT | Performed by: INTERNAL MEDICINE

## 2020-02-02 PROCEDURE — 99221 1ST HOSP IP/OBS SF/LOW 40: CPT | Performed by: PHYSICIAN ASSISTANT

## 2020-02-02 PROCEDURE — 93880 EXTRACRANIAL BILAT STUDY: CPT

## 2020-02-02 PROCEDURE — 93005 ELECTROCARDIOGRAM TRACING: CPT | Performed by: EMERGENCY MEDICINE

## 2020-02-02 PROCEDURE — 99232 SBSQ HOSP IP/OBS MODERATE 35: CPT | Performed by: FAMILY MEDICINE

## 2020-02-02 PROCEDURE — 25010000002 HEPARIN (PORCINE) PER 1000 UNITS: Performed by: FAMILY MEDICINE

## 2020-02-02 RX ORDER — DONEPEZIL HYDROCHLORIDE 10 MG/1
10 TABLET, FILM COATED ORAL NIGHTLY
Status: DISCONTINUED | OUTPATIENT
Start: 2020-02-02 | End: 2020-02-04 | Stop reason: HOSPADM

## 2020-02-02 RX ORDER — ONDANSETRON 2 MG/ML
4 INJECTION INTRAMUSCULAR; INTRAVENOUS EVERY 6 HOURS PRN
Status: DISCONTINUED | OUTPATIENT
Start: 2020-02-02 | End: 2020-02-04 | Stop reason: HOSPADM

## 2020-02-02 RX ORDER — ASPIRIN 81 MG/1
81 TABLET ORAL DAILY
Status: DISCONTINUED | OUTPATIENT
Start: 2020-02-03 | End: 2020-02-03

## 2020-02-02 RX ORDER — CITALOPRAM 20 MG/1
20 TABLET ORAL DAILY
COMMUNITY

## 2020-02-02 RX ORDER — ROSUVASTATIN CALCIUM 20 MG/1
40 TABLET, COATED ORAL NIGHTLY
Status: DISCONTINUED | OUTPATIENT
Start: 2020-02-02 | End: 2020-02-04 | Stop reason: HOSPADM

## 2020-02-02 RX ORDER — ASPIRIN 325 MG
325 TABLET ORAL ONCE
Status: COMPLETED | OUTPATIENT
Start: 2020-02-02 | End: 2020-02-02

## 2020-02-02 RX ORDER — SODIUM CHLORIDE 0.9 % (FLUSH) 0.9 %
10 SYRINGE (ML) INJECTION AS NEEDED
Status: DISCONTINUED | OUTPATIENT
Start: 2020-02-02 | End: 2020-02-04 | Stop reason: HOSPADM

## 2020-02-02 RX ORDER — LORAZEPAM 0.5 MG/1
0.5 TABLET ORAL EVERY 6 HOURS
Status: DISCONTINUED | OUTPATIENT
Start: 2020-02-02 | End: 2020-02-02

## 2020-02-02 RX ORDER — BUMETANIDE 1 MG/1
1 TABLET ORAL DAILY
Status: DISCONTINUED | OUTPATIENT
Start: 2020-02-02 | End: 2020-02-04 | Stop reason: HOSPADM

## 2020-02-02 RX ORDER — RANITIDINE 150 MG/1
150 TABLET ORAL 2 TIMES DAILY
COMMUNITY
End: 2020-02-04 | Stop reason: HOSPADM

## 2020-02-02 RX ORDER — BUDESONIDE AND FORMOTEROL FUMARATE DIHYDRATE 160; 4.5 UG/1; UG/1
2 AEROSOL RESPIRATORY (INHALATION) 2 TIMES DAILY
Status: DISCONTINUED | OUTPATIENT
Start: 2020-02-02 | End: 2020-02-04 | Stop reason: HOSPADM

## 2020-02-02 RX ORDER — CHOLECALCIFEROL (VITAMIN D3) 125 MCG
5 CAPSULE ORAL NIGHTLY
Status: DISCONTINUED | OUTPATIENT
Start: 2020-02-02 | End: 2020-02-04 | Stop reason: HOSPADM

## 2020-02-02 RX ORDER — ACETAMINOPHEN 650 MG/1
650 SUPPOSITORY RECTAL EVERY 4 HOURS PRN
Status: DISCONTINUED | OUTPATIENT
Start: 2020-02-02 | End: 2020-02-04 | Stop reason: HOSPADM

## 2020-02-02 RX ORDER — TERAZOSIN 5 MG/1
5 CAPSULE ORAL NIGHTLY
Status: DISCONTINUED | OUTPATIENT
Start: 2020-02-02 | End: 2020-02-04 | Stop reason: HOSPADM

## 2020-02-02 RX ORDER — BISACODYL 5 MG/1
5 TABLET, DELAYED RELEASE ORAL DAILY PRN
Status: DISCONTINUED | OUTPATIENT
Start: 2020-02-02 | End: 2020-02-04 | Stop reason: HOSPADM

## 2020-02-02 RX ORDER — ACETAMINOPHEN 325 MG/1
650 TABLET ORAL EVERY 4 HOURS PRN
Status: DISCONTINUED | OUTPATIENT
Start: 2020-02-02 | End: 2020-02-04 | Stop reason: HOSPADM

## 2020-02-02 RX ORDER — ONDANSETRON 4 MG/1
4 TABLET, FILM COATED ORAL EVERY 6 HOURS PRN
Status: DISCONTINUED | OUTPATIENT
Start: 2020-02-02 | End: 2020-02-04 | Stop reason: HOSPADM

## 2020-02-02 RX ORDER — DIAZEPAM 5 MG/1
5 TABLET ORAL EVERY 6 HOURS PRN
Status: DISCONTINUED | OUTPATIENT
Start: 2020-02-02 | End: 2020-02-04 | Stop reason: HOSPADM

## 2020-02-02 RX ORDER — MECLIZINE HYDROCHLORIDE 25 MG/1
25 TABLET ORAL EVERY 8 HOURS SCHEDULED
Status: DISCONTINUED | OUTPATIENT
Start: 2020-02-02 | End: 2020-02-04 | Stop reason: HOSPADM

## 2020-02-02 RX ORDER — SODIUM CHLORIDE 0.9 % (FLUSH) 0.9 %
10 SYRINGE (ML) INJECTION EVERY 12 HOURS SCHEDULED
Status: DISCONTINUED | OUTPATIENT
Start: 2020-02-02 | End: 2020-02-04 | Stop reason: HOSPADM

## 2020-02-02 RX ORDER — PANTOPRAZOLE SODIUM 40 MG/1
40 TABLET, DELAYED RELEASE ORAL
Status: DISCONTINUED | OUTPATIENT
Start: 2020-02-02 | End: 2020-02-04 | Stop reason: HOSPADM

## 2020-02-02 RX ORDER — SULFAMETHOXAZOLE AND TRIMETHOPRIM 400; 80 MG/1; MG/1
1 TABLET ORAL DAILY
COMMUNITY
End: 2020-02-04 | Stop reason: HOSPADM

## 2020-02-02 RX ORDER — HEPARIN SODIUM 5000 [USP'U]/ML
5000 INJECTION, SOLUTION INTRAVENOUS; SUBCUTANEOUS EVERY 8 HOURS SCHEDULED
Status: DISCONTINUED | OUTPATIENT
Start: 2020-02-02 | End: 2020-02-03

## 2020-02-02 RX ORDER — DOCUSATE SODIUM 100 MG/1
100 CAPSULE, LIQUID FILLED ORAL 2 TIMES DAILY
Status: DISCONTINUED | OUTPATIENT
Start: 2020-02-02 | End: 2020-02-04 | Stop reason: HOSPADM

## 2020-02-02 RX ORDER — LORAZEPAM 0.5 MG/1
0.5 TABLET ORAL EVERY 8 HOURS
Status: DISCONTINUED | OUTPATIENT
Start: 2020-02-03 | End: 2020-02-02

## 2020-02-02 RX ORDER — FINASTERIDE 5 MG/1
5 TABLET, FILM COATED ORAL DAILY
Status: DISCONTINUED | OUTPATIENT
Start: 2020-02-02 | End: 2020-02-04 | Stop reason: HOSPADM

## 2020-02-02 RX ORDER — ALBUTEROL SULFATE 2.5 MG/3ML
2.5 SOLUTION RESPIRATORY (INHALATION) EVERY 4 HOURS PRN
Status: DISCONTINUED | OUTPATIENT
Start: 2020-02-02 | End: 2020-02-04 | Stop reason: HOSPADM

## 2020-02-02 RX ORDER — MORPHINE SULFATE 100 MG/1
100 TABLET ORAL EVERY 12 HOURS SCHEDULED
Status: DISCONTINUED | OUTPATIENT
Start: 2020-02-02 | End: 2020-02-02

## 2020-02-02 RX ORDER — CLOPIDOGREL BISULFATE 75 MG/1
75 TABLET ORAL DAILY
Status: DISCONTINUED | OUTPATIENT
Start: 2020-02-02 | End: 2020-02-04 | Stop reason: HOSPADM

## 2020-02-02 RX ORDER — DEXTROSE MONOHYDRATE 25 G/50ML
25 INJECTION, SOLUTION INTRAVENOUS
Status: DISCONTINUED | OUTPATIENT
Start: 2020-02-02 | End: 2020-02-04 | Stop reason: HOSPADM

## 2020-02-02 RX ORDER — CYCLOBENZAPRINE HCL 10 MG
10 TABLET ORAL 2 TIMES DAILY PRN
COMMUNITY
End: 2020-02-04 | Stop reason: HOSPADM

## 2020-02-02 RX ORDER — NICOTINE POLACRILEX 4 MG
15 LOZENGE BUCCAL
Status: DISCONTINUED | OUTPATIENT
Start: 2020-02-02 | End: 2020-02-04 | Stop reason: HOSPADM

## 2020-02-02 RX ADMIN — SODIUM CHLORIDE, PRESERVATIVE FREE 10 ML: 5 INJECTION INTRAVENOUS at 09:19

## 2020-02-02 RX ADMIN — ROSUVASTATIN CALCIUM 40 MG: 20 TABLET, COATED ORAL at 20:32

## 2020-02-02 RX ADMIN — DONEPEZIL HYDROCHLORIDE 10 MG: 10 TABLET, FILM COATED ORAL at 20:32

## 2020-02-02 RX ADMIN — MORPHINE SULFATE 100 MG: 100 TABLET, FILM COATED, EXTENDED RELEASE ORAL at 09:15

## 2020-02-02 RX ADMIN — INSULIN LISPRO 7 UNITS: 100 INJECTION, SOLUTION INTRAVENOUS; SUBCUTANEOUS at 20:42

## 2020-02-02 RX ADMIN — INSULIN DETEMIR 15 UNITS: 100 INJECTION, SOLUTION SUBCUTANEOUS at 14:00

## 2020-02-02 RX ADMIN — MECLIZINE HYDROCHLORIDE 25 MG: 25 TABLET ORAL at 14:01

## 2020-02-02 RX ADMIN — DOCUSATE SODIUM 100 MG: 100 CAPSULE, LIQUID FILLED ORAL at 09:16

## 2020-02-02 RX ADMIN — HEPARIN SODIUM 5000 UNITS: 5000 INJECTION, SOLUTION INTRAVENOUS; SUBCUTANEOUS at 14:00

## 2020-02-02 RX ADMIN — BUDESONIDE AND FORMOTEROL FUMARATE DIHYDRATE 2 PUFF: 160; 4.5 AEROSOL RESPIRATORY (INHALATION) at 09:24

## 2020-02-02 RX ADMIN — CLOPIDOGREL BISULFATE 75 MG: 75 TABLET ORAL at 09:16

## 2020-02-02 RX ADMIN — INSULIN LISPRO 7 UNITS: 100 INJECTION, SOLUTION INTRAVENOUS; SUBCUTANEOUS at 17:30

## 2020-02-02 RX ADMIN — MECLIZINE HYDROCHLORIDE 25 MG: 25 TABLET ORAL at 09:15

## 2020-02-02 RX ADMIN — FINASTERIDE 5 MG: 5 TABLET, FILM COATED ORAL at 09:16

## 2020-02-02 RX ADMIN — TERAZOSIN HYDROCHLORIDE 5 MG: 5 CAPSULE ORAL at 20:32

## 2020-02-02 RX ADMIN — HEPARIN SODIUM 5000 UNITS: 5000 INJECTION, SOLUTION INTRAVENOUS; SUBCUTANEOUS at 20:33

## 2020-02-02 RX ADMIN — BUDESONIDE AND FORMOTEROL FUMARATE DIHYDRATE 2 PUFF: 160; 4.5 AEROSOL RESPIRATORY (INHALATION) at 20:31

## 2020-02-02 RX ADMIN — MECLIZINE HYDROCHLORIDE 25 MG: 25 TABLET ORAL at 20:32

## 2020-02-02 RX ADMIN — IOPAMIDOL 75 ML: 755 INJECTION, SOLUTION INTRAVENOUS at 05:07

## 2020-02-02 RX ADMIN — MELATONIN TAB 5 MG 5 MG: 5 TAB at 20:32

## 2020-02-02 RX ADMIN — BUMETANIDE 1 MG: 1 TABLET ORAL at 09:15

## 2020-02-02 RX ADMIN — SODIUM CHLORIDE, PRESERVATIVE FREE 10 ML: 5 INJECTION INTRAVENOUS at 20:41

## 2020-02-02 RX ADMIN — PANTOPRAZOLE SODIUM 40 MG: 40 TABLET, DELAYED RELEASE ORAL at 09:21

## 2020-02-02 RX ADMIN — DOCUSATE SODIUM 100 MG: 100 CAPSULE, LIQUID FILLED ORAL at 20:32

## 2020-02-02 RX ADMIN — INSULIN LISPRO 6 UNITS: 100 INJECTION, SOLUTION INTRAVENOUS; SUBCUTANEOUS at 10:13

## 2020-02-02 RX ADMIN — INSULIN LISPRO 9 UNITS: 100 INJECTION, SOLUTION INTRAVENOUS; SUBCUTANEOUS at 12:21

## 2020-02-02 RX ADMIN — ASPIRIN 325 MG ORAL TABLET 325 MG: 325 PILL ORAL at 05:52

## 2020-02-02 NOTE — CONSULTS
Inpatient Neurology Consult Stroke  Consult performed by: Moise Amaro MD  Consult ordered by: Beronica Gordillo MD  Reason for consult: dizziness and falls           Patient Care Team:  Dandy Bailey MD as PCP - General  Juan Pablo Osullivan MD as Surgeon (Cardiothoracic Surgery)  Jong Diop DO as Consulting Physician (Gastroenterology)    Chief complaint: dizziness    Subjective     History of Present Illness    74 y.o. male referred by Dr Gordillo for dizziness and unsteady gait.  Presented to [facility] ED 2/2/20 for awakening with sensation of extreme dizziness that lasted for several hours.  Unable to stand due to dizziness.  Asymptomatic prior to going to bed at 2130 pm the awakening with inability to stand at 0300.  States severe intensity of room spinning.  Sx lasted for 45 minutes.  This am denies dizziness but while sitting will lean back and to right.      MRI Brain, my review of films, atrophy, mild small vessel disease, no acute infarct    CTA H & N - R ICA 77%, L 50%, no sig intracranial stenosis    CBC,CMP, trop, TSH - NCS    Review of Systems   Constitutional: Negative for activity change, fatigue and unexpected weight change.   HENT: Negative for facial swelling, hearing loss, tinnitus, trouble swallowing and voice change.    Eyes: Negative for photophobia, pain and visual disturbance.   Respiratory: Negative for apnea, cough and choking.    Cardiovascular: Negative for chest pain.   Gastrointestinal: Negative for constipation, nausea and vomiting.   Endocrine: Negative for cold intolerance.   Genitourinary: Negative for difficulty urinating, frequency and urgency.   Musculoskeletal: Positive for gait problem. Negative for arthralgias, back pain, myalgias, neck pain and neck stiffness.   Skin: Negative for rash.   Allergic/Immunologic: Negative for immunocompromised state.   Neurological: Positive for dizziness. Negative for tremors, seizures, syncope, facial asymmetry, speech  difficulty, weakness, light-headedness, numbness and headaches.   Hematological: Negative for adenopathy.   Psychiatric/Behavioral: Negative for confusion, decreased concentration, hallucinations and sleep disturbance. The patient is not nervous/anxious.         Past Medical History:   Diagnosis Date   • Acute kidney injury (CMS/HCC)    • Acute tubular necrosis (CMS/HCC)    • Anemia    • Arthritis    • B12 deficiency    • Bone pain    • Carcinoma of lung (CMS/HCC)     stage IA   • Chronic kidney disease (CKD), stage III (moderate) (CMS/HCC)    • Confusion, postoperative    • COPD (chronic obstructive pulmonary disease) (CMS/HCC)    • Coronary artery disease s/p stent    • Depression    • Diabetes mellitus (CMS/HCC)    • Fatigue    • Fractures    • GERD (gastroesophageal reflux disease)    • H/O colonoscopy 03/2016   • History of BPH    • Hyperlipidemia    • Hypertension    • Impotence    • Kidney stones    • Leaking of urine    • Lung cancer (CMS/HCC) 01/2016   • Lung mass    • Obesity    • Peptic ulcer disease    • Post-thoracotomy pain, mild    ,   Past Surgical History:   Procedure Laterality Date   • ANKLE SURGERY     • BACK SURGERY     • BONE MARROW BIOPSY     • BRONCHOSCOPY N/A 4/13/2017    Procedure: BRONCHOSCOPY WITH ENDOBRONCHIAL ULTRASOUND;  Surgeon: Kingsley Hodge MD;  Location: [facility] ENDOSCOPY;  Service:    • CAROTID STENT  1991   • CORONARY STENT PLACEMENT     • KIDNEY STONE SURGERY  1976   • KNEE SURGERY Bilateral 2010   • LUNG REMOVAL, PARTIAL Right     Right upper and lower lobe wedge resections (2016)   • THORACOTOMY      RT THORACOTOMY.  WIDE WEDGE EXCISION OG RT UPPER LOBE. WIDE WEDGE EXCISION OF RIGHT LOWER LOBE.   ,   Family History   Problem Relation Age of Onset   • Diabetes Mother    • Breast cancer Sister         60s   • Colon cancer Sister         59   • Skin cancer Sister 45   • Cancer Sister    • Diabetes Other    • Aneurysm Father    • Heart attack Brother    • Heart  disease Brother    • Heart disease Brother    • Diabetes Brother    • Diabetes Brother    ,   Social History     Tobacco Use   • Smoking status: Former Smoker     Packs/day: 1.00     Types: Cigarettes     Last attempt to quit:      Years since quittin.1   • Smokeless tobacco: Never Used   Substance Use Topics   • Alcohol use: No   • Drug use: No   ,   Medications Prior to Admission   Medication Sig Dispense Refill Last Dose   • citalopram (CeleXA) 20 MG tablet Take 20 mg by mouth Daily.      • cyclobenzaprine (FLEXERIL) 10 MG tablet Take 10 mg by mouth 2 (Two) Times a Day As Needed for Muscle Spasms.      • raNITIdine (ZANTAC) 150 MG tablet Take 150 mg by mouth 2 (Two) Times a Day.      • sulfamethoxazole-trimethoprim (BACTRIM,SEPTRA) 400-80 MG tablet Take 1 tablet by mouth Daily.      • albuterol sulfate  (90 Base) MCG/ACT inhaler Inhale 2 puffs Every 4 (Four) Hours As Needed for Wheezing. 6.7 g 11 Taking   • aspirin 81 MG EC tablet Take 81 mg by mouth Daily.   Taking   • budesonide-formoterol (SYMBICORT) 160-4.5 MCG/ACT inhaler Inhale 2 puffs 2 (Two) Times a Day. 1 inhaler 11    • bumetanide (BUMEX) 1 MG tablet Take 1 mg by mouth Daily.  5 Taking   • clopidogrel (PLAVIX) 75 MG tablet Take 1 tablet by mouth Daily. 90 tablet 0 Taking   • Coenzyme Q10 (CO Q 10) 100 MG capsule Take 200 mg by mouth Daily.   Taking   • diazepam (VALIUM) 10 MG tablet Take 1 tablet by mouth 4 (Four) Times a Day.   Taking   • docusate sodium (COLACE) 100 MG capsule Take 100 mg by mouth 2 (Two) Times a Day.   Taking   • donepezil (ARICEPT) 10 MG tablet Take 10 mg by mouth Every Night.   Taking   • doxazosin (CARDURA) 4 MG tablet TAKE 1 TABLET BY MOUTH DAILY  5 Taking   • finasteride (PROSCAR) 5 MG tablet Take 5 mg by mouth Daily.   Taking   • glipizide (GLUCOTROL) 5 MG tablet Take 0.5 tablets by mouth 2 (Two) Times a Day Before Meals. (Patient taking differently: Take 10 mg by mouth 2 (Two) Times a Day Before Meals.) 30  tablet 2 Taking   • linagliptin (TRADJENTA) 5 MG tablet tablet Take 5 mg by mouth daily.   Taking   • melatonin 5 MG tablet tablet Take 5 mg by mouth Every Night.   Taking   • morphine (MS CONTIN) 100 MG 12 hr tablet Take 200 mg by mouth 2 (Two) Times a Day.   Taking   • nitroglycerin (NITROSTAT) 0.4 MG SL tablet DISSOLVE 1 TABLET(S) UNDER THE TONGUE MAY REPEAT EVERY 5 MINUTES. MAXIMUM OF 3 DOSES IN 15 MINUTES  5 Taking   • pantoprazole (PROTONIX) 40 MG EC tablet Take 40 mg by mouth 2 (Two) Times a Day.   Taking   • pharmacy consult - MTM Daily.   Taking   • PHARMACY MEDS TO BED CONSULT Daily.   Taking   • pravastatin (PRAVACHOL) 80 MG tablet Take 80 mg by mouth Daily.   Taking   , Scheduled Meds:    [START ON 2/3/2020] aspirin 81 mg Oral Daily   budesonide-formoterol 2 puff Inhalation BID   bumetanide 1 mg Oral Daily   clopidogrel 75 mg Oral Daily   docusate sodium 100 mg Oral BID   donepezil 10 mg Oral Nightly   finasteride 5 mg Oral Daily   insulin lispro 0-9 Units Subcutaneous 4x Daily With Meals & Nightly   LORazepam 0.5 mg Oral Q6H   Followed by      [START ON 2/3/2020] LORazepam 0.5 mg Oral Q8H   meclizine 25 mg Oral Q8H   melatonin 5 mg Oral Nightly   Morphine 100 mg Oral Q12H   pantoprazole 40 mg Oral Q AM   rosuvastatin 40 mg Oral Nightly   sodium chloride 10 mL Intravenous Q12H   terazosin 5 mg Oral Nightly   , Continuous Infusions:   , PRN Meds:  •  acetaminophen **OR** acetaminophen  •  albuterol  •  bisacodyl  •  dextrose  •  dextrose  •  diazePAM  •  glucagon (human recombinant)  •  ondansetron **OR** ondansetron  •  sodium chloride  •  sodium chloride and Allergies:  Atorvastatin; Gabapentin; and Sulfa antibiotics    Objective      Vital Signs  Temp:  [97.9 °F (36.6 °C)] 97.9 °F (36.6 °C)  Heart Rate:  [63-78] 69  Resp:  [18] 18  BP: (145-180)/(74-96) 173/96    Physical Exam   Constitutional: He is oriented to person, place, and time. Vital signs are normal. He appears well-developed and  well-nourished.   HENT:   Head: Normocephalic and atraumatic.   Eyes: Pupils are equal, round, and reactive to light. EOM and lids are normal.   Fundoscopic exam:       The right eye shows no exudate, no hemorrhage and no papilledema. The right eye shows venous pulsations.        The left eye shows no exudate, no hemorrhage and no papilledema. The left eye shows venous pulsations.   Neck: Normal range of motion and phonation normal. Normal carotid pulses present. Carotid bruit is not present. No thyroid mass and no thyromegaly present.   Cardiovascular: Normal rate, regular rhythm and normal heart sounds.   Pulmonary/Chest: Effort normal.   Neurological: He is oriented to person, place, and time. He has normal strength. He has an abnormal Romberg Test. He has a normal Finger-Nose-Finger Test and a normal Heel to Fritz Test.   Skin: Skin is warm and dry.   Psychiatric: He has a normal mood and affect. His speech is normal.   Nursing note and vitals reviewed.    Neurologic Exam     Mental Status   Oriented to person, place, and time.   Registration: recalls 3 of 3 objects. Recall at 5 minutes: recalls 2 of 3 objects. Follows 3 step commands.   Attention: normal. Concentration: normal.   Speech: speech is normal   Level of consciousness: alert  Knowledge: good and consistent with education.   Able to name object. Able to read. Able to repeat. Able to write. Normal comprehension.     Cranial Nerves     CN II   Visual fields full to confrontation.   Visual acuity: normal  Right visual field deficit: none  Left visual field deficit: none     CN III, IV, VI   Pupils are equal, round, and reactive to light.  Extraocular motions are normal.   Right pupil: Shape: regular. Reactivity: brisk. Consensual response: intact.   Left pupil: Shape: regular. Reactivity: brisk. Consensual response: intact.   Nystagmus: none   Diplopia: none  Ophthalmoparesis: none  Upgaze: normal  Downgaze: normal  Conjugate gaze:  present  Vestibulo-ocular reflex: present    CN V   Facial sensation intact.   Right corneal reflex: normal  Left corneal reflex: normal    CN VII   Right facial weakness: none  Left facial weakness: none    CN VIII   Hearing: intact    CN IX, X   Palate: symmetric  Right gag reflex: normal  Left gag reflex: normal    CN XI   Right sternocleidomastoid strength: normal  Left sternocleidomastoid strength: normal    CN XII   Tongue: not atrophic  Fasciculations: absent  Tongue deviation: none    Motor Exam   Muscle bulk: normal  Overall muscle tone: normal  Right arm tone: normal  Left arm tone: normal  Right leg tone: normal  Left leg tone: normal    Strength   Strength 5/5 throughout.     Sensory Exam   Light touch normal.   Vibration normal.   Proprioception normal.   Pinprick normal.     Gait, Coordination, and Reflexes     Coordination   Romberg: positive  Finger to nose coordination: normal  Heel to shin coordination: normal    Tremor   Resting tremor: absent  Intention tremor: absent  Action tremor: absent    Reflexes   Reflexes 2+ except as noted. Leans back and to right while sitting       Results Review:    I reviewed the patient's new clinical results.  I reviewed the patient's new imaging results and agree with the interpretation.  I reviewed the patient's other test results and agree with the interpretation    .echo    Assessment/Plan       Dizziness    CKD (chronic kidney disease), stage III (CMS/HCC)    Type 2 diabetes mellitus without complication, without long-term current use of insulin (CMS/HCC)    Essential hypertension    Chronic obstructive pulmonary disease (CMS/HCC)    Sleep-disordered breathing    Coronary artery disease    Obesity (BMI 30-39.9)    History of lung cancer    Frequent falls    BPH (benign prostatic hyperplasia)     1.  Dizziness - suspect BPPV episode in middle of night.  However, pt is still falling backwards to right.  Agree with decreasing benzo and opiates likely adding to  poor balance.  Echo pending.      2.  Carotid stenosis - asymptomatic,  Consult neurosurgery     Moise Amaro MD  02/02/20  9:43 AM

## 2020-02-02 NOTE — PLAN OF CARE
Problem: Patient Care Overview  Goal: Plan of Care Review  Outcome: Ongoing (interventions implemented as appropriate)  Flowsheets (Taken 2/2/2020 1706)  Progress: improving  Plan of Care Reviewed With: patient; spouse  Outcome Summary: VSS. AOx4 however pt's spouse reports that pt is confused at times when retelling symptoms and events prior to arrival. Pt lethargic and slept for most of shift. CIWA initiated due to BUE tremors and diaphoresis; scored less than 8. NIH1 for dysarthria, however pt's spouse states this is baseline. Up to chair for afternoon. Producing good urine output. Pt no longer reports dizziness after meclizine administration, however continues to be unsteady on feet. Glucose readings elevated, levemir given per order. SR on the monitor.

## 2020-02-02 NOTE — CONSULTS
Consults    Referring Provider: Moise Amaro MD    Patient Care Team:  Dandy Bailey MD as PCP - General  OsullivanJuan Pablo gan MD as Surgeon (Cardiothoracic Surgery)  Jong Diop DO as Consulting Physician (Gastroenterology)    Chief Complaint: Dizziness    Subjective .     History of present illness:       This is a 74-year-old gentleman with a medical history significant for HTN, HLD, COPD, DM 2, PAF, CAD status post stent on aspirin/Plavix daily and obesity.  Patient presented to [facility] ED this morning after he woke up with dizziness and feelings of the room spinning.  He denies any associated weakness, numbness, speech changes, facial droop, diplopia, amaurosis fugax, or other signs or symptoms of stroke.  During the course of his work-up, patient was noted to have bilateral carotid stenosis on CTA.  Presently, the patient symptoms have resolved.  He is denying any feelings of dizziness, vision changes, or signs or symptoms of stroke.  He states he is compliant with taking aspirin and Plavix daily.  Patient is a former smoker but quit 40 years ago.    Review of Systems   Constitutional: Negative for chills, diaphoresis and fever.   Respiratory: Negative for shortness of breath.    Cardiovascular: Negative for chest pain.   Neurological: Negative for tremors, facial asymmetry, speech difficulty, weakness, numbness and headaches.   All other systems reviewed and are negative.      History  Past Medical History:   Diagnosis Date   • Acute kidney injury (CMS/HCC)    • Acute tubular necrosis (CMS/HCC)    • Anemia    • Arthritis    • B12 deficiency    • Bone pain    • Carcinoma of lung (CMS/HCC)     stage IA   • Chronic kidney disease (CKD), stage III (moderate) (CMS/HCC)    • Confusion, postoperative    • COPD (chronic obstructive pulmonary disease) (CMS/HCC)    • Coronary artery disease s/p stent    • Depression    • Diabetes mellitus (CMS/HCC)    • Fatigue    • Fractures    • GERD (gastroesophageal  reflux disease)    • H/O colonoscopy 2016   • History of BPH    • Hyperlipidemia    • Hypertension    • Impotence    • Kidney stones    • Leaking of urine    • Lung cancer (CMS/HCC) 2016   • Lung mass    • Obesity    • Peptic ulcer disease    • Post-thoracotomy pain, mild    ,   Past Surgical History:   Procedure Laterality Date   • ANKLE SURGERY     • BACK SURGERY     • BONE MARROW BIOPSY     • BRONCHOSCOPY N/A 2017    Procedure: BRONCHOSCOPY WITH ENDOBRONCHIAL ULTRASOUND;  Surgeon: Kingsley Hodge MD;  Location: [facility] ENDOSCOPY;  Service:    • CAROTID STENT     • CORONARY STENT PLACEMENT     • KIDNEY STONE SURGERY     • KNEE SURGERY Bilateral    • LUNG REMOVAL, PARTIAL Right     Right upper and lower lobe wedge resections ()   • THORACOTOMY      RT THORACOTOMY.  WIDE WEDGE EXCISION OG RT UPPER LOBE. WIDE WEDGE EXCISION OF RIGHT LOWER LOBE.   ,   Family History   Problem Relation Age of Onset   • Diabetes Mother    • Breast cancer Sister         60s   • Colon cancer Sister         59   • Skin cancer Sister 45   • Cancer Sister    • Diabetes Other    • Aneurysm Father    • Heart attack Brother    • Heart disease Brother    • Heart disease Brother    • Diabetes Brother    • Diabetes Brother    ,   Social History     Tobacco Use   • Smoking status: Former Smoker     Packs/day: 1.00     Types: Cigarettes     Last attempt to quit:      Years since quittin.1   • Smokeless tobacco: Never Used   Substance Use Topics   • Alcohol use: No   • Drug use: No   ,   Medications Prior to Admission   Medication Sig Dispense Refill Last Dose   • citalopram (CeleXA) 20 MG tablet Take 20 mg by mouth Daily.      • cyclobenzaprine (FLEXERIL) 10 MG tablet Take 10 mg by mouth 2 (Two) Times a Day As Needed for Muscle Spasms.      • raNITIdine (ZANTAC) 150 MG tablet Take 150 mg by mouth 2 (Two) Times a Day.      • sulfamethoxazole-trimethoprim (BACTRIM,SEPTRA) 400-80 MG tablet Take 1 tablet by  mouth Daily.      • albuterol sulfate  (90 Base) MCG/ACT inhaler Inhale 2 puffs Every 4 (Four) Hours As Needed for Wheezing. 6.7 g 11 Taking   • aspirin 81 MG EC tablet Take 81 mg by mouth Daily.   Taking   • budesonide-formoterol (SYMBICORT) 160-4.5 MCG/ACT inhaler Inhale 2 puffs 2 (Two) Times a Day. 1 inhaler 11    • bumetanide (BUMEX) 1 MG tablet Take 1 mg by mouth Daily.  5 Taking   • clopidogrel (PLAVIX) 75 MG tablet Take 1 tablet by mouth Daily. 90 tablet 0 Taking   • Coenzyme Q10 (CO Q 10) 100 MG capsule Take 200 mg by mouth Daily.   Taking   • diazepam (VALIUM) 10 MG tablet Take 1 tablet by mouth 4 (Four) Times a Day.   Taking   • docusate sodium (COLACE) 100 MG capsule Take 100 mg by mouth 2 (Two) Times a Day.   Taking   • donepezil (ARICEPT) 10 MG tablet Take 10 mg by mouth Every Night.   Taking   • doxazosin (CARDURA) 4 MG tablet TAKE 1 TABLET BY MOUTH DAILY  5 Taking   • finasteride (PROSCAR) 5 MG tablet Take 5 mg by mouth Daily.   Taking   • glipizide (GLUCOTROL) 5 MG tablet Take 0.5 tablets by mouth 2 (Two) Times a Day Before Meals. (Patient taking differently: Take 10 mg by mouth 2 (Two) Times a Day Before Meals.) 30 tablet 2 Taking   • linagliptin (TRADJENTA) 5 MG tablet tablet Take 5 mg by mouth daily.   Taking   • melatonin 5 MG tablet tablet Take 5 mg by mouth Every Night.   Taking   • morphine (MS CONTIN) 100 MG 12 hr tablet Take 200 mg by mouth 2 (Two) Times a Day.   Taking   • nitroglycerin (NITROSTAT) 0.4 MG SL tablet DISSOLVE 1 TABLET(S) UNDER THE TONGUE MAY REPEAT EVERY 5 MINUTES. MAXIMUM OF 3 DOSES IN 15 MINUTES  5 Taking   • pantoprazole (PROTONIX) 40 MG EC tablet Take 40 mg by mouth 2 (Two) Times a Day.   Taking   • pharmacy consult - MTM Daily.   Taking   • PHARMACY MEDS TO BED CONSULT Daily.   Taking   • pravastatin (PRAVACHOL) 80 MG tablet Take 80 mg by mouth Daily.   Taking   , Scheduled Meds:      [START ON 2/3/2020] aspirin 81 mg Oral Daily   budesonide-formoterol 2 puff  Inhalation BID   bumetanide 1 mg Oral Daily   clopidogrel 75 mg Oral Daily   docusate sodium 100 mg Oral BID   donepezil 10 mg Oral Nightly   finasteride 5 mg Oral Daily   insulin lispro 0-9 Units Subcutaneous 4x Daily With Meals & Nightly   LORazepam 0.5 mg Oral Q6H   Followed by      [START ON 2/3/2020] LORazepam 0.5 mg Oral Q8H   meclizine 25 mg Oral Q8H   melatonin 5 mg Oral Nightly   Morphine 100 mg Oral Q12H   pantoprazole 40 mg Oral Q AM   rosuvastatin 40 mg Oral Nightly   sodium chloride 10 mL Intravenous Q12H   terazosin 5 mg Oral Nightly   , Continuous Infusions:   , PRN Meds:  •  acetaminophen **OR** acetaminophen  •  albuterol  •  bisacodyl  •  dextrose  •  dextrose  •  diazePAM  •  glucagon (human recombinant)  •  ondansetron **OR** ondansetron  •  sodium chloride  •  sodium chloride and Allergies:  Atorvastatin; Gabapentin; and Sulfa antibiotics   SMOKING STATUS: former smoker   Objective     Vital Signs   Temp:  [97.9 °F (36.6 °C)] 97.9 °F (36.6 °C)  Heart Rate:  [63-78] 69  Resp:  [18] 18  BP: (145-180)/(74-96) 173/96  Body mass index is 38.02 kg/m².    Physical Exam:  Physical Exam    Results Review:   I reviewed the patient's new imaging results and agree with the interpretation.  Discussed with Dr. Robles      Assessment/Plan       Dizziness    CKD (chronic kidney disease), stage III (CMS/HCC)    Type 2 diabetes mellitus without complication, without long-term current use of insulin (CMS/HCC)    Essential hypertension    Chronic obstructive pulmonary disease (CMS/HCC)    Sleep-disordered breathing    Coronary artery disease    Obesity (BMI 30-39.9)    History of lung cancer    Frequent falls    BPH (benign prostatic hyperplasia)      This is a 74-year-old gentleman found to have bilateral carotid stenosis on CTA.  MRI reviewed and does not demonstrate any evidence of acute infarcts.  The patient's medical comorbidities include poorly controlled diabetes, morbid obesity, HLD, and history of tobacco  abuse.  Patient is already on dual antiplatelet therapy for history of CAD s/p stent.      We will obtain carotid ultrasound and final recommendations to be left pending this study.    I discussed the patients findings and my recommendations with patient and family    Gretta Rodriguez PA-C  02/02/20  11:53 AM

## 2020-02-02 NOTE — ED PROVIDER NOTES
"Subjective   74-year-old male presents for evaluation of \"dizziness.\"  Of note, the patient has an extensive past medical history.  He states that he felt well when he went to bed last night at approximately 9:30 PM.  Later this morning, the patient woke up with a sensation of extreme dizziness that has persisted for several hours.  He states that he attempted to stand but was unable to do so secondary to the intense dizzy sensation.  No similar episodes before in the past.  He is typically ambulatory at baseline.  He denies any accompanying focal weakness or numbness/tingling.  He denies any changes to his voice or speech.  He denies any tinnitus.  No vision changes.          Review of Systems   Constitutional: Negative for fever.   Neurological: Positive for dizziness. Negative for speech difficulty, weakness, numbness and headaches.   All other systems reviewed and are negative.      Past Medical History:   Diagnosis Date   • Acute kidney injury (CMS/HCC)    • Acute tubular necrosis (CMS/HCC)    • Anemia    • Arthritis    • B12 deficiency    • Bone pain    • Carcinoma of lung (CMS/HCC)     stage IA   • Chronic kidney disease (CKD), stage III (moderate) (CMS/HCC)    • Confusion, postoperative    • COPD (chronic obstructive pulmonary disease) (CMS/HCC)    • Coronary artery disease s/p stent    • Depression    • Diabetes mellitus (CMS/HCC)    • Fatigue    • Fractures    • GERD (gastroesophageal reflux disease)    • H/O colonoscopy 03/2016   • History of BPH    • Hyperlipidemia    • Hypertension    • Impotence    • Kidney stones    • Leaking of urine    • Lung cancer (CMS/HCC) 01/2016   • Lung mass    • Obesity    • Peptic ulcer disease    • Post-thoracotomy pain, mild        Allergies   Allergen Reactions   • Atorvastatin    • Gabapentin Dizziness   • Sulfa Antibiotics        Past Surgical History:   Procedure Laterality Date   • ANKLE SURGERY     • BACK SURGERY     • BONE MARROW BIOPSY     • BRONCHOSCOPY N/A " 2017    Procedure: BRONCHOSCOPY WITH ENDOBRONCHIAL ULTRASOUND;  Surgeon: Kingsley Hodge MD;  Location: [facility] ENDOSCOPY;  Service:    • CAROTID STENT     • CORONARY STENT PLACEMENT     • KIDNEY STONE SURGERY     • KNEE SURGERY Bilateral    • LUNG REMOVAL, PARTIAL Right     Right upper and lower lobe wedge resections ()   • THORACOTOMY      RT THORACOTOMY.  WIDE WEDGE EXCISION OG RT UPPER LOBE. WIDE WEDGE EXCISION OF RIGHT LOWER LOBE.       Family History   Problem Relation Age of Onset   • Diabetes Mother    • Breast cancer Sister         60s   • Colon cancer Sister         59   • Skin cancer Sister 45   • Cancer Sister    • Diabetes Other    • Aneurysm Father    • Heart attack Brother    • Heart disease Brother    • Heart disease Brother    • Diabetes Brother    • Diabetes Brother        Social History     Socioeconomic History   • Marital status:      Spouse name: Not on file   • Number of children: Not on file   • Years of education: Not on file   • Highest education level: Not on file   Tobacco Use   • Smoking status: Former Smoker     Packs/day: 1.00     Types: Cigarettes     Last attempt to quit:      Years since quittin.1   • Smokeless tobacco: Never Used   Substance and Sexual Activity   • Alcohol use: No   • Drug use: No   • Sexual activity: Defer   Social History Narrative    , lives with wife. Denies home oxygen, hh or dme.             Objective   Physical Exam   Constitutional: He is oriented to person, place, and time. He appears well-developed and well-nourished. No distress.   Chronically ill-appearing obese male in no acute distress   HENT:   Head: Normocephalic and atraumatic.   Mouth/Throat: Oropharynx is clear and moist.   Eyes: Pupils are equal, round, and reactive to light.   Neck: Normal range of motion. No JVD present.   Cardiovascular: Normal rate, regular rhythm, normal heart sounds and intact distal pulses. Exam reveals no gallop and  "no friction rub.   No murmur heard.  Pulmonary/Chest: Effort normal and breath sounds normal. No respiratory distress. He has no wheezes. He has no rales.   Abdominal: Soft. Bowel sounds are normal. He exhibits no distension and no mass. There is no tenderness. There is no guarding.   Musculoskeletal: Normal range of motion.   Neurological: He is alert and oriented to person, place, and time. No cranial nerve deficit or sensory deficit. Coordination normal.   Clear and fluent speech, awake, alert, and oriented x3, no dysmetria noted with finger-to-nose or heel-to-shin testing, 5 out of 5 strength in all fours, neurovascularly intact distally in all fours with bounding distal pulses normal sensation, I attempted to stand the patient to assess his gait and was sitting patient exhibited significant truncal ataxia, unable to stand or walk secondary to truncal ataxia   Skin: Skin is warm and dry. No rash noted. He is not diaphoretic. No erythema. No pallor.   Psychiatric: He has a normal mood and affect. Judgment and thought content normal.   Nursing note and vitals reviewed.      Procedures           ED Course  ED Course as of Feb 02 0723   Sun Feb 02, 2020   0547 74-year-old male presents for evaluation of \"severe dizziness.\"  He felt well when he went to bed last night at approximately 9 PM.  This morning, he woke up with significant dizziness that has persisted for several hours, prompting his visit to the emergency department.  On arrival to the ED, patient nontoxic-appearing.  Exam remarkable for significant truncal ataxia when I attempted to stand the patient.  NIH stroke scale of 0.  However, I have a significant concern for posterior circulation stroke versus cerebellar stroke versus severe peripheral vertigo.  CT head negative.  Aspirin given.  Patient clearly is outside of TPA window.  His clinical presentation is not consistent with a large vessel occlusion.  Advanced imaging obtained.    [DD]   0501 I " discussed the patient's case with Dr. Anderson, and the patient will be admitted under her care for further evaluation and treatment.  He is hemodynamically stable at this time and aware/agreeable with the plan.  Aspirin given.  MRI brain is currently pending.    [DD]   0604 CTA of head and neck remarkable for bilateral carotid stenosis.    [DD]      ED Course User Index  [DD] Fabian Steven MD                      Rhonda Coma Scale Score: 14                  Recent Results (from the past 24 hour(s))   POC Creatinine    Collection Time: 02/02/20  4:45 AM   Result Value Ref Range    Creatinine 1.90 (H) 0.60 - 1.30 mg/dL   POC Protime / INR    Collection Time: 02/02/20  4:51 AM   Result Value Ref Range    Protime 12.9 12.8 - 15.2 seconds    INR 1.1 0.8 - 1.2   Troponin    Collection Time: 02/02/20  5:00 AM   Result Value Ref Range    Troponin T <0.010 0.000 - 0.030 ng/mL   aPTT    Collection Time: 02/02/20  5:00 AM   Result Value Ref Range    PTT 28.4 24.0 - 37.0 seconds   AST    Collection Time: 02/02/20  5:00 AM   Result Value Ref Range    AST (SGOT) 8 1 - 40 U/L   ALT    Collection Time: 02/02/20  5:00 AM   Result Value Ref Range    ALT (SGPT) 10 1 - 41 U/L   Light Blue Top    Collection Time: 02/02/20  5:00 AM   Result Value Ref Range    Extra Tube hold for add-on    Green Top (Gel)    Collection Time: 02/02/20  5:00 AM   Result Value Ref Range    Extra Tube Hold for add-ons.    Lavender Top    Collection Time: 02/02/20  5:00 AM   Result Value Ref Range    Extra Tube hold for add-on    Gold Top - SST    Collection Time: 02/02/20  5:00 AM   Result Value Ref Range    Extra Tube Hold for add-ons.    CBC Auto Differential    Collection Time: 02/02/20  5:00 AM   Result Value Ref Range    WBC 6.07 3.40 - 10.80 10*3/mm3    RBC 3.68 (L) 4.14 - 5.80 10*6/mm3    Hemoglobin 10.6 (L) 13.0 - 17.7 g/dL    Hematocrit 33.3 (L) 37.5 - 51.0 %    MCV 90.5 79.0 - 97.0 fL    MCH 28.8 26.6 - 33.0 pg    MCHC 31.8 31.5 - 35.7 g/dL     RDW 15.6 (H) 12.3 - 15.4 %    RDW-SD 51.3 37.0 - 54.0 fl    MPV 9.9 6.0 - 12.0 fL    Platelets 148 140 - 450 10*3/mm3    Neutrophil % 64.6 42.7 - 76.0 %    Lymphocyte % 26.7 19.6 - 45.3 %    Monocyte % 6.1 5.0 - 12.0 %    Eosinophil % 1.3 0.3 - 6.2 %    Basophil % 0.8 0.0 - 1.5 %    Immature Grans % 0.5 0.0 - 0.5 %    Neutrophils, Absolute 3.92 1.70 - 7.00 10*3/mm3    Lymphocytes, Absolute 1.62 0.70 - 3.10 10*3/mm3    Monocytes, Absolute 0.37 0.10 - 0.90 10*3/mm3    Eosinophils, Absolute 0.08 0.00 - 0.40 10*3/mm3    Basophils, Absolute 0.05 0.00 - 0.20 10*3/mm3    Immature Grans, Absolute 0.03 0.00 - 0.05 10*3/mm3    nRBC 0.0 0.0 - 0.2 /100 WBC   Comprehensive Metabolic Panel    Collection Time: 02/02/20  5:00 AM   Result Value Ref Range    Glucose 318 (H) 65 - 99 mg/dL    BUN 21 8 - 23 mg/dL    Creatinine 1.81 (H) 0.76 - 1.27 mg/dL    Sodium 138 136 - 145 mmol/L    Potassium 3.3 (L) 3.5 - 5.2 mmol/L    Chloride 95 (L) 98 - 107 mmol/L    CO2 28.0 22.0 - 29.0 mmol/L    Calcium 8.2 (L) 8.6 - 10.5 mg/dL    Total Protein 7.3 6.0 - 8.5 g/dL    Albumin 3.80 3.50 - 5.20 g/dL    ALT (SGPT) 10 1 - 41 U/L    AST (SGOT) 8 1 - 40 U/L    Alkaline Phosphatase 79 39 - 117 U/L    Total Bilirubin 0.3 0.2 - 1.2 mg/dL    eGFR Non African Amer 37 (L) >60 mL/min/1.73    Globulin 3.5 gm/dL    A/G Ratio 1.1 g/dL    BUN/Creatinine Ratio 11.6 7.0 - 25.0    Anion Gap 15.0 5.0 - 15.0 mmol/L   Lipid Panel    Collection Time: 02/02/20  5:00 AM   Result Value Ref Range    Total Cholesterol 143 0 - 200 mg/dL    Triglycerides 132 0 - 150 mg/dL    HDL Cholesterol 38 (L) 40 - 60 mg/dL    LDL Cholesterol  79 0 - 100 mg/dL    VLDL Cholesterol 26.4 mg/dL    LDL/HDL Ratio 2.07    Magnesium    Collection Time: 02/02/20  5:00 AM   Result Value Ref Range    Magnesium 2.2 1.6 - 2.4 mg/dL     Note: In addition to lab results from this visit, the labs listed above may include labs taken at another facility or during a different encounter within the last 24  hours. Please correlate lab times with ED admission and discharge times for further clarification of the services performed during this visit.    MRI Brain Without Contrast   Final Result   Impression:   1.  No acute intracranial findings. No convincing evidence of metastatic disease on this noncontrasted exam.      2.  Cerebral atrophy and white matter microvascular disease similar to prior study of February 3, 2017.      3.  Left-sided mastoid effusion with fluid in the left middle ear cavity. Trace effusion on the right side. These findings were present on the prior exam appear improved on the right side.            Signer Name: Moise Luis MD    Signed: 2/2/2020 7:10 AM    Workstation [facility]     Radiology [facility]      XR Chest 1 View   Final Result   1. No active disease.   2. Postoperative changes right lung base.      Signer Name: Jesus Zuluaga MD    Signed: 2/2/2020 6:32 AM    Workstation Name: [facility]      CT Angiogram Head   Final Result   1.  No intracranial or extracranial arterial flow limiting stenosis or aneurysm.   2.  Bilateral carotid bulb stenosis measuring 77% on the right and 50% on the left using NASCET criteria.      Signer Name: Jesus Zuluaga MD    Signed: 2/2/2020 5:43 AM    Workstation Name: [facility]      CT Angiogram Neck   Final Result   1.  No intracranial or extracranial arterial flow limiting stenosis or aneurysm.   2.  Bilateral carotid bulb stenosis measuring 77% on the right and 50% on the left using NASCET criteria.      Signer Name: Jesus Zuluaga MD    Signed: 2/2/2020 5:43 AM    Workstation Name: [facility]      CT Head Without Contrast Stroke Protocol   Final Result   1.  No acute intracranial abnormality.   2.  Stable diffuse chronic changes as noted above.   3.  Complete fluid opacification of the  "left mastoid sinus air spaces, unchanged.   4.  No change since 4/19/2017.      NOTIFICATION: Critical Value/emergent results were relayed from me to the ED treatment team by telephone through the CT technologist, Jessika, at 05:13 hours on 2/2/2020.      Signer Name: Jesus Zuluaga MD    Signed: 2/2/2020 5:13 AM    Workstation Name: [facility]     [facility]        Vitals:    02/02/20 0428 02/02/20 0515 02/02/20 0545   BP: 164/89 160/94 180/85   BP Location: Right arm     Patient Position: Lying     Pulse: 77 72 70   Resp: 18     Temp: 97.9 °F (36.6 °C)     TempSrc: Oral     SpO2: 93% 97% 98%   Weight: 120 kg (265 lb)     Height: 177 cm (69.69\")       Medications   sodium chloride 0.9 % flush 10 mL (has no administration in time range)   diazePAM (VALIUM) tablet 5 mg (has no administration in time range)   Morphine (MS CONTIN) 12 hr tablet 100 mg (has no administration in time range)   meclizine (ANTIVERT) tablet 25 mg (has no administration in time range)   iopamidol (ISOVUE-370) 76 % injection 100 mL (75 mL Intravenous Given 2/2/20 0261)   aspirin tablet 325 mg (325 mg Oral Given 2/2/20 1146)     ECG/EMG Results (last 24 hours)     Procedure Component Value Units Date/Time    ECG 12 Lead [[id number]] Collected:  02/02/20 0513     Updated:  02/02/20 0512        ECG 12 Lead                     [facility]    Final diagnoses:   Dizziness   Carotid stenosis, asymptomatic, bilateral   Truncal ataxia            Fabian Steven MD  02/02/20 0705    "

## 2020-02-02 NOTE — PROGRESS NOTES
[facility] Medicine Services  PROGRESS NOTE    Patient Name: Joseph Rodriguez  : 1945  MRN: [id number]    Date of Admission: 2020  Primary Care Physician: Dandy Bailey MD    Subjective   Subjective     CC:  Dizziness and weakness    HPI:  Patient is a 74-year-old who is on extremely high doses of opiates as an outpatient who presented to the hospital for evaluation of sudden onset of dizziness.  He has had evaluation by neurology and neurosurgery today.  Work-up is in progress.  Nursing reported he was pretty tremulous upon arrival to the unit from the ER.  After he was given his morphine 100 mg he was barely arousable and his speech was slurred.    Review of Systems   General: No fever, chills, does not have fatigue  ENT: No sore throat, trouble swallowing or changes in vision  Respiratory: Positive shortness of breath, denies cough, wheezing or fast breathing  Cardiovascular: No chest pain, palpitations, positive dyspnea with exertion  Gastrointestinal: No nausea vomiting abdominal pain  Musculoskeletal: Positive difficulty walking, positive weakness  Neurologic: No headache, confusion, positive dizziness    Objective   Objective     Vital Signs:   Temp:  [97.4 °F (36.3 °C)-97.9 °F (36.6 °C)] 97.4 °F (36.3 °C)  Heart Rate:  [63-78] 75  Resp:  [18] 18  BP: (145-180)/(74-96) 178/83  Total (NIH Stroke Scale): 1     Physical Exam:  Constitutional: No acute distress, awake, alert, nontoxic, morbidly obese body habitus  Eyes: Pupils equal, sclerae anicteric, no conjunctival injection  HENT: NCAT, mucous membranes moist  Neck: Supple, no thyromegaly, no lymphadenopathy  Respiratory: Clear to auscultation bilaterally, poor effort, nonlabored respirations   Cardiovascular: RRR  Gastrointestinal: Positive bowel sounds, soft, nontender, nondistended  Musculoskeletal: Difficulty with mobility due to body habitus  Psychiatric: Appropriate affect, good insight and judgement,  cooperative  Neurologic: Oriented x 3      Results Reviewed:  Results from last 7 days   Lab Units 02/02/20  0805 02/02/20  0500 02/02/20  0451   WBC 10*3/mm3 5.68 6.07  --    HEMOGLOBIN g/dL 10.0* 10.6*  --    HEMATOCRIT % 31.6* 33.3*  --    PLATELETS 10*3/mm3 140 148  --    INR   --   --  1.1     Results from last 7 days   Lab Units 02/02/20  0500 02/02/20  0445   SODIUM mmol/L 138  --    POTASSIUM mmol/L 3.3*  --    CHLORIDE mmol/L 95*  --    CO2 mmol/L 28.0  --    BUN mg/dL 21  --    CREATININE mg/dL 1.81* 1.90*   GLUCOSE mg/dL 318*  --    CALCIUM mg/dL 8.2*  --    ALT (SGPT) U/L 10  10  --    AST (SGOT) U/L 8  8  --    TROPONIN T ng/mL <0.010  --    PROBNP pg/mL 195.0  --      Estimated Creatinine Clearance: 47.2 mL/min (A) (by C-G formula based on SCr of 1.81 mg/dL (H)).    Microbiology Results Abnormal     None          Imaging Results (Last 24 Hours)     Procedure Component Value Units Date/Time    MRI Brain Without Contrast [[id number]] Collected:  02/02/20 0710     Updated:  02/02/20 0712    Narrative:           MRI Brain WO     Clinical history: Acute onset of truncal ataxia and dizziness. History of lung carcinoma.    Comparison: February 3, 2017    Technique: Sagittal, axial and coronal images of the head were obtained using the standard protocol.    Findings:  The diffusion sequence demonstrates no evidence of restricted diffusion to indicate acute infarction. The FLAIR sequence shows the ventricles to be generous in size. Cortical sulci are correspondingly prominent. No extra-axial fluid collections. No  significant shift of midline structures. There are FLAIR hyperintensities in the periventricular white matter and subcortical white matter which likely represent microvascular disease in a patient of this age.    The pituitary is within normal limits. The cervicomedullary junction is normal. The 7th and 8th cranial nerve complexes are within normal limits.    There is evidence of a left sided mastoid  effusion. There is fluid in the left middle ear cavity.    Paranasal sinuses show inflammatory changes in the ethmoid air cells. No acute orbital findings. The right mastoid air cells demonstrate a trace effusion.      Impression:       Impression:  1.  No acute intracranial findings. No convincing evidence of metastatic disease on this noncontrasted exam.    2.  Cerebral atrophy and white matter microvascular disease similar to prior study of February 3, 2017.    3.  Left-sided mastoid effusion with fluid in the left middle ear cavity. Trace effusion on the right side. These findings were present on the prior exam appear improved on the right side.        Signer Name: Moise Luis MD   Signed: 2/2/2020 7:10 AM   Work[facility] Name: [email or web address]    Radiology [facility]    XR Chest 1 View [[id number]] Collected:  02/02/20 0632     Updated:  02/02/20 0634    Narrative:       CHEST X-RAY, 2/2/2020      HISTORY:    74-year-old male in the ED and undergoing stroke protocol.      TECHNIQUE:  AP portable upright chest x-ray.      FINDINGS:  Heart size and pulmonary vascularity are within normal limits. Surgical staples and postoperative scarring at the right lung base, best demonstrated on prior chest CT 8/13/2019. The lungs appear clear. No visible airspace consolidation or pleural  effusion.      Impression:       1. No active disease.  2. Postoperative changes right lung base.    Signer Name: Jesus Zuluaga MD   Signed: 2/2/2020 6:32 AM   Workstation Name: [email or web address]    Radiology [facility]    CT Angiogram Head [[id number]] Collected:  02/02/20 0543     Updated:  02/02/20 0545    Narrative:       CT ANGIOGRAM, HEAD AND NECK, 2/2/2020    HISTORY:  74-year-old male in the ED with new onset dizziness, weakness. Stroke evaluation.    TECHNIQUE:  CT angiogram of the head and neck with contrast. 3-D postprocessing was performed and reviewed. Evaluation for a significant carotid arterial  stenosis is based on NASCET criteria. Radiation dose reduction techniques included automated exposure control.  Radiation audit for known CT and nuclear cardiology exams in the last 12 months: 2.    COMPARISON:  Noncontrast CT head tonight.    CTA NECK:  Normal aortic arch branching pattern with no proximal great vessel stenosis. Left vertebral artery is dominant with predominant supply to the basilar artery. Right vertebral artery is small and terminates prior to the basilar junction. Dense atheromatous  plaque in the carotid bulb bilaterally with ICA stenosis at the bulbs measuring about 77% on the right and 50% on the left using NASCET criteria.    CTA HEAD:  Intracranially, there is symmetric distal vascular contrast distribution in the anterior, middle and posterior cerebral artery territories. No compelling evidence of intracranial arterial flow limiting stenosis. No intracranial aneurysm or vascular  malformation is identified. The dural venous sinuses appear normal. No intracranial mass or abnormal contrast enhancement.      Impression:       1.  No intracranial or extracranial arterial flow limiting stenosis or aneurysm.  2.  Bilateral carotid bulb stenosis measuring 77% on the right and 50% on the left using NASCET criteria.    Signer Name: Jesus Zuluaga MD   Signed: 2/2/2020 5:43 AM   Workstation Name: [email or web address]    [facility]    CT Angiogram Neck [[id number]] Collected:  02/02/20 0543     Updated:  02/02/20 0545    Narrative:       CT ANGIOGRAM, HEAD AND NECK, 2/2/2020    HISTORY:  74-year-old male in the ED with new onset dizziness, weakness. Stroke evaluation.    TECHNIQUE:  CT angiogram of the head and neck with contrast. 3-D postprocessing was performed and reviewed. Evaluation for a significant carotid arterial stenosis is based on NASCET criteria. Radiation dose reduction techniques included automated exposure control.  Radiation audit for known CT and nuclear  cardiology exams in the last 12 months: 2.    COMPARISON:  Noncontrast CT head tonight.    CTA NECK:  Normal aortic arch branching pattern with no proximal great vessel stenosis. Left vertebral artery is dominant with predominant supply to the basilar artery. Right vertebral artery is small and terminates prior to the basilar junction. Dense atheromatous  plaque in the carotid bulb bilaterally with ICA stenosis at the bulbs measuring about 77% on the right and 50% on the left using NASCET criteria.    CTA HEAD:  Intracranially, there is symmetric distal vascular contrast distribution in the anterior, middle and posterior cerebral artery territories. No compelling evidence of intracranial arterial flow limiting stenosis. No intracranial aneurysm or vascular  malformation is identified. The dural venous sinuses appear normal. No intracranial mass or abnormal contrast enhancement.      Impression:       1.  No intracranial or extracranial arterial flow limiting stenosis or aneurysm.  2.  Bilateral carotid bulb stenosis measuring 77% on the right and 50% on the left using NASCET criteria.    Signer Name: Jesus Zuluaga MD   Signed: 2/2/2020 5:43 AM   Workstation Name: [email or web address]    [facility]    CT Head Without Contrast Stroke Protocol [[id number]] Collected:  02/02/20 0513     Updated:  02/02/20 0515    Narrative:       CT HEAD, NONCONTRAST, STROKE PROTOCOL, 2/2/2020    HISTORY:  74-year-old male presenting to the ED with new onset dizziness, weakness. Stroke evaluation.    TECHNIQUE:  CT imaging of the head without IV contrast. Radiation dose reduction techniques included automated exposure control. Radiation audit for CT and nuclear cardiology exams in the last 12 months: 2.  *  CT exam time, 04:48.  *  CT on line for interpretation, 05:07.  *  Stat interpretation time, 05:10.    COMPARISON:  *  CT head, 4/9/2017.    FINDINGS:  No acute intracranial abnormality is  demonstrated.    Mild-to-moderate generalized cerebral cortical atrophy with a notable frontotemporal predominance. Moderate diffuse low-attenuation white matter changes, nonspecific but most commonly seen in the setting of chronic small vessel disease. These findings  are stable since the prior exam.    No evidence of intracranial hemorrhage, mass, mass effect, acute cerebral edema, extra-axial fluid collection or hydrocephalus.    Note is again made of extensive fluid opacification of left mastoid air spaces.      Impression:       1.  No acute intracranial abnormality.  2.  Stable diffuse chronic changes as noted above.  3.  Complete fluid opacification of the left mastoid sinus air spaces, unchanged.  4.  No change since 4/19/2017.    NOTIFICATION: Critical Value/emergent results were relayed from me to the ED treatment team by telephone through the CT technologist, Jessika, at 05:13 hours on 2/2/2020.    Signer Name: Jesus Zuluaga MD   Signed: 2/2/2020 5:13 AM   Workstation Name: [facility]    [facility]          Results for orders placed during the hospital encounter of 02/02/20   Adult Transthoracic Echo Complete W/ Cont if Necessary Per Protocol    Narrative · Left atrial cavity size is moderately dilated.  · Mild mitral valve regurgitation is present.  · Left ventricular wall thickness is consistent with mild concentric   hypertrophy.  · Estimated EF = 60%.  · Left ventricular systolic function is normal.  · Normal right ventricular wall thickness, systolic function and septal   motion noted with rght ventricular cavity moderately dilated.  · Left ventricular diastolic dysfunction is abnormal consistent with: age.  · Saline test results are negative.  · The aortic valve exhibits mild-moderate sclerosis.  · No evidence of pulmonary hypertension is present.  · There is no evidence of pericardial effusion.  · Technically difficult and limited study.          I have reviewed the  medications:  Scheduled Meds:    [START ON 2/3/2020] aspirin 81 mg Oral Daily   budesonide-formoterol 2 puff Inhalation BID   bumetanide 1 mg Oral Daily   clopidogrel 75 mg Oral Daily   docusate sodium 100 mg Oral BID   donepezil 10 mg Oral Nightly   finasteride 5 mg Oral Daily   heparin (porcine) 5,000 Units Subcutaneous Q8H   insulin detemir 15 Units Subcutaneous Once   insulin lispro 0-9 Units Subcutaneous 4x Daily With Meals & Nightly   meclizine 25 mg Oral Q8H   melatonin 5 mg Oral Nightly   pantoprazole 40 mg Oral Q AM   rosuvastatin 40 mg Oral Nightly   sodium chloride 10 mL Intravenous Q12H   terazosin 5 mg Oral Nightly     Continuous Infusions:   PRN Meds:.•  acetaminophen **OR** acetaminophen  •  albuterol  •  bisacodyl  •  dextrose  •  dextrose  •  diazePAM  •  glucagon (human recombinant)  •  ondansetron **OR** ondansetron  •  sodium chloride  •  sodium chloride    Assessment/Plan   Assessment & Plan     Active Hospital Problems    Diagnosis  POA   • **Dizziness [R42]  Yes   • Frequent falls [R29.6]  Not Applicable   • BPH (benign prostatic hyperplasia) [N40.0]  Yes   • History of lung cancer [Z85.118]  Not Applicable   • Coronary artery disease [I25.10]  Yes   • Obesity (BMI 30-39.9) [E66.9]  Yes   • Chronic obstructive pulmonary disease (CMS/HCC) [J44.9]  Yes   • Sleep-disordered breathing [G47.30]  Yes   • CKD (chronic kidney disease), stage III (CMS/HCC) [N18.3]  Yes   • Essential hypertension [I10]  Yes   • Type 2 diabetes mellitus without complication, without long-term current use of insulin (CMS/HCC) [E11.9]  Yes      Resolved Hospital Problems   No resolved problems to display.        Brief Hospital Course to date:  Joseph Rodriguez is a 74 y.o. male admitted with sudden onset of dizziness, work-up is in progress.  Noted he is on chronic very high doses of an opiate regimen at home.  He is also on benzos at home.    Dizziness  -Work-up in progress, at least multifactorial and exacerbated by  chronic opiate and benzo use    Chronic opiate dependence  -Initial dose was reduced to half which was 100 mg, however the patient seemed to have decreased responsiveness after this dose was administered so I have decreased it further planning to resume 30 mg for the next dose.    Chronic benzo dependence  -Continue Valium as needed 3 times daily 5 mg per home dose    Diabetes mellitus type 2 with hyperglycemia 465  -Started long-acting insulin, sliding scale as needed; he has not on home insulin    History of lung cancer    Coronary artery disease    Bilateral carotid artery stenosis  -77% on the right and 50% on the left using NASCET criteria  -Neurosurgery consulted and following    DVT Prophylaxis: Heparin    CODE STATUS:   Code Status and Medical Interventions:   Ordered at: 02/02/20 0624     Code Status:    CPR     Medical Interventions (Level of Support Prior to Arrest):    Full         Electronically signed by Beronica Gordillo MD, 02/02/20, 1:29 PM.

## 2020-02-03 LAB
ALBUMIN SERPL-MCNC: 3.4 G/DL (ref 3.5–5.2)
ALBUMIN/GLOB SERPL: 1 G/DL
ALP SERPL-CCNC: 67 U/L (ref 39–117)
ALT SERPL W P-5'-P-CCNC: 6 U/L (ref 1–41)
ANION GAP SERPL CALCULATED.3IONS-SCNC: 16 MMOL/L (ref 5–15)
AST SERPL-CCNC: 12 U/L (ref 1–40)
BASOPHILS # BLD AUTO: 0.04 10*3/MM3 (ref 0–0.2)
BASOPHILS NFR BLD AUTO: 0.8 % (ref 0–1.5)
BILIRUB SERPL-MCNC: 0.5 MG/DL (ref 0.2–1.2)
BUN BLD-MCNC: 19 MG/DL (ref 8–23)
BUN/CREAT SERPL: 10.6 (ref 7–25)
CALCIUM SPEC-SCNC: 8.1 MG/DL (ref 8.6–10.5)
CHLORIDE SERPL-SCNC: 94 MMOL/L (ref 98–107)
CO2 SERPL-SCNC: 24 MMOL/L (ref 22–29)
CREAT BLD-MCNC: 1.79 MG/DL (ref 0.76–1.27)
DEPRECATED RDW RBC AUTO: 57.7 FL (ref 37–54)
EOSINOPHIL # BLD AUTO: 0.12 10*3/MM3 (ref 0–0.4)
EOSINOPHIL NFR BLD AUTO: 2.4 % (ref 0.3–6.2)
ERYTHROCYTE [DISTWIDTH] IN BLOOD BY AUTOMATED COUNT: 16.2 % (ref 12.3–15.4)
GFR SERPL CREATININE-BSD FRML MDRD: 37 ML/MIN/1.73
GLOBULIN UR ELPH-MCNC: 3.4 GM/DL
GLUCOSE BLD-MCNC: 219 MG/DL (ref 65–99)
GLUCOSE BLDC GLUCOMTR-MCNC: 233 MG/DL (ref 70–130)
GLUCOSE BLDC GLUCOMTR-MCNC: 237 MG/DL (ref 70–130)
GLUCOSE BLDC GLUCOMTR-MCNC: 306 MG/DL (ref 70–130)
GLUCOSE BLDC GLUCOMTR-MCNC: 339 MG/DL (ref 70–130)
GLUCOSE BLDC GLUCOMTR-MCNC: 355 MG/DL (ref 70–130)
HBA1C MFR BLD: 10.1 % (ref 4.8–5.6)
HCT VFR BLD AUTO: 34.4 % (ref 37.5–51)
HGB BLD-MCNC: 10.1 G/DL (ref 13–17.7)
IMM GRANULOCYTES # BLD AUTO: 0.03 10*3/MM3 (ref 0–0.05)
IMM GRANULOCYTES NFR BLD AUTO: 0.6 % (ref 0–0.5)
LYMPHOCYTES # BLD AUTO: 1.47 10*3/MM3 (ref 0.7–3.1)
LYMPHOCYTES NFR BLD AUTO: 29.5 % (ref 19.6–45.3)
MCH RBC QN AUTO: 28.8 PG (ref 26.6–33)
MCHC RBC AUTO-ENTMCNC: 29.4 G/DL (ref 31.5–35.7)
MCV RBC AUTO: 98 FL (ref 79–97)
MONOCYTES # BLD AUTO: 0.49 10*3/MM3 (ref 0.1–0.9)
MONOCYTES NFR BLD AUTO: 9.8 % (ref 5–12)
NEUTROPHILS # BLD AUTO: 2.83 10*3/MM3 (ref 1.7–7)
NEUTROPHILS NFR BLD AUTO: 56.9 % (ref 42.7–76)
NRBC BLD AUTO-RTO: 0 /100 WBC (ref 0–0.2)
NT-PROBNP SERPL-MCNC: 154.2 PG/ML (ref 5–900)
PLATELET # BLD AUTO: 126 10*3/MM3 (ref 140–450)
PMV BLD AUTO: 10.4 FL (ref 6–12)
POTASSIUM BLD-SCNC: 3.6 MMOL/L (ref 3.5–5.2)
PROT SERPL-MCNC: 6.8 G/DL (ref 6–8.5)
RBC # BLD AUTO: 3.51 10*6/MM3 (ref 4.14–5.8)
SODIUM BLD-SCNC: 134 MMOL/L (ref 136–145)
TROPONIN T SERPL-MCNC: <0.01 NG/ML (ref 0–0.03)
TROPONIN T SERPL-MCNC: <0.01 NG/ML (ref 0–0.03)
WBC NRBC COR # BLD: 4.98 10*3/MM3 (ref 3.4–10.8)

## 2020-02-03 PROCEDURE — G0378 HOSPITAL OBSERVATION PER HR: HCPCS

## 2020-02-03 PROCEDURE — 85025 COMPLETE CBC W/AUTO DIFF WBC: CPT | Performed by: FAMILY MEDICINE

## 2020-02-03 PROCEDURE — 99222 1ST HOSP IP/OBS MODERATE 55: CPT | Performed by: INTERNAL MEDICINE

## 2020-02-03 PROCEDURE — G0108 DIAB MANAGE TRN  PER INDIV: HCPCS

## 2020-02-03 PROCEDURE — 94799 UNLISTED PULMONARY SVC/PX: CPT

## 2020-02-03 PROCEDURE — 83036 HEMOGLOBIN GLYCOSYLATED A1C: CPT | Performed by: NURSE PRACTITIONER

## 2020-02-03 PROCEDURE — 97535 SELF CARE MNGMENT TRAINING: CPT

## 2020-02-03 PROCEDURE — 93005 ELECTROCARDIOGRAM TRACING: CPT | Performed by: FAMILY MEDICINE

## 2020-02-03 PROCEDURE — 99232 SBSQ HOSP IP/OBS MODERATE 35: CPT | Performed by: PSYCHIATRY & NEUROLOGY

## 2020-02-03 PROCEDURE — 99232 SBSQ HOSP IP/OBS MODERATE 35: CPT | Performed by: FAMILY MEDICINE

## 2020-02-03 PROCEDURE — 80053 COMPREHEN METABOLIC PANEL: CPT | Performed by: FAMILY MEDICINE

## 2020-02-03 PROCEDURE — 84484 ASSAY OF TROPONIN QUANT: CPT | Performed by: PHYSICIAN ASSISTANT

## 2020-02-03 PROCEDURE — 93010 ELECTROCARDIOGRAM REPORT: CPT | Performed by: INTERNAL MEDICINE

## 2020-02-03 PROCEDURE — 82962 GLUCOSE BLOOD TEST: CPT

## 2020-02-03 PROCEDURE — 83880 ASSAY OF NATRIURETIC PEPTIDE: CPT | Performed by: PHYSICIAN ASSISTANT

## 2020-02-03 PROCEDURE — 97162 PT EVAL MOD COMPLEX 30 MIN: CPT

## 2020-02-03 PROCEDURE — 25010000002 HEPARIN (PORCINE) PER 1000 UNITS: Performed by: FAMILY MEDICINE

## 2020-02-03 PROCEDURE — 92523 SPEECH SOUND LANG COMPREHEN: CPT

## 2020-02-03 PROCEDURE — 97165 OT EVAL LOW COMPLEX 30 MIN: CPT

## 2020-02-03 PROCEDURE — 99232 SBSQ HOSP IP/OBS MODERATE 35: CPT | Performed by: PHYSICIAN ASSISTANT

## 2020-02-03 RX ORDER — NITROGLYCERIN 0.4 MG/1
0.4 TABLET SUBLINGUAL
Status: DISCONTINUED | OUTPATIENT
Start: 2020-02-03 | End: 2020-02-04 | Stop reason: HOSPADM

## 2020-02-03 RX ORDER — MORPHINE SULFATE 15 MG/1
15 TABLET, FILM COATED, EXTENDED RELEASE ORAL 2 TIMES DAILY PRN
Status: CANCELLED | OUTPATIENT
Start: 2020-02-03 | End: 2020-02-13

## 2020-02-03 RX ADMIN — APIXABAN 2.5 MG: 2.5 TABLET, FILM COATED ORAL at 12:39

## 2020-02-03 RX ADMIN — PANTOPRAZOLE SODIUM 40 MG: 40 TABLET, DELAYED RELEASE ORAL at 05:39

## 2020-02-03 RX ADMIN — MECLIZINE HYDROCHLORIDE 25 MG: 25 TABLET ORAL at 20:53

## 2020-02-03 RX ADMIN — APIXABAN 2.5 MG: 2.5 TABLET, FILM COATED ORAL at 20:53

## 2020-02-03 RX ADMIN — DONEPEZIL HYDROCHLORIDE 10 MG: 10 TABLET, FILM COATED ORAL at 20:52

## 2020-02-03 RX ADMIN — FINASTERIDE 5 MG: 5 TABLET, FILM COATED ORAL at 08:36

## 2020-02-03 RX ADMIN — ROSUVASTATIN CALCIUM 40 MG: 20 TABLET, COATED ORAL at 20:54

## 2020-02-03 RX ADMIN — INSULIN LISPRO 8 UNITS: 100 INJECTION, SOLUTION INTRAVENOUS; SUBCUTANEOUS at 18:30

## 2020-02-03 RX ADMIN — ASPIRIN 81 MG: 81 TABLET, COATED ORAL at 08:36

## 2020-02-03 RX ADMIN — HEPARIN SODIUM 5000 UNITS: 5000 INJECTION, SOLUTION INTRAVENOUS; SUBCUTANEOUS at 05:39

## 2020-02-03 RX ADMIN — NITROGLYCERIN 0.4 MG: 0.4 TABLET SUBLINGUAL at 06:05

## 2020-02-03 RX ADMIN — MECLIZINE HYDROCHLORIDE 25 MG: 25 TABLET ORAL at 14:11

## 2020-02-03 RX ADMIN — BUDESONIDE AND FORMOTEROL FUMARATE DIHYDRATE 2 PUFF: 160; 4.5 AEROSOL RESPIRATORY (INHALATION) at 08:37

## 2020-02-03 RX ADMIN — NITROGLYCERIN 0.4 MG: 0.4 TABLET SUBLINGUAL at 06:10

## 2020-02-03 RX ADMIN — INSULIN LISPRO 7 UNITS: 100 INJECTION, SOLUTION INTRAVENOUS; SUBCUTANEOUS at 20:54

## 2020-02-03 RX ADMIN — TERAZOSIN HYDROCHLORIDE 5 MG: 5 CAPSULE ORAL at 20:53

## 2020-02-03 RX ADMIN — SODIUM CHLORIDE, PRESERVATIVE FREE 10 ML: 5 INJECTION INTRAVENOUS at 08:37

## 2020-02-03 RX ADMIN — INSULIN LISPRO 4 UNITS: 100 INJECTION, SOLUTION INTRAVENOUS; SUBCUTANEOUS at 08:36

## 2020-02-03 RX ADMIN — DOCUSATE SODIUM 100 MG: 100 CAPSULE, LIQUID FILLED ORAL at 08:36

## 2020-02-03 RX ADMIN — ACETAMINOPHEN 650 MG: 325 TABLET, FILM COATED ORAL at 20:51

## 2020-02-03 RX ADMIN — CLOPIDOGREL BISULFATE 75 MG: 75 TABLET ORAL at 08:36

## 2020-02-03 RX ADMIN — INSULIN LISPRO 7 UNITS: 100 INJECTION, SOLUTION INTRAVENOUS; SUBCUTANEOUS at 12:40

## 2020-02-03 RX ADMIN — MELATONIN TAB 5 MG 5 MG: 5 TAB at 20:53

## 2020-02-03 RX ADMIN — BUDESONIDE AND FORMOTEROL FUMARATE DIHYDRATE 2 PUFF: 160; 4.5 AEROSOL RESPIRATORY (INHALATION) at 20:19

## 2020-02-03 RX ADMIN — SODIUM CHLORIDE, PRESERVATIVE FREE 10 ML: 5 INJECTION INTRAVENOUS at 20:54

## 2020-02-03 RX ADMIN — MECLIZINE HYDROCHLORIDE 25 MG: 25 TABLET ORAL at 05:39

## 2020-02-03 RX ADMIN — BUMETANIDE 1 MG: 1 TABLET ORAL at 08:36

## 2020-02-03 RX ADMIN — NITROGLYCERIN 0.4 MG: 0.4 TABLET SUBLINGUAL at 06:00

## 2020-02-03 NOTE — PLAN OF CARE
Problem: Patient Care Overview  Goal: Plan of Care Review  Outcome: Ongoing (interventions implemented as appropriate)  Flowsheets (Taken 2/3/2020 4640)  Plan of Care Reviewed With: patient  Note:   SLP evaluation completed. Will address cognition and communication in treatment. Please see note for further details and recommendations.

## 2020-02-03 NOTE — PROGRESS NOTES
Neurology       Patient Care Team:  Dandy Bailey MD as PCP - General  OsullivanJuan Pablo gan MD as Surgeon (Cardiothoracic Surgery)  Jong Diop DO as Consulting Physician (Gastroenterology)    Chief complaint: Dizziness    History: Patient's vertigo has resolved.    Is been found however to have a high-grade stenosis of both carotids 50 to 70% bilaterally both on CT T angiogram and carotid duplex.    He is also been found to have untreated atrial fibrillation.    Blood sugar is out of control with a hemoglobin A1c of 10.      Past Medical History:   Diagnosis Date   • Acute kidney injury (CMS/HCC)    • Acute tubular necrosis (CMS/HCC)    • Anemia    • Arthritis    • B12 deficiency    • Bone pain    • Carcinoma of lung (CMS/HCC)     stage IA   • Chronic kidney disease (CKD), stage III (moderate) (CMS/HCC)    • Confusion, postoperative    • COPD (chronic obstructive pulmonary disease) (CMS/HCC)    • Coronary artery disease s/p stent    • Depression    • Diabetes mellitus (CMS/HCC)    • Fatigue    • Fractures    • GERD (gastroesophageal reflux disease)    • H/O colonoscopy 03/2016   • History of BPH    • Hyperlipidemia    • Hypertension    • Impotence    • Kidney stones    • Leaking of urine    • Lung cancer (CMS/HCC) 01/2016   • Lung mass    • Obesity    • Peptic ulcer disease    • Post-thoracotomy pain, mild        Vital Signs   Vitals:    02/03/20 0615 02/03/20 0712 02/03/20 0837 02/03/20 1129   BP:  129/74  144/91   BP Location:  Left arm  Right arm   Patient Position:  Lying  Sitting   Pulse: 84 67  71   Resp:  18  18   Temp:  97.7 °F (36.5 °C)  98.3 °F (36.8 °C)   TempSrc:  Oral  Oral   SpO2: (!) 89% 96% 100% 94%   Weight:       Height:           Physical Exam:   General: Awake and alert              Neuro: Oriented to person place and time.    Speech is normal.    He moves all extremities well.        Results Review:  MRI shows no evidence of significant vascular change.    CT angiogram shows 77%  right carotid stenosis 50% left carotid stenosis.    Since carotid duplex shows bilateral 50 to 69% stenosis.    Thoracic echocardiogram shows a mildly dilated left atrial cavity with an ejection fraction of 60%.    Bubble study is negative.        Results from last 7 days   Lab Units 02/03/20  0435   WBC 10*3/mm3 4.98   HEMOGLOBIN g/dL 10.1*   HEMATOCRIT % 34.4*   PLATELETS 10*3/mm3 126*     Results from last 7 days   Lab Units 02/03/20  0435 02/02/20  0500 02/02/20  0445   SODIUM mmol/L 134* 138  --    POTASSIUM mmol/L 3.6 3.3*  --    CHLORIDE mmol/L 94* 95*  --    CO2 mmol/L 24.0 28.0  --    BUN mg/dL 19 21  --    CREATININE mg/dL 1.79* 1.81* 1.90*   CALCIUM mg/dL 8.1* 8.2*  --    BILIRUBIN mg/dL 0.5 0.3  --    ALK PHOS U/L 67 79  --    ALT (SGPT) U/L 6 10  10  --    AST (SGOT) U/L 12 8  8  --    GLUCOSE mg/dL 219* 318*  --        Imaging Results (Last 24 Hours)     ** No results found for the last 24 hours. **          Assessment:  Benign positional vertigo improving spontaneously.    Asymptomatic atrial fibrillation.    Asymptomatic bilateral carotid stenosis    Plan:  Patient needs to be on best medical therapy.  I would suggest Plavix 75 mg combined with Eliquis at the cardiologist choice.    Needs to be on a high-dose statin and is already on Crestor 40 mg.    Diabetic educator is seen him and he like to have the nutritionist see him before he leaves.    Needs a lipid profile either as an outpatient or an inpatient.        Comment:  Once the intervention people seen him and everything else is worked out he can go with outpatient follow-up with intervention.             I discussed the patients findings and my recommendations with patient and family    Shekhar Oakes MD  02/03/20  1:34 PM

## 2020-02-03 NOTE — THERAPY EVALUATION
Acute Care - Occupational Therapy Initial Evaluation  [facility]     Patient Name: Joseph Rodriguez  : 1945  MRN: [id number]  Today's Date: 2/3/2020  Onset of Illness/Injury or Date of Surgery: 20  Date of Referral to OT: 20  Referring Physician: MD Justin     Admit Date: 2020       ICD-10-CM ICD-9-CM   1. Dizziness R42 780.4   2. Carotid stenosis, asymptomatic, bilateral I65.23 433.10     433.30   3. Truncal ataxia R27.8 781.3   4. Impaired mobility and ADLs Z74.09 799.89     Patient Active Problem List   Diagnosis   • Carcinoma of right lung (CMS/HCC)   • Anemia   • Acute tubular necrosis (CMS/HCC)   • B12 deficiency   • Monoclonal gammopathy   • CKD (chronic kidney disease), stage III (CMS/HCC)   • Lung mass   • Type 2 diabetes mellitus without complication, without long-term current use of insulin (CMS/HCC)   • Essential hypertension   • Gastroesophageal reflux disease with esophagitis   • Chronic obstructive pulmonary disease (CMS/HCC)   • Lung nodule   • Sleep-disordered breathing   • Coronary artery disease   • Obesity (BMI 30-39.9)   • History of lung cancer   • Frequent falls   • Dizziness   • BPH (benign prostatic hyperplasia)     Past Medical History:   Diagnosis Date   • Acute kidney injury (CMS/HCC)    • Acute tubular necrosis (CMS/HCC)    • Anemia    • Arthritis    • B12 deficiency    • Bone pain    • Carcinoma of lung (CMS/HCC)     stage IA   • Chronic kidney disease (CKD), stage III (moderate) (CMS/HCC)    • Confusion, postoperative    • COPD (chronic obstructive pulmonary disease) (CMS/HCC)    • Coronary artery disease s/p stent    • Depression    • Diabetes mellitus (CMS/HCC)    • Fatigue    • Fractures    • GERD (gastroesophageal reflux disease)    • H/O colonoscopy 2016   • History of BPH    • Hyperlipidemia    • Hypertension    • Impotence    • Kidney stones    • Leaking of urine    • Lung cancer (CMS/HCC) 2016   • Lung mass    • Obesity    • Peptic ulcer disease     • Post-thoracotomy pain, mild      Past Surgical History:   Procedure Laterality Date   • ANKLE SURGERY     • BACK SURGERY     • BONE MARROW BIOPSY     • BRONCHOSCOPY N/A 4/13/2017    Procedure: BRONCHOSCOPY WITH ENDOBRONCHIAL ULTRASOUND;  Surgeon: Kingsley Hodge MD;  Location: [facility] ENDOSCOPY;  Service:    • CAROTID STENT  1991   • CORONARY STENT PLACEMENT     • KIDNEY STONE SURGERY  1976   • KNEE SURGERY Bilateral 2010   • LUNG REMOVAL, PARTIAL Right     Right upper and lower lobe wedge resections (2016)   • THORACOTOMY      RT THORACOTOMY.  WIDE WEDGE EXCISION OG RT UPPER LOBE. WIDE WEDGE EXCISION OF RIGHT LOWER LOBE.          OT ASSESSMENT FLOWSHEET (last 12 hours)      Occupational Therapy Evaluation     Row Name 02/03/20 0815                   OT Evaluation Time/Intention    Subjective Information  complains of;weakness;fatigue  -HK        Document Type  evaluation  -HK        Mode of Treatment  individual therapy;occupational therapy  -HK        Patient Effort  good  -HK        Symptoms Noted During/After Treatment  none  -HK           General Information    Patient Profile Reviewed?  yes  -HK        Onset of Illness/Injury or Date of Surgery  02/02/20  -HK        Referring Physician  MD Justin   -HK        Patient Observations  alert;cooperative;agree to therapy  -HK        Patient/Family Observations  No family at bedside.   -HK        General Observations of Patient  Pt recieved upright in chair with IV heplocked and O2 donned.   -HK        Prior Level of Function  independent:;all household mobility;community mobility;gait;transfer;bed mobility;ADL's;home management;cooking;cleaning;driving;shopping Pt does report frequent falls   -HK        Equipment Currently Used at Home  none has RW if needed   -HK        Existing Precautions/Restrictions  fall  -HK        Risks Reviewed  patient:;LOB;nausea/vomiting;dizziness;increased discomfort;change in vital signs;lines disloged;increased drainage   -HK        Benefits Reviewed  patient:;increase independence;improve function;increase strength;increase balance;decrease pain;decrease risk of DVT;improve skin integrity;increase knowledge  -HK        Barriers to Rehab  previous functional deficit  -HK           Relationship/Environment    Primary Source of Support/Comfort  spouse  -HK        Lives With  spouse  -HK           Resource/Environmental Concerns    Current Living Arrangements  home/apartment/condo  -HK           Home Main Entrance    Number of Stairs, Main Entrance  one  -HK           Cognitive Assessment/Interventions    Additional Documentation  Cognitive Assessment/Intervention (Group)  -HK           Cognitive Assessment/Intervention- PT/OT    Affect/Mental Status (Cognitive)  flat/blunted affect  -HK        Orientation Status (Cognition)  oriented x 4;verbal cues/prompts needed for orientation  -HK        Follows Commands (Cognition)  follows one step commands;over 90% accuracy  -HK        Cognitive Function (Cognitive)  safety deficit  -HK        Safety Deficit (Cognitive)  mild deficit;safety precautions follow-through/compliance;safety precautions awareness;insight into deficits/self awareness;awareness of need for assistance  -HK           Safety Issues, Functional Mobility    Safety Issues Affecting Function (Mobility)  sequencing abilities;safety precautions follow-through/compliance;safety precaution awareness;judgment;insight into deficits/self awareness  -HK        Impairments Affecting Function (Mobility)  balance;strength;coordination  -HK           Bed Mobility Assessment/Treatment    Comment (Bed Mobility)  Pt received and left UIC   -HK           Functional Mobility    Functional Mobility- Ind. Level  contact guard assist;verbal cues required  -HK        Functional Mobility- Device  rolling walker  -HK        Functional Mobility-Distance (Feet)  30  -HK        Functional Mobility- Safety Issues  step length decreased;weight-shifting  ability decreased  -HK        Functional Mobility- Comment  Pt ambulated to and from bathroom with CGA and RW.   -HK           Transfer Assessment/Treatment    Transfer Assessment/Treatment  sit-stand transfer;stand-sit transfer  -HK        Comment (Transfers)  Verbal cues for safe hand placement and sequencing. Pt completed sit to stand x1 without. Pt completed x2 with use of RW and had improvement in balance.    -HK           Sit-Stand Transfer    Sit-Stand Cache (Transfers)  contact guard;verbal cues  -HK        Assistive Device (Sit-Stand Transfers)  walker, front-wheeled  -HK           Stand-Sit Transfer    Stand-Sit Cache (Transfers)  contact guard;verbal cues  -HK        Assistive Device (Stand-Sit Transfers)  walker, front-wheeled  -HK           ADL Assessment/Intervention    BADL Assessment/Intervention  lower body dressing;toileting  -HK           Lower Body Dressing Assessment/Training    Lower Body Dressing Cache Level  doff;don;socks;minimum assist (75% patient effort);verbal cues  -HK        Lower Body Dressing Position  unsupported sitting  -HK        Comment (Lower Body Dressing)  Pt required Chante to don socks. Pt reports he typically sits in a lower chair to complete.   -HK           Toileting Assessment/Training    Cache Level (Toileting)  adjust/manage clothing;perform perineal hygiene;independent  -HK        Toileting Position  unsupported sitting  -HK           BADL Safety/Performance    Impairments, BADL Safety/Performance  endurance/activity tolerance;strength;coordination  -HK           General ROM    RT Upper Ext  Rt Shoulder Flexion  -HK        LT Upper Ext  Lt Shoulder Flexion  -HK           Right Upper Ext    Rt Shoulder Flexion AROM  WNL  -HK           Left Upper Ext    Lt Shoulder Flexion AROM  WNL  -HK           MMT (Manual Muscle Testing)    Rt Upper Ext  Rt Shoulder WNL  -HK        Lt Upper Ext  Lt Shoulder WNL  -HK        General MMT Comments  BUE WNL    -HK           Motor Assessment/Interventions    Additional Documentation  Balance (Group);Fine Motor Testing & Training (Group);Gross Motor Coordination (Group)  -HK           Gross Motor Coordination    Gross Motor Impairments  rapid alternating  -HK        Gross Motor Skill, Impairments Detail  pt with decreased gross motoro coordination.   -HK           Balance    Balance  static sitting balance;static standing balance;dynamic sitting balance;dynamic standing balance  -HK           Static Sitting Balance    Level of Hunt (Unsupported Sitting, Static Balance)  supervision  -HK        Sitting Position (Unsupported Sitting, Static Balance)  sitting in chair  -HK        Time Able to Maintain Position (Unsupported Sitting, Static Balance)  2 to 3 minutes  -HK           Dynamic Sitting Balance    Level of Hunt, Reaches Outside Midline (Sitting, Dynamic Balance)  supervision  -HK        Sitting Position, Reaches Outside Midline (Sitting, Dynamic Balance)  other (see comments) on toilet   -HK        Comment, Reaches Outside Midline (Sitting, Dynamic Balance)  completing ghada care   -HK           Static Standing Balance    Level of Hunt (Supported Standing, Static Balance)  contact guard assist  -HK        Time Able to Maintain Position (Supported Standing, Static Balance)  1 to 2 minutes  -HK        Assistive Device Utilized (Supported Standing, Static Balance)  walker, rolling  -HK           Dynamic Standing Balance    Level of Hunt, Reaches Outside Midline (Standing, Dynamic Balance)  contact guard assist  -HK        Time Able to Maintain Position, Reaches Outside Midline (Standing, Dynamic Balance)  1 to 2 minutes  -HK        Assistive Device Utilized (Supported Standing, Dynamic Balance)  walker, rolling  -HK           Fine Motor Testing & Training    Fine Motor Tests  other (see comments) finger opposition   -HK        Comment, Fine Motor Coordination  intact   -HK           Sensory  Assessment/Intervention    Sensory General Assessment  no sensation deficits identified  -HK        Additional Documentation  Vision Assessment/Intervention (Group)  -HK           Vision Assessment/Intervention    Visual Impairment/Limitations  WFL  -HK           Positioning and Restraints    Pre-Treatment Position  sitting in chair/recliner  -HK        Post Treatment Position  chair  -HK        In Chair  notified nsg;reclined;call light within reach;encouraged to call for assist;exit alarm on  -HK           Pain Assessment    Additional Documentation  Pain Scale: Numbers Pre/Post-Treatment (Group)  -HK           Pain Scale: Numbers Pre/Post-Treatment    Pain Scale: Numbers, Pretreatment  0/10 - no pain  -HK        Pain Scale: Numbers, Post-Treatment  0/10 - no pain  -HK           Orthotics & Prosthetics Management    Additional Documentation  --  -HK           Coping    Observed Emotional State  accepting;calm;cooperative  -HK           Plan of Care Review    Plan of Care Reviewed With  patient  -HK        Progress  improving  -HK           Clinical Impression (OT)    Date of Referral to OT  02/02/20  -HK        OT Diagnosis  Decreased independence with ADLS and mobility.   -HK        Patient/Family Goals Statement (OT Eval)  Pt would like to improve and return home.   -HK        Criteria for Skilled Therapeutic Interventions Met (OT Eval)  yes;treatment indicated  -HK        Rehab Potential (OT Eval)  good, to achieve stated therapy goals  -HK        Therapy Frequency (OT Eval)  daily  -HK        Care Plan Review (OT)  evaluation/treatment results reviewed;care plan/treatment goals reviewed;risks/benefits reviewed;patient/other agree to care plan  -HK        Anticipated Equipment Needs at Discharge (OT)  tub bench  -HK        Anticipated Discharge Disposition (OT)  home with assist;home with OP services  -HK           Vital Signs    Pre Systolic BP Rehab  -- RN cleared for tx; VSS   -HK        Pre SpO2 (%)  94   -HK        O2 Delivery Pre Treatment  supplemental O2  -HK        Intra SpO2 (%)  93  -HK        O2 Delivery Intra Treatment  room air  -HK        Post SpO2 (%)  95  -HK        O2 Delivery Post Treatment  supplemental O2  -HK        Pre Patient Position  Sitting  -HK        Intra Patient Position  Standing  -HK        Post Patient Position  Sitting  -HK           OT Goals    Bed Mobility Goal Selection (OT)  bed mobility, OT goal 1  -HK        Transfer Goal Selection (OT)  transfer, OT goal 1  -HK        Dressing Goal Selection (OT)  dressing, OT goal 1  -HK        Toileting Goal Selection (OT)  toileting, OT goal 1  -HK           Bed Mobility Goal 1 (OT)    Activity/Assistive Device (Bed Mobility Goal 1, OT)  sit to supine/supine to sit;scooting  -HK        [place] Level/Cues Needed (Bed Mobility Goal 1, OT)  minimum assist (75% or more patient effort);verbal cues required  -HK        Time Frame (Bed Mobility Goal 1, OT)  5 days  -HK        Progress/Outcomes (Bed Mobility Goal 1, OT)  goal ongoing  -HK           Transfer Goal 1 (OT)    Activity/Assistive Device (Transfer Goal 1, OT)  sit-to-stand/stand-to-sit;toilet  -HK        [place] Level/Cues Needed (Transfer Goal 1, OT)  standby assist  -HK        Time Frame (Transfer Goal 1, OT)  5 days  -HK        Progress/Outcome (Transfer Goal 1, OT)  goal ongoing  -HK           Dressing Goal 1 (OT)    Activity/Assistive Device (Dressing Goal 1, OT)  lower body dressing  -HK        [place]/Cues Needed (Dressing Goal 1, OT)  contact guard assist  -HK        Time Frame (Dressing Goal 1, OT)  5 days  -HK        Progress/Outcome (Dressing Goal 1, OT)  goal ongoing  -HK           Toileting Goal 1 (OT)    Activity/Device (Toileting Goal 1, OT)  adjust/manage clothing;perform perineal hygiene  -HK        [place] Level/Cues Needed (Toileting Goal 1, OT)  independent  -HK        Time Frame (Toileting Goal 1, OT)  5 days  -HK        Progress/Outcome (Toileting  Goal 1, OT)  goal ongoing  -HK           Living Environment    Home Accessibility  stairs to enter home;tub/shower is not walk in  -          User Key  (r) = Recorded By, (t) = Taken By, (c) = Cosigned By    Initials Name Effective Dates    Renae Palmer OT 03/07/18 -                OT Recommendation and Plan  Outcome Summary/Treatment Plan (OT)  Anticipated Equipment Needs at Discharge (OT): tub bench  Anticipated Discharge Disposition (OT): home with assist, home with OP services  Therapy Frequency (OT Eval): daily  Plan of Care Review  Plan of Care Reviewed With: patient  Plan of Care Reviewed With: patient  Outcome Summary: OT eval complete. Pt demonstrates decreased balance and overall gross motor coordination. Pt completes sit to stand with CGA and ambulates with CGA and RW. Pt completed toileting independently and required Chante for LBD. Recommend d/c home with assist from family and OP therapy services.    Outcome Measures     Row Name 02/03/20 0815             How much help from another is currently needed...    Putting on and taking off regular lower body clothing?  3  -HK      Bathing (including washing, rinsing, and drying)  3  -HK      Toileting (which includes using toilet bed pan or urinal)  4  -HK      Putting on and taking off regular upper body clothing  4  -HK      Taking care of personal grooming (such as brushing teeth)  3  -HK      Eating meals  4  -HK      AM-PAC 6 Clicks Score (OT)  21  -HK         Functional Assessment    Outcome Measure Options  AM-PAC 6 Clicks Daily Activity (OT)  -HK        User Key  (r) = Recorded By, (t) = Taken By, (c) = Cosigned By    Initials Name Provider Type    Renae Palmer, OT Occupational Therapist          Time Calculation:   Time Calculation- OT     Row Name 02/03/20 0815             Time Calculation- OT    OT Start Time  0815  -HK      OT Received On  02/03/20  -      OT Goal Re-Cert Due Date  02/13/20  -         Timed Charges    96537 - OT Self  Care/Mgmt Minutes  10  -HK        User Key  (r) = Recorded By, (t) = Taken By, (c) = Cosigned By    Initials Name Provider Type    Renae Palmer, OT Occupational Therapist        Therapy Charges for Today     Code Description Service Date Service Provider Modifiers Qty    [id number]  OT EVAL LOW COMPLEXITY 3 2/3/2020 Renae Chauhan, OT GO 1    [id number]  OT SELF CARE/MGMT/TRAIN EA 15 MIN 2/3/2020 Renae Chauhan OT GO 1               Renae Chauhan OT  2/3/2020

## 2020-02-03 NOTE — PROGRESS NOTES
[facility] Medicine Services  PROGRESS NOTE    Patient Name: Joseph Rodriguez  : 1945  MRN: [id number]    Date of Admission: 2020  Primary Care Physician: Dandy Baiely MD    Subjective   Subjective     CC:  Dizziness and weakness    HPI:  Patient is a 74-year-old who is on extremely high doses of opiates as well as chronic Valium as an outpatient who presented to the hospital for evaluation of sudden onset of dizziness.      2/3/2020 -patient reported chest pain around 5:30 AM this morning.  He had an EKG which was concerning for atrial fibrillation.  He states his pain is now resolved.  I asked cardiology to see him for this.  Patient is a very poor historian and is confused this morning.  His wife is not present.  He is unable to tell me why he came to the hospital.    Review of Systems   General: No fever, chills, positive fatigue  ENT: No sore throat, trouble swallowing or changes in vision  Respiratory: Positive shortness of breath, denies cough, wheezing or fast breathing  Cardiovascular: Positive chest pain, denies palpitations, positive dyspnea with exertion  Gastrointestinal: No nausea vomiting abdominal pain  Musculoskeletal: Positive difficulty walking, positive weakness  Neurologic: No headache, positive confusion, positive dizziness    Objective   Objective     Vital Signs:   Temp:  [97.4 °F (36.3 °C)-97.7 °F (36.5 °C)] 97.7 °F (36.5 °C)  Heart Rate:  [50-84] 67  Resp:  [18] 18  BP: (111-178)/(69-94) 129/74  Total (NIH Stroke Scale): 0     Physical Exam:  Constitutional: No acute distress, awake, alert but disoriented, nontoxic, morbidly obese body habitus  Eyes: Pupils equal, sclerae anicteric, no conjunctival injection  HENT: NCAT, mucous membranes moist  Neck: Supple, no thyromegaly, no lymphadenopathy  Respiratory: Clear to auscultation bilaterally, poor effort, nonlabored respirations   Cardiovascular: RRR  Gastrointestinal: Positive bowel sounds, soft,  nontender, nondistended  Musculoskeletal: Difficulty with mobility due to body habitus  Psychiatric: Appropriate affect, good insight and judgement, cooperative  Neurologic: Oriented to person only      Results Reviewed:  Results from last 7 days   Lab Units 02/03/20  0435 02/02/20  0805 02/02/20  0500 02/02/20  0451   WBC 10*3/mm3 4.98 5.68 6.07  --    HEMOGLOBIN g/dL 10.1* 10.0* 10.6*  --    HEMATOCRIT % 34.4* 31.6* 33.3*  --    PLATELETS 10*3/mm3 126* 140 148  --    INR   --   --   --  1.1     Results from last 7 days   Lab Units 02/03/20  0840 02/03/20  0600 02/03/20 0435 02/02/20  0500 02/02/20 0445   SODIUM mmol/L  --   --  134* 138  --    POTASSIUM mmol/L  --   --  3.6 3.3*  --    CHLORIDE mmol/L  --   --  94* 95*  --    CO2 mmol/L  --   --  24.0 28.0  --    BUN mg/dL  --   --  19 21  --    CREATININE mg/dL  --   --  1.79* 1.81* 1.90*   GLUCOSE mg/dL  --   --  219* 318*  --    CALCIUM mg/dL  --   --  8.1* 8.2*  --    ALT (SGPT) U/L  --   --  6 10  10  --    AST (SGOT) U/L  --   --  12 8  8  --    TROPONIN T ng/mL <0.010 <0.010  --  <0.010  --    PROBNP pg/mL  --  154.2  --  195.0  --      Estimated Creatinine Clearance: 47.7 mL/min (A) (by C-G formula based on SCr of 1.79 mg/dL (H)).    Microbiology Results Abnormal     None          Imaging Results (Last 24 Hours)     ** No results found for the last 24 hours. **          Results for orders placed during the hospital encounter of 02/02/20   Adult Transthoracic Echo Complete W/ Cont if Necessary Per Protocol    Narrative · Left atrial cavity size is moderately dilated.  · Mild mitral valve regurgitation is present.  · Left ventricular wall thickness is consistent with mild concentric   hypertrophy.  · Estimated EF = 60%.  · Left ventricular systolic function is normal.  · Normal right ventricular wall thickness, systolic function and septal   motion noted with rght ventricular cavity moderately dilated.  · Left ventricular diastolic dysfunction is  abnormal consistent with: age.  · Saline test results are negative.  · The aortic valve exhibits mild-moderate sclerosis.  · No evidence of pulmonary hypertension is present.  · There is no evidence of pericardial effusion.  · Technically difficult and limited study.          I have reviewed the medications:  Scheduled Meds:    aspirin 81 mg Oral Daily   budesonide-formoterol 2 puff Inhalation BID   bumetanide 1 mg Oral Daily   clopidogrel 75 mg Oral Daily   docusate sodium 100 mg Oral BID   donepezil 10 mg Oral Nightly   finasteride 5 mg Oral Daily   heparin (porcine) 5,000 Units Subcutaneous Q8H   insulin lispro 0-9 Units Subcutaneous 4x Daily With Meals & Nightly   meclizine 25 mg Oral Q8H   melatonin 5 mg Oral Nightly   pantoprazole 40 mg Oral Q AM   rosuvastatin 40 mg Oral Nightly   sodium chloride 10 mL Intravenous Q12H   terazosin 5 mg Oral Nightly     Continuous Infusions:   PRN Meds:.•  acetaminophen **OR** acetaminophen  •  albuterol  •  bisacodyl  •  dextrose  •  dextrose  •  diazePAM  •  glucagon (human recombinant)  •  nitroglycerin  •  ondansetron **OR** ondansetron  •  sodium chloride  •  sodium chloride    Assessment/Plan   Assessment & Plan     Active Hospital Problems    Diagnosis  POA   • **Dizziness [R42]  Yes   • Frequent falls [R29.6]  Not Applicable   • BPH (benign prostatic hyperplasia) [N40.0]  Yes   • History of lung cancer [Z85.118]  Not Applicable   • Coronary artery disease [I25.10]  Yes   • Obesity (BMI 30-39.9) [E66.9]  Yes   • Chronic obstructive pulmonary disease (CMS/HCC) [J44.9]  Yes   • Sleep-disordered breathing [G47.30]  Yes   • CKD (chronic kidney disease), stage III (CMS/HCC) [N18.3]  Yes   • Essential hypertension [I10]  Yes   • Type 2 diabetes mellitus without complication, without long-term current use of insulin (CMS/HCC) [E11.9]  Yes      Resolved Hospital Problems   No resolved problems to display.        Brief Hospital Course to date:  Joseph Rodriguez is a 74 y.o. male  admitted with sudden onset of dizziness, work-up is in progress.  Noted he is on chronic very high doses of an opiate regimen at home as well as Valium.  On 2/3/2020 he had chest pain associated with atrial fibrillation.    Chest pain with atrial fibrillation on 2/3/2020  -Cardiology consulted  -Currently rate controlled  -Continue medical management    Dizziness and Confusion  -Work-up in progress, at least multifactorial and exacerbated by chronic opiate and benzo use    Chronic opiate dependence  -Initial dose was reduced to half which was 100 mg, however the patient seemed to have decreased responsiveness after this dose was administered so I have decreased it further planning to resume 30 mg for the next dose.    Chronic benzo dependence  -Continue Valium as needed 3 times daily 5 mg per 1/2 usual home dose    Diabetes mellitus type 2 with hyperglycemia 465  -Started long-acting insulin, sliding scale as needed; he has not on home insulin    History of lung cancer    Coronary artery disease    Bilateral carotid artery stenosis  -77% on the right and 50% on the left using NASCET criteria  -Neurosurgery consulted and following    DVT Prophylaxis: Heparin    CODE STATUS:   Code Status and Medical Interventions:   Ordered at: 02/02/20 0624     Code Status:    CPR     Medical Interventions (Level of Support Prior to Arrest):    Full         Electronically signed by Beronica Gordillo MD, 02/03/20, 9:52 AM.

## 2020-02-03 NOTE — PLAN OF CARE
Problem: Patient Care Overview  Goal: Plan of Care Review  Outcome: Ongoing (interventions implemented as appropriate)  Flowsheets (Taken 2/3/2020 2782)  Outcome Summary: PT eval complete.  Pt presents with weakness and reduced mobility warranting skilled IPPT servicves.  Pt performs STS CGA of 2 with RW.  Pt ambulated 50'x2 with min A, close chair follow, and RW.  Intermittent knee buckle noted.  Coordination deficits present.  Difficulty comprehending cues at times.  Recommend DC home with family and home health/OP services when appropriate

## 2020-02-03 NOTE — CONSULTS
"Diabetes Education  Assessment/Teaching    Patient Name:  Joseph Rodriguez  YOB: 1945  MRN: [id number]  Admit Date:  2/2/2020      Assessment Date:  2/3/2020    Most Recent Value   General Information    Referral From:  A1c, Other (comment) [stroke protocol]   Height  177.8 cm (70\")   Height Method  Stated   Weight  120 kg (265 lb)   Weight Method  Bed scale   Pregnancy Assessment   Diabetes History   What type of diabetes do you have?  Type 2   Current DM knowledge  fair   Have you had diabetes education/teaching in the past?  no   Do you test your blood sugar at home?  yes   Frequency of checks  twice a day per wife   Meter type  [employer] brand   Who performs the test?  wife   Typical readings  \"over 200\"   How would you rate your diabetes control?  fair   Education Preferences   What areas of diabetes would you like to learn about?  avoiding high blood sugar, diet information, understanding diabetes, testing my blood sugar at home, resources on diabetes   Nutrition Information   Are you currently following a special meal plan?  never   Do you eat mostly at home or out of the house?  at home   What is the biggest challenge you have with your diet?  Portions, Knowledge   Assessment Topics   Healthy Eating - Assessment  Needs education   Being Active - Assessment  Needs education   Taking Medication - Assessment  Needs education   Problem Solving - Assessment  Needs education   Reducing Risk - Assessment  Needs education   Healthy Coping - Assessment  Needs education   Monitoring - Assessment  Needs education   DM Goals            Most Recent Value   DM Education Needs   Meter  Has own   Blood Glucose Target Range  per ADA recommendations   Medication  Oral   Problem Solving  Hypoglycemia, Hyperglycemia, Sick days, Signs, Symptoms, Treatment   Reducing Risks  A1C testing, Blood pressure, Lipids   Healthy Eating  Basic meal plan provided   Physical Activity Frequency  Discussed exercise importance " "  Healthy Coping  Other (comment) [pt drowsy, most conversation was with wife]   Discharge Plan  Follow-up with PCP   Teaching Method  Explanation, Discussion, Handouts   Patient Response  Needs reinforcement            Other Comments:  Saw Mr. Rodriguez at bedside for diabetes education. His wife was present during visit and provided most information. Mr. Rodriguez did fall asleep during visit. He does state that he takes oral medications and wonders if he should be on \"the shots\". Wife states that PCP has discussed insulin with she and pt, and has had her monitoring pt's blood sugars at home and providing results-she was supposed to provide results for the month of January and then PCP would make decision about insulin. She states that pt is willing to take insulin, and she is willing to learn how to give to him. She states he has memory issues and some confusion at home so she does help with all of his meds and monitoring his blood sugar. She states they have a store brand [employer] meter because insurance would not cover supplies for accucheck meter written for by PCP-she states pharmacist told her the prescription would have to be written differently by PCP. She confirms Mr. Rodriguez does have Medicare-Accucheck is preferred meter for medicare, will provide with voucher so that accucheck meter can be obtained at no cost-discussed w/ her to communicate with PCP or have pharmacist contact so that prescription can be modified-she verbalized understanding. Mrs. Rodriguez had many questions about diet-reports she and pt have not ever had any formal education about diabetes diet. Provided basic education about consistent carb diet and plate method. She has specific questions about fruit, serving sizes, and peanut butter. Pt and wife would benefit from dietician consult for further education on consistent carb diet. Discussed w/ wife pt's current A1c of 10.1 and it's significance, as well as ADA recommended target goal for A1c <7. " "Also discussed that A1c was 9.2 in October 2019. Stressed importance of ongoing follow up w/ PCP. Wife will need insulin teaching if pt is to go home on insulin.  She plans to send pt's blood sugars for January to PCP after pt is discharged from hospital.   Provided additional information in written materials, including [facility]'s \"What is diabetes\" handout, \"blood glucose goals\" handout, and \"what is A1c\" handout. Thank you for the consult!        Electronically signed by:  Jennifer Miles RN  02/03/20 11:34 AM  "

## 2020-02-03 NOTE — CONSULTS
Joseph Rodriguez  [id number]  1945   LOS: 1 day   Patient Care Team:  PHYSICIAN: Dandy Bailey MD  THORACIC SURGEON: Juan Pablo Osullivan MD  GASTROENTEROLOGIST: Jong Diop DO   PULMONOLOGIST: Kingsley Hodge MD,[facility]  ONCOLOGIST: Miles Bui MD  CARDIOLOGIST: Tasha Bennett MD  NEUROLOGIST: Chasity Ortiz MD  REMOTE CARDIOLOGIST: Emeterio Pagan MD / Juan Pablo Luna MD    Mr. Rodriguez is a 74-year-old  white male from [place], [place], retired .    Chief Complaint:  PAF    Problem List:  1. Paroxysmal atrial fibrillation  a. CHADsVasc 4, ASA prior to admission  b. Echocardiogram 4/9/2017: LVEF greater than 70%, LV cavity moderately dilated, RV moderately dilated, biatrial enlargement is seen, mild MR, mild TR, RVSP greater than 55 mmHg  c. Echocardiogram 2/2/2020: LA moderately dilated, mild MR, LVEF 60%, LV wall thickness consistent with mild concentric hypertrophy, normal RV wall thickness, systolic function and septal motion noted with RV cavity moderately dilated, LV diastolic dysfunction abnormal consistent with age, saline test results negative, aortic valve exhibits mild to moderate sclerosis, no pulmonary hypertension, no pericardial effusion  d. Recurrent asymptomatic PAF  2. Hypertension  3. Pulmonary hypertension  4. Hyperlipidemia; on statin therapy  5. Type 2 diabetes mellitus hemoglobin A1c 10.1% February 2020  6. GERD  7. Moderate obesity: BMI 38.02  8. Obstructive sleep apnea, noncompliant with CPAP  9. 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 Anxiety and depression  11. BPH  12. COPD with remote tobacco use  13. 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 Dementia  15. Chronic ischemic heart disease             A. left heart  catheterization approximately 1990s with stent placement x3-data deficit             B. left heart catheterization July 2015 with patent stents to LAD and PTCA to the ramus for in-stent restenosis-data deficit             C. Residual class I symptoms on limited activity  16. Surgical history:  a. Right thoracotomy and resection of right upper lobe and right lower lobe  b. Bilateral knee replacements  c. Left ankle surgery      Allergies   Allergen Reactions   • Atorvastatin    • Gabapentin Dizziness   • Sulfa Antibiotics      Medications Prior to Admission   Medication Sig Dispense Refill Last Dose   • citalopram (CeleXA) 20 MG tablet Take 20 mg by mouth Daily.      • cyclobenzaprine (FLEXERIL) 10 MG tablet Take 10 mg by mouth 2 (Two) Times a Day As Needed for Muscle Spasms.      • raNITIdine (ZANTAC) 150 MG tablet Take 150 mg by mouth 2 (Two) Times a Day.      • sulfamethoxazole-trimethoprim (BACTRIM,SEPTRA) 400-80 MG tablet Take 1 tablet by mouth Daily.      • albuterol sulfate  (90 Base) MCG/ACT inhaler Inhale 2 puffs Every 4 (Four) Hours As Needed for Wheezing. 6.7 g 11 Taking   • aspirin 81 MG EC tablet Take 81 mg by mouth Daily.   Taking   • budesonide-formoterol (SYMBICORT) 160-4.5 MCG/ACT inhaler Inhale 2 puffs 2 (Two) Times a Day. 1 inhaler 11    • bumetanide (BUMEX) 1 MG tablet Take 1 mg by mouth Daily.  5 Taking   • clopidogrel (PLAVIX) 75 MG tablet Take 1 tablet by mouth Daily. 90 tablet 0 Taking   • Coenzyme Q10 (CO Q 10) 100 MG capsule Take 200 mg by mouth Daily.   Taking   • diazepam (VALIUM) 10 MG tablet Take 1 tablet by mouth 4 (Four) Times a Day.   Taking   • docusate sodium (COLACE) 100 MG capsule Take 100 mg by mouth 2 (Two) Times a Day.   Taking   • donepezil (ARICEPT) 10 MG tablet Take 10 mg by mouth Every Night.   Taking   • doxazosin (CARDURA) 4 MG tablet TAKE 1 TABLET BY MOUTH DAILY  5 Taking   • finasteride (PROSCAR) 5 MG tablet Take 5 mg by mouth Daily.   Taking   • glipizide  "(GLUCOTROL) 5 MG tablet Take 0.5 tablets by mouth 2 (Two) Times a Day Before Meals. (Patient taking differently: Take 10 mg by mouth 2 (Two) Times a Day Before Meals.) 30 tablet 2 Taking   • linagliptin (TRADJENTA) 5 MG tablet tablet Take 5 mg by mouth daily.   Taking   • melatonin 5 MG tablet tablet Take 5 mg by mouth Every Night.   Taking   • morphine (MS CONTIN) 100 MG 12 hr tablet Take 200 mg by mouth 2 (Two) Times a Day.   Taking   • nitroglycerin (NITROSTAT) 0.4 MG SL tablet DISSOLVE 1 TABLET(S) UNDER THE TONGUE MAY REPEAT EVERY 5 MINUTES. MAXIMUM OF 3 DOSES IN 15 MINUTES  5 Taking   • pantoprazole (PROTONIX) 40 MG EC tablet Take 40 mg by mouth 2 (Two) Times a Day.   Taking   • pharmacy consult - MTM Daily.   Taking   • PHARMACY MEDS TO BED CONSULT Daily.   Taking   • pravastatin (PRAVACHOL) 80 MG tablet Take 80 mg by mouth Daily.   Taking     Scheduled Meds:  aspirin 81 mg Oral Daily   budesonide-formoterol 2 puff Inhalation BID   bumetanide 1 mg Oral Daily   clopidogrel 75 mg Oral Daily   docusate sodium 100 mg Oral BID   donepezil 10 mg Oral Nightly   finasteride 5 mg Oral Daily   heparin (porcine) 5,000 Units Subcutaneous Q8H   insulin lispro 0-9 Units Subcutaneous 4x Daily With Meals & Nightly   meclizine 25 mg Oral Q8H   melatonin 5 mg Oral Nightly   pantoprazole 40 mg Oral Q AM   rosuvastatin 40 mg Oral Nightly   sodium chloride 10 mL Intravenous Q12H   terazosin 5 mg Oral Nightly        History of Present Illness:   This is a 74-year-old white male who [facility] to [facility] 2/2/2020 with complaints of dizziness and inability to stand up without assistance.  He denied any visual changes, headache, difficulties with speech, numbness, tingling, or weakness.  He denied vertigo but felt that his legs were weak and he felt off balance and had fallen several times this week because his knees \"gave out\" on him.  While in the ED, CT of the head and chest x-ray were negative and CTA of the head/neck with no " intracranial or extracranial arterial flow-limiting stenosis or aneurysm.  His ECG demonstrates sinus rhythm with first-degree AV block and had a MRI of the brain with no acute intracranial findings with left-sided mastoid effusion with fluid in the left middle ear cavity and trace effusion on the right.  When speaking with the patient this morning, he is confused.  He is unable to give an accurate review of systems and is not sure why he is in the hospital currently.  He denies any chest pain, shortness of breath, dizziness, presyncope, syncope, or palpitations.  He cannot tell me whether he had had chemotherapy or radiation with his lung cancer in the past and contradicted himself multiple times when asked different questions.  He denies any prior MIs, CVAs, TIAs, seizures, DVTs, or PEs.  He believes that he has had atrial fibrillation in the past but was never on anticoagulation and does not have a cardiologist.  The patient is on 400 mg of morphine and 40 mg of Valium daily which may be contributing to his dizziness.  I was able to speak with his wife who reports that the patient has a fair amount of dementia and will sometimes not recognize their children and he is supposed to have an appointment with a neurologist in [place] named Chasity Ortiz MD on 2/5/2020.  He is working with his physician regarding his uncontrolled type 2 diabetes mellitus and they are recording his glucose levels in a log right now.  On limited activity, he currently denies anginal type chest discomfort, TIA, claudication, or CHF symptoms.  He is unaware of his intermittent rate controlled atrial fibrillation.    Cardiac risk factors: advanced age (older than 55 for men, 65 for women), diabetes mellitus, dyslipidemia, hypertension, male gender, obesity (BMI >= 30 kg/m2) and sedentary lifestyle.    Social History     Socioeconomic History   • Marital status:      Spouse name: Not on file   • Number of children: Not on file   • Years  "of education: Not on file   • Highest education level: Not on file   Tobacco Use   • Smoking status: Former Smoker     Packs/day: 1.00     Types: Cigarettes     Last attempt to quit:      Years since quittin.1   • Smokeless tobacco: Never Used   Substance and Sexual Activity   • Alcohol use: No   • Drug use: No   • Sexual activity: Defer   Social History Narrative    , lives with wife. Denies home oxygen, hh or dme.       Family History   Problem Relation Age of Onset   • Diabetes Mother    • Breast cancer Sister         60s   • Colon cancer Sister         59   • Skin cancer Sister 45   • Cancer Sister    • Diabetes Other    • Aneurysm Father    • Heart attack Brother    • Heart disease Brother    • Heart disease Brother    • Diabetes Brother    • Diabetes Brother        Review of Systems  10 point review of systems was completed, positives outlined in the HPI, and otherwise all other systems are negative.      Objective:       Physical Exam  /74 (BP Location: Left arm, Patient Position: Lying)   Pulse 67   Temp 97.7 °F (36.5 °C) (Oral)   Resp 18   Ht 177.8 cm (70\")   Wt 120 kg (265 lb)   SpO2 100%   BMI 38.02 kg/m²       20  0700 20  0941   Weight: 121 kg (265 lb 11.2 oz) 120 kg (265 lb)     Body mass index is 38.02 kg/m².    Intake/Output Summary (Last 24 hours) at 2/3/2020 0919  Last data filed at 2/3/2020 0100  Gross per 24 hour   Intake --   Output 2200 ml   Net -2200 ml       General Appearance:  Alert, cooperative, no distress, appears stated age   Head:  Normocephalic, without obvious abnormality, atraumatic   Neck: Supple, symmetrical, trachea midline, no adenopathy, thyroid: not enlarged, symmetric, no tenderness/mass/nodules, no carotid bruit or JVD   Lungs:   Clear to auscultation bilaterally, respirations unlabored, 2 L o2 NC   Heart:  Sinus rhythm, S1, S2 normal, grade 2/6 murmur with radiation to the carotids, no rub or gallop   Abdomen:   Soft, non-tender, no " masses, no organomegaly, bowel sounds audible x4   Extremities: No edema, normal range of motion   Pulses: 2+ and symmetric   Skin: Skin color, texture, turgor normal, no rashes or lesions   Neurologic: Confused       · Cardiographics:    · EKG 2/3/2020:    Atrial fibrillation  Abnormal ECG  When compared with ECG of 02-FEB-2020 05:13, (Unconfirmed)  Atrial fibrillation has replaced Sinus rhythm    · Echocardiogram 2/2/2020:    · Left atrial cavity size is moderately dilated.  · Mild mitral valve regurgitation is present.  · Left ventricular wall thickness is consistent with mild concentric hypertrophy.  · Estimated EF = 60%.  · Left ventricular systolic function is normal.  · Normal right ventricular wall thickness, systolic function and septal motion noted with rght ventricular cavity moderately dilated.  · Left ventricular diastolic dysfunction is abnormal consistent with: age.  · Saline test results are negative.  · The aortic valve exhibits mild-moderate sclerosis.  · No evidence of pulmonary hypertension is present.  · There is no evidence of pericardial effusion.  · Technically difficult and limited study.    · Imaging:    · Chest x-ray 2/2/2020:     1. No active disease.  2. Postoperative changes right lung base    · CT head 2/2/2020:    1.  No acute intracranial abnormality.  2.  Stable diffuse chronic changes as noted above.  3.  Complete fluid opacification of the left mastoid sinus air spaces, unchanged.  4.  No change since 4/19/2017    · CTA head/neck 2/2/2020:    1.  No intracranial or extracranial arterial flow limiting stenosis or aneurysm.  2.  Bilateral carotid bulb stenosis measuring 77% on the right and 50% on the left using NASCET criteria.    · MRI brain 2/2/2020:    1.  No acute intracranial findings. No convincing evidence of metastatic disease on this noncontrasted exam.     2.  Cerebral atrophy and white matter microvascular disease similar to prior study of February 3, 2017.     3.   Left-sided mastoid effusion with fluid in the left middle ear cavity. Trace effusion on the right side. These findings were present on the prior exam appear improved on the right side.    Lab Review:     Results from last 7 days   Lab Units 02/03/20  0435 02/02/20  0500  02/02/20  0445   SODIUM mmol/L 134* 138  --   --    POTASSIUM mmol/L 3.6 3.3*  --   --    CHLORIDE mmol/L 94* 95*  --   --    CO2 mmol/L 24.0 28.0  --   --    BUN mg/dL 19 21  --   --    CREATININE mg/dL 1.79* 1.81*  --  1.90*   GLUCOSE mg/dL 219* 318*   < >  --    CALCIUM mg/dL 8.1* 8.2*  --   --     < > = values in this interval not displayed.     Results from last 7 days   Lab Units 02/03/20  0435 02/02/20  0805 02/02/20  0500   WBC 10*3/mm3 4.98 5.68 6.07   HEMOGLOBIN g/dL 10.1* 10.0* 10.6*   HEMATOCRIT % 34.4* 31.6* 33.3*   PLATELETS 10*3/mm3 126* 140 148     Results from last 7 days   Lab Units 02/02/20  0500   CHOLESTEROL mg/dL 143   TRIGLYCERIDES mg/dL 132   HDL CHOL mg/dL 38*   LDL CHOL mg/dL 79     Results from last 7 days   Lab Units 02/03/20  0857   HEMOGLOBIN A1C % 10.10*     Results from last 7 days   Lab Units 02/03/20  0600 02/02/20  0500   TROPONIN T ng/mL <0.010 <0.010         Assessment:   Patient with asymptomatic PAF in the setting of uncontrolled Type 2 diabetes, GAGE (noncompliant with  CPAP), confusion, and marginal renal function. He is confused and no accurate ROS is able to be obtained. The patient is on 400 mg of morphine and 40 mg of Valium daily which may be contributing to his dizziness. We would be watchful for benzo/opioid withdrawal.  The patient has an upcoming neurology appointment with Chasity Ortiz MD in [place], [place] on 2/5/20 for dementia.  He appears to have significant underlying cognitive dysfunction.  He needs outpatient follow-up of his atrial fibrillation and does  need to be on low-dose NOAC.  He has intermittently followed with Dr. Tasha Bennett for his cardiac issues per wife's history and he and his  wife think that he was last evaluated during 2019; data deficit.  While in his room during examination he returned to normal sinus rhythm spontaneously.  Finally, he is eager for discharge home today.     Plan:   1. Eliquis 2.5mg bid and consider altering dual antiplatelet therapy to clopidogrel 75 mg only.  2. BMP, ECG in morning  3. Defer GUERLINE/ECV/MPS/LHC/EPS consult at this time  4. Hospitalists to address acute confusion and uncontrolled T2DM  5. Consider outpatient follow-up with Dr. Tasha Bennett including event monitor and decision about possible suppression of intermittent asymptomatic rate controlled atrial fibrillation with formal antiarrhythmic therapy.  6. Needs consideration of outpatient nephrology evaluation and follow-up in view of probable progressive diabetic nephropathy from uncontrolled diabetes mellitus.    Discussed with patient and wife in room.    Scribed for Mahamed Thompson MD by YIMI Estrada. 2/3/2020  9:59 AM     I have seen and examined the patient, case was discussed with the physician extender, reviewed the above note, necessary changes were made and I agree with the final note.     Mahamed Thompson MD [facility]

## 2020-02-03 NOTE — PROGRESS NOTES
"NEUROSURGERY PROGRESS NOTE    Interval History:   Patient's dizziness and vertigo symptoms have resolved.  He denies any signs or symptoms of stroke.    Vital Signs  Blood pressure 144/91, pulse 75, temperature 98.3 °F (36.8 °C), temperature source Oral, resp. rate 18, height 177.8 cm (70\"), weight 120 kg (265 lb), SpO2 94 %.    Physical Exam:  Patient is sitting up in bed in no apparent distress  He is awake, alert and oriented  Cranial nerves II through XII grossly intact  Moves all extremities to command with good strength  He is conversant appropriate.  Speech is     Results Review:    I/O last 3 completed shifts:  In: -   Out: 2200 [Urine:2200]  I/O this shift:  In: -   Out: 1000 [Urine:1000]      Assessment/Plan:   Bilateral carotid stenosis  Carotid ultrasound reviewed with Dr. Robles and demonstrates moderate bilateral carotid stenosis.    Recommend discharge on dual antiplatelet therapy and high-dose statin.  Patient should follow-up in our office with a repeat carotid ultrasound in 1 year.    Signs and symptoms of stroke were discussed at length with the patient and his wife  He may be discharged home from a neuro interventional standpoint when medically ready    Gretta Rodriguez PA-C  02/03/20  3:10 PM    "

## 2020-02-03 NOTE — PROGRESS NOTES
Discharge Planning Assessment  [facility]     Patient Name: Joseph Rodriguez  MRN: [id number]  Today's Date: 2/3/2020    Admit Date: 2/2/2020    Discharge Needs Assessment     Row Name 02/03/20 1839       Living Environment    Lives With  spouse    Name(s) of Who Lives With Patient  Yvette Rodriguez (spouse) [phone]    Current Living Arrangements  home/apartment/condo    Primary Care Provided by  self    Provides Primary Care For  no one    Family Caregiver if Needed  spouse    Family Caregiver Names  Yvette Rodriguez (spouse) [phone]    Quality of Family Relationships  helpful;involved;supportive    Able to Return to Prior Arrangements  yes       Resource/Environmental Concerns    Resource/Environmental Concerns  none       Transition Planning    Patient/Family Anticipates Transition to  home with help/services    Patient/Family Anticipated Services at Transition      Transportation Anticipated  family or friend will provide       Discharge Needs Assessment    Readmission Within the Last 30 Days  no previous admission in last 30 days    Concerns to be Addressed  discharge planning    Equipment Currently Used at Home  shower chair;walker, standard;glucometer    Anticipated Changes Related to Illness  none    Equipment Needed After Discharge  none    Outpatient/Agency/Support Group Needs  homecare agency    Discharge Facility/Level of Care Needs  home with home health    Provided post acute provider list?  Yes    Post Acute Provider List  Home Health    Post Acute Provider Quality & Resource List  Yes    Delivered To  Patient    Method of Delivery  In person    Patient's Choice of Community Agency(s)  [facility] home health        Discharge Plan     Row Name 02/03/20 4045       Plan    Plan  Home with home health    Patient/Family in Agreement with Plan  yes    Plan Comments  Spoke with patient's wife at bedside.  Wife states that she and patient live in [facility].  She states that patient is at  "baseline independent with bathing and dressing.  She assist with medications and meals as needed.  She states that patient has been \"forgetful\" in the last few months.  She states patient can still drive, but she mostly drives to appoinments and to stores.  She states that patient has a shower chair and glucometer.  They have a rolling walker at home if needed.  He is not current with home health or services, but states that they would like home health arranged for discharge with [facility] in [facility].  She states that his PCP is Candelario Bailey MD and insurance provider is [facility] [employer] [facility].  She states that patient has prescription drug coverage and he is able to afford his medications.  Plans to discharge home with wife when medically able.  Will continue to follow for discharge planning.      Final Discharge Disposition Code  06 - home with home health care        Destination      Coordination has not been started for this encounter.      Durable Medical Equipment      Coordination has not been started for this encounter.      Dialysis/Infusion      Coordination has not been started for this encounter.      Home Medical Care      Coordination has not been started for this encounter.      Therapy      Coordination has not been started for this encounter.      Community Resources      Coordination has not been started for this encounter.        Expected Discharge Date and Time     Expected Discharge Date Expected Discharge Time    Feb 6, 2020         Demographic Summary     Row Name 02/03/20 5650       General Information    Admission Type  observation    Arrived From  home    Referral Source  admission list    Reason for Consult  discharge planning    Preferred Language  English     Used During This Interaction  no    General Information Comments  PCP:  Candelario Bailey MD confirmed with wife        Functional Status     Row Name 02/03/20 0694       Functional Status    Usual Activity Tolerance  moderate    " Current Activity Tolerance  moderate       Functional Status, IADL    Medications  assistive person    Meal Preparation  assistive person    Housekeeping  assistive person    Laundry  assistive person    Shopping  assistive person        Psychosocial    No documentation.       Abuse/Neglect    No documentation.       Legal    No documentation.       Substance Abuse    No documentation.       Patient Forms    No documentation.           Viri Santillan RN

## 2020-02-03 NOTE — PLAN OF CARE
Problem: Patient Care Overview  Goal: Plan of Care Review  Outcome: Ongoing (interventions implemented as appropriate)  Flowsheets (Taken 2/3/2020 2834)  Outcome Summary: OT eval complete. Pt demonstrates decreased balance and overall gross motor coordination. Pt completes sit to stand with CGA and ambulates with CGA and RW. Pt completed toileting independently and required Chante for LBD. Recommend d/c home with assist from family and OP therapy services.

## 2020-02-03 NOTE — SIGNIFICANT NOTE
Event: RR called for CP @ 6 AM  Rated 8/10, substernal to right chest  Nitro SL given x 3 with improvement to 2/10  EKG showing NSR w/ PACs  Stat labs obtained   Serial troponins/EKGs ordered    Follow up on labs, consider cardiology evaluation

## 2020-02-03 NOTE — THERAPY EVALUATION
Patient Name: Joseph Rodriguez  : 1945    MRN: [id number]                              Today's Date: 2/3/2020       Admit Date: 2020    Visit Dx:     ICD-10-CM ICD-9-CM   1. Dizziness R42 780.4   2. Carotid stenosis, asymptomatic, bilateral I65.23 433.10     433.30   3. Truncal ataxia R27.8 781.3   4. Impaired mobility and ADLs Z74.09 799.89     Patient Active Problem List   Diagnosis   • Carcinoma of right lung (CMS/HCC)   • Anemia   • Acute tubular necrosis (CMS/HCC)   • B12 deficiency   • Monoclonal gammopathy   • CKD (chronic kidney disease), stage III (CMS/HCC)   • Lung mass   • Type 2 diabetes mellitus without complication, without long-term current use of insulin (CMS/HCC)   • Essential hypertension   • Gastroesophageal reflux disease with esophagitis   • Chronic obstructive pulmonary disease (CMS/HCC)   • Lung nodule   • Sleep-disordered breathing   • Coronary artery disease   • Obesity (BMI 30-39.9)   • History of lung cancer   • Frequent falls   • Dizziness   • BPH (benign prostatic hyperplasia)     Past Medical History:   Diagnosis Date   • Acute kidney injury (CMS/HCC)    • Acute tubular necrosis (CMS/HCC)    • Anemia    • Arthritis    • B12 deficiency    • Bone pain    • Carcinoma of lung (CMS/HCC)     stage IA   • Chronic kidney disease (CKD), stage III (moderate) (CMS/HCC)    • Confusion, postoperative    • COPD (chronic obstructive pulmonary disease) (CMS/HCC)    • Coronary artery disease s/p stent    • Depression    • Diabetes mellitus (CMS/HCC)    • Fatigue    • Fractures    • GERD (gastroesophageal reflux disease)    • H/O colonoscopy 2016   • History of BPH    • Hyperlipidemia    • Hypertension    • Impotence    • Kidney stones    • Leaking of urine    • Lung cancer (CMS/HCC) 2016   • Lung mass    • Obesity    • Peptic ulcer disease    • Post-thoracotomy pain, mild      Past Surgical History:   Procedure Laterality Date   • ANKLE SURGERY     • BACK SURGERY     • BONE MARROW  BIOPSY     • BRONCHOSCOPY N/A 4/13/2017    Procedure: BRONCHOSCOPY WITH ENDOBRONCHIAL ULTRASOUND;  Surgeon: Kingsley Hodge MD;  Location: [facility] ENDOSCOPY;  Service:    • CAROTID STENT  1991   • CORONARY STENT PLACEMENT     • KIDNEY STONE SURGERY  1976   • KNEE SURGERY Bilateral 2010   • LUNG REMOVAL, PARTIAL Right     Right upper and lower lobe wedge resections (2016)   • THORACOTOMY      RT THORACOTOMY.  WIDE WEDGE EXCISION OG RT UPPER LOBE. WIDE WEDGE EXCISION OF RIGHT LOWER LOBE.     General Information     Row Name 02/03/20 0950          PT Evaluation Time/Intention    Document Type  evaluation  -AA     Mode of Treatment  physical therapy  -AA     Row Name 02/03/20 0950          General Information    Patient Profile Reviewed?  yes  -AA     Prior Level of Function  independent:;all household mobility;gait;transfer;bed mobility;ADL's;community mobility  -AA     Existing Precautions/Restrictions  fall  -AA     Barriers to Rehab  previous functional deficit  -AA     Row Name 02/03/20 0950          Relationship/Environment    Lives With  spouse  -AA     Row Name 02/03/20 0950          Resource/Environmental Concerns    Current Living Arrangements  home/apartment/condo  -AA     Row Name 02/03/20 0950          Home Main Entrance    Number of Stairs, Main Entrance  one  -AA     Stair Railings, Main Entrance  none  -AA     Row Name 02/03/20 0950          Stairs Within Home, Primary    Number of Stairs, Within Home, Primary  none  -AA     Stairs Comment, Within Home, Primary  pt does not use second story  -AA     Row Name 02/03/20 0950          Cognitive Assessment/Intervention- PT/OT    Orientation Status (Cognition)  oriented x 3;verbal cues/prompts needed for orientation  -AA     Cognitive Assessment/Intervention Comment  slow processing and increased time to answer  -AA     Row Name 02/03/20 0950          Safety Issues, Functional Mobility    Safety Issues Affecting Function (Mobility)  awareness of  need for assistance;insight into deficits/self awareness;judgment;problem solving;safety precaution awareness;safety precautions follow-through/compliance;sequencing abilities  -AA     Impairments Affecting Function (Mobility)  balance;strength;coordination;cognition;endurance/activity tolerance  -AA       User Key  (r) = Recorded By, (t) = Taken By, (c) = Cosigned By    Initials Name Provider Type    AA Tish Saab, PT Physical Therapist        Mobility     Row Name 02/03/20 0950          Bed Mobility Assessment/Treatment    Comment (Bed Mobility)  pt UIC  -AA     Row Name 02/03/20 0950          Transfer Assessment/Treatment    Comment (Transfers)  VC for push off with BUE and foot placement.  STS x4 from recliner.  No knee buckle during transfer  -AA     Row Name 02/03/20 0950          Sit-Stand Transfer    Sit-Stand [place] (Transfers)  contact guard;2 person assist;verbal cues  -AA     Assistive Device (Sit-Stand Transfers)  walker, front-wheeled  -AA     Row Name 02/03/20 0950          Gait/Stairs Assessment/Training    Gait/Stairs Assessment/Training  gait/ambulation independence;gait/ambulation assistive device;distance ambulated;gait pattern;gait deviations  -AA     [place] Level (Gait)  minimum assist (75% patient effort);1 person assist;1 person to manage equipment;verbal cues;nonverbal cues (demo/gesture)  -AA     Assistive Device (Gait)  walker, front-wheeled  -AA     Distance in Feet (Gait)  50'x2  -AA     Pattern (Gait)  step-through  -AA     Deviations/Abnormal Patterns (Gait)  bilateral deviations;florinda decreased;steppage;stride length decreased  -AA     Bilateral Gait Deviations  forward flexed posture;heel strike decreased;knee buckling, bilateral  -AA     Comment (Gait/Stairs)  Pt ambulated with RW and min A of 1 with close chair follow.  Pt required cues for safety and proper stepping.  VC for posture.  Intermittent B knee buckle noted.  Tremors in BUE noted with pt unaware of  -AA        User Key  (r) = Recorded By, (t) = Taken By, (c) = Cosigned By    Initials Name Provider Type    Tish Kramer PT Physical Therapist        Obj/Interventions     Row Name 02/03/20 0950          General ROM    GENERAL ROM COMMENTS  BLE WNL  -AA     Row Name 02/03/20 0950          MMT (Manual Muscle Testing)    General MMT Comments  BLE 4+/5  -AA     Row Name 02/03/20 0950          Static Sitting Balance    Level of [place] (Unsupported Sitting, Static Balance)  standby assist  -AA     Sitting Position (Unsupported Sitting, Static Balance)  sitting in chair  -AA     Time Able to Maintain Position (Unsupported Sitting, Static Balance)  more than 5 minutes  -AA     Comment (Unsupported Sitting, Static Balance)  no back support  -AA     Row Name 02/03/20 0950          Static Standing Balance    Level of [place] (Supported Standing, Static Balance)  contact guard assist;2 person assist  -AA     Time Able to Maintain Position (Supported Standing, Static Balance)  1 to 2 minutes  -AA     Assistive Device Utilized (Supported Standing, Static Balance)  walker, rolling  -     Row Name 02/03/20 0950          Dynamic Standing Balance    Level of [place], Reaches Outside Midline (Standing, Dynamic Balance)  minimal assist, 75% patient effort;1 person to manage equipment;1 person assist  -AA     Time Able to Maintain Position, Reaches Outside Midline (Standing, Dynamic Balance)  3 to 4 minutes  -AA     Assistive Device Utilized (Supported Standing, Dynamic Balance)  walker, rolling  -AA     Row Name 02/03/20 0950          Sensory Assessment/Intervention    Sensory General Assessment  no sensation deficits identified  -AA       User Key  (r) = Recorded By, (t) = Taken By, (c) = Cosigned By    Initials Name Provider Type    Tish Kramer PT Physical Therapist        Goals/Plan     Row Name 02/03/20 0950          Bed Mobility Goal 1 (PT)    Activity/Assistive Device (Bed Mobility Goal 1, PT)  bed  mobility activities, all  -AA     [place] Level/Cues Needed (Bed Mobility Goal 1, PT)  conditional independence  -AA     Time Frame (Bed Mobility Goal 1, PT)  10 days  -AA     Row Name 02/03/20 0950          Transfer Goal 1 (PT)    Activity/Assistive Device (Transfer Goal 1, PT)  sit-to-stand/stand-to-sit;bed-to-chair/chair-to-bed  -AA     [place] Level/Cues Needed (Transfer Goal 1, PT)  supervision required  -AA     Time Frame (Transfer Goal 1, PT)  10 days  -AA     Row Name 02/03/20 0950          Gait Training Goal 1 (PT)    Activity/Assistive Device (Gait Training Goal 1, PT)  gait (walking locomotion);assistive device use  -AA     [place] Level (Gait Training Goal 1, PT)  supervision required  -AA     Distance (Gait Goal 1, PT)  150'  -AA     Time Frame (Gait Training Goal 1, PT)  10 days  -AA     Row Name 02/03/20 0950          Stairs Goal 1 (PT)    Activity/Assistive Device (Stairs Goal 1, PT)  stairs, all skills  -AA     [place] Level/Cues Needed (Stairs Goal 1, PT)  contact guard assist  -AA     Number of Stairs (Stairs Goal 1, PT)  1  -AA     Time Frame (Stairs Goal 1, PT)  10 days  -AA       User Key  (r) = Recorded By, (t) = Taken By, (c) = Cosigned By    Initials Name Provider Type    Tish Kramer, PT Physical Therapist        Clinical Impression     Row Name 02/03/20 0950          Pain Assessment    Additional Documentation  Pain Scale: Numbers Pre/Post-Treatment (Group)  -     Row Name 02/03/20 0950          Pain Scale: Numbers Pre/Post-Treatment    Pain Scale: Numbers, Pretreatment  0/10 - no pain  -AA     Pain Scale: Numbers, Post-Treatment  0/10 - no pain  -AA     Row Name 02/03/20 0950          Plan of Care Review    Plan of Care Reviewed With  patient;spouse  -AA     Progress  improving  -     Row Name 02/03/20 0950          Physical Therapy Clinical Impression    Criteria for Skilled Interventions Met (PT Clinical Impression)  yes;treatment indicated  -AA     Rehab  Potential (PT Clinical Summary)  good, to achieve stated therapy goals  -AA     Predicted Duration of Therapy (PT)  10 days  -AA     Row Name 02/03/20 0950          Vital Signs    Pre Systolic BP Rehab  163  -AA     Pre Treatment Diastolic BP  80  -AA     Post Systolic BP Rehab  140  -AA     Post Treatment Diastolic BP  76  -AA     Pre Patient Position  Sitting  -AA     Intra Patient Position  Standing  -AA     Post Patient Position  Sitting  -AA     Row Name 02/03/20 0950          Positioning and Restraints    Pre-Treatment Position  sitting in chair/recliner  -AA     Post Treatment Position  chair  -AA     In Chair  exit alarm on;notified nsg;reclined;with family/caregiver;call light within reach;encouraged to call for assist  -AA       User Key  (r) = Recorded By, (t) = Taken By, (c) = Cosigned By    Initials Name Provider Type    Tish Kramer, PT Physical Therapist        Outcome Measures     Row Name 02/03/20 0950          How much help from another person do you currently need...    Turning from your back to your side while in flat bed without using bedrails?  3  -AA     Moving from lying on back to sitting on the side of a flat bed without bedrails?  3  -AA     Moving to and from a bed to a chair (including a wheelchair)?  3  -AA     Standing up from a chair using your arms (e.g., wheelchair, bedside chair)?  3  -AA     Climbing 3-5 steps with a railing?  2  -AA     To walk in hospital room?  2  -AA     AM-PAC 6 Clicks Score (PT)  16  -AA     Row Name 02/03/20 0950          Modified Bebe Scale    Modified [place]aine Scale  4 - Moderately severe disability.  Unable to walk without assistance, and unable to attend to own bodily needs without assistance.  -AA     Row Name 02/03/20 0950          Functional Assessment    Outcome Measure Options  Modified [place]aine;AM-PAC 6 Clicks Basic Mobility (PT)  -AA       User Key  (r) = Recorded By, (t) = Taken By, (c) = Cosigned By    Initials Name Provider Type    PATRICE  Tish Saab PT Physical Therapist          PT Recommendation and Plan  Planned Therapy Interventions (PT Eval): balance training, bed mobility training, gait training, home exercise program, neuromuscular re-education, patient/family education, stair training, transfer training, strengthening  Outcome Summary/Treatment Plan (PT)  Anticipated Discharge Disposition (PT): home with assist, home with home health  Plan of Care Reviewed With: patient, spouse  Progress: improving  Outcome Summary: PT eval complete.  Pt presents with weakness and reduced mobility warranting skilled IPPT servicves.  Pt performs STS CGA of 2 with RW.  Pt ambulated 50'x2 with min A, close chair follow, and RW.  Intermittent knee buckle noted.  Coordination deficits present.  Difficulty comprehending cues at times.  Recommend DC home with family and home health/OP services when appropriate     Time Calculation:   PT Charges     Row Name 02/03/20 0950             Time Calculation    Start Time  0950  -AA      PT Received On  02/03/20  -      PT Goal Re-Cert Due Date  02/13/20  -AA        User Key  (r) = Recorded By, (t) = Taken By, (c) = Cosigned By    Initials Name Provider Type    AA Tish Saab PT Physical Therapist        Therapy Charges for Today     Code Description Service Date Service Provider Modifiers Qty    79932832083 HC PT EVAL MOD COMPLEXITY 4 2/3/2020 Tish Saab, PT GP 1    07499973495 HC PT THER SUPP EA 15 MIN 2/3/2020 Tish Saab, PT GP 2          PT G-Codes  Outcome Measure Options: Modified Bebe, AM-PAC 6 Clicks Basic Mobility (PT)  AM-PAC 6 Clicks Score (PT): 16  AM-PAC 6 Clicks Score (OT): 21  Modified Turner Scale: 4 - Moderately severe disability.  Unable to walk without assistance, and unable to attend to own bodily needs without assistance.    Tish Saab PT  2/3/2020

## 2020-02-03 NOTE — THERAPY EVALUATION
Acute Care - Speech Language Pathology Initial Evaluation  [facility] Cognitive-Communication Evaluation       Patient Name: Joseph Rodriguez  : 1945  MRN: [id number]  Today's Date: 2/3/2020  Onset of Illness/Injury or Date of Surgery: 20     Referring Physician: MD Justin       Admit Date: 2020     Visit Dx:    ICD-10-CM ICD-9-CM   1. Dizziness R42 780.4   2. Carotid stenosis, asymptomatic, bilateral I65.23 433.10     433.30   3. Truncal ataxia R27.8 781.3   4. Impaired mobility and ADLs Z74.09 799.89   5. Cognitive communication deficit R41.841 799.52     Patient Active Problem List   Diagnosis   • Carcinoma of right lung (CMS/HCC)   • Anemia   • Acute tubular necrosis (CMS/HCC)   • B12 deficiency   • Monoclonal gammopathy   • CKD (chronic kidney disease), stage III (CMS/HCC)   • Lung mass   • Type 2 diabetes mellitus without complication, without long-term current use of insulin (CMS/HCC)   • Essential hypertension   • Gastroesophageal reflux disease with esophagitis   • Chronic obstructive pulmonary disease (CMS/HCC)   • Lung nodule   • Sleep-disordered breathing   • Coronary artery disease   • Obesity (BMI 30-39.9)   • History of lung cancer   • Frequent falls   • Dizziness   • BPH (benign prostatic hyperplasia)     Past Medical History:   Diagnosis Date   • Acute kidney injury (CMS/HCC)    • Acute tubular necrosis (CMS/HCC)    • Anemia    • Arthritis    • B12 deficiency    • Bone pain    • Carcinoma of lung (CMS/HCC)     stage IA   • Chronic kidney disease (CKD), stage III (moderate) (CMS/HCC)    • Confusion, postoperative    • COPD (chronic obstructive pulmonary disease) (CMS/HCC)    • Coronary artery disease s/p stent    • Depression    • Diabetes mellitus (CMS/HCC)    • Fatigue    • Fractures    • GERD (gastroesophageal reflux disease)    • H/O colonoscopy 2016   • History of BPH    • Hyperlipidemia    • Hypertension    • Impotence    • Kidney stones    • Leaking of urine    • Lung  cancer (CMS/HCC) 01/2016   • Lung mass    • Obesity    • Peptic ulcer disease    • Post-thoracotomy pain, mild      Past Surgical History:   Procedure Laterality Date   • ANKLE SURGERY     • BACK SURGERY     • BONE MARROW BIOPSY     • BRONCHOSCOPY N/A 4/13/2017    Procedure: BRONCHOSCOPY WITH ENDOBRONCHIAL ULTRASOUND;  Surgeon: Kingsley Hodge MD;  Location[facility] ENDOSCOPY;  Service:    • CAROTID STENT  1991   • CORONARY STENT PLACEMENT     • KIDNEY STONE SURGERY  1976   • KNEE SURGERY Bilateral 2010   • LUNG REMOVAL, PARTIAL Right     Right upper and lower lobe wedge resections (2016)   • THORACOTOMY      RT THORACOTOMY.  WIDE WEDGE EXCISION OG RT UPPER LOBE. WIDE WEDGE EXCISION OF RIGHT LOWER LOBE.        SLP EVALUATION (last 72 hours)      SLP SLC Evaluation     Row Name 02/03/20 1030                   Communication Assessment/Intervention    Document Type  evaluation  -CH        Patient Observations  alert;cooperative;agree to therapy  -CH        Patient/Family Observations  No family present  -CH        Patient Effort  good  -CH           General Information    Patient Profile Reviewed  yes  -CH        Pertinent History Of Current Problem  74-year-old gentleman with a medical history significant for HTN, HLD, COPD, DM 2, PAF, CAD status post stent on aspirin/Plavix daily and obesity.  Patient presented to [facility] ED this morning after he woke up with dizziness and feelings of the room spinning.  He denies any associated weakness, numbness, speech changes, facial droop, diplopia, amaurosis fugax, or other signs or symptoms of stroke.  During the course of his work-up, patient was noted to have bilateral carotid stenosis on CTA. SLC evaluation completed per stroke protocol.   -CH        Precautions/Limitations, Vision  WFL;for purposes of eval  -CH        Precautions/Limitations, Hearing  WFL;for purposes of eval  -CH        Prior Level of Function-Communication  WFL  -CH        Plans/Goals Discussed  with  patient;agreed upon  -        Barriers to Rehab  none identified  -        Patient's Goals for Discharge  return to home  -           Pain Assessment    Additional Documentation  Pain Scale: FACES Pre/Post-Treatment (Group)  -           Pain Scale: Numbers Pre/Post-Treatment    Pain Scale: Numbers, Pretreatment  0/10 - no pain  -        Pain Scale: Numbers, Post-Treatment  0/10 - no pain  -           Pain Scale: FACES Pre/Post-Treatment    Pain: FACES Scale, Pretreatment  0-->no hurt  -        Pain: FACES Scale, Post-Treatment  0-->no hurt  -           Comprehension Assessment/Intervention    Comprehension Assessment/Intervention  Auditory Comprehension  -           Auditory Comprehension Assessment/Intervention    Auditory Comprehension (Communication)  mild impairment  -        Answers Questions (Communication)  yes/no;personal;simple;concrete;WFL;mulit-unit;complex;abstract;mild impairment  -CH        Able to Follow Commands (Communication)  1-step;2-step;WFL;3-step;multi-step;mild impairment  -CH        Narrative Discourse  conversational level;WFL  -CH        Successful Auditory Strategies (Communication)  repetition  -           Expression Assessment/Intervention    Expression Assessment/Intervention  verbal expression  -           Verbal Expression Assessment/Intervention    Verbal Expression  mild impairment  -CH        Automatic Speech (Communication)  response to greeting;WNL  -        Repetition  sentences;mild impairment  -        Responsive Naming  complex;simple;mild impairment  -CH        Sentence Formulation  WFL  -           Oral Motor Structure and Function    Oral Motor Structure and Function  WFL  -           Oral Musculature and Cranial Nerve Assessment    Oral Motor General Assessment  WFL  -           Motor Speech Assessment/Intervention    Motor Speech Function  WFL  -           Cursory Voice Assessment/Intervention    Quality and Resonance (Voice)   WFL  -           Cognitive Assessment Intervention- SLP    Cognitive Function (Cognition)  mild impairment  -CH        Orientation Status (Cognition)  awareness of basic personal information;person;place;time;situation;WNL  -        Memory (Cognitive)  simple;delayed;short-term;mental manipulation;moderate impairment;immediate;long-term;WFL  -        Attention (Cognitive)  sustained;selective;WFL  -        Thought Organization (Cognitive)  concrete convergent;drawing conclusions;WFL;concrete divergent;verbal sequencing;mental manipulation;mild impairment  -        Reasoning (Cognitive)  simple;mild impairment  -        Problem Solving (Cognitive)  temporal;mild impairment  -        Pragmatics (Communication)  WF  -           SLP Clinical Impressions    SLP Diagnosis  Patient displayed a mild cognitive linguistic impairment as evidenced by difficulty with word retrieval, recall, reasoning/problem solving and following directives.  -        Rehab Potential/Prognosis  good  -        SLC Criteria for Skilled Therapy Interventions Met  yes  -        Functional Impact  functional impact in ADLs;difficulty in expressing complex messages  -           Recommendations    Therapy Frequency (SLP SLC)  5 days per week  -        Predicted Duration Therapy Intervention (Days)  until discharge  -           SLP Discharge Summary    Discharge Destination  unknown;anticipated therapy at next level of care  -          User Key  (r) = Recorded By, (t) = Taken By, (c) = Cosigned By    Initials Name Effective Dates     Mary Daniels, MS CCC-SLP 02/14/19 -              EDUCATION  The patient has been educated in the following areas:     Cognitive Impairment Communication Impairment.    SLP Recommendation and Plan  SLP Diagnosis: Patient displayed a mild cognitive linguistic impairment as evidenced by difficulty with word retrieval, recall, reasoning/problem solving and following directives.     SLC  Criteria for Skilled Therapy Interventions Met: yes        Predicted Duration Therapy Intervention (Days): until discharge           SLP GOALS     Row Name 02/03/20 1030             Follow Directions Goal 2 (SLP)    Improve Ability to Follow Directions Goal 1 (SLP)  3 step commands;90%;independently (over 90% accuracy)  -      Time Frame (Follow Directions Goal 1, SLP)  by discharge  -         Word Retrieval Skills Goal 1 (SLP)    Improve Word Retrieval Skills By Goal 1 (SLP)  responsive naming task;complex;completing a divergent task;90%;independently (over 90% accuracy)  -      Time Frame (Word Retrieval Goal 1, SLP)  by discharge  -         Memory Skills Goal 1 (SLP)    Improve Memory Skills Through Goal 1 (SLP)  recalling related word lists with an imposed delay;use memory strategies;80%;with minimal cues (75-90%)  -      Time Frame (Memory Skills Goal 1, SLP)  by discharge  -         Reasoning Goal 1 (SLP)    Improve Reasoning Through Goal 1 (SLP)  complete basic reasoning task;complete deductive reasoning task;80%;with minimal cues (75-90%)  -      Time Frame (Reasoning Goal 1, SLP)  by discharge  -         Additional Goal 1 (SLP)    Additional Goal 1, SLP  LTG: Patient will improve cognitive linguistic skills to WFL without verbal cues  -      Time Frame (Additional Goal 1, SLP)  by discharge  -        User Key  (r) = Recorded By, (t) = Taken By, (c) = Cosigned By    Initials Name Provider Type    Mary Tellez MS CCC-SLP Speech and Language Pathologist                  Time Calculation:     Time Calculation- SLP     Row Name 02/03/20 1607             Time Calculation- SLP    SLP Start Time  1030  -      SLP Received On  02/03/20  -        User Key  (r) = Recorded By, (t) = Taken By, (c) = Cosigned By    Initials Name Provider Type    Mary Tellez MS CCC-SLP Speech and Language Pathologist          Therapy Charges for Today     Code Description Service Date Service  Provider Modifiers Qty    [id number]  ST EVAL SPEECH AND PROD W LANG  4 2/3/2020 Mary Daniels, MS CCC-SLP GN 1                     Mary Daniels, MS CCC-SLP  2/3/2020

## 2020-02-03 NOTE — PLAN OF CARE
Problem: Patient Care Overview  Goal: Plan of Care Review  Outcome: Ongoing (interventions implemented as appropriate)  Flowsheets (Taken 2/2/2020 1706 by Sejal Kerr, RN)  Plan of Care Reviewed With: patient;spouse  Note:   Pt VSS, bradycardiac most of the night, seems to have rested well throughout the night. Will cont to monitor.

## 2020-02-04 ENCOUNTER — TELEPHONE (OUTPATIENT)
Dept: NEUROSURGERY | Facility: CLINIC | Age: 75
End: 2020-02-04

## 2020-02-04 VITALS
BODY MASS INDEX: 37.94 KG/M2 | HEART RATE: 70 BPM | WEIGHT: 265 LBS | DIASTOLIC BLOOD PRESSURE: 86 MMHG | RESPIRATION RATE: 18 BRPM | SYSTOLIC BLOOD PRESSURE: 179 MMHG | TEMPERATURE: 97.8 F | OXYGEN SATURATION: 95 % | HEIGHT: 70 IN

## 2020-02-04 DIAGNOSIS — I65.23 BILATERAL CAROTID ARTERY STENOSIS: Primary | ICD-10-CM

## 2020-02-04 LAB
CHOLEST SERPL-MCNC: 143 MG/DL (ref 0–200)
GLUCOSE BLDC GLUCOMTR-MCNC: 243 MG/DL (ref 70–130)
GLUCOSE BLDC GLUCOMTR-MCNC: 345 MG/DL (ref 70–130)
HDLC SERPL-MCNC: 39 MG/DL (ref 40–60)
LDLC SERPL CALC-MCNC: 71 MG/DL (ref 0–100)
LDLC/HDLC SERPL: 1.82 {RATIO}
TRIGL SERPL-MCNC: 165 MG/DL (ref 0–150)
VLDLC SERPL-MCNC: 33 MG/DL

## 2020-02-04 PROCEDURE — 93005 ELECTROCARDIOGRAM TRACING: CPT | Performed by: NURSE PRACTITIONER

## 2020-02-04 PROCEDURE — 99239 HOSP IP/OBS DSCHRG MGMT >30: CPT | Performed by: INTERNAL MEDICINE

## 2020-02-04 PROCEDURE — 94799 UNLISTED PULMONARY SVC/PX: CPT

## 2020-02-04 PROCEDURE — 80061 LIPID PANEL: CPT | Performed by: PSYCHIATRY & NEUROLOGY

## 2020-02-04 PROCEDURE — 97116 GAIT TRAINING THERAPY: CPT

## 2020-02-04 PROCEDURE — 93010 ELECTROCARDIOGRAM REPORT: CPT | Performed by: INTERNAL MEDICINE

## 2020-02-04 PROCEDURE — 82962 GLUCOSE BLOOD TEST: CPT

## 2020-02-04 PROCEDURE — 97110 THERAPEUTIC EXERCISES: CPT

## 2020-02-04 RX ORDER — ROSUVASTATIN CALCIUM 40 MG/1
40 TABLET, COATED ORAL NIGHTLY
Qty: 30 TABLET | Refills: 0 | Status: SHIPPED | OUTPATIENT
Start: 2020-02-04 | End: 2020-04-06 | Stop reason: HOSPADM

## 2020-02-04 RX ORDER — MECLIZINE HYDROCHLORIDE 25 MG/1
25 TABLET ORAL EVERY 8 HOURS SCHEDULED
Qty: 6 TABLET | Refills: 0 | Status: SHIPPED | OUTPATIENT
Start: 2020-02-04 | End: 2020-04-06 | Stop reason: HOSPADM

## 2020-02-04 RX ORDER — DIAZEPAM 10 MG/1
5 TABLET ORAL EVERY 8 HOURS PRN
Status: ON HOLD
Start: 2020-02-04 | End: 2020-04-02

## 2020-02-04 RX ADMIN — CLOPIDOGREL BISULFATE 75 MG: 75 TABLET ORAL at 08:35

## 2020-02-04 RX ADMIN — MECLIZINE HYDROCHLORIDE 25 MG: 25 TABLET ORAL at 05:25

## 2020-02-04 RX ADMIN — PANTOPRAZOLE SODIUM 40 MG: 40 TABLET, DELAYED RELEASE ORAL at 05:25

## 2020-02-04 RX ADMIN — SODIUM CHLORIDE, PRESERVATIVE FREE 10 ML: 5 INJECTION INTRAVENOUS at 08:36

## 2020-02-04 RX ADMIN — APIXABAN 2.5 MG: 2.5 TABLET, FILM COATED ORAL at 08:35

## 2020-02-04 RX ADMIN — DOCUSATE SODIUM 100 MG: 100 CAPSULE, LIQUID FILLED ORAL at 08:35

## 2020-02-04 RX ADMIN — MECLIZINE HYDROCHLORIDE 25 MG: 25 TABLET ORAL at 13:21

## 2020-02-04 RX ADMIN — INSULIN LISPRO 4 UNITS: 100 INJECTION, SOLUTION INTRAVENOUS; SUBCUTANEOUS at 08:36

## 2020-02-04 RX ADMIN — FINASTERIDE 5 MG: 5 TABLET, FILM COATED ORAL at 08:36

## 2020-02-04 RX ADMIN — INSULIN LISPRO 7 UNITS: 100 INJECTION, SOLUTION INTRAVENOUS; SUBCUTANEOUS at 12:16

## 2020-02-04 RX ADMIN — BUMETANIDE 1 MG: 1 TABLET ORAL at 08:35

## 2020-02-04 RX ADMIN — BUDESONIDE AND FORMOTEROL FUMARATE DIHYDRATE 2 PUFF: 160; 4.5 AEROSOL RESPIRATORY (INHALATION) at 08:00

## 2020-02-04 NOTE — PROGRESS NOTES
Continued Stay Note  [facility]     Patient Name: Joseph Rodriguez  MRN: [id number]  Today's Date: 2/4/2020    Admit Date: 2/2/2020    Discharge Plan     Row Name 02/04/20 1025       Plan    Plan  home    Patient/Family in Agreement with Plan  yes    Plan Comments  I met with Mr. Rodriguez and his wife at the bedside. He is being discharged home today. They do not think that home health services are warranted at this time. They deny having any discharge needs. Mrs. Rodriguez will transport him home today by car.    Final Discharge Disposition Code  01 - home or self-care        Discharge Codes    No documentation.       Expected Discharge Date and Time     Expected Discharge Date Expected Discharge Time    Feb 4, 2020             Bartolo Vincent RN

## 2020-02-04 NOTE — DISCHARGE SUMMARY
[facility] Medicine Services  DISCHARGE SUMMARY    Patient Name: Joseph Rodriguez  : 1945  MRN: [id number]    Date of Admission: 2020  4:29 AM  Date of Discharge:  2020  Primary Care Physician: Dandy Bailey MD    Consults     Date and Time Order Name Status Description    2/3/2020 0652 Inpatient Cardiology Consult Completed     2020 0932 Inpatient Neurology Consult Stroke Completed     2020 0740 Inpatient Neurology Consult Stroke            Hospital Course     Presenting Problem:   Dizziness [R42]  Dizziness [R42]    Active Hospital Problems    Diagnosis  POA   • **Dizziness [R42]  Yes   • Frequent falls [R29.6]  Not Applicable   • BPH (benign prostatic hyperplasia) [N40.0]  Yes   • History of lung cancer [Z85.118]  Not Applicable   • Coronary artery disease [I25.10]  Yes   • Obesity (BMI 30-39.9) [E66.9]  Yes   • Chronic obstructive pulmonary disease (CMS/Formerly [facility]) [J44.9]  Yes   • Sleep-disordered breathing [G47.30]  Yes   • CKD (chronic kidney disease), stage III (CMS/Formerly [facility]) [N18.3]  Yes   • Essential hypertension [I10]  Yes   • Type 2 diabetes mellitus without complication, without long-term current use of insulin (CMS/Formerly [facility]) [E11.9]  Yes      Resolved Hospital Problems   No resolved problems to display.      --------------------Final Diagnoses--------------------  -Benign positional Vertigo  -New parox. Atrial Fibrillation  -Asymptomatic Bilateral moderate ICA stenosis  -dm2  -ckd 3 (baseline cr ~1.7-1.8)  -chronic benzodiazepine & opiate use  -Hx CAD  -Hx lung cancer   ----------------------------------------------------------------      Hospital Course:  Joseph Rodriguez is a 74 y.o. male presented with dizziness & unsteady gait. MRI was negative for stroke. There was suggestion of mastoid effusions. Workup revealed moderate carotid stenosis for which neurointerventional service was consulted and recommended medical management w/ follow up in 1 year. Atrial  fibrillation was noted and new. Echo revealed no significant abnormality. Cardiology recommended eliquis and, since rate is controlled, recommended local follow up w/ Dr. Tasha Bennett for consideration of future rhythm vs rate control if needed. Patient agreeable to this. Patient will be discharged on plavix, eliquis, high dose statin therapy.         Day of Discharge     HPI:   Feels well. No dyspnea. No chest pain. Eager to go home. No palpitations    Review of Systems  No headache, no dizziness    Vital Signs:   Temp:  [97.4 °F (36.3 °C)-98.3 °F (36.8 °C)] 98.3 °F (36.8 °C)  Heart Rate:  [64-75] 68  Resp:  [16-18] 18  BP: (138-167)/(69-91) 139/71     Physical Exam:  Alert, oriented to year, month, day, no distress  irr irr, regular rate  Lungs clear  Equal  and leg raise  abd soft, nontender  No cce    Pertinent  and/or Most Recent Results     Results from last 7 days   Lab Units 02/03/20 0435 02/02/20  0805 02/02/20  0500 02/02/20 0445   WBC 10*3/mm3 4.98 5.68 6.07  --    HEMOGLOBIN g/dL 10.1* 10.0* 10.6*  --    HEMATOCRIT % 34.4* 31.6* 33.3*  --    PLATELETS 10*3/mm3 126* 140 148  --    SODIUM mmol/L 134*  --  138  --    POTASSIUM mmol/L 3.6  --  3.3*  --    CHLORIDE mmol/L 94*  --  95*  --    CO2 mmol/L 24.0  --  28.0  --    BUN mg/dL 19  --  21  --    CREATININE mg/dL 1.79*  --  1.81* 1.90*   GLUCOSE mg/dL 219*  --  318*  --    CALCIUM mg/dL 8.1*  --  8.2*  --      Results from last 7 days   Lab Units 02/03/20 0435 02/02/20  0500 02/02/20  0451   BILIRUBIN mg/dL 0.5 0.3  --    ALK PHOS U/L 67 79  --    ALT (SGPT) U/L 6 10  10  --    AST (SGOT) U/L 12 8  8  --    PROTIME seconds  --   --  12.9   INR   --   --  1.1   APTT seconds  --  28.4  --      Results from last 7 days   Lab Units 02/04/20  0359 02/02/20  0500   CHOLESTEROL mg/dL 143 143   TRIGLYCERIDES mg/dL 165* 132   HDL CHOL mg/dL 39* 38*     Results from last 7 days   Lab Units 02/03/20  0857 02/03/20  0840 02/03/20  0600 02/02/20  0500   TSH  uIU/mL  --   --   --  1.990   HEMOGLOBIN A1C % 10.10*  --   --   --    PROBNP pg/mL  --   --  154.2 195.0   TROPONIN T ng/mL  --  <0.010 <0.010 <0.010       Brief Urine Lab Results  (Last result in the past 365 days)      Color   Clarity   Blood   Leuk Est   Nitrite   Protein   CREAT   Urine HCG        10/17/19 1627 Yellow Clear Small (1+) Negative Negative 100 mg/dL (2+)               Microbiology Results Abnormal     None          Imaging Results (All)     Procedure Component Value Units Date/Time    MRI Brain Without Contrast [[id number]] Collected:  02/02/20 0710     Updated:  02/02/20 0712    Narrative:           MRI Brain WO     Clinical history: Acute onset of truncal ataxia and dizziness. History of lung carcinoma.    Comparison: February 3, 2017    Technique: Sagittal, axial and coronal images of the head were obtained using the standard protocol.    Findings:  The diffusion sequence demonstrates no evidence of restricted diffusion to indicate acute infarction. The FLAIR sequence shows the ventricles to be generous in size. Cortical sulci are correspondingly prominent. No extra-axial fluid collections. No  significant shift of midline structures. There are FLAIR hyperintensities in the periventricular white matter and subcortical white matter which likely represent microvascular disease in a patient of this age.    The pituitary is within normal limits. The cervicomedullary junction is normal. The 7th and 8th cranial nerve complexes are within normal limits.    There is evidence of a left sided mastoid effusion. There is fluid in the left middle ear cavity.    Paranasal sinuses show inflammatory changes in the ethmoid air cells. No acute orbital findings. The right mastoid air cells demonstrate a trace effusion.      Impression:       Impression:  1.  No acute intracranial findings. No convincing evidence of metastatic disease on this noncontrasted exam.    2.  Cerebral atrophy and white matter microvascular  disease similar to prior study of February 3, 2017.    3.  Left-sided mastoid effusion with fluid in the left middle ear cavity. Trace effusion on the right side. These findings were present on the prior exam appear improved on the right side.        Signer Name: Moise Luis MD   Signed: 2/2/2020 7:10 AM   Workstation Name: [id number]    [facility]    XR Chest 1 View [[id number]] Collected:  02/02/20 0632     Updated:  02/02/20 0634    Narrative:       CHEST X-RAY, 2/2/2020      HISTORY:    74-year-old male in the ED and undergoing stroke protocol.      TECHNIQUE:  AP portable upright chest x-ray.      FINDINGS:  Heart size and pulmonary vascularity are within normal limits. Surgical staples and postoperative scarring at the right lung base, best demonstrated on prior chest CT 8/13/2019. The lungs appear clear. No visible airspace consolidation or pleural  effusion.      Impression:       1. No active disease.  2. Postoperative changes right lung base.    Signer Name: Jesus Zuluaga MD   Signed: 2/2/2020 6:32 AM   Workstation Name: [id number]    [facility]    CT Angiogram Head [[id number]] Collected:  02/02/20 0543     Updated:  02/02/20 0545    Narrative:       CT ANGIOGRAM, HEAD AND NECK, 2/2/2020    HISTORY:  74-year-old male in the ED with new onset dizziness, weakness. Stroke evaluation.    TECHNIQUE:  CT angiogram of the head and neck with contrast. 3-D postprocessing was performed and reviewed. Evaluation for a significant carotid arterial stenosis is based on NASCET criteria. Radiation dose reduction techniques included automated exposure control.  Radiation audit for known CT and nuclear cardiology exams in the last 12 months: 2.    COMPARISON:  Noncontrast CT head tonight.    CTA NECK:  Normal aortic arch branching pattern with no proximal great vessel stenosis. Left vertebral artery is dominant with predominant supply to the basilar artery. Right  vertebral artery is small and terminates prior to the basilar junction. Dense atheromatous  plaque in the carotid bulb bilaterally with ICA stenosis at the bulbs measuring about 77% on the right and 50% on the left using NASCET criteria.    CTA HEAD:  Intracranially, there is symmetric distal vascular contrast distribution in the anterior, middle and posterior cerebral artery territories. No compelling evidence of intracranial arterial flow limiting stenosis. No intracranial aneurysm or vascular  malformation is identified. The dural venous sinuses appear normal. No intracranial mass or abnormal contrast enhancement.      Impression:       1.  No intracranial or extracranial arterial flow limiting stenosis or aneurysm.  2.  Bilateral carotid bulb stenosis measuring 77% on the right and 50% on the left using NASCET criteria.    Signer Name: Jesus Zuluaga MD   Signed: 2/2/2020 5:43 AM   Workstation Name: [email or web address]    [facility]    CT Angiogram Neck [[id number]] Collected:  02/02/20 0543     Updated:  02/02/20 0545    Narrative:       CT ANGIOGRAM, HEAD AND NECK, 2/2/2020    HISTORY:  74-year-old male in the ED with new onset dizziness, weakness. Stroke evaluation.    TECHNIQUE:  CT angiogram of the head and neck with contrast. 3-D postprocessing was performed and reviewed. Evaluation for a significant carotid arterial stenosis is based on NASCET criteria. Radiation dose reduction techniques included automated exposure control.  Radiation audit for known CT and nuclear cardiology exams in the last 12 months: 2.    COMPARISON:  Noncontrast CT head tonight.    CTA NECK:  Normal aortic arch branching pattern with no proximal great vessel stenosis. Left vertebral artery is dominant with predominant supply to the basilar artery. Right vertebral artery is small and terminates prior to the basilar junction. Dense atheromatous  plaque in the carotid bulb bilaterally with ICA stenosis at the  bulbs measuring about 77% on the right and 50% on the left using NASCET criteria.    CTA HEAD:  Intracranially, there is symmetric distal vascular contrast distribution in the anterior, middle and posterior cerebral artery territories. No compelling evidence of intracranial arterial flow limiting stenosis. No intracranial aneurysm or vascular  malformation is identified. The dural venous sinuses appear normal. No intracranial mass or abnormal contrast enhancement.      Impression:       1.  No intracranial or extracranial arterial flow limiting stenosis or aneurysm.  2.  Bilateral carotid bulb stenosis measuring 77% on the right and 50% on the left using NASCET criteria.    Signer Name: Jesus Zuluaga MD   Signed: 2/2/2020 5:43 AM   Workstation Name: [email or web address]    [facility]    CT Head Without Contrast Stroke Protocol [[id number]] Collected:  02/02/20 0513     Updated:  02/02/20 0515    Narrative:       CT HEAD, NONCONTRAST, STROKE PROTOCOL, 2/2/2020    HISTORY:  74-year-old male presenting to the ED with new onset dizziness, weakness. Stroke evaluation.    TECHNIQUE:  CT imaging of the head without IV contrast. Radiation dose reduction techniques included automated exposure control. Radiation audit for CT and nuclear cardiology exams in the last 12 months: 2.  *  CT exam time, 04:48.  *  CT on line for interpretation, 05:07.  *  Stat interpretation time, 05:10.    COMPARISON:  *  CT head, 4/9/2017.    FINDINGS:  No acute intracranial abnormality is demonstrated.    Mild-to-moderate generalized cerebral cortical atrophy with a notable frontotemporal predominance. Moderate diffuse low-attenuation white matter changes, nonspecific but most commonly seen in the setting of chronic small vessel disease. These findings  are stable since the prior exam.    No evidence of intracranial hemorrhage, mass, mass effect, acute cerebral edema, extra-axial fluid collection or hydrocephalus.    Note is  again made of extensive fluid opacification of left mastoid air spaces.      Impression:       1.  No acute intracranial abnormality.  2.  Stable diffuse chronic changes as noted above.  3.  Complete fluid opacification of the left mastoid sinus air spaces, unchanged.  4.  No change since 4/19/2017.    NOTIFICATION: Critical Value/emergent results were relayed from me to the ED treatment team by telephone through the CT technologist, Jessika, at 05:13 hours on 2/2/2020.    Signer Name: Jesus Zuluaga MD   Signed: 2/2/2020 5:13 AM   Workstation Name: [email or web address]    [facility]          Results for orders placed during the hospital encounter of 02/02/20   Duplex Carotid Ultrasound CAR    Narrative · Right internal carotid artery stenosis of 50-69%.  · Left internal carotid artery stenosis of 50-69%.  · Nominal antegrade bilateral vertebral artery flow demonstrated.          Results for orders placed during the hospital encounter of 02/02/20   Duplex Carotid Ultrasound CAR    Narrative · Right internal carotid artery stenosis of 50-69%.  · Left internal carotid artery stenosis of 50-69%.  · Nominal antegrade bilateral vertebral artery flow demonstrated.          Results for orders placed during the hospital encounter of 02/02/20   Adult Transthoracic Echo Complete W/ Cont if Necessary Per Protocol    Narrative · Left atrial cavity size is moderately dilated.  · Mild mitral valve regurgitation is present.  · Left ventricular wall thickness is consistent with mild concentric   hypertrophy.  · Estimated EF = 60%.  · Left ventricular systolic function is normal.  · Normal right ventricular wall thickness, systolic function and septal   motion noted with rght ventricular cavity moderately dilated.  · Left ventricular diastolic dysfunction is abnormal consistent with: age.  · Saline test results are negative.  · The aortic valve exhibits mild-moderate sclerosis.  · No evidence of pulmonary  hypertension is present.  · There is no evidence of pericardial effusion.  · Technically difficult and limited study.            Discharge Details        Discharge Medications      New Medications      Instructions Start Date   apixaban 2.5 MG tablet tablet  Commonly known as:  ELIQUIS   2.5 mg, Oral, Every 12 Hours Scheduled      meclizine 25 MG tablet  Commonly known as:  ANTIVERT   25 mg, Oral, Every 8 Hours Scheduled      rosuvastatin 40 MG tablet  Commonly known as:  CRESTOR   40 mg, Oral, Nightly         Changes to Medications      Instructions Start Date   diazePAM 10 MG tablet  Commonly known as:  VALIUM  What changed:    · how much to take  · when to take this  · reasons to take this   5 mg, Oral, Every 8 Hours PRN      glipizide 5 MG tablet  Commonly known as:  GLUCOTROL  What changed:  how much to take   2.5 mg, Oral, 2 Times Daily Before Meals         Continue These Medications      Instructions Start Date   albuterol sulfate  (90 Base) MCG/ACT inhaler  Commonly known as:  PROVENTIL HFA;VENTOLIN HFA;PROAIR HFA   2 puffs, Inhalation, Every 4 Hours PRN      budesonide-formoterol 160-4.5 MCG/ACT inhaler  Commonly known as:  SYMBICORT   2 puffs, Inhalation, 2 Times Daily      bumetanide 1 MG tablet  Commonly known as:  BUMEX   1 mg, Oral, Daily      citalopram 20 MG tablet  Commonly known as:  CeleXA   20 mg, Oral, Daily      clopidogrel 75 MG tablet  Commonly known as:  PLAVIX   75 mg, Oral, Daily      Co Q 10 100 MG capsule   200 mg, Oral, Daily      docusate sodium 100 MG capsule  Commonly known as:  COLACE   100 mg, Oral, 2 Times Daily      donepezil 10 MG tablet  Commonly known as:  ARICEPT   10 mg, Oral, Nightly      doxazosin 4 MG tablet  Commonly known as:  CARDURA   TAKE 1 TABLET BY MOUTH DAILY      finasteride 5 MG tablet  Commonly known as:  PROSCAR   5 mg, Oral, Daily      linagliptin 5 MG tablet tablet  Commonly known as:  TRADJENTA   5 mg, Oral, Daily      melatonin 5 MG tablet tablet    5 mg, Oral, Nightly      nitroglycerin 0.4 MG SL tablet  Commonly known as:  NITROSTAT   DISSOLVE 1 TABLET(S) UNDER THE TONGUE MAY REPEAT EVERY 5 MINUTES. MAXIMUM OF 3 DOSES IN 15 MINUTES      pantoprazole 40 MG EC tablet  Commonly known as:  PROTONIX   40 mg, Oral, 2 Times Daily      PHARMACY MEDS TO BED CONSULT   Does not apply, Daily         Stop These Medications    aspirin 81 MG EC tablet     cyclobenzaprine 10 MG tablet  Commonly known as:  FLEXERIL     Morphine 100 MG 12 hr tablet  Commonly known as:  MS CONTIN     pharmacy consult - MTM     pravastatin 80 MG tablet  Commonly known as:  PRAVACHOL     raNITIdine 150 MG tablet  Commonly known as:  ZANTAC     sulfamethoxazole-trimethoprim 400-80 MG tablet  Commonly known as:  BACTRIM,SEPTRA            Allergies   Allergen Reactions   • Atorvastatin    • Gabapentin Dizziness   • Sulfa Antibiotics          Discharge Disposition:  Home-Health Care [facility]    Diet:  Hospital:  Diet Order   Procedures   • Diet Regular; Cardiac, Consistent Carbohydrate       Activity:           CODE STATUS:    Code Status and Medical Interventions:   Ordered at: 02/02/20 0624     Code Status:    CPR     Medical Interventions (Level of Support Prior to Arrest):    Full       No future appointments.    Additional Instructions for the Follow-ups that You Need to Schedule     Discharge Follow-up with PCP   As directed       Currently Documented PCP:    Dandy Bailey MD    PCP Phone Number:    [phone]     Follow Up Details:  1 week         Discharge Follow-up with Specialty: Dr. Robles (neurointerventional service) 1 year   As directed      Specialty:  Dr. Robles (neurointerventional service) 1 year         Discharge Follow-up with Specialty: Dr. Tasha Bennett (cardiology in [facility]) 3-4 weeks. follow up new onset afib   As directed      Specialty:  Dr. Tasha Bennett (cardiology in [facility]) 3-4 weeks. follow up new onset afib               Time Spent on Discharge:  35  min    Electronically signed by Isac Yancey MD, 02/04/20, 9:31 AM.

## 2020-02-04 NOTE — THERAPY TREATMENT NOTE
Patient Name: Joseph Rodriguez  : 1945    MRN: [id number]                              Today's Date: 2020       Admit Date: 2020    Visit Dx:     ICD-10-CM ICD-9-CM   1. Dizziness R42 780.4   2. Carotid stenosis, asymptomatic, bilateral I65.23 433.10     433.30   3. Truncal ataxia R27.8 781.3   4. Impaired mobility and ADLs Z74.09 799.89   5. Cognitive communication deficit R41.841 799.52     Patient Active Problem List   Diagnosis   • Carcinoma of right lung (CMS/HCC)   • Anemia   • Acute tubular necrosis (CMS/HCC)   • B12 deficiency   • Monoclonal gammopathy   • CKD (chronic kidney disease), stage III (CMS/HCC)   • Lung mass   • Type 2 diabetes mellitus without complication, without long-term current use of insulin (CMS/HCC)   • Essential hypertension   • Gastroesophageal reflux disease with esophagitis   • Chronic obstructive pulmonary disease (CMS/HCC)   • Lung nodule   • Sleep-disordered breathing   • Coronary artery disease   • Obesity (BMI 30-39.9)   • History of lung cancer   • Frequent falls   • Dizziness   • BPH (benign prostatic hyperplasia)     Past Medical History:   Diagnosis Date   • Acute kidney injury (CMS/HCC)    • Acute tubular necrosis (CMS/HCC)    • Anemia    • Arthritis    • B12 deficiency    • Bone pain    • Carcinoma of lung (CMS/HCC)     stage IA   • Chronic kidney disease (CKD), stage III (moderate) (CMS/HCC)    • Confusion, postoperative    • COPD (chronic obstructive pulmonary disease) (CMS/HCC)    • Coronary artery disease s/p stent    • Depression    • Diabetes mellitus (CMS/HCC)    • Fatigue    • Fractures    • GERD (gastroesophageal reflux disease)    • H/O colonoscopy 2016   • History of BPH    • Hyperlipidemia    • Hypertension    • Impotence    • Kidney stones    • Leaking of urine    • Lung cancer (CMS/HCC) 2016   • Lung mass    • Obesity    • Peptic ulcer disease    • Post-thoracotomy pain, mild      Past Surgical History:   Procedure Laterality Date   •  ANKLE SURGERY     • BACK SURGERY     • BONE MARROW BIOPSY     • BRONCHOSCOPY N/A 4/13/2017    Procedure: BRONCHOSCOPY WITH ENDOBRONCHIAL ULTRASOUND;  Surgeon: Kingsley Hodge MD;  Location[facility] ENDOSCOPY;  Service:    • CAROTID STENT  1991   • CORONARY STENT PLACEMENT     • KIDNEY STONE SURGERY  1976   • KNEE SURGERY Bilateral 2010   • LUNG REMOVAL, PARTIAL Right     Right upper and lower lobe wedge resections (2016)   • THORACOTOMY      RT THORACOTOMY.  WIDE WEDGE EXCISION OG RT UPPER LOBE. WIDE WEDGE EXCISION OF RIGHT LOWER LOBE.     General Information     Row Name 02/04/20 1046          PT Evaluation Time/Intention    Document Type  therapy note (daily note)  -     Mode of Treatment  physical therapy  -AA     Row Name 02/04/20 1046          General Information    Patient Profile Reviewed?  yes  -AA     Existing Precautions/Restrictions  fall  -AA     Row Name 02/04/20 1046          Cognitive Assessment/Intervention- PT/OT    Orientation Status (Cognition)  oriented x 4  -AA     Row Name 02/04/20 1046          Safety Issues, Functional Mobility    Impairments Affecting Function (Mobility)  balance  -       User Key  (r) = Recorded By, (t) = Taken By, (c) = Cosigned By    Initials Name Provider Type    AA Tish Saab, PT Physical Therapist        Mobility     Row Name 02/04/20 1046          Bed Mobility Assessment/Treatment    Comment (Bed Mobility)  pt UIC  -AA     Row Name 02/04/20 1046          Transfer Assessment/Treatment    Comment (Transfers)  VC for push off and transitioning to RW.  Good safety and balance during transfers  -AA     Row Name 02/04/20 1046          Sit-Stand Transfer    Sit-Stand [place] (Transfers)  supervision  -     Assistive Device (Sit-Stand Transfers)  walker, front-wheeled  -     Row Name 02/04/20 1046          Gait/Stairs Assessment/Training    Gait/Stairs Assessment/Training  gait/ambulation independence;gait/ambulation assistive device;distance  ambulated;gait pattern;gait deviations  -AA     Dukes Level (Gait)  contact guard;verbal cues  -AA     Assistive Device (Gait)  walker, front-wheeled  -AA     Distance in Feet (Gait)  100'x2  -AA     Pattern (Gait)  swing-through  -AA     Bilateral Gait Deviations  forward flexed posture  -AA     Comment (Gait/Stairs)  Pt ambulated with RW and CGA with periods of SBA.  Good heel strike.  VC for florinda due to pt increasing and safety decreased.  VC for posture.  No knee buckle.    -AA       User Key  (r) = Recorded By, (t) = Taken By, (c) = Cosigned By    Initials Name Provider Type    AA Tish Saab, PT Physical Therapist        Obj/Interventions     Row Name 02/04/20 1046          Therapeutic Exercise    Lower Extremity (Therapeutic Exercise)  marching while standing;LAQ (long arc quad), bilateral stand hip flexion, stand marching, standing hip abduction  -AA     Exercise Type (Therapeutic Exercise)  AROM (active range of motion)  -AA     Position (Therapeutic Exercise)  seated;standing  -AA     Sets/Reps (Therapeutic Exercise)  15  -AA     Row Name 02/04/20 1046          Static Sitting Balance    Level of Dukes (Unsupported Sitting, Static Balance)  independent  -AA     Sitting Position (Unsupported Sitting, Static Balance)  sitting in chair  -AA     Time Able to Maintain Position (Unsupported Sitting, Static Balance)  more than 5 minutes  -AA     Row Name 02/04/20 1046          Static Standing Balance    Level of Dukes (Supported Standing, Static Balance)  supervision  -AA     Assistive Device Utilized (Supported Standing, Static Balance)  walker, rolling  -AA     Row Name 02/04/20 1046          Dynamic Standing Balance    Level of Dukes, Reaches Outside Midline (Standing, Dynamic Balance)  contact guard assist;standby assist  -AA     Assistive Device Utilized (Supported Standing, Dynamic Balance)  walker, rolling  -AA       User Key  (r) = Recorded By, (t) = Taken By, (c) =  Cosigned By    Initials Name Provider Type    Tish Kramer, PT Physical Therapist        Goals/Plan     Row Name 02/04/20 1046          Bed Mobility Goal 1 (PT)    Progress/Outcomes (Bed Mobility Goal 1, PT)  goal ongoing  -AA     [facility] Name 02/04/20 1046          Transfer Goal 1 (PT)    Progress/Outcome (Transfer Goal 1, PT)  goal met  -AA     Row Name 02/04/20 1046          Gait Training Goal 1 (PT)    Progress/Outcome (Gait Training Goal 1, PT)  goal partially met;goal ongoing  -AA     Row Name 02/04/20 1046          Stairs Goal 1 (PT)    Progress/Outcome (Stairs Goal 1, PT)  goal ongoing  -       User Key  (r) = Recorded By, (t) = Taken By, (c) = Cosigned By    Initials Name Provider Type    Tish Kramer, PT Physical Therapist        Clinical Impression     [facility] Name 02/04/20 1046          Pain Assessment    Additional Documentation  Pain Scale: Numbers Pre/Post-Treatment (Group)  -AA     Row Name 02/04/20 1046          Pain Scale: Numbers Pre/Post-Treatment    Pain Scale: Numbers, Pretreatment  0/10 - no pain  -AA     Pain Scale: Numbers, Post-Treatment  0/10 - no pain  -AA     Row Name 02/04/20 1046          Plan of Care Review    Plan of Care Reviewed With  patient  -AA     Progress  improving  -AA     Row Name 02/04/20 1046          Positioning and Restraints    Pre-Treatment Position  sitting in chair/recliner  -AA     Post Treatment Position  chair  -AA     In Chair  notified nsg;reclined;with family/caregiver;call light within reach;encouraged to call for assist  -AA       User Key  (r) = Recorded By, (t) = Taken By, (c) = Cosigned By    Initials Name Provider Type    Tish Kramer, PT Physical Therapist        Outcome Measures     Row Name 02/04/20 1046          How much help from another person do you currently need...    Turning from your back to your side while in flat bed without using bedrails?  3  -AA     Moving from lying on back to sitting on the side of a flat bed without bedrails?   3  -AA     Moving to and from a bed to a chair (including a wheelchair)?  3  -AA     Standing up from a chair using your arms (e.g., wheelchair, bedside chair)?  3  -AA     Climbing 3-5 steps with a railing?  3  -AA     To walk in hospital room?  3  -AA     AM-PAC 6 Clicks Score (PT)  18  -AA     Row Name 02/04/20 1046          Modified Bebe Scale    Modified [place] Scale  3 - Moderate disability.  Requiring some help, but able to walk without assistance.  -     Row Name 02/04/20 1046          Functional Assessment    Outcome Measure Options  AM-PAC 6 Clicks Basic Mobility (PT);Modified Bebe  -AA       User Key  (r) = Recorded By, (t) = Taken By, (c) = Cosigned By    Initials Name Provider Type    Tish Kramer, PT Physical Therapist          PT Recommendation and Plan  Planned Therapy Interventions (PT Eval): balance training, bed mobility training, gait training, home exercise program, neuromuscular re-education, patient/family education, stair training, transfer training, strengthening  Outcome Summary/Treatment Plan (PT)  Anticipated Discharge Disposition (PT): home with assist, home with home health  Plan of Care Reviewed With: patient  Progress: improving  Outcome Summary: Pt presents with improved balance, ambulation, and safety awareness.  Pt perform transfers with SPV and RW.  Pt ambulated with RW and CGA/'x2 with no knee buckle noted.  No dizziness during activity.  Pt plans to DC home today with family.  Recommend RW and home health upon DC     Time Calculation:   PT Charges     Row Name 02/04/20 1046             Time Calculation    Start Time  1046  -AA      PT Received On  02/04/20  -      PT Goal Re-Cert Due Date  02/13/20  -         Time Calculation- PT    Total Timed Code Minutes- PT  24 minute(s)  -AA         Timed Charges    91708 - PT Therapeutic Exercise Minutes  11  -AA      36643 - Gait Training Minutes   13  -AA        User Key  (r) = Recorded By, (t) = Taken By, (c) =  Cosigned By    Initials Name Provider Type    AA Tish Saab, PT Physical Therapist        Therapy Charges for Today     Code Description Service Date Service Provider Modifiers Qty    50986533494 HC PT EVAL MOD COMPLEXITY 4 2/3/2020 [facility] April STEFANI, PT GP 1    82600868287 HC PT THER SUPP EA 15 MIN 2/3/2020 [facility] April STEFANI, PT GP 2    14313800213 HC PT THER PROC EA 15 MIN 2/4/2020 [facility] April STEFANI, PT GP 1    39168021498 HC GAIT TRAINING EA 15 MIN 2/4/2020 [facility] April STEFANI, PT GP 1          PT G-Codes  Outcome Measure Options: AM-PAC 6 Clicks Basic Mobility (PT), Modified Bebe  AM-PAC 6 Clicks Score (PT): 18  AM-PAC 6 Clicks Score (OT): 21  Modified [place] Scale: 3 - Moderate disability.  Requiring some help, but able to walk without assistance.    April STEFANI Saab PT  2/4/2020

## 2020-02-04 NOTE — PROGRESS NOTES
Patient is on apixaban.  Education provided on 2/4/2020 verbally and in writing.  Discussed effects of medication,  drug-drug and drug-food interactions, and signs/symptoms of bleeding and clotting.  Patient verbalized understanding through teach back.  All pertinent questions were answered.       Sylvie Amin, SharonD, BCPS

## 2020-02-04 NOTE — PLAN OF CARE
Problem: Patient Care Overview  Goal: Plan of Care Review  Outcome: Ongoing (interventions implemented as appropriate)  Flowsheets (Taken 2/4/2020 1046)  Outcome Summary: Pt presents with improved balance, ambulation, and safety awareness.  Pt perform transfers with SPV and RW.  Pt ambulated with RW and CGA/'x2 with no knee buckle noted.  No dizziness during activity.  Pt plans to DC home today with family.  Recommend RW and home health upon DC

## 2020-02-04 NOTE — PLAN OF CARE
Problem: Patient Care Overview  Goal: Plan of Care Review  Outcome: Ongoing (interventions implemented as appropriate)  Flowsheets (Taken 2/3/2020 1942)  Progress: improving  Plan of Care Reviewed With: patient  Outcome Summary: VSS. AOx4, pt confused at times. NSR with first degree AV block on the monitor. No reports of pain. Pt anxious to be discharged. Glucose readings elevated; insulin per orders. Pt ambulating to bathroom with walker with more steady gait.

## 2020-02-04 NOTE — CONSULTS
Nutrition Education     Patient Name: Joseph Rodriguez  Date of Encounter: 02/04/20 9:51 AM  MRN: [id number]  Admission date: 2/2/2020    Time: 20min   Reason for Visit: consult for DM education     Comments:  History of DM x 1 year per patient. Review of EMR indicate an A1C reflective of pre-DM in 2017, up to 9.2 in 2019 and now 10.1.  Per home medication patient takes tradjenta and glucotrol.  Attempt to educate patient this morning, however patient very drowsy and kept dozing off. Education material left at bedside. RD to re-attempt to review education material later this afternoon if patient less drowsy.     Pertinent Diagnosis:  Patient Active Problem List   Diagnosis   • Carcinoma of right lung (CMS/HCC)   • Anemia   • Acute tubular necrosis (CMS/HCC)   • B12 deficiency   • Monoclonal gammopathy   • CKD (chronic kidney disease), stage III (CMS/HCC)   • Lung mass   • Type 2 diabetes mellitus without complication, without long-term current use of insulin (CMS/HCC)   • Essential hypertension   • Gastroesophageal reflux disease with esophagitis   • Chronic obstructive pulmonary disease (CMS/HCC)   • Lung nodule   • Sleep-disordered breathing   • Coronary artery disease   • Obesity (BMI 30-39.9)   • History of lung cancer   • Frequent falls   • Dizziness   • BPH (benign prostatic hyperplasia)       Problem/Intervetion:  Nutrition Diagnoses Problem 1   Problem 1:  Food and Nutrition Related Knowledge Deficit   Etiology (related to): Lack of prior nutrition-related education    Signs/Symptoms: Demonstrated Information Deficit   (evidence by)    Intervention Goal:   General: Provide information regarding MNT for treatment/condition    Education/Evaluation  Education    Education Re:  Diabetic diet    Education Topics: carb counting (patient provided with written material, too drowsy for education at time of visit)     Monitor/Evaluation    Per protocol    Laura Monsivais RD, CNSC  9:51 AM

## 2020-02-05 ENCOUNTER — READMISSION MANAGEMENT (OUTPATIENT)
Dept: CALL CENTER | Facility: HOSPITAL | Age: 75
End: 2020-02-05

## 2020-02-05 NOTE — OUTREACH NOTE
Prep Survey      Responses   Facility patient discharged from?  [facility]   Is patient eligible?  Yes   Discharge diagnosis  Dizziness, frequent falls, BPH, hx lung cancer, New PAF, Asymptomatic bila. mod. ICA stenosis, DM II, CKD III, Chronic benzodiazepine & opiate , hx CAD hx Lung CA   Does the patient have one of the following disease processes/diagnoses(primary or secondary)?  Other   Does the patient have Home health ordered?  No   Is there a DME ordered?  No   Comments regarding appointments  See AVS   Prep survey completed?  Yes          Gayla Gupta RN

## 2020-02-06 ENCOUNTER — READMISSION MANAGEMENT (OUTPATIENT)
Dept: CALL CENTER | Facility: HOSPITAL | Age: 75
End: 2020-02-06

## 2020-02-06 NOTE — OUTREACH NOTE
Medical Week 1 Survey      Responses   Facility patient discharged from?  [place]   Does the patient have one of the following disease processes/diagnoses(primary or secondary)?  Other   Is there a successful TCM telephone encounter documented?  No   Week 1 attempt successful?  Yes   Call start time  1108   Call end time  1114   Discharge diagnosis  Dizziness, frequent falls, BPH, hx lung cancer, New PAF, Asymptomatic bila. mod. ICA stenosis, DM II, CKD III, Chronic benzodiazepine & opiate , hx CAD hx Lung CA   Is patient permission given to speak with other caregiver?  Yes   List who call center can speak with  Wife   Medication alerts for this patient  Pt currently not taking meclizine.   Meds reviewed with patient/caregiver?  Yes   Is the patient having any side effects they believe may be caused by any medication additions or changes?  No   Does the patient have all medications ordered at discharge?  Yes   Is the patient taking all medications as directed (includes completed medication regime)?  Yes   Comments regarding appointments  Pt has multiple follow up appointments.   Does the patient have a primary care provider?   Yes   Psychosocial issues?  No   Did the patient receive a copy of their discharge instructions?  Yes   Nursing interventions  Reviewed instructions with patient   What is the patient's perception of their health status since discharge?  Improving   Is the patient/caregiver able to teach back signs and symptoms related to disease process for when to call PCP?  Yes   Is the patient/caregiver able to teach back signs and symptoms related to disease process for when to call 911?  Yes   Is the patient/caregiver able to teach back the hierarchy of who to call/visit for symptoms/problems? PCP, Specialist, Home health nurse, Urgent Care, ED, 911  Yes   Additional teach back comments  Pt doing well.   Week 1 call completed?  Yes          Bee Kumari RN

## 2020-02-14 ENCOUNTER — READMISSION MANAGEMENT (OUTPATIENT)
Dept: CALL CENTER | Facility: HOSPITAL | Age: 75
End: 2020-02-14

## 2020-02-14 NOTE — OUTREACH NOTE
Medical Week 2 Survey      Responses   Facility patient discharged from?  [place]   Does the patient have one of the following disease processes/diagnoses(primary or secondary)?  Other   Week 2 attempt successful?  Yes   Call start time  1119   Discharge diagnosis  Dizziness, frequent falls, BPH, hx lung cancer, New PAF, Asymptomatic bila. mod. ICA stenosis, DM II, CKD III, Chronic benzodiazepine & opiate , hx CAD hx Lung CA   Call end time  1121   Person spoke with today (if not patient) and relationship  Yvette-spouse   Meds reviewed with patient/caregiver?  Yes   Is the patient taking all medications as directed (includes completed medication regime)?  Yes   Comments regarding appointments  Appt with Dr. Bennett on 2/26/20    Has the patient kept scheduled appointments due by today?  Yes   Psychosocial issues?  No   What is the patient's perception of their health status since discharge?  Improving   If the patient is a current smoker, are they able to teach back resources for cessation?  -- [Pt is a nonsmoker]   Week 2 Call Completed?  Yes          Parris Hoang RN

## 2020-02-21 ENCOUNTER — READMISSION MANAGEMENT (OUTPATIENT)
Dept: CALL CENTER | Facility: HOSPITAL | Age: 75
End: 2020-02-21

## 2020-02-21 NOTE — OUTREACH NOTE
Medical Week 3 Survey      Responses   Facility patient discharged from?  [place] [facility]   Does the patient have one of the following disease processes/diagnoses(primary or secondary)?  Other   Week 3 attempt successful?  No   Unsuccessful attempts  Attempt 1          Parris Hoang RN

## 2020-02-24 ENCOUNTER — READMISSION MANAGEMENT (OUTPATIENT)
Dept: CALL CENTER | Facility: HOSPITAL | Age: 75
End: 2020-02-24

## 2020-02-24 NOTE — OUTREACH NOTE
Medical Week 3 Survey      Responses   Facility patient discharged from?  [place]   Does the patient have one of the following disease processes/diagnoses(primary or secondary)?  Other   Week 3 attempt successful?  No   Unsuccessful attempts  Attempt 2          Gayla Saleem RN

## 2020-03-31 ENCOUNTER — APPOINTMENT (OUTPATIENT)
Dept: ULTRASOUND IMAGING | Facility: HOSPITAL | Age: 75
End: 2020-03-31

## 2020-03-31 ENCOUNTER — DOCUMENTATION (OUTPATIENT)
Dept: SOCIAL WORK | Facility: HOSPITAL | Age: 75
End: 2020-03-31

## 2020-03-31 ENCOUNTER — APPOINTMENT (OUTPATIENT)
Dept: GENERAL RADIOLOGY | Facility: HOSPITAL | Age: 75
End: 2020-03-31

## 2020-03-31 ENCOUNTER — HOSPITAL ENCOUNTER (INPATIENT)
Facility: HOSPITAL | Age: 75
LOS: 6 days | Discharge: HOME | End: 2020-04-06
Attending: INTERNAL MEDICINE | Admitting: INTERNAL MEDICINE

## 2020-03-31 ENCOUNTER — APPOINTMENT (OUTPATIENT)
Dept: CT IMAGING | Facility: HOSPITAL | Age: 75
End: 2020-03-31

## 2020-03-31 DIAGNOSIS — C34.91 CARCINOMA OF RIGHT LUNG (HCC): Primary | Chronic | ICD-10-CM

## 2020-03-31 DIAGNOSIS — N17.9 ACUTE KIDNEY INJURY SUPERIMPOSED ON CHRONIC KIDNEY DISEASE (HCC): ICD-10-CM

## 2020-03-31 DIAGNOSIS — N18.9 ACUTE KIDNEY INJURY SUPERIMPOSED ON CHRONIC KIDNEY DISEASE (HCC): ICD-10-CM

## 2020-03-31 PROBLEM — J96.22 ACUTE ON CHRONIC RESPIRATORY FAILURE WITH HYPERCAPNIA (HCC): Status: ACTIVE | Noted: 2020-03-31

## 2020-03-31 PROBLEM — Z87.891 FORMER SMOKER: Status: ACTIVE | Noted: 2020-03-31

## 2020-03-31 PROBLEM — I48.0 PAROXYSMAL A-FIB (HCC): Status: ACTIVE | Noted: 2020-03-31

## 2020-03-31 PROBLEM — G47.33 OSA (OBSTRUCTIVE SLEEP APNEA): Chronic | Status: ACTIVE | Noted: 2020-03-31

## 2020-03-31 PROBLEM — H81.10 BPV (BENIGN POSITIONAL VERTIGO): Status: ACTIVE | Noted: 2020-03-31

## 2020-03-31 PROBLEM — E16.2 HYPOGLYCEMIA: Status: ACTIVE | Noted: 2020-03-31

## 2020-03-31 PROBLEM — J44.9 CHRONIC OBSTRUCTIVE PULMONARY DISEASE (HCC): Chronic | Status: ACTIVE | Noted: 2018-01-29

## 2020-03-31 PROBLEM — I95.9 HYPOTENSION: Status: ACTIVE | Noted: 2020-03-31

## 2020-03-31 PROBLEM — E87.20 ACIDOSIS: Status: ACTIVE | Noted: 2020-03-31

## 2020-03-31 PROBLEM — G93.40 ACUTE ENCEPHALOPATHY: Status: ACTIVE | Noted: 2020-03-31

## 2020-03-31 PROBLEM — H81.10 BPV (BENIGN POSITIONAL VERTIGO): Chronic | Status: ACTIVE | Noted: 2020-03-31

## 2020-03-31 PROBLEM — Z79.01 CHRONIC ANTICOAGULATION: Status: ACTIVE | Noted: 2020-03-31

## 2020-03-31 PROBLEM — G47.33 OSA (OBSTRUCTIVE SLEEP APNEA): Status: ACTIVE | Noted: 2020-03-31

## 2020-03-31 PROBLEM — Z79.899 POLYPHARMACY: Status: ACTIVE | Noted: 2020-03-31

## 2020-03-31 PROBLEM — E11.9 TYPE 2 DIABETES MELLITUS WITHOUT COMPLICATION, WITHOUT LONG-TERM CURRENT USE OF INSULIN (HCC): Chronic | Status: ACTIVE | Noted: 2017-04-09

## 2020-03-31 PROBLEM — M62.82 RHABDOMYOLYSIS: Status: ACTIVE | Noted: 2020-03-31

## 2020-03-31 PROBLEM — G93.41 ENCEPHALOPATHY, METABOLIC: Status: ACTIVE | Noted: 2020-03-31

## 2020-03-31 PROBLEM — I48.0 PAROXYSMAL A-FIB (HCC): Chronic | Status: ACTIVE | Noted: 2020-03-31

## 2020-03-31 PROBLEM — F03.90 DEMENTIA (HCC): Status: ACTIVE | Noted: 2020-03-31

## 2020-03-31 LAB
ALBUMIN SERPL-MCNC: 3.1 G/DL (ref 3.5–5.2)
ALBUMIN SERPL-MCNC: 3.5 G/DL (ref 3.5–5.2)
ALBUMIN/GLOB SERPL: 0.9 G/DL
ALBUMIN/GLOB SERPL: 1.2 G/DL
ALP SERPL-CCNC: 56 U/L (ref 39–117)
ALP SERPL-CCNC: 56 U/L (ref 39–117)
ALT SERPL W P-5'-P-CCNC: 20 U/L (ref 1–41)
ALT SERPL W P-5'-P-CCNC: 21 U/L (ref 1–41)
AMMONIA BLD-SCNC: 42 UMOL/L (ref 16–60)
AMPHET+METHAMPHET UR QL: NEGATIVE
AMPHETAMINES UR QL: NEGATIVE
AMYLASE SERPL-CCNC: 54 U/L (ref 28–100)
ANION GAP SERPL CALCULATED.3IONS-SCNC: 18 MMOL/L (ref 5–15)
ANION GAP SERPL CALCULATED.3IONS-SCNC: 19 MMOL/L (ref 5–15)
ANION GAP SERPL CALCULATED.3IONS-SCNC: 20 MMOL/L (ref 5–15)
ARTERIAL PATENCY WRIST A: ABNORMAL
AST SERPL-CCNC: 77 U/L (ref 1–40)
AST SERPL-CCNC: 77 U/L (ref 1–40)
ATMOSPHERIC PRESS: ABNORMAL MM[HG]
BACTERIA UR QL AUTO: ABNORMAL /HPF
BARBITURATES UR QL SCN: NEGATIVE
BASE EXCESS BLDA CALC-SCNC: -10.3 MMOL/L (ref 0–2)
BASE EXCESS BLDA CALC-SCNC: -11.3 MMOL/L (ref 0–2)
BASE EXCESS BLDA CALC-SCNC: -11.3 MMOL/L (ref 0–2)
BASOPHILS # BLD AUTO: 0.01 10*3/MM3 (ref 0–0.2)
BASOPHILS # BLD AUTO: 0.02 10*3/MM3 (ref 0–0.2)
BASOPHILS NFR BLD AUTO: 0.1 % (ref 0–1.5)
BASOPHILS NFR BLD AUTO: 0.3 % (ref 0–1.5)
BDY SITE: ABNORMAL
BDY SITE: ABNORMAL
BENZODIAZ UR QL SCN: POSITIVE
BILIRUB SERPL-MCNC: 0.3 MG/DL (ref 0.2–1.2)
BILIRUB SERPL-MCNC: 0.3 MG/DL (ref 0.2–1.2)
BILIRUB UR QL STRIP: NEGATIVE
BODY TEMPERATURE: 37 C
BUN BLD-MCNC: 100 MG/DL (ref 8–23)
BUN BLD-MCNC: 101 MG/DL (ref 8–23)
BUN BLD-MCNC: 103 MG/DL (ref 8–23)
BUN/CREAT SERPL: 11.7 (ref 7–25)
BUN/CREAT SERPL: 12.8 (ref 7–25)
BUN/CREAT SERPL: 13.9 (ref 7–25)
BUPRENORPHINE SERPL-MCNC: NEGATIVE NG/ML
CA-I SERPL ISE-MCNC: 1.02 MMOL/L (ref 1.12–1.32)
CALCIUM SPEC-SCNC: 6.7 MG/DL (ref 8.6–10.5)
CALCIUM SPEC-SCNC: 6.8 MG/DL (ref 8.6–10.5)
CALCIUM SPEC-SCNC: 6.9 MG/DL (ref 8.6–10.5)
CANNABINOIDS SERPL QL: NEGATIVE
CHLORIDE SERPL-SCNC: 101 MMOL/L (ref 98–107)
CHLORIDE SERPL-SCNC: 102 MMOL/L (ref 98–107)
CHLORIDE SERPL-SCNC: 104 MMOL/L (ref 98–107)
CK SERPL-CCNC: 4480 U/L (ref 20–200)
CLARITY UR: ABNORMAL
CO2 BLDA-SCNC: 18.2 MMOL/L (ref 22–33)
CO2 BLDA-SCNC: 19.4 MMOL/L (ref 22–33)
CO2 BLDA-SCNC: 19.8 MMOL/L (ref 22–33)
CO2 SERPL-SCNC: 14 MMOL/L (ref 22–29)
CO2 SERPL-SCNC: 17 MMOL/L (ref 22–29)
CO2 SERPL-SCNC: 17 MMOL/L (ref 22–29)
COARSE GRAN CASTS URNS QL MICRO: ABNORMAL /LPF
COCAINE UR QL: NEGATIVE
COHGB MFR BLD: 1.3 % (ref 0–2)
COLOR UR: YELLOW
CREAT BLD-MCNC: 7.43 MG/DL (ref 0.76–1.27)
CREAT BLD-MCNC: 7.83 MG/DL (ref 0.76–1.27)
CREAT BLD-MCNC: 8.64 MG/DL (ref 0.76–1.27)
CREAT UR-MCNC: 99.9 MG/DL
D-LACTATE SERPL-SCNC: 0.5 MMOL/L (ref 0.5–2)
D-LACTATE SERPL-SCNC: 0.8 MMOL/L (ref 0.5–2)
DEPRECATED RDW RBC AUTO: 60.6 FL (ref 37–54)
DEPRECATED RDW RBC AUTO: 62.4 FL (ref 37–54)
EOSINOPHIL # BLD AUTO: 0 10*3/MM3 (ref 0–0.4)
EOSINOPHIL # BLD AUTO: 0 10*3/MM3 (ref 0–0.4)
EOSINOPHIL NFR BLD AUTO: 0 % (ref 0.3–6.2)
EOSINOPHIL NFR BLD AUTO: 0 % (ref 0.3–6.2)
EOSINOPHIL SPEC QL MICRO: 0 % EOS/100 CELLS (ref 0–0)
ERYTHROCYTE [DISTWIDTH] IN BLOOD BY AUTOMATED COUNT: 17.9 % (ref 12.3–15.4)
ERYTHROCYTE [DISTWIDTH] IN BLOOD BY AUTOMATED COUNT: 18 % (ref 12.3–15.4)
ETHANOL BLD-MCNC: <10 MG/DL (ref 0–10)
GFR SERPL CREATININE-BSD FRML MDRD: 6 ML/MIN/1.73
GFR SERPL CREATININE-BSD FRML MDRD: 7 ML/MIN/1.73
GFR SERPL CREATININE-BSD FRML MDRD: 7 ML/MIN/1.73
GFR SERPL CREATININE-BSD FRML MDRD: ABNORMAL ML/MIN/{1.73_M2}
GLOBULIN UR ELPH-MCNC: 2.9 GM/DL
GLOBULIN UR ELPH-MCNC: 3.3 GM/DL
GLUCOSE BLD-MCNC: 122 MG/DL (ref 65–99)
GLUCOSE BLD-MCNC: 150 MG/DL (ref 65–99)
GLUCOSE BLD-MCNC: 25 MG/DL (ref 65–99)
GLUCOSE BLDC GLUCOMTR-MCNC: 106 MG/DL (ref 70–130)
GLUCOSE BLDC GLUCOMTR-MCNC: 121 MG/DL (ref 70–130)
GLUCOSE BLDC GLUCOMTR-MCNC: 125 MG/DL (ref 70–130)
GLUCOSE BLDC GLUCOMTR-MCNC: 132 MG/DL (ref 70–130)
GLUCOSE BLDC GLUCOMTR-MCNC: 147 MG/DL (ref 70–130)
GLUCOSE BLDC GLUCOMTR-MCNC: 148 MG/DL (ref 70–130)
GLUCOSE BLDC GLUCOMTR-MCNC: 149 MG/DL (ref 70–130)
GLUCOSE BLDC GLUCOMTR-MCNC: 169 MG/DL (ref 70–130)
GLUCOSE BLDC GLUCOMTR-MCNC: 176 MG/DL (ref 70–130)
GLUCOSE BLDC GLUCOMTR-MCNC: 28 MG/DL (ref 70–130)
GLUCOSE BLDC GLUCOMTR-MCNC: 37 MG/DL (ref 70–130)
GLUCOSE BLDC GLUCOMTR-MCNC: 63 MG/DL (ref 70–130)
GLUCOSE BLDC GLUCOMTR-MCNC: 75 MG/DL (ref 70–130)
GLUCOSE BLDC GLUCOMTR-MCNC: 76 MG/DL (ref 70–130)
GLUCOSE BLDC GLUCOMTR-MCNC: 91 MG/DL (ref 70–130)
GLUCOSE UR STRIP-MCNC: NEGATIVE MG/DL
HBA1C MFR BLD: 7.6 % (ref 4.8–5.6)
HCO3 BLDA-SCNC: 16.7 MMOL/L (ref 20–26)
HCO3 BLDA-SCNC: 17.7 MMOL/L (ref 20–26)
HCO3 BLDA-SCNC: 18.2 MMOL/L (ref 20–26)
HCT VFR BLD AUTO: 26.2 % (ref 37.5–51)
HCT VFR BLD AUTO: 26.3 % (ref 37.5–51)
HCT VFR BLD CALC: 24 %
HCT VFR BLD CALC: 24.6 %
HCT VFR BLD CALC: 25.6 %
HGB BLD-MCNC: 7.8 G/DL (ref 13–17.7)
HGB BLD-MCNC: 8 G/DL (ref 13–17.7)
HGB BLDA-MCNC: 7.8 G/DL (ref 13.5–17.5)
HGB BLDA-MCNC: 8 G/DL (ref 13.5–17.5)
HGB BLDA-MCNC: 8.4 G/DL (ref 13.5–17.5)
HGB UR QL STRIP.AUTO: ABNORMAL
HOROWITZ INDEX BLD+IHG-RTO: 25 %
HOROWITZ INDEX BLD+IHG-RTO: 28 %
HOROWITZ INDEX BLD+IHG-RTO: 28 %
HYALINE CASTS UR QL AUTO: ABNORMAL /LPF
IMM GRANULOCYTES # BLD AUTO: 0.06 10*3/MM3 (ref 0–0.05)
IMM GRANULOCYTES # BLD AUTO: 0.09 10*3/MM3 (ref 0–0.05)
IMM GRANULOCYTES NFR BLD AUTO: 0.8 % (ref 0–0.5)
IMM GRANULOCYTES NFR BLD AUTO: 1.1 % (ref 0–0.5)
INR PPP: 1.26 (ref 0.85–1.16)
IRON 24H UR-MRATE: 61 MCG/DL (ref 59–158)
IRON SATN MFR SERPL: 22 % (ref 20–50)
KETONES UR QL STRIP: NEGATIVE
LEUKOCYTE ESTERASE UR QL STRIP.AUTO: ABNORMAL
LIPASE SERPL-CCNC: 30 U/L (ref 13–60)
LYMPHOCYTES # BLD AUTO: 1.11 10*3/MM3 (ref 0.7–3.1)
LYMPHOCYTES # BLD AUTO: 1.15 10*3/MM3 (ref 0.7–3.1)
LYMPHOCYTES NFR BLD AUTO: 14 % (ref 19.6–45.3)
LYMPHOCYTES NFR BLD AUTO: 14.5 % (ref 19.6–45.3)
Lab: ABNORMAL
MAGNESIUM SERPL-MCNC: 3.1 MG/DL (ref 1.6–2.4)
MCH RBC QN AUTO: 27.3 PG (ref 26.6–33)
MCH RBC QN AUTO: 29.1 PG (ref 26.6–33)
MCHC RBC AUTO-ENTMCNC: 29.7 G/DL (ref 31.5–35.7)
MCHC RBC AUTO-ENTMCNC: 30.5 G/DL (ref 31.5–35.7)
MCV RBC AUTO: 92 FL (ref 79–97)
MCV RBC AUTO: 95.3 FL (ref 79–97)
METHADONE UR QL SCN: NEGATIVE
METHGB BLD QL: 0.5 % (ref 0–1.5)
METHGB BLD QL: 1.2 % (ref 0–1.5)
METHGB BLD QL: 1.3 % (ref 0–1.5)
MODALITY: ABNORMAL
MONOCYTES # BLD AUTO: 0.47 10*3/MM3 (ref 0.1–0.9)
MONOCYTES # BLD AUTO: 0.52 10*3/MM3 (ref 0.1–0.9)
MONOCYTES NFR BLD AUTO: 5.7 % (ref 5–12)
MONOCYTES NFR BLD AUTO: 6.8 % (ref 5–12)
MYOGLOBIN SERPL-MCNC: 2689 NG/ML (ref 28–72)
NEUTROPHILS # BLD AUTO: 5.94 10*3/MM3 (ref 1.7–7)
NEUTROPHILS # BLD AUTO: 6.5 10*3/MM3 (ref 1.7–7)
NEUTROPHILS NFR BLD AUTO: 77.6 % (ref 42.7–76)
NEUTROPHILS NFR BLD AUTO: 79.1 % (ref 42.7–76)
NITRITE UR QL STRIP: NEGATIVE
NOTE: ABNORMAL
NOTIFIED BY: ABNORMAL
NOTIFIED WHO: ABNORMAL
NRBC BLD AUTO-RTO: 0 /100 WBC (ref 0–0.2)
NRBC BLD AUTO-RTO: 0 /100 WBC (ref 0–0.2)
OPIATES UR QL: POSITIVE
OXYCODONE UR QL SCN: NEGATIVE
OXYHGB MFR BLDV: 89.9 % (ref 94–99)
OXYHGB MFR BLDV: 92.9 % (ref 94–99)
OXYHGB MFR BLDV: 93.9 % (ref 94–99)
PCO2 BLDA: 46.8 MM HG (ref 35–45)
PCO2 BLDA: 53.2 MM HG (ref 35–45)
PCO2 BLDA: 56.7 MM HG (ref 35–45)
PCO2 TEMP ADJ BLD: 46.8 MM HG (ref 35–48)
PCO2 TEMP ADJ BLD: 53.2 MM HG (ref 35–48)
PCO2 TEMP ADJ BLD: 56.7 MM HG (ref 35–48)
PCP UR QL SCN: NEGATIVE
PH BLDA: 7.1 PH UNITS (ref 7.35–7.45)
PH BLDA: 7.14 PH UNITS (ref 7.35–7.45)
PH BLDA: 7.16 PH UNITS (ref 7.35–7.45)
PH UR STRIP.AUTO: <=5 [PH] (ref 5–8)
PH, TEMP CORRECTED: 7.1 PH UNITS
PH, TEMP CORRECTED: 7.14 PH UNITS
PH, TEMP CORRECTED: 7.16 PH UNITS
PHOSPHATE SERPL-MCNC: 10.3 MG/DL (ref 2.5–4.5)
PLATELET # BLD AUTO: 134 10*3/MM3 (ref 140–450)
PLATELET # BLD AUTO: 136 10*3/MM3 (ref 140–450)
PMV BLD AUTO: 10.3 FL (ref 6–12)
PMV BLD AUTO: 11 FL (ref 6–12)
PO2 BLDA: 73.1 MM HG (ref 83–108)
PO2 BLDA: 93.6 MM HG (ref 83–108)
PO2 BLDA: 97.5 MM HG (ref 83–108)
PO2 TEMP ADJ BLD: 73.1 MM HG (ref 83–108)
PO2 TEMP ADJ BLD: 93.6 MM HG (ref 83–108)
PO2 TEMP ADJ BLD: 97.5 MM HG (ref 83–108)
POTASSIUM BLD-SCNC: 4.4 MMOL/L (ref 3.5–5.2)
POTASSIUM BLD-SCNC: 4.5 MMOL/L (ref 3.5–5.2)
POTASSIUM BLD-SCNC: 4.7 MMOL/L (ref 3.5–5.2)
PROCALCITONIN SERPL-MCNC: 0.17 NG/ML (ref 0.1–0.25)
PROPOXYPH UR QL: NEGATIVE
PROT SERPL-MCNC: 6.4 G/DL (ref 6–8.5)
PROT SERPL-MCNC: 6.4 G/DL (ref 6–8.5)
PROT UR QL STRIP: ABNORMAL
PROTHROMBIN TIME: 15.5 SECONDS (ref 11.5–14)
RBC # BLD AUTO: 2.75 10*6/MM3 (ref 4.14–5.8)
RBC # BLD AUTO: 2.86 10*6/MM3 (ref 4.14–5.8)
RBC # UR: ABNORMAL /HPF
REF LAB TEST METHOD: ABNORMAL
SODIUM BLD-SCNC: 136 MMOL/L (ref 136–145)
SODIUM BLD-SCNC: 136 MMOL/L (ref 136–145)
SODIUM BLD-SCNC: 140 MMOL/L (ref 136–145)
SODIUM UR-SCNC: <20 MMOL/L
SP GR UR STRIP: 1.01 (ref 1–1.03)
SQUAMOUS #/AREA URNS HPF: ABNORMAL /HPF
TIBC SERPL-MCNC: 280 MCG/DL (ref 298–536)
TRANSFERRIN SERPL-MCNC: 188 MG/DL (ref 200–360)
TRICYCLICS UR QL SCN: NEGATIVE
TROPONIN T SERPL-MCNC: 0.01 NG/ML (ref 0–0.03)
TSH SERPL DL<=0.05 MIU/L-ACNC: 0.34 UIU/ML (ref 0.27–4.2)
UROBILINOGEN UR QL STRIP: ABNORMAL
VENTILATOR MODE: ABNORMAL
WBC NRBC COR # BLD: 7.65 10*3/MM3 (ref 3.4–10.8)
WBC NRBC COR # BLD: 8.22 10*3/MM3 (ref 3.4–10.8)
WBC UR QL AUTO: ABNORMAL /HPF

## 2020-03-31 PROCEDURE — 80307 DRUG TEST PRSMV CHEM ANLYZR: CPT | Performed by: NURSE PRACTITIONER

## 2020-03-31 PROCEDURE — 93005 ELECTROCARDIOGRAM TRACING: CPT | Performed by: INTERNAL MEDICINE

## 2020-03-31 PROCEDURE — 84681 ASSAY OF C-PEPTIDE: CPT | Performed by: NURSE PRACTITIONER

## 2020-03-31 PROCEDURE — 36600 WITHDRAWAL OF ARTERIAL BLOOD: CPT

## 2020-03-31 PROCEDURE — 84165 PROTEIN E-PHORESIS SERUM: CPT | Performed by: INTERNAL MEDICINE

## 2020-03-31 PROCEDURE — 83935 ASSAY OF URINE OSMOLALITY: CPT | Performed by: NURSE PRACTITIONER

## 2020-03-31 PROCEDURE — 99291 CRITICAL CARE FIRST HOUR: CPT | Performed by: INTERNAL MEDICINE

## 2020-03-31 PROCEDURE — 83520 IMMUNOASSAY QUANT NOS NONAB: CPT | Performed by: INTERNAL MEDICINE

## 2020-03-31 PROCEDURE — 86256 FLUORESCENT ANTIBODY TITER: CPT | Performed by: INTERNAL MEDICINE

## 2020-03-31 PROCEDURE — 84300 ASSAY OF URINE SODIUM: CPT | Performed by: INTERNAL MEDICINE

## 2020-03-31 PROCEDURE — 85025 COMPLETE CBC W/AUTO DIFF WBC: CPT | Performed by: NURSE PRACTITIONER

## 2020-03-31 PROCEDURE — 87205 SMEAR GRAM STAIN: CPT | Performed by: INTERNAL MEDICINE

## 2020-03-31 PROCEDURE — 82330 ASSAY OF CALCIUM: CPT | Performed by: INTERNAL MEDICINE

## 2020-03-31 PROCEDURE — 80053 COMPREHEN METABOLIC PANEL: CPT | Performed by: NURSE PRACTITIONER

## 2020-03-31 PROCEDURE — 84305 ASSAY OF SOMATOMEDIN: CPT | Performed by: NURSE PRACTITIONER

## 2020-03-31 PROCEDURE — 80053 COMPREHEN METABOLIC PANEL: CPT | Performed by: INTERNAL MEDICINE

## 2020-03-31 PROCEDURE — 84466 ASSAY OF TRANSFERRIN: CPT | Performed by: INTERNAL MEDICINE

## 2020-03-31 PROCEDURE — 84484 ASSAY OF TROPONIN QUANT: CPT | Performed by: NURSE PRACTITIONER

## 2020-03-31 PROCEDURE — 83540 ASSAY OF IRON: CPT | Performed by: INTERNAL MEDICINE

## 2020-03-31 PROCEDURE — 93010 ELECTROCARDIOGRAM REPORT: CPT | Performed by: INTERNAL MEDICINE

## 2020-03-31 PROCEDURE — 84145 PROCALCITONIN (PCT): CPT | Performed by: NURSE PRACTITIONER

## 2020-03-31 PROCEDURE — 94660 CPAP INITIATION&MGMT: CPT

## 2020-03-31 PROCEDURE — 94799 UNLISTED PULMONARY SVC/PX: CPT

## 2020-03-31 PROCEDURE — 70450 CT HEAD/BRAIN W/O DYE: CPT

## 2020-03-31 PROCEDURE — 83735 ASSAY OF MAGNESIUM: CPT | Performed by: INTERNAL MEDICINE

## 2020-03-31 PROCEDURE — 83525 ASSAY OF INSULIN: CPT | Performed by: NURSE PRACTITIONER

## 2020-03-31 PROCEDURE — 76775 US EXAM ABDO BACK WALL LIM: CPT

## 2020-03-31 PROCEDURE — 83690 ASSAY OF LIPASE: CPT | Performed by: NURSE PRACTITIONER

## 2020-03-31 PROCEDURE — 82550 ASSAY OF CK (CPK): CPT | Performed by: INTERNAL MEDICINE

## 2020-03-31 PROCEDURE — 99292 CRITICAL CARE ADDL 30 MIN: CPT | Performed by: INTERNAL MEDICINE

## 2020-03-31 PROCEDURE — 25010000002 CEFTRIAXONE PER 250 MG: Performed by: INTERNAL MEDICINE

## 2020-03-31 PROCEDURE — 82805 BLOOD GASES W/O2 SATURATION: CPT

## 2020-03-31 PROCEDURE — 94640 AIRWAY INHALATION TREATMENT: CPT

## 2020-03-31 PROCEDURE — 85610 PROTHROMBIN TIME: CPT | Performed by: INTERNAL MEDICINE

## 2020-03-31 PROCEDURE — 82150 ASSAY OF AMYLASE: CPT | Performed by: NURSE PRACTITIONER

## 2020-03-31 PROCEDURE — 83874 ASSAY OF MYOGLOBIN: CPT | Performed by: INTERNAL MEDICINE

## 2020-03-31 PROCEDURE — 25010000002 CALCIUM GLUCONATE PER 10 ML: Performed by: INTERNAL MEDICINE

## 2020-03-31 PROCEDURE — 85025 COMPLETE CBC W/AUTO DIFF WBC: CPT | Performed by: INTERNAL MEDICINE

## 2020-03-31 PROCEDURE — 82962 GLUCOSE BLOOD TEST: CPT

## 2020-03-31 PROCEDURE — 83605 ASSAY OF LACTIC ACID: CPT | Performed by: INTERNAL MEDICINE

## 2020-03-31 PROCEDURE — 81001 URINALYSIS AUTO W/SCOPE: CPT | Performed by: INTERNAL MEDICINE

## 2020-03-31 PROCEDURE — 82570 ASSAY OF URINE CREATININE: CPT | Performed by: INTERNAL MEDICINE

## 2020-03-31 PROCEDURE — 83036 HEMOGLOBIN GLYCOSYLATED A1C: CPT | Performed by: INTERNAL MEDICINE

## 2020-03-31 PROCEDURE — 83883 ASSAY NEPHELOMETRY NOT SPEC: CPT | Performed by: INTERNAL MEDICINE

## 2020-03-31 PROCEDURE — 25010000002 NALOXONE PER 1 MG: Performed by: INTERNAL MEDICINE

## 2020-03-31 PROCEDURE — 83605 ASSAY OF LACTIC ACID: CPT | Performed by: NURSE PRACTITIONER

## 2020-03-31 PROCEDURE — 84443 ASSAY THYROID STIM HORMONE: CPT | Performed by: NURSE PRACTITIONER

## 2020-03-31 PROCEDURE — 84100 ASSAY OF PHOSPHORUS: CPT | Performed by: INTERNAL MEDICINE

## 2020-03-31 PROCEDURE — 76705 ECHO EXAM OF ABDOMEN: CPT

## 2020-03-31 PROCEDURE — 71045 X-RAY EXAM CHEST 1 VIEW: CPT

## 2020-03-31 PROCEDURE — 82140 ASSAY OF AMMONIA: CPT | Performed by: NURSE PRACTITIONER

## 2020-03-31 RX ORDER — DEXTROSE AND SODIUM CHLORIDE 5; .9 G/100ML; G/100ML
50 INJECTION, SOLUTION INTRAVENOUS CONTINUOUS
Status: DISCONTINUED | OUTPATIENT
Start: 2020-03-31 | End: 2020-03-31

## 2020-03-31 RX ORDER — NALOXONE HCL 0.4 MG/ML
0.4 VIAL (ML) INJECTION ONCE
Status: COMPLETED | OUTPATIENT
Start: 2020-03-31 | End: 2020-03-31

## 2020-03-31 RX ORDER — PANTOPRAZOLE SODIUM 40 MG/10ML
40 INJECTION, POWDER, LYOPHILIZED, FOR SOLUTION INTRAVENOUS
Status: DISCONTINUED | OUTPATIENT
Start: 2020-04-01 | End: 2020-04-05

## 2020-03-31 RX ORDER — DEXTROSE 20 G/100ML
25 INJECTION, SOLUTION INTRAVENOUS CONTINUOUS
Status: DISCONTINUED | OUTPATIENT
Start: 2020-04-01 | End: 2020-04-01

## 2020-03-31 RX ORDER — DEXTROSE MONOHYDRATE 100 MG/ML
100 INJECTION, SOLUTION INTRAVENOUS CONTINUOUS
Status: DISCONTINUED | OUTPATIENT
Start: 2020-03-31 | End: 2020-03-31

## 2020-03-31 RX ORDER — MECLIZINE HYDROCHLORIDE 25 MG/1
12.5 TABLET ORAL 3 TIMES DAILY PRN
Status: DISCONTINUED | OUTPATIENT
Start: 2020-03-31 | End: 2020-04-06 | Stop reason: HOSPADM

## 2020-03-31 RX ORDER — SODIUM CHLORIDE, SODIUM LACTATE, POTASSIUM CHLORIDE, CALCIUM CHLORIDE 600; 310; 30; 20 MG/100ML; MG/100ML; MG/100ML; MG/100ML
100 INJECTION, SOLUTION INTRAVENOUS CONTINUOUS
Status: DISCONTINUED | OUTPATIENT
Start: 2020-03-31 | End: 2020-04-01

## 2020-03-31 RX ORDER — DEXTROSE MONOHYDRATE 25 G/50ML
50 INJECTION, SOLUTION INTRAVENOUS
Status: DISCONTINUED | OUTPATIENT
Start: 2020-03-31 | End: 2020-04-04

## 2020-03-31 RX ORDER — DEXTROSE MONOHYDRATE 100 MG/ML
100 INJECTION, SOLUTION INTRAVENOUS CONTINUOUS
Status: DISCONTINUED | OUTPATIENT
Start: 2020-04-01 | End: 2020-03-31

## 2020-03-31 RX ORDER — CITALOPRAM 20 MG/1
20 TABLET ORAL DAILY
Status: DISCONTINUED | OUTPATIENT
Start: 2020-04-01 | End: 2020-04-06 | Stop reason: HOSPADM

## 2020-03-31 RX ORDER — PROCHLORPERAZINE EDISYLATE 5 MG/ML
5 INJECTION INTRAMUSCULAR; INTRAVENOUS EVERY 6 HOURS PRN
Status: DISCONTINUED | OUTPATIENT
Start: 2020-03-31 | End: 2020-04-06 | Stop reason: HOSPADM

## 2020-03-31 RX ORDER — MAG HYDROX/ALUMINUM HYD/SIMETH 400-400-40
1 SUSPENSION, ORAL (FINAL DOSE FORM) ORAL DAILY PRN
Status: DISCONTINUED | OUTPATIENT
Start: 2020-03-31 | End: 2020-04-06 | Stop reason: HOSPADM

## 2020-03-31 RX ORDER — CLOPIDOGREL BISULFATE 75 MG/1
75 TABLET ORAL DAILY
Status: DISCONTINUED | OUTPATIENT
Start: 2020-04-01 | End: 2020-03-31

## 2020-03-31 RX ORDER — BUDESONIDE AND FORMOTEROL FUMARATE DIHYDRATE 160; 4.5 UG/1; UG/1
2 AEROSOL RESPIRATORY (INHALATION) 2 TIMES DAILY
Status: DISCONTINUED | OUTPATIENT
Start: 2020-03-31 | End: 2020-03-31

## 2020-03-31 RX ORDER — DEXMEDETOMIDINE HYDROCHLORIDE 4 UG/ML
.2-1.5 INJECTION, SOLUTION INTRAVENOUS
Status: DISCONTINUED | OUTPATIENT
Start: 2020-03-31 | End: 2020-04-03

## 2020-03-31 RX ORDER — SODIUM CHLORIDE 0.9 % (FLUSH) 0.9 %
10 SYRINGE (ML) INJECTION EVERY 12 HOURS SCHEDULED
Status: DISCONTINUED | OUTPATIENT
Start: 2020-03-31 | End: 2020-04-06 | Stop reason: HOSPADM

## 2020-03-31 RX ORDER — IPRATROPIUM BROMIDE AND ALBUTEROL SULFATE 2.5; .5 MG/3ML; MG/3ML
3 SOLUTION RESPIRATORY (INHALATION)
Status: DISCONTINUED | OUTPATIENT
Start: 2020-03-31 | End: 2020-04-06 | Stop reason: HOSPADM

## 2020-03-31 RX ORDER — ARFORMOTEROL TARTRATE 15 UG/2ML
15 SOLUTION RESPIRATORY (INHALATION)
Status: DISCONTINUED | OUTPATIENT
Start: 2020-03-31 | End: 2020-03-31

## 2020-03-31 RX ORDER — SODIUM CHLORIDE 0.9 % (FLUSH) 0.9 %
10 SYRINGE (ML) INJECTION AS NEEDED
Status: DISCONTINUED | OUTPATIENT
Start: 2020-03-31 | End: 2020-04-06 | Stop reason: HOSPADM

## 2020-03-31 RX ORDER — FLUMAZENIL 0.1 MG/ML
0.2 INJECTION INTRAVENOUS ONCE
Status: COMPLETED | OUTPATIENT
Start: 2020-03-31 | End: 2020-03-31

## 2020-03-31 RX ADMIN — DEXTROSE 50 % IN WATER (D50W) INTRAVENOUS SYRINGE 50 ML: at 14:33

## 2020-03-31 RX ADMIN — FLUMAZENIL 0.2 MG: 0.1 INJECTION, SOLUTION INTRAVENOUS at 13:54

## 2020-03-31 RX ADMIN — CALCIUM GLUCONATE 1 G: 98 INJECTION, SOLUTION INTRAVENOUS at 20:14

## 2020-03-31 RX ADMIN — NALXONE HYDROCHLORIDE 0.4 MG: 0.4 INJECTION INTRAMUSCULAR; INTRAVENOUS; SUBCUTANEOUS at 13:54

## 2020-03-31 RX ADMIN — SODIUM BICARBONATE 50 MEQ: 84 INJECTION, SOLUTION INTRAVENOUS at 23:04

## 2020-03-31 RX ADMIN — DEXTROSE MONOHYDRATE 100 ML/HR: 100 INJECTION, SOLUTION INTRAVENOUS at 20:01

## 2020-03-31 RX ADMIN — CEFTRIAXONE SODIUM 1 G: 1 INJECTION, POWDER, FOR SOLUTION INTRAMUSCULAR; INTRAVENOUS at 15:49

## 2020-03-31 RX ADMIN — DOXYCYCLINE 100 MG: 100 INJECTION, POWDER, LYOPHILIZED, FOR SOLUTION INTRAVENOUS at 21:41

## 2020-03-31 RX ADMIN — IPRATROPIUM BROMIDE AND ALBUTEROL SULFATE 3 ML: 2.5; .5 SOLUTION RESPIRATORY (INHALATION) at 19:07

## 2020-03-31 RX ADMIN — SODIUM CHLORIDE, POTASSIUM CHLORIDE, SODIUM LACTATE AND CALCIUM CHLORIDE 100 ML/HR: 600; 310; 30; 20 INJECTION, SOLUTION INTRAVENOUS at 13:54

## 2020-03-31 RX ADMIN — DEXTROSE 50 % IN WATER (D50W) INTRAVENOUS SYRINGE 25 ML: at 20:00

## 2020-03-31 RX ADMIN — DEXTROSE 50 % IN WATER (D50W) INTRAVENOUS SYRINGE 50 ML: at 12:21

## 2020-03-31 RX ADMIN — NALOXONE HYDROCHLORIDE 0.4 MG: 0.4 INJECTION, SOLUTION INTRAMUSCULAR; INTRAVENOUS; SUBCUTANEOUS at 20:07

## 2020-03-31 RX ADMIN — DEXTROSE 50 % IN WATER (D50W) INTRAVENOUS SYRINGE 50 ML: at 22:08

## 2020-03-31 RX ADMIN — NALOXONE HYDROCHLORIDE 1 MG/HR: 1 INJECTION PARENTERAL at 20:50

## 2020-03-31 RX ADMIN — DEXTROSE AND SODIUM CHLORIDE 50 ML/HR: 5; 900 INJECTION, SOLUTION INTRAVENOUS at 18:37

## 2020-03-31 RX ADMIN — DEXTROSE 100 ML/HR: 20 INJECTION, SOLUTION INTRAVENOUS at 23:53

## 2020-03-31 RX ADMIN — DEXTROSE AND SODIUM CHLORIDE 50 ML/HR: 5; 900 INJECTION, SOLUTION INTRAVENOUS at 14:23

## 2020-03-31 RX ADMIN — SODIUM CHLORIDE, POTASSIUM CHLORIDE, SODIUM LACTATE AND CALCIUM CHLORIDE 100 ML/HR: 600; 310; 30; 20 INJECTION, SOLUTION INTRAVENOUS at 18:37

## 2020-03-31 RX ADMIN — DEXTROSE 50 % IN WATER (D50W) INTRAVENOUS SYRINGE 50 ML: at 19:40

## 2020-03-31 RX ADMIN — DEXMEDETOMIDINE HYDROCHLORIDE 0.2 MCG/KG/HR: 400 INJECTION INTRAVENOUS at 21:22

## 2020-03-31 RX ADMIN — SODIUM CHLORIDE 1000 ML: 9 INJECTION, SOLUTION INTRAVENOUS at 13:01

## 2020-03-31 RX ADMIN — SODIUM BICARBONATE 150 MEQ: 84 INJECTION, SOLUTION INTRAVENOUS at 23:04

## 2020-03-31 RX ADMIN — SODIUM CHLORIDE, PRESERVATIVE FREE 10 ML: 5 INJECTION INTRAVENOUS at 21:33

## 2020-04-01 ENCOUNTER — APPOINTMENT (OUTPATIENT)
Dept: GENERAL RADIOLOGY | Facility: HOSPITAL | Age: 75
End: 2020-04-01

## 2020-04-01 ENCOUNTER — APPOINTMENT (OUTPATIENT)
Dept: CARDIOLOGY | Facility: HOSPITAL | Age: 75
End: 2020-04-01

## 2020-04-01 LAB
ALBUMIN SERPL-MCNC: 3.3 G/DL (ref 2.9–4.4)
ALBUMIN SERPL-MCNC: 3.7 G/DL (ref 3.5–5.2)
ALBUMIN/GLOB SERPL: 1.1 {RATIO} (ref 0.7–1.7)
ALBUMIN/GLOB SERPL: 1.2 G/DL
ALP SERPL-CCNC: 57 U/L (ref 39–117)
ALPHA1 GLOB FLD ELPH-MCNC: 0.3 G/DL (ref 0–0.4)
ALPHA2 GLOB SERPL ELPH-MCNC: 1 G/DL (ref 0.4–1)
ALT SERPL W P-5'-P-CCNC: 19 U/L (ref 1–41)
AMMONIA BLD-SCNC: 29 UMOL/L (ref 16–60)
ANION GAP SERPL CALCULATED.3IONS-SCNC: 13 MMOL/L (ref 5–15)
ANION GAP SERPL CALCULATED.3IONS-SCNC: 16 MMOL/L (ref 5–15)
ANION GAP SERPL CALCULATED.3IONS-SCNC: 17 MMOL/L (ref 5–15)
ANION GAP SERPL CALCULATED.3IONS-SCNC: 19 MMOL/L (ref 5–15)
ARTERIAL PATENCY WRIST A: ABNORMAL
AST SERPL-CCNC: 74 U/L (ref 1–40)
ATMOSPHERIC PRESS: ABNORMAL MM[HG]
B-GLOBULIN SERPL ELPH-MCNC: 0.9 G/DL (ref 0.7–1.3)
BASE EXCESS BLDA CALC-SCNC: -2.1 MMOL/L (ref 0–2)
BASE EXCESS BLDA CALC-SCNC: -3.6 MMOL/L (ref 0–2)
BASE EXCESS BLDA CALC-SCNC: -5.2 MMOL/L (ref 0–2)
BASE EXCESS BLDA CALC-SCNC: -7.1 MMOL/L (ref 0–2)
BDY SITE: ABNORMAL
BH CV ECHO MEAS - AO MAX PG (FULL): 36.6 MMHG
BH CV ECHO MEAS - AO MAX PG: 47.5 MMHG
BH CV ECHO MEAS - AO MEAN PG (FULL): 12.5 MMHG
BH CV ECHO MEAS - AO MEAN PG: 18.4 MMHG
BH CV ECHO MEAS - AO ROOT AREA (BSA CORRECTED): 0.99
BH CV ECHO MEAS - AO ROOT AREA: 4.4 CM^2
BH CV ECHO MEAS - AO ROOT DIAM: 2.4 CM
BH CV ECHO MEAS - AO V2 MAX: 344.8 CM/SEC
BH CV ECHO MEAS - AO V2 MEAN: 193.4 CM/SEC
BH CV ECHO MEAS - AO V2 VTI: 57.2 CM
BH CV ECHO MEAS - AVA(I,A): 1.5 CM^2
BH CV ECHO MEAS - AVA(I,D): 1.5 CM^2
BH CV ECHO MEAS - AVA(V,A): 1.5 CM^2
BH CV ECHO MEAS - AVA(V,D): 1.5 CM^2
BH CV ECHO MEAS - BSA(HAYCOCK): 2.6 M^2
BH CV ECHO MEAS - BSA: 2.4 M^2
BH CV ECHO MEAS - BZI_BMI: 40 KILOGRAMS/M^2
BH CV ECHO MEAS - BZI_METRIC_HEIGHT: 177.8 CM
BH CV ECHO MEAS - BZI_METRIC_WEIGHT: 126.6 KG
BH CV ECHO MEAS - EDV(CUBED): 166.5 ML
BH CV ECHO MEAS - EDV(MOD-SP2): 155 ML
BH CV ECHO MEAS - EDV(MOD-SP4): 150 ML
BH CV ECHO MEAS - EDV(TEICH): 147.5 ML
BH CV ECHO MEAS - EF(CUBED): 94.7 %
BH CV ECHO MEAS - EF(MOD-BP): 67 %
BH CV ECHO MEAS - EF(MOD-SP2): 72.9 %
BH CV ECHO MEAS - EF(MOD-SP4): 63.3 %
BH CV ECHO MEAS - EF(TEICH): 90.6 %
BH CV ECHO MEAS - ESV(CUBED): 8.9 ML
BH CV ECHO MEAS - ESV(MOD-SP2): 42 ML
BH CV ECHO MEAS - ESV(MOD-SP4): 55 ML
BH CV ECHO MEAS - ESV(TEICH): 13.9 ML
BH CV ECHO MEAS - FS: 62.4 %
BH CV ECHO MEAS - IVS/LVPW: 0.56
BH CV ECHO MEAS - IVSD: 0.68 CM
BH CV ECHO MEAS - LA DIMENSION: 4.7 CM
BH CV ECHO MEAS - LA/AO: 2
BH CV ECHO MEAS - LAD MAJOR: 7.4 CM
BH CV ECHO MEAS - LAT PEAK E' VEL: 7.9 CM/SEC
BH CV ECHO MEAS - LATERAL E/E' RATIO: 16.4
BH CV ECHO MEAS - LV DIASTOLIC VOL/BSA (35-75): 62.4 ML/M^2
BH CV ECHO MEAS - LV MASS(C)D: 199.5 GRAMS
BH CV ECHO MEAS - LV MASS(C)DI: 83 GRAMS/M^2
BH CV ECHO MEAS - LV MAX PG: 11 MMHG
BH CV ECHO MEAS - LV MEAN PG: 5.9 MMHG
BH CV ECHO MEAS - LV SYSTOLIC VOL/BSA (12-30): 22.9 ML/M^2
BH CV ECHO MEAS - LV V1 MAX: 165.6 CM/SEC
BH CV ECHO MEAS - LV V1 MEAN: 114.8 CM/SEC
BH CV ECHO MEAS - LV V1 VTI: 29.1 CM
BH CV ECHO MEAS - LVIDD: 5.5 CM
BH CV ECHO MEAS - LVIDS: 2.1 CM
BH CV ECHO MEAS - LVLD AP2: 8.4 CM
BH CV ECHO MEAS - LVLD AP4: 8.5 CM
BH CV ECHO MEAS - LVLS AP2: 7 CM
BH CV ECHO MEAS - LVLS AP4: 6.4 CM
BH CV ECHO MEAS - LVOT AREA (M): 3.1 CM^2
BH CV ECHO MEAS - LVOT AREA: 3 CM^2
BH CV ECHO MEAS - LVOT DIAM: 2 CM
BH CV ECHO MEAS - LVPWD: 1.2 CM
BH CV ECHO MEAS - MED PEAK E' VEL: 9.1 CM/SEC
BH CV ECHO MEAS - MEDIAL E/E' RATIO: 14.2
BH CV ECHO MEAS - MV A MAX VEL: 121.2 CM/SEC
BH CV ECHO MEAS - MV DEC SLOPE: 455.4 CM/SEC^2
BH CV ECHO MEAS - MV E MAX VEL: 131.1 CM/SEC
BH CV ECHO MEAS - MV E/A: 1.1
BH CV ECHO MEAS - MV MAX PG: 15.1 MMHG
BH CV ECHO MEAS - MV MEAN PG: 4.3 MMHG
BH CV ECHO MEAS - MV V2 MAX: 194.1 CM/SEC
BH CV ECHO MEAS - MV V2 MEAN: 92 CM/SEC
BH CV ECHO MEAS - MV V2 VTI: 65.4 CM
BH CV ECHO MEAS - MVA(VTI): 1.3 CM^2
BH CV ECHO MEAS - PA ACC SLOPE: 1356 CM/SEC^2
BH CV ECHO MEAS - PA ACC TIME: 0.08 SEC
BH CV ECHO MEAS - PA MAX PG: 10.8 MMHG
BH CV ECHO MEAS - PA PR(ACCEL): 41 MMHG
BH CV ECHO MEAS - PA V2 MAX: 164.2 CM/SEC
BH CV ECHO MEAS - RAP SYSTOLE: 8 MMHG
BH CV ECHO MEAS - RVDD: 2.2 CM
BH CV ECHO MEAS - RVSP: 29 MMHG
BH CV ECHO MEAS - SI(AO): 105.6 ML/M^2
BH CV ECHO MEAS - SI(CUBED): 65.6 ML/M^2
BH CV ECHO MEAS - SI(LVOT): 36.6 ML/M^2
BH CV ECHO MEAS - SI(MOD-SP2): 47 ML/M^2
BH CV ECHO MEAS - SI(MOD-SP4): 39.5 ML/M^2
BH CV ECHO MEAS - SI(TEICH): 55.6 ML/M^2
BH CV ECHO MEAS - SV(AO): 254 ML
BH CV ECHO MEAS - SV(CUBED): 157.6 ML
BH CV ECHO MEAS - SV(LVOT): 88 ML
BH CV ECHO MEAS - SV(MOD-SP2): 113 ML
BH CV ECHO MEAS - SV(MOD-SP4): 95 ML
BH CV ECHO MEAS - SV(TEICH): 133.6 ML
BH CV ECHO MEAS - TAPSE (>1.6): 2.1 CM2
BH CV ECHO MEAS - TR MAX PG: 21 MMHG
BH CV ECHO MEAS - TR MAX VEL: 229 CM/SEC
BH CV ECHO MEASUREMENTS AVERAGE E/E' RATIO: 15.42
BH CV VAS BP RIGHT ARM: NORMAL MMHG
BH CV XLRA - RV BASE: 4.8 CM
BH CV XLRA - RV LENGTH: 7.3 CM
BH CV XLRA - RV MID: 4.3 CM
BH CV XLRA - TDI S': 22.6 CM/SEC
BILIRUB SERPL-MCNC: 0.2 MG/DL (ref 0.2–1.2)
BODY TEMPERATURE: 37 C
BUN BLD-MCNC: 73 MG/DL (ref 8–23)
BUN BLD-MCNC: 90 MG/DL (ref 8–23)
BUN BLD-MCNC: 94 MG/DL (ref 8–23)
BUN BLD-MCNC: 94 MG/DL (ref 8–23)
BUN/CREAT SERPL: 12.7 (ref 7–25)
BUN/CREAT SERPL: 13.7 (ref 7–25)
BUN/CREAT SERPL: 13.9 (ref 7–25)
BUN/CREAT SERPL: 14.3 (ref 7–25)
CA-I SERPL ISE-MCNC: 1.05 MMOL/L (ref 1.12–1.32)
CALCIUM SPEC-SCNC: 6.9 MG/DL (ref 8.6–10.5)
CALCIUM SPEC-SCNC: 7.1 MG/DL (ref 8.6–10.5)
CHLORIDE SERPL-SCNC: 101 MMOL/L (ref 98–107)
CHLORIDE SERPL-SCNC: 103 MMOL/L (ref 98–107)
CHLORIDE SERPL-SCNC: 104 MMOL/L (ref 98–107)
CHLORIDE SERPL-SCNC: 105 MMOL/L (ref 98–107)
CK SERPL-CCNC: 3961 U/L (ref 20–200)
CO2 BLDA-SCNC: 22.3 MMOL/L (ref 22–33)
CO2 BLDA-SCNC: 23.5 MMOL/L (ref 22–33)
CO2 BLDA-SCNC: 24.8 MMOL/L (ref 22–33)
CO2 BLDA-SCNC: 25.3 MMOL/L (ref 22–33)
CO2 SERPL-SCNC: 19 MMOL/L (ref 22–29)
CO2 SERPL-SCNC: 20 MMOL/L (ref 22–29)
CO2 SERPL-SCNC: 21 MMOL/L (ref 22–29)
CO2 SERPL-SCNC: 24 MMOL/L (ref 22–29)
COHGB MFR BLD: 0.7 % (ref 0–2)
COHGB MFR BLD: 0.8 % (ref 0–2)
CREAT BLD-MCNC: 5.09 MG/DL (ref 0.76–1.27)
CREAT BLD-MCNC: 6.49 MG/DL (ref 0.76–1.27)
CREAT BLD-MCNC: 6.86 MG/DL (ref 0.76–1.27)
CREAT BLD-MCNC: 7.43 MG/DL (ref 0.76–1.27)
D-LACTATE SERPL-SCNC: 0.8 MMOL/L (ref 0.5–2)
DEPRECATED RDW RBC AUTO: 61.1 FL (ref 37–54)
EPAP: 8
ERYTHROCYTE [DISTWIDTH] IN BLOOD BY AUTOMATED COUNT: 18.1 % (ref 12.3–15.4)
FOLATE SERPL-MCNC: 8.75 NG/ML (ref 4.78–24.2)
GAMMA GLOB SERPL ELPH-MCNC: 0.9 G/DL (ref 0.4–1.8)
GFR SERPL CREATININE-BSD FRML MDRD: 11 ML/MIN/1.73
GFR SERPL CREATININE-BSD FRML MDRD: 7 ML/MIN/1.73
GFR SERPL CREATININE-BSD FRML MDRD: 8 ML/MIN/1.73
GFR SERPL CREATININE-BSD FRML MDRD: 8 ML/MIN/1.73
GFR SERPL CREATININE-BSD FRML MDRD: ABNORMAL ML/MIN/{1.73_M2}
GLOBULIN SER CALC-MCNC: 3.1 G/DL (ref 2.2–3.9)
GLOBULIN UR ELPH-MCNC: 3 GM/DL
GLUCOSE BLD-MCNC: 243 MG/DL (ref 65–99)
GLUCOSE BLD-MCNC: 57 MG/DL (ref 65–99)
GLUCOSE BLD-MCNC: 57 MG/DL (ref 65–99)
GLUCOSE BLD-MCNC: 68 MG/DL (ref 65–99)
GLUCOSE BLDC GLUCOMTR-MCNC: 103 MG/DL (ref 70–130)
GLUCOSE BLDC GLUCOMTR-MCNC: 103 MG/DL (ref 70–130)
GLUCOSE BLDC GLUCOMTR-MCNC: 107 MG/DL (ref 70–130)
GLUCOSE BLDC GLUCOMTR-MCNC: 107 MG/DL (ref 70–130)
GLUCOSE BLDC GLUCOMTR-MCNC: 114 MG/DL (ref 70–130)
GLUCOSE BLDC GLUCOMTR-MCNC: 122 MG/DL (ref 70–130)
GLUCOSE BLDC GLUCOMTR-MCNC: 155 MG/DL (ref 70–130)
GLUCOSE BLDC GLUCOMTR-MCNC: 183 MG/DL (ref 70–130)
GLUCOSE BLDC GLUCOMTR-MCNC: 218 MG/DL (ref 70–130)
GLUCOSE BLDC GLUCOMTR-MCNC: 246 MG/DL (ref 70–130)
GLUCOSE BLDC GLUCOMTR-MCNC: 254 MG/DL (ref 70–130)
GLUCOSE BLDC GLUCOMTR-MCNC: 264 MG/DL (ref 70–130)
GLUCOSE BLDC GLUCOMTR-MCNC: 310 MG/DL (ref 70–130)
GLUCOSE BLDC GLUCOMTR-MCNC: 57 MG/DL (ref 70–130)
GLUCOSE BLDC GLUCOMTR-MCNC: 65 MG/DL (ref 70–130)
GLUCOSE BLDC GLUCOMTR-MCNC: 66 MG/DL (ref 70–130)
GLUCOSE BLDC GLUCOMTR-MCNC: 68 MG/DL (ref 70–130)
GLUCOSE BLDC GLUCOMTR-MCNC: 69 MG/DL (ref 70–130)
GLUCOSE BLDC GLUCOMTR-MCNC: 69 MG/DL (ref 70–130)
GLUCOSE BLDC GLUCOMTR-MCNC: 73 MG/DL (ref 70–130)
GLUCOSE BLDC GLUCOMTR-MCNC: 74 MG/DL (ref 70–130)
GLUCOSE BLDC GLUCOMTR-MCNC: 77 MG/DL (ref 70–130)
GLUCOSE BLDC GLUCOMTR-MCNC: 78 MG/DL (ref 70–130)
GLUCOSE BLDC GLUCOMTR-MCNC: 79 MG/DL (ref 70–130)
GLUCOSE BLDC GLUCOMTR-MCNC: 83 MG/DL (ref 70–130)
GLUCOSE BLDC GLUCOMTR-MCNC: 85 MG/DL (ref 70–130)
GLUCOSE BLDC GLUCOMTR-MCNC: 86 MG/DL (ref 70–130)
GLUCOSE BLDC GLUCOMTR-MCNC: 93 MG/DL (ref 70–130)
GLUCOSE BLDC GLUCOMTR-MCNC: 96 MG/DL (ref 70–130)
HCO3 BLDA-SCNC: 20.7 MMOL/L (ref 20–26)
HCO3 BLDA-SCNC: 22 MMOL/L (ref 20–26)
HCO3 BLDA-SCNC: 23.3 MMOL/L (ref 20–26)
HCO3 BLDA-SCNC: 23.9 MMOL/L (ref 20–26)
HCT VFR BLD AUTO: 25.6 % (ref 37.5–51)
HCT VFR BLD CALC: 23.4 %
HCT VFR BLD CALC: 24.6 %
HCT VFR BLD CALC: 25 %
HCT VFR BLD CALC: 25.9 %
HGB BLD-MCNC: 7.9 G/DL (ref 13–17.7)
HGB BLDA-MCNC: 7.6 G/DL (ref 13.5–17.5)
HGB BLDA-MCNC: 8 G/DL (ref 13.5–17.5)
HGB BLDA-MCNC: 8.1 G/DL (ref 13.5–17.5)
HGB BLDA-MCNC: 8.5 G/DL (ref 13.5–17.5)
HOROWITZ INDEX BLD+IHG-RTO: 28 %
IPAP: 18
KAPPA LC SERPL-MCNC: 86.7 MG/L (ref 3.3–19.4)
KAPPA LC/LAMBDA SER: 2.08 {RATIO} (ref 0.26–1.65)
LAMBDA LC FREE SERPL-MCNC: 41.7 MG/L (ref 5.7–26.3)
LEFT ATRIUM VOLUME INDEX: 32.4 ML/M^2
LEFT ATRIUM VOLUME: 78 ML
Lab: ABNORMAL
Lab: NORMAL
M-SPIKE: NORMAL G/DL
MAGNESIUM SERPL-MCNC: 2.8 MG/DL (ref 1.6–2.4)
MCH RBC QN AUTO: 28.7 PG (ref 26.6–33)
MCHC RBC AUTO-ENTMCNC: 30.9 G/DL (ref 31.5–35.7)
MCV RBC AUTO: 93.1 FL (ref 79–97)
METHGB BLD QL: 1.1 % (ref 0–1.5)
METHGB BLD QL: 1.1 % (ref 0–1.5)
METHGB BLD QL: 1.2 % (ref 0–1.5)
METHGB BLD QL: 1.2 % (ref 0–1.5)
MODALITY: ABNORMAL
NOTE: ABNORMAL
NOTIFIED BY: ABNORMAL
NOTIFIED WHO: ABNORMAL
OSMOLALITY UR: 369 MOSM/KG (ref 300–1100)
OXYHGB MFR BLDV: 95.3 % (ref 94–99)
OXYHGB MFR BLDV: 95.6 % (ref 94–99)
OXYHGB MFR BLDV: 95.8 % (ref 94–99)
OXYHGB MFR BLDV: 96.3 % (ref 94–99)
PCO2 BLDA: 46.6 MM HG (ref 35–45)
PCO2 BLDA: 50.8 MM HG (ref 35–45)
PCO2 BLDA: 51 MM HG (ref 35–45)
PCO2 BLDA: 52.9 MM HG (ref 35–45)
PCO2 TEMP ADJ BLD: 46.6 MM HG (ref 35–48)
PCO2 TEMP ADJ BLD: 50.8 MM HG (ref 35–48)
PCO2 TEMP ADJ BLD: 51 MM HG (ref 35–48)
PCO2 TEMP ADJ BLD: 52.9 MM HG (ref 35–48)
PH BLDA: 7.2 PH UNITS (ref 7.35–7.45)
PH BLDA: 7.24 PH UNITS (ref 7.35–7.45)
PH BLDA: 7.27 PH UNITS (ref 7.35–7.45)
PH BLDA: 7.32 PH UNITS (ref 7.35–7.45)
PH, TEMP CORRECTED: 7.2 PH UNITS
PH, TEMP CORRECTED: 7.24 PH UNITS
PH, TEMP CORRECTED: 7.27 PH UNITS
PH, TEMP CORRECTED: 7.32 PH UNITS
PHOSPHATE SERPL-MCNC: 7.8 MG/DL (ref 2.5–4.5)
PLATELET # BLD AUTO: 145 10*3/MM3 (ref 140–450)
PMV BLD AUTO: 10.4 FL (ref 6–12)
PO2 BLDA: 111 MM HG (ref 83–108)
PO2 BLDA: 112 MM HG (ref 83–108)
PO2 BLDA: 95.5 MM HG (ref 83–108)
PO2 BLDA: 98.5 MM HG (ref 83–108)
PO2 TEMP ADJ BLD: 111 MM HG (ref 83–108)
PO2 TEMP ADJ BLD: 112 MM HG (ref 83–108)
PO2 TEMP ADJ BLD: 95.5 MM HG (ref 83–108)
PO2 TEMP ADJ BLD: 98.5 MM HG (ref 83–108)
POTASSIUM BLD-SCNC: 4 MMOL/L (ref 3.5–5.2)
POTASSIUM BLD-SCNC: 4.3 MMOL/L (ref 3.5–5.2)
POTASSIUM BLD-SCNC: 4.6 MMOL/L (ref 3.5–5.2)
POTASSIUM BLD-SCNC: 4.6 MMOL/L (ref 3.5–5.2)
PROT PATTERN SERPL ELPH-IMP: NORMAL
PROT SERPL-MCNC: 6.4 G/DL (ref 6–8.5)
PROT SERPL-MCNC: 6.7 G/DL (ref 6–8.5)
RBC # BLD AUTO: 2.75 10*6/MM3 (ref 4.14–5.8)
SODIUM BLD-SCNC: 138 MMOL/L (ref 136–145)
SODIUM BLD-SCNC: 141 MMOL/L (ref 136–145)
SODIUM BLD-SCNC: 141 MMOL/L (ref 136–145)
SODIUM BLD-SCNC: 142 MMOL/L (ref 136–145)
TSH SERPL DL<=0.05 MIU/L-ACNC: 0.34 UIU/ML (ref 0.27–4.2)
VENTILATOR MODE: ABNORMAL
VIT B12 BLD-MCNC: 156 PG/ML (ref 211–946)
WBC NRBC COR # BLD: 5.74 10*3/MM3 (ref 3.4–10.8)

## 2020-04-01 PROCEDURE — 25010000002 CEFTRIAXONE PER 250 MG: Performed by: INTERNAL MEDICINE

## 2020-04-01 PROCEDURE — 83605 ASSAY OF LACTIC ACID: CPT | Performed by: INTERNAL MEDICINE

## 2020-04-01 PROCEDURE — 84100 ASSAY OF PHOSPHORUS: CPT | Performed by: INTERNAL MEDICINE

## 2020-04-01 PROCEDURE — 71045 X-RAY EXAM CHEST 1 VIEW: CPT

## 2020-04-01 PROCEDURE — 82962 GLUCOSE BLOOD TEST: CPT

## 2020-04-01 PROCEDURE — 82746 ASSAY OF FOLIC ACID SERUM: CPT | Performed by: INTERNAL MEDICINE

## 2020-04-01 PROCEDURE — 82550 ASSAY OF CK (CPK): CPT | Performed by: INTERNAL MEDICINE

## 2020-04-01 PROCEDURE — 83735 ASSAY OF MAGNESIUM: CPT | Performed by: INTERNAL MEDICINE

## 2020-04-01 PROCEDURE — 36600 WITHDRAWAL OF ARTERIAL BLOOD: CPT

## 2020-04-01 PROCEDURE — 80053 COMPREHEN METABOLIC PANEL: CPT | Performed by: NURSE PRACTITIONER

## 2020-04-01 PROCEDURE — 25010000002 METHYLPREDNISOLONE PER 40 MG: Performed by: INTERNAL MEDICINE

## 2020-04-01 PROCEDURE — 99291 CRITICAL CARE FIRST HOUR: CPT | Performed by: INTERNAL MEDICINE

## 2020-04-01 PROCEDURE — 25010000002 GLUCAGON (HUMAN RECOMBINANT) 1 MG RECONSTITUTED SOLUTION: Performed by: NURSE PRACTITIONER

## 2020-04-01 PROCEDURE — 93306 TTE W/DOPPLER COMPLETE: CPT

## 2020-04-01 PROCEDURE — 85027 COMPLETE CBC AUTOMATED: CPT | Performed by: INTERNAL MEDICINE

## 2020-04-01 PROCEDURE — 25010000002 HALOPERIDOL LACTATE PER 5 MG: Performed by: INTERNAL MEDICINE

## 2020-04-01 PROCEDURE — 94799 UNLISTED PULMONARY SVC/PX: CPT

## 2020-04-01 PROCEDURE — 84443 ASSAY THYROID STIM HORMONE: CPT | Performed by: INTERNAL MEDICINE

## 2020-04-01 PROCEDURE — 93010 ELECTROCARDIOGRAM REPORT: CPT | Performed by: INTERNAL MEDICINE

## 2020-04-01 PROCEDURE — 93306 TTE W/DOPPLER COMPLETE: CPT | Performed by: INTERNAL MEDICINE

## 2020-04-01 PROCEDURE — 82140 ASSAY OF AMMONIA: CPT | Performed by: INTERNAL MEDICINE

## 2020-04-01 PROCEDURE — 82805 BLOOD GASES W/O2 SATURATION: CPT

## 2020-04-01 PROCEDURE — 94660 CPAP INITIATION&MGMT: CPT

## 2020-04-01 PROCEDURE — 82607 VITAMIN B-12: CPT | Performed by: INTERNAL MEDICINE

## 2020-04-01 PROCEDURE — 25010000002 OCTREOTIDE PER 25 MCG: Performed by: INTERNAL MEDICINE

## 2020-04-01 PROCEDURE — 82330 ASSAY OF CALCIUM: CPT | Performed by: INTERNAL MEDICINE

## 2020-04-01 RX ORDER — SODIUM CHLORIDE, SODIUM LACTATE, POTASSIUM CHLORIDE, CALCIUM CHLORIDE 600; 310; 30; 20 MG/100ML; MG/100ML; MG/100ML; MG/100ML
100 INJECTION, SOLUTION INTRAVENOUS CONTINUOUS
Status: DISCONTINUED | OUTPATIENT
Start: 2020-04-01 | End: 2020-04-01

## 2020-04-01 RX ORDER — DEXTROSE, SODIUM CHLORIDE, SODIUM LACTATE, POTASSIUM CHLORIDE, AND CALCIUM CHLORIDE 5; .6; .31; .03; .02 G/100ML; G/100ML; G/100ML; G/100ML; G/100ML
100 INJECTION, SOLUTION INTRAVENOUS CONTINUOUS
Status: DISCONTINUED | OUTPATIENT
Start: 2020-04-01 | End: 2020-04-02

## 2020-04-01 RX ORDER — HALOPERIDOL 5 MG/ML
5 INJECTION INTRAMUSCULAR ONCE
Status: COMPLETED | OUTPATIENT
Start: 2020-04-01 | End: 2020-04-01

## 2020-04-01 RX ORDER — OCTREOTIDE ACETATE 50 UG/ML
50 INJECTION, SOLUTION INTRAVENOUS; SUBCUTANEOUS EVERY 6 HOURS
Status: DISCONTINUED | OUTPATIENT
Start: 2020-04-01 | End: 2020-04-01

## 2020-04-01 RX ORDER — HALOPERIDOL 5 MG/ML
5 INJECTION INTRAMUSCULAR EVERY 6 HOURS PRN
Status: DISCONTINUED | OUTPATIENT
Start: 2020-04-01 | End: 2020-04-06 | Stop reason: HOSPADM

## 2020-04-01 RX ORDER — METHYLPREDNISOLONE SODIUM SUCCINATE 40 MG/ML
40 INJECTION, POWDER, LYOPHILIZED, FOR SOLUTION INTRAMUSCULAR; INTRAVENOUS EVERY 24 HOURS
Status: COMPLETED | OUTPATIENT
Start: 2020-04-01 | End: 2020-04-03

## 2020-04-01 RX ADMIN — SODIUM CHLORIDE, PRESERVATIVE FREE 10 ML: 5 INJECTION INTRAVENOUS at 22:16

## 2020-04-01 RX ADMIN — DEXTROSE 50 % IN WATER (D50W) INTRAVENOUS SYRINGE 50 ML: at 09:24

## 2020-04-01 RX ADMIN — HALOPERIDOL LACTATE 5 MG: 5 INJECTION, SOLUTION INTRAMUSCULAR at 13:36

## 2020-04-01 RX ADMIN — CEFTRIAXONE SODIUM 1 G: 1 INJECTION, POWDER, FOR SOLUTION INTRAMUSCULAR; INTRAVENOUS at 16:40

## 2020-04-01 RX ADMIN — GLUCAGON HYDROCHLORIDE 1 MG: 1 INJECTION, POWDER, FOR SOLUTION INTRAMUSCULAR; INTRAVENOUS; SUBCUTANEOUS at 02:45

## 2020-04-01 RX ADMIN — SODIUM CHLORIDE, SODIUM LACTATE, POTASSIUM CHLORIDE, CALCIUM CHLORIDE AND DEXTROSE MONOHYDRATE 100 ML/HR: 5; 600; 310; 30; 20 INJECTION, SOLUTION INTRAVENOUS at 22:00

## 2020-04-01 RX ADMIN — IPRATROPIUM BROMIDE AND ALBUTEROL SULFATE 3 ML: 2.5; .5 SOLUTION RESPIRATORY (INHALATION) at 07:40

## 2020-04-01 RX ADMIN — HALOPERIDOL LACTATE 5 MG: 5 INJECTION, SOLUTION INTRAMUSCULAR at 18:53

## 2020-04-01 RX ADMIN — DEXMEDETOMIDINE HYDROCHLORIDE 0.2 MCG/KG/HR: 400 INJECTION INTRAVENOUS at 14:43

## 2020-04-01 RX ADMIN — DOXYCYCLINE 100 MG: 100 INJECTION, POWDER, LYOPHILIZED, FOR SOLUTION INTRAVENOUS at 22:17

## 2020-04-01 RX ADMIN — DEXTROSE 100 ML/HR: 20 INJECTION, SOLUTION INTRAVENOUS at 04:57

## 2020-04-01 RX ADMIN — SODIUM BICARBONATE 150 MEQ: 84 INJECTION, SOLUTION INTRAVENOUS at 09:25

## 2020-04-01 RX ADMIN — PANTOPRAZOLE SODIUM 40 MG: 40 INJECTION, POWDER, FOR SOLUTION INTRAVENOUS at 06:21

## 2020-04-01 RX ADMIN — DEXTROSE 50 % IN WATER (D50W) INTRAVENOUS SYRINGE 50 ML: at 00:51

## 2020-04-01 RX ADMIN — OCTREOTIDE ACETATE 50 MCG: 50 INJECTION, SOLUTION INTRAVENOUS; SUBCUTANEOUS at 18:52

## 2020-04-01 RX ADMIN — GLUCAGON HYDROCHLORIDE 1 MG: 1 INJECTION, POWDER, FOR SOLUTION INTRAMUSCULAR; INTRAVENOUS; SUBCUTANEOUS at 03:04

## 2020-04-01 RX ADMIN — IPRATROPIUM BROMIDE AND ALBUTEROL SULFATE 3 ML: 2.5; .5 SOLUTION RESPIRATORY (INHALATION) at 14:52

## 2020-04-01 RX ADMIN — SODIUM CHLORIDE, SODIUM LACTATE, POTASSIUM CHLORIDE, CALCIUM CHLORIDE AND DEXTROSE MONOHYDRATE 100 ML/HR: 5; 600; 310; 30; 20 INJECTION, SOLUTION INTRAVENOUS at 10:51

## 2020-04-01 RX ADMIN — IPRATROPIUM BROMIDE AND ALBUTEROL SULFATE 3 ML: 2.5; .5 SOLUTION RESPIRATORY (INHALATION) at 23:09

## 2020-04-01 RX ADMIN — HALOPERIDOL LACTATE 5 MG: 5 INJECTION, SOLUTION INTRAMUSCULAR at 17:01

## 2020-04-01 RX ADMIN — OCTREOTIDE ACETATE 50 MCG: 50 INJECTION, SOLUTION INTRAVENOUS; SUBCUTANEOUS at 13:37

## 2020-04-01 RX ADMIN — DOXYCYCLINE 100 MG: 100 INJECTION, POWDER, LYOPHILIZED, FOR SOLUTION INTRAVENOUS at 10:00

## 2020-04-01 RX ADMIN — SODIUM CHLORIDE, PRESERVATIVE FREE 10 ML: 5 INJECTION INTRAVENOUS at 09:27

## 2020-04-01 RX ADMIN — METHYLPREDNISOLONE SODIUM SUCCINATE 40 MG: 40 INJECTION, POWDER, FOR SOLUTION INTRAMUSCULAR; INTRAVENOUS at 09:25

## 2020-04-01 RX ADMIN — IPRATROPIUM BROMIDE AND ALBUTEROL SULFATE 3 ML: 2.5; .5 SOLUTION RESPIRATORY (INHALATION) at 04:23

## 2020-04-01 RX ADMIN — DEXTROSE 50 % IN WATER (D50W) INTRAVENOUS SYRINGE 50 ML: at 05:38

## 2020-04-01 RX ADMIN — IPRATROPIUM BROMIDE AND ALBUTEROL SULFATE 3 ML: 2.5; .5 SOLUTION RESPIRATORY (INHALATION) at 19:33

## 2020-04-01 NOTE — SIGNIFICANT NOTE
"    [facility] Medicine Services  CRITICAL CARE NOTE    Patient Name: Joseph Rodriguez  : 1945  MRN: [id number]    Date of Admission: 3/31/2020  Length of Stay: 0    Subjective     Event/HPI:  Called by nursing staff for a glucose in the 20s and patient \"foaming at the mouth\".  He was immediately given D50.  Review of the patient's chart reveals that he had been given flumazenil, Narcan, and started on D5 at 100 cc/h as well as LR at 100 cc/h for rhabdomyolysis, renal failure, and hypoglycemia.  Patient had improvement in his glucose up to 170s, but rapidly began falling to 125.  He was given another half amp of D50 and placed on D10 at 100 cc/h.  Patient was again given 0.4 mg of Narcan with greater improvement in his alertness.  He remains confused.  Narcan drip has been ordered at 1 mg/h.  Patient replaced back on BiPAP.  Per nursing staff patient had refused to wear this earlier in the shift despite his severe acidosis.  He is confused and unable to make his own decisions at this time.    Repeat ABG slightly worsened from 3:30 PM showing a pH of 7.14, PCO2 of 53.7, PO2 of 73, bicarb of 18.    Case was discussed with nephrology who recommended if his PCO2 had not increased to give 1 amp of bicarb push with bolus of 500 cc D5 W with 150 mEq of bicarb.  Given his elevation in PCO2 from 330, this could cause a possible worsening respiratory acidosis and therefore case was discussed with intensivist who will evaluate at bedside.  Patient likely to be moved to the ICU.  Restraints may have to be used as patient is resisting BiPAP.    Objective     Vital Signs:   Temp:  [97.4 °F (36.3 °C)] 97.4 °F (36.3 °C)  Heart Rate:  [50-71] 56  Resp:  [18] 18  BP: ()/(46-71) 142/71       Pertinent Physical Exam:  Constitutional: somnolent, wakes to tactile stimuli but immendiately falls asleep  Eyes: PERRLA, sclerae anicteric, no conjunctival injection  HENT: NCAT, mucous membranes dry  Neck: " Supple, no thyromegaly, no lymphadenopathy, trachea midline  Respiratory: crackles in bases, nonlabored respirations   Cardiovascular: loretta, regular , no murmurs, rubs, or gallops, palpable pedal pulses bilaterally  Gastrointestinal: Positive bowel sounds, soft, nontender, nondistended  Musculoskeletal: No bilateral ankle edema, no clubbing or cyanosis to extremities  Psychiatric: anxious, uncooperative  Neurologic: Oriented to self only, strength symmetric in all extremities, Cranial Nerves grossly intact to confrontation, speech clear  Skin: No rashes      Results Reviewed:  I have personally reviewed current lab, radiology, and data and agree.    Results from last 7 days   Lab Units 03/31/20  1256   WBC 10*3/mm3 7.65   HEMOGLOBIN g/dL 7.8*   HEMATOCRIT % 26.3*   PLATELETS 10*3/mm3 136*   INR  1.26*     Results from last 7 days   Lab Units 03/31/20  1839 03/31/20  1256   SODIUM mmol/L 140 136   POTASSIUM mmol/L 4.5 4.4   CHLORIDE mmol/L 104 101   CO2 mmol/L 17.0* 17.0*   BUN mg/dL 103* 101*   CREATININE mg/dL 7.43* 8.64*   GLUCOSE mg/dL 25* 122*   CALCIUM mg/dL 6.8* 6.9*   ALT (SGPT) U/L  --  20   AST (SGOT) U/L  --  77*     No results found for: BNP  pH, Arterial   Date Value Ref Range Status   03/31/2020 7.142 (C) 7.350 - 7.450 pH units Final     Comment:     85 Value below critical limit       Microbiology Results Abnormal     Procedure Component Value - Date/Time    Eosinophil Smear - Urine, Urine, Clean Catch [[id number]]  (Normal) Collected:  03/31/20 1331    Lab Status:  Final result Specimen:  Urine, Clean Catch Updated:  03/31/20 1516     Eosinophil Smear 0 % EOS/100 Cells     Narrative:       No eosinophil seen          Imaging Results (Last 24 Hours)     Procedure Component Value Units Date/Time    XR Chest 1 View [[id number]] Resulted:  03/31/20 1952     Updated:  03/31/20 2003    US Renal Limited [[id number]] Resulted:  03/31/20 1821     Updated:  03/31/20 1909    US Gallbladder [[id number]] Resulted:   03/31/20 1821     Updated:  03/31/20 1908    CT Head Without Contrast [[id number]] Collected:  03/31/20 1702     Updated:  03/31/20 1759    Narrative:       EXAMINATION: CT HEAD WO CONTRAST- 03/31/2020     INDICATION: confusion, s/p fall, rule out intracranial disease/hematoma      TECHNIQUE: CT head without intravenous contrast     The radiation dose reduction device was turned on for each scan per the  ALARA (As Low as Reasonably Achievable) protocol.     COMPARISON: MRI brain dated 02/02/2020, CT head 02/20/2020.     FINDINGS: Midline structures are symmetric without evidence of mass,  mass effect or midline shift. Ventricles and sulci demonstrate stable  appearance from prior without hydrocephalus or focal atrophy  development. Mildly asymmetric and persistent calcifications of the  basal ganglia of normal aging variance. No intra-axial hemorrhage or  extra-axial fluid collection. Globes and orbits unremarkable. Visualized  paranasal sinuses and mastoid air cells are grossly clear and  well-pneumatized with the exception of a small left maxillary mucus  retention cyst and mild mucosal thickening of the posterior wall right  maxillary sinus. Large left mastoid effusion. Calvarium intact.       Impression:       1. No acute intracranial abnormality specifically no acute intracranial  hemorrhage. Calvarium intact.      2. Large left mastoid effusion stable from prior.     D:  03/31/2020  E:  03/31/2020     This report was finalized on 3/31/2020 5:56 PM by Dr. Álvaro Cooper.       XR Chest 1 View [[id number]] Collected:  03/31/20 1332     Updated:  03/31/20 1756    Narrative:       EXAMINATION: XR CHEST 1 VW- 03/31/2020     INDICATION: requires oxygen      COMPARISON: Chest x-ray to 02/02/2020     FINDINGS: Cardiomegaly with increased prominence from prior comparison  along with increased pulmonary vascularity and perihilar prominence of  vascular congestion and/or peribronchial inflammatory process.  Additionally  noted increased opacifications obscuring the right  hemidiaphragm margin in the right infrahilar region concerning for  developing airspace disease. No pneumothorax or pleural effusion.       Impression:       Cardiomegaly with increased prominence from prior comparison  along with increased pulmonary vascularity and perihilar prominence of  vascular congestion and/or peribronchial inflammatory process.  Additionally noted increased opacifications obscuring the right  hemidiaphragm margin in the right infrahilar region concerning for  developing airspace disease. No pneumothorax or pleural effusion.     D:  03/31/2020  E:  03/31/2020     This report was finalized on 3/31/2020 5:53 PM by Dr. Álvaro Cooper.           Results for orders placed during the hospital encounter of 02/02/20   Adult Transthoracic Echo Complete W/ Cont if Necessary Per Protocol    Narrative · Left atrial cavity size is moderately dilated.  · Mild mitral valve regurgitation is present.  · Left ventricular wall thickness is consistent with mild concentric   hypertrophy.  · Estimated EF = 60%.  · Left ventricular systolic function is normal.  · Normal right ventricular wall thickness, systolic function and septal   motion noted with rght ventricular cavity moderately dilated.  · Left ventricular diastolic dysfunction is abnormal consistent with: age.  · Saline test results are negative.  · The aortic valve exhibits mild-moderate sclerosis.  · No evidence of pulmonary hypertension is present.  · There is no evidence of pericardial effusion.  · Technically difficult and limited study.          I have reviewed the current medications.    Assessment / Plan     Active Hospital Problems    Diagnosis POA   • **CARLTON (acute kidney injury) (CMS/[facility]) [N17.9] Unknown   • Encephalopathy, metabolic [G93.41] Unknown   • Polypharmacy [Z79.899] Not Applicable   • Hypoglycemia [E16.2] Unknown   • Hypotension [I95.9] Unknown   • Acidosis [E87.2] Unknown     Mixed  respiratory & metabolic acidosis     • Rhabdomyolysis [M62.82] Unknown   • Dementia (CMS/[facility]) [F03.90] Unknown   • Paroxysmal A-fib (CMS/[facility]) [I48.0] Unknown   • BPH (benign prostatic hyperplasia) [N40.0] Yes   • Coronary artery disease [I25.10] Yes   • Chronic obstructive pulmonary disease (CMS/[facility]) [J44.9] Yes   • CKD (chronic kidney disease), stage III (CMS/[facility]) [N18.3] Yes     - Baseline creatinine 1.4-1.6 (4/9/17)     • Type 2 diabetes mellitus without complication, without long-term current use of insulin (CMS/[facility]) [E11.9] Yes   • Carcinoma of right lung (CMS/[facility]) [C34.91] Yes          • Monoclonal gammopathy [D47.2] Yes         Pertinent Assessment & Plan:     Severe mixed acidosis  --multifactorial secondary renal failure, resp acidosis from overload, possible aspiration event  --case discussed with nephrology for consideration of dialysis, would like to try bicarb pending repeat abg results if co2 is not increased from prior  --will discuss case with ICU as nephrology recommending possible intubation    Metabolic encephalopathy  --also multifactorial secondary to dementia, renal failure, uremia, hypercarbia, hypoglycemia, home narcotics in setting of renal failure  --improving with bipap (does not want to keep on and placed in restraints), IV D5 changed to D10, q1h accuchecks, IV narcan drip, May require bolus of bicarb    Hypoglycemia  --D5 changes to D10, pt given 1.5 amps D50.  --q15min glucose until stabalized, q1h thereafter  --mental status improving after changes. Awake but confused    Hypocalcemia  -check ionized  -give 1 amp    Chest congestion  --noted to be foaming at mouth at beginning of shift, concern for ability to control secreations  --probable aspiration event  -wife denies cough, chest congestion prior to admission          CODE STATUS:    Code Status and Medical Interventions:   Ordered at: 03/31/20 1411     Code Status:    CPR     Medical Interventions (Level of Support Prior to  Arrest):    Full         Critical Care time spent in direct patient care:    58 minutes (excluding procedure time, if applicable) including high complexity decision making to assess and treat vital organ system failure in this individual who has impairment of one or more vital organ systems such that there is a high probability of imminent or life threatening deterioration in the patient’s condition. Failure to initiate the above interventions on an urgent basis would likely result in sudden, clinically significant or life threatening deterioration in the patient's condition.        Electronically signed by Fortino Little DO, 03/31/20, 8:24 PM.

## 2020-04-01 NOTE — PROGRESS NOTES
Intensive Care Follow-up      LOS: 0 days     Mr. Joseph Rodriguez, 74 y.o. male is followed for: Acute kidney injury superimposed on chronic kidney disease (CMS/HCC)     Subjective - Interval History     Joseph Rodriguez is a 74 [facility].o. [facility] for management of CARLTON, encephalopathy, and hypoglycemia.     Patient was previously admitted to our facility from 2/2-2/4 for dizziness and usnteady gait work-up revealing BPV also found to be in paroxsymal AF. He was seen by neurology placed on meclizine and cardiology discharged on Eliquis and Plavix.     Evidently the patient was evaluated by his PCP a few weeks ago placed on a statin. He additionally takes bumex. Over the past week the wife reports increased somnolence, decreased appetite, and fall with unknown contact to the head.     On 3/31 he was taken to OSH ED with complaints of hypoglycemia in the 30s. Labs showed Cr 9.3, , K 4.8, , and Ca 7.1. EKG sinus bradycardia with 1st degree AVB and negative troponins. CT A/P displayed GB distention, non-obstructing L renal stone, colonic stool retention, and diverticulosis. UA + blood and bacteria given empiric Zosyn and IVF.     [facility] and higher level of care. Placed on LR and Rocephin with Cr slightly improved at 7.8. Initial BG 28 given additional hypoglycemic management with D5W fluids with overall improvement. ABG showed 7.10/56/93/17 placed on BiPAP and bicarbonate gtt that was later discontinued per nephrology's recommendation. On arrival he was somnolent with UDS + BZD/opiates not improved with Romazicon/Narcan and CT head benign apart from a large left mastoid effusion noted on previous 2/2 imaging.     The evening on 3/31 the patient was transferred to the ICU for ongoing mixed acidosis and somnolence.  Was given Narcan on the floor and then started on a Narcan drip.  Patient became agitated,  trying to climb out of bed.  Has been fighting use of his BiPAP.     Of note the patient was recently seen by APRN with [facility] for management of COPD with exacerbation. PFTs in 10/2019 as follows: FVC 3.26, 80%; FEV1 1.65, 56%; and ratio 51%.     The patient's relevant past medical, surgical and social history were reviewed and updated in Epic as appropriate.     Objective     Infusions:    dextrose 100 mL/hr Last Rate: 100 mL/hr (03/31/20 2001)   lactated ringers 100 mL/hr Last Rate: 100 mL/hr (03/31/20 1837)   naloxone (NARCAN) infusion 1 mg/hr Last Rate: 1 mg/hr (03/31/20 2050)     Medications:    arformoterol 15 mcg Nebulization BID - RT   cefTRIAXone 1 g Intravenous Q24H   doxycycline 100 mg Intravenous Q12H   ipratropium-albuterol 3 mL Nebulization Q4H - RT   [START ON 4/1/2020] pantoprazole 40 mg Intravenous Q AM   sodium chloride 10 mL Intravenous Q12H     Intake/Output       03/31/20 0700 - 04/01/20 0659    Intake (ml) 150    Output (ml) 750    Net (ml) -600    Last Weight  127 kg (279 lb 15.8 oz)        Vital Sign Min/Max for last 24 hours  Temp  Min: 97.4 °F (36.3 °C)  Max: 97.4 °F (36.3 °C)   BP  Min: 84/54  Max: 156/79   Pulse  Min: 50  Max: 71   Resp  Min: 18  Max: 18   SpO2  Min: 89 %  Max: 100 %   Flow (L/min)  Min: 2  Max: 2        Physical Exam:   GENERAL: Awake, agitated, confused, no respiratory distress   HEENT: No adenopathy or thyromegaly   LUNGS: Decreased breath sounds bilaterally with late expiratory wheezes   HEART: Regular rate and rhythm without murmurs   ABDOMEN: Soft, protuberant, nontender   EXTREMITIES: Chronic venous stasis changes with trace pitting ankle edema   NEURO/PSYCH: Awake, conversant, confused, moves all fours seemingly symmetrically    Results from last 7 days   Lab Units 03/31/20 2014 03/31/20  1256   WBC 10*3/mm3 8.22 7.65   HEMOGLOBIN g/dL 8.0* 7.8*   PLATELETS 10*3/mm3 134* 136*     Results from last 7 days   Lab Units 03/31/20 2014 03/31/20  1836  03/31/20  1256   SODIUM mmol/L 136 140 136   POTASSIUM mmol/L 4.7 4.5 4.4   CO2 mmol/L 14.0* 17.0* 17.0*   BUN mg/dL 100* 103* 101*   CREATININE mg/dL 7.83* 7.43* 8.64*   MAGNESIUM mg/dL  --   --  3.1*   PHOSPHORUS mg/dL  --   --  10.3*   GLUCOSE mg/dL 150* 25* 122*     Estimated Creatinine Clearance: 11.2 mL/min (A) (by C-G formula based on SCr of 7.83 mg/dL (H)).    Results from last 7 days   Lab Units 03/31/20  1256   HEMOGLOBIN A1C % 7.60*       Results from last 7 days   Lab Units 03/31/20  1950   PH, ARTERIAL pH units 7.142*   PCO2, ARTERIAL mm Hg 53.2*   PO2 ART mm Hg 73.1*     Lab Results   Component Value Date    LACTATE 0.5 03/31/2020          Images: Chest x-ray reveals no consolidation or effusions.  Low lung volumes on the right.    CT scan of the head reveals no acute intracranial abnormalities    I reviewed the patient's results and images.     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  May require dialysis emergently for intractable acidosis  Continue empiric antibiotics  DVT and ulcer prophylaxis  Continue inhaled bronchodilators    At high risk for intubation and vent support     I discussed the patient's findings and my recommendations with patient and nursing staff     Critical Care time spent in direct  patient care: 40 minutes (excluding procedure time, if applicable) including high complexity decision making to assess, manipulate, and support vital organ system failure in this individual who has impairment of one or more vital organ systems such that there is a high probability of imminent or life threatening deterioration in the patient’s condition.      MOUSTAPHA Hodge MD  Pulmonary and Critical Care Medicine

## 2020-04-01 NOTE — PLAN OF CARE
Problem: Patient Care Overview  Goal: Plan of Care Review  Outcome: Ongoing (interventions implemented as appropriate)  Flowsheets  Taken 4/1/2020 0318  Progress: declining  Outcome Summary: Transfer from floor approx 2130. Restraints in place. Patient agitated. Narcan drip stopped per Dr. Hodge and precedex drip started briefly. Drip stopped shortly after. 1 amp of bicarb given and bicarb drip started per orders. 2 amps of D50 given over shift for hypoglycemia. ICU APRN and MD updated frequently. Fluids changed from D10 @100ml/hr to D20 @ 100ml/hr with little improvement. SQ glucagon given x 2 per APRN order. Monitoring glucose q 15-30 minutes. Patient does arouse and follow commands and oriented x 2-3. UOP adequate. Serial BMPs ordered q 4 per ICU APRN. ABG remains acidotic APRN aware. Repeat AM gas. Will continue to monitor.  Taken 3/31/2020 2130  Plan of Care Reviewed With: patient

## 2020-04-01 NOTE — PROGRESS NOTES
Continued Stay Note  [facility]     Patient Name: Joseph Rodriguez  MRN: [id number]  Today's Date: 4/1/2020    Admit Date: 3/31/2020    Discharge Plan     Row Name 04/01/20 1402       Plan    Plan  Home/Wife     Patient/Family in Agreement with Plan  yes    Plan Comments  I called and spoke with patient's wife Yvette. Patient lives in [place] with wife. Wife says he is independent but she does all the household duties. Wife plans for patient to come home as of now. If patient needs HH use [facility] @ [phone]. CM following. Caroline @ [phone]    Final Discharge Disposition Code  06 - home with home health care        Discharge Codes    No documentation.             Jessica Ray RN

## 2020-04-01 NOTE — PROGRESS NOTES
" LOS: 1 day   Patient Care Team:  Dandy Bailey MD as PCP - General  Juan Pablo Osullivan MD as Surgeon (Cardiothoracic Surgery)  Jong Diop DO as Consulting Physician (Gastroenterology)    Chief Complaint: CARLTON on CKD Stage III     Subjective     Making good UOP. However BUN remains elevated. Persistent Co2 retention. UOP 2.8 liter Mentation improved today       Subjective:  Symptoms:  Stable.  No shortness of breath, cough, headache or chest pressure.    Diet:  Adequate intake.    Activity level: Normal.    Pain:  He reports no pain.        History taken from: patient RN    Objective     Vital Sign Min/Max for last 24 hours  Temp  Min: 97.4 °F (36.3 °C)  Max: 98.6 °F (37 °C)   BP  Min: 84/54  Max: 156/79   Pulse  Min: 42  Max: 71   Resp  Min: 16  Max: 20   SpO2  Min: 89 %  Max: 100 %   Flow (L/min)  Min: 2  Max: 2   Weight  Min: 127 kg (279 lb 15.8 oz)  Max: 127 kg (279 lb 15.8 oz)     Flowsheet Rows      First Filed Value   Admission Height  177.8 cm (70\") Documented at 03/31/2020 1211   Admission Weight  127 kg (279 lb 15.8 oz) Documented at 03/31/2020 1211          I/O this shift:  In: 50 [IV Piggyback:50]  Out: -   I/O last 3 completed shifts:  In: 1905.3 [I.V.:1605.3; IV Piggyback:300]  Out: 2890 [Urine:2890]    Objective:  General Appearance:  Comfortable.    Vital signs: (most recent): Blood pressure 118/58, pulse 54, temperature 98.6 °F (37 °C), temperature source Axillary, resp. rate 20, height 177.8 cm (70\"), weight 127 kg (279 lb 15.8 oz), SpO2 97 %.    Output: Producing urine.    HEENT: Normal HEENT exam.    Lungs:  Normal effort and normal respiratory rate.  Breath sounds clear to auscultation.  He is not in respiratory distress.  No stridor.  No decreased breath sounds or rhonchi.    Heart: Normal rate.  Regular rhythm.  S1 normal and S2 normal.    Abdomen: Abdomen is soft.  Bowel sounds are normal.   There is no abdominal tenderness.     Neurological: Patient is alert and oriented to " person, place and time.    Skin:  Warm and dry.              Results Review:     I reviewed the patient's new clinical results.    WBC WBC   Date Value Ref Range Status   04/01/2020 5.74 3.40 - 10.80 10*3/mm3 Final   03/31/2020 8.22 3.40 - 10.80 10*3/mm3 Final   03/31/2020 7.65 3.40 - 10.80 10*3/mm3 Final      HGB Hemoglobin   Date Value Ref Range Status   04/01/2020 7.9 (L) 13.0 - 17.7 g/dL Final   03/31/2020 8.0 (L) 13.0 - 17.7 g/dL Final   03/31/2020 7.8 (L) 13.0 - 17.7 g/dL Final      HCT Hematocrit   Date Value Ref Range Status   04/01/2020 25.6 (L) 37.5 - 51.0 % Final   03/31/2020 26.2 (L) 37.5 - 51.0 % Final   03/31/2020 26.3 (L) 37.5 - 51.0 % Final      Platlets No results found for: LABPLAT   MCV MCV   Date Value Ref Range Status   04/01/2020 93.1 79.0 - 97.0 fL Final   03/31/2020 95.3 79.0 - 97.0 fL Final   03/31/2020 92.0 79.0 - 97.0 fL Final          Sodium Sodium   Date Value Ref Range Status   04/01/2020 141 136 - 145 mmol/L Final   04/01/2020 142 136 - 145 mmol/L Final   04/01/2020 141 136 - 145 mmol/L Final   03/31/2020 136 136 - 145 mmol/L Final   03/31/2020 140 136 - 145 mmol/L Final   03/31/2020 136 136 - 145 mmol/L Final      Potassium Potassium   Date Value Ref Range Status   04/01/2020 4.0 3.5 - 5.2 mmol/L Final   04/01/2020 4.3 3.5 - 5.2 mmol/L Final   04/01/2020 4.6 3.5 - 5.2 mmol/L Final   03/31/2020 4.7 3.5 - 5.2 mmol/L Final   03/31/2020 4.5 3.5 - 5.2 mmol/L Final   03/31/2020 4.4 3.5 - 5.2 mmol/L Final      Chloride Chloride   Date Value Ref Range Status   04/01/2020 103 98 - 107 mmol/L Final   04/01/2020 104 98 - 107 mmol/L Final   04/01/2020 105 98 - 107 mmol/L Final   03/31/2020 102 98 - 107 mmol/L Final   03/31/2020 104 98 - 107 mmol/L Final   03/31/2020 101 98 - 107 mmol/L Final      CO2 CO2   Date Value Ref Range Status   04/01/2020 19.0 (L) 22.0 - 29.0 mmol/L Final   04/01/2020 21.0 (L) 22.0 - 29.0 mmol/L Final   04/01/2020 20.0 (L) 22.0 - 29.0 mmol/L Final   03/31/2020 14.0 (L)  22.0 - 29.0 mmol/L Final   03/31/2020 17.0 (L) 22.0 - 29.0 mmol/L Final   03/31/2020 17.0 (L) 22.0 - 29.0 mmol/L Final      BUN BUN   Date Value Ref Range Status   04/01/2020 94 (H) 8 - 23 mg/dL Final   04/01/2020 90 (H) 8 - 23 mg/dL Final   04/01/2020 94 (H) 8 - 23 mg/dL Final   03/31/2020 100 (H) 8 - 23 mg/dL Final   03/31/2020 103 (H) 8 - 23 mg/dL Final   03/31/2020 101 (H) 8 - 23 mg/dL Final      Creatinine Creatinine   Date Value Ref Range Status   04/01/2020 6.86 (H) 0.76 - 1.27 mg/dL Final   04/01/2020 6.49 (H) 0.76 - 1.27 mg/dL Final   04/01/2020 7.43 (H) 0.76 - 1.27 mg/dL Final   03/31/2020 7.83 (H) 0.76 - 1.27 mg/dL Final   03/31/2020 7.43 (H) 0.76 - 1.27 mg/dL Final   03/31/2020 8.64 (H) 0.76 - 1.27 mg/dL Final      Calcium Calcium   Date Value Ref Range Status   04/01/2020 6.9 (L) 8.6 - 10.5 mg/dL Final   04/01/2020 7.1 (L) 8.6 - 10.5 mg/dL Final   04/01/2020 7.1 (L) 8.6 - 10.5 mg/dL Final   03/31/2020 6.7 (L) 8.6 - 10.5 mg/dL Final   03/31/2020 6.8 (L) 8.6 - 10.5 mg/dL Final   03/31/2020 6.9 (L) 8.6 - 10.5 mg/dL Final      PO4 No results found for: CAPO4   Albumin Albumin   Date Value Ref Range Status   04/01/2020 3.70 3.50 - 5.20 g/dL Final   03/31/2020 3.10 (L) 3.50 - 5.20 g/dL Final   03/31/2020 3.50 3.50 - 5.20 g/dL Final      Magnesium Magnesium   Date Value Ref Range Status   04/01/2020 2.8 (H) 1.6 - 2.4 mg/dL Final   03/31/2020 3.1 (H) 1.6 - 2.4 mg/dL Final      Uric Acid No results found for: URICACID     Medication Review: Yes    Assessment/Plan       CARLTON on CKD (baseline sCr 1.4-1.6)     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      Acid base disturbance.  Mixed acidosis  Respiratory acidosis, metabolic gap and non gap acidosis      AMS: Likely due to CO2 narcosis, polypharmacy related .     Hx of MGUS: Previous free light chain ration 1.6     Anemia and thrombocytopenia: Seems chronic     Hypocalcemia        ).      Plan:   (Plan   - CARLTON on CKD likely related to ATN in the setting of hypoxia and hypercapnia further worsened by diuretic and ARB use at home  -  If there is no significant improvement in renal function he may need renal replacement therapy for solute clearance by tomorrow. He took BZD there clearance may lag due to poor renal function.   - D/C bicarb gtt at this point. Met acidosis is mainly AG acidosis   - Diuretics as needed   - SPEP, Free light chain ratio  - Avoid nephrotoxic agents  - At risk of requiring RRT given severe azotemia          I discussed the patients findings and my recommendations with nursing staff and primary care team    Jasen Little MD  04/01/20  11:38

## 2020-04-01 NOTE — PROGRESS NOTES
Intensive Care Follow-up     Hospital:  LOS: 1 day   Mr. Joseph Rodriguez, 74 y.o. male is followed for:   Acute kidney injury superimposed on chronic kidney disease (CMS/HCC)            History of present illness:   Joseph Rodriguez is a 74 y.o. male admitted to [facility] on 3/31 from [facility] for management of CARLTON, encephalopathy, and hypoglycemia.      Patient was previously admitted to our facility from 2/2-2/4 for dizziness and usnteady gait work-up revealing BPV also found to be in paroxsymal AF. He was seen by neurology placed on meclizine and cardiology discharged on Eliquis and Plavix.      Evidently the patient was evaluated by his PCP a few weeks ago placed on a statin. He additionally takes bumex. Over the past week the wife reports increased somnolence, decreased appetite, and fall with unknown contact to the head.      On 3/31 he was taken to OSH ED with complaints of hypoglycemia in the 30s. Labs showed Cr 9.3, , K 4.8, , and Ca 7.1. EKG sinus bradycardia with 1st degree AVB and negative troponins. CT A/P displayed GB distention, non-obstructing L renal stone, colonic stool retention, and diverticulosis. UA + blood and bacteria given empiric Zosyn and IVF.      The patient was transferred to [facility] for nephrology evaluation and higher level of care. Placed on LR and Rocephin with Cr slightly improved at 7.8. Initial BG 28 given additional hypoglycemic management with D5W fluids with overall improvement. ABG showed 7.10/56/93/17 placed on BiPAP and bicarbonate gtt that was later discontinued per nephrology's recommendation. On arrival he was somnolent with UDS + BZD/opiates not improved with Romazicon/Narcan and CT head benign apart from a large left mastoid effusion noted on previous 2/2 imaging.      The evening on 3/31 the patient was transferred to the ICU for ongoing mixed acidosis and somnolence.  Was given Narcan on the floor and then started on a Narcan drip.  Patient  became agitated, trying to climb out of bed.  Has been fighting use of his BiPAP.      Of note the patient was recently seen by [facility]N with [facility] for management of COPD with exacerbation. PFTs in 10/2019 as follows: FVC 3.26, 80%; FEV1 1.65, 56%; and ratio 51%.       Subjective   Interval History:  Patient improving this morning, on BiPAP but awake and alert oriented to person and time, not place.  He is able to answer questions appropriately.  He continues to have adequate urine output with 2 L over last 24 hours.  His bicarb continues to improve 21 and his most recent BMP.  Renal following for CARLTON.  Precedex turned off overnight.  Patient continues to be severely hypoglycemic.         The patient's past medical, surgical and social history were reviewed and updated in Epic as appropriate.       Objective     Infusions:    dexmedetomidine 0.2-1.5 mcg/kg/hr Last Rate: Stopped (03/31/20 2300)   dextrose 100 mL/hr Last Rate: 100 mL/hr (04/01/20 0457)     Medications:    cefTRIAXone 1 g Intravenous Q24H   citalopram 20 mg Oral Daily   doxycycline 100 mg Intravenous Q12H   ipratropium-albuterol 3 mL Nebulization Q4H - RT   methylPREDNISolone sodium succinate 40 mg Intravenous Q24H   pantoprazole 40 mg Intravenous Q AM   sodium chloride 10 mL Intravenous Q12H     I reviewed the patient's medications.    Vital Sign Min/Max for last 24 hours  Temp  Min: 97.4 °F (36.3 °C)  Max: 98.6 °F (37 °C)   BP  Min: 84/54  Max: 156/79   Pulse  Min: 42  Max: 71   Resp  Min: 16  Max: 20   SpO2  Min: 89 %  Max: 100 %   Flow (L/min)  Min: 2  Max: 2       Input/Output for last 24 hour shift  03/31 0701 - 04/01 0700  In: 1905.3 [I.V.:1605.3]  Out: 2890 [Urine:2890]      GENERAL : NAD, conversant  RESPIRATORY/THORAX : normal respiratory effort and no intercostal retractions, diminished breath sounds bilaterally  CARDIOVASCULAR : Normal S1/S2, RRR. 1+ lower ext edema.  GASTROINTESTINAL : Soft, NT/ND. BS x 4 normoactive. No  hepatosplenomegaly.  MUSCULOSKELETAL : No cyanosis, clubbing, or ischemia  NEUROLOGICAL: alert and oriented to person, place and time  PSYCHOLOGICAL : Appropriate affect    Results from last 7 days   Lab Units 04/01/20  0333 03/31/20 2014 03/31/20  1256   WBC 10*3/mm3 5.74 8.22 7.65   HEMOGLOBIN g/dL 7.9* 8.0* 7.8*   PLATELETS 10*3/mm3 145 134* 136*     Results from last 7 days   Lab Units 04/01/20  0729 04/01/20  0333 04/01/20  0007  03/31/20  1256   SODIUM mmol/L 141 142 141   < > 136   POTASSIUM mmol/L 4.0 4.3 4.6   < > 4.4   CO2 mmol/L 19.0* 21.0* 20.0*   < > 17.0*   BUN mg/dL 94* 90* 94*   < > 101*   CREATININE mg/dL 6.86* 6.49* 7.43*   < > 8.64*   MAGNESIUM mg/dL  --  2.8*  --   --  3.1*   PHOSPHORUS mg/dL  --  7.8*  --   --  10.3*   GLUCOSE mg/dL 57* 68 57*   < > 122*    < > = values in this interval not displayed.     Estimated Creatinine Clearance: 12.8 mL/min (A) (by C-G formula based on SCr of 6.86 mg/dL (H)).    Results from last 7 days   Lab Units 04/01/20  0515   PH, ARTERIAL pH units 7.243*   PCO2, ARTERIAL mm Hg 51.0*   PO2 ART mm Hg 112.0*       I reviewed the patient's new clinical results.  I reviewed the patient's new imaging results/reports including actual images and agree with reports.              Imaging Results (Last 24 Hours)     Procedure Component Value Units Date/Time    US Renal Limited [[id number]] Collected:  04/01/20 0830     Updated:  04/01/20 0910    Narrative:       EXAMINATION: US RENAL, LIMITED-03/31/2020:     INDICATION: Elevated BUN and CR.  Renal insufficiency.     TECHNIQUE: Sonographic imaging was obtained of the kidneys in both the  sagittal and transverse planes.     COMPARISON: NONE.     FINDINGS: The right kidney measures in length from pole to pole 11.5 cm.  There is a cyst identified in the right kidney measuring 3.0 cm. No  solid mass identified. No hydronephrosis or nephrolithiasis.     The left kidney measures in length from pole to pole 12.8 cm. There is  no  solid cortical mass or renal cortical cysts seen within the left  kidney. No hydronephrosis or nephrolithiasis. A Mckeon catheter balloon  is present within the bladder. There is incomplete distention.       Impression:       Small right renal cortical cyst. The remainder of the renal  ultrasound is grossly unremarkable.     D:  04/01/2020  E:  04/01/2020            This report was finalized on 4/1/2020 9:06 AM by Dr. Aisha Segura MD.       US Gallbladder [[id number]] Collected:  04/01/20 0836     Updated:  04/01/20 0910    Narrative:       EXAMINATION: US GALLBLADDER-03/31/2020:     INDICATION: Followup distended GB seen on outside CT scan, followup  distended gallbladder.     TECHNIQUE: Sonographic imaging was obtained of the right upper quadrant  in both the sagittal and transverse planes. The examination is  suboptimal due to overlying bowel gas.     COMPARISON: NONE.     FINDINGS: The pancreas is homogeneous in appearance in its visualized  portions with no focal mass or abnormal fluid collection. The liver is  homogeneous and normal in echotexture and echogenicity with no focal  mass or intrahepatic biliary ductal dilatation. The gallbladder reveals  no definite stones, no wall thickening with sludge seen in the dependent  portion. There is no pericholecystic fluid. There is mild distention of  the gallbladder. The common bile duct measures 3 mm. The right kidney is  normal in size, configuration, and texture measuring in length from pole  to pole 11.5 cm. No solid cortical mass seen within the right kidney.  Small renal cortical cyst measuring 2.7 cm. No hydronephrosis or  nephrolithiasis.       Impression:       Mild distention of the gallbladder with sludge. There is no  evidence of wall thickening or biliary ductal dilatation. No evidence of  stones. Small cyst on the right kidney.     D:  04/01/2020  E:  04/01/2020            This report was finalized on 4/1/2020 9:06 AM by Dr. Leonard  MD [place].       XR Chest 1 View [[id number]] Collected:  04/01/20 0835     Updated:  04/01/20 0854    Narrative:       EXAMINATION: XR CHEST 1 VW-04/01/2020:     INDICATION: Respiratory Distress.     COMPARISON: 03/31/2020.     FINDINGS: The heart is enlarged. The vasculature appears upper limits of  normal. Mild generalized coarsening of the pulmonary interstitial  markings is similar to the prior study. No new pulmonary parenchymal  disease is seen. No significant effusion or pneumothorax is appreciated.       Impression:       Cardiomegaly and perhaps mild pulmonary venous hypertension.  No other evidence of active chest disease.      D:  04/01/2020  E:  04/01/2020          XR Chest 1 View [[id number]] Collected:  03/31/20 2030     Updated:  03/31/20 2033    Narrative:       CR Chest 1 Vw    INDICATION:   Dyspnea beginning prior to arrival     COMPARISON:    3/31/2020 earlier in the day    FINDINGS:  Single portable AP view(s) of the chest.        Since previous examination no changes are seen. Again noted is volume loss at the right lung base. No new infiltrates are seen. Vascular markings are normal and no effusions are seen.       Impression:       No new infiltrates are seen since the previous examination.    Signer Name: Vicente Wilkins MD   Signed: 3/31/2020 8:30 PM   Workstation Name: [id number]    [facility]    CT Head Without Contrast [[id number]] Collected:  03/31/20 1702     Updated:  03/31/20 1759    Narrative:       EXAMINATION: CT HEAD WO CONTRAST- 03/31/2020     INDICATION: confusion, s/p fall, rule out intracranial disease/hematoma      TECHNIQUE: CT head without intravenous contrast     The radiation dose reduction device was turned on for each scan per the  ALARA (As Low as Reasonably Achievable) protocol.     COMPARISON: MRI brain dated 02/02/2020, CT head 02/20/2020.     FINDINGS: Midline structures are symmetric without evidence of mass,  mass effect or midline shift.  Ventricles and sulci demonstrate stable  appearance from prior without hydrocephalus or focal atrophy  development. Mildly asymmetric and persistent calcifications of the  basal ganglia of normal aging variance. No intra-axial hemorrhage or  extra-axial fluid collection. Globes and orbits unremarkable. Visualized  paranasal sinuses and mastoid air cells are grossly clear and  well-pneumatized with the exception of a small left maxillary mucus  retention cyst and mild mucosal thickening of the posterior wall right  maxillary sinus. Large left mastoid effusion. Calvarium intact.       Impression:       1. No acute intracranial abnormality specifically no acute intracranial  hemorrhage. Calvarium intact.      2. Large left mastoid effusion stable from prior.     D:  03/31/2020  E:  03/31/2020     This report was finalized on 3/31/2020 5:56 PM by Dr. Álvaro Cooper.       XR Chest 1 View [[id number]] Collected:  03/31/20 1332     Updated:  03/31/20 1756    Narrative:       EXAMINATION: XR CHEST 1 VW- 03/31/2020     INDICATION: requires oxygen      COMPARISON: Chest x-ray to 02/02/2020     FINDINGS: Cardiomegaly with increased prominence from prior comparison  along with increased pulmonary vascularity and perihilar prominence of  vascular congestion and/or peribronchial inflammatory process.  Additionally noted increased opacifications obscuring the right  hemidiaphragm margin in the right infrahilar region concerning for  developing airspace disease. No pneumothorax or pleural effusion.       Impression:       Cardiomegaly with increased prominence from prior comparison  along with increased pulmonary vascularity and perihilar prominence of  vascular congestion and/or peribronchial inflammatory process.  Additionally noted increased opacifications obscuring the right  hemidiaphragm margin in the right infrahilar region concerning for  developing airspace disease. No pneumothorax or pleural effusion.     D:  03/31/2020  E:   03/31/2020     This report was finalized on 3/31/2020 5:53 PM by Dr. Álvaro Cooper.             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  Who presented to the ICU on 3/31/2020 with acute metabolic encephalopathy secondary to hypoglycemia and acute on chronic hypoxic respiratory failure secondary to hypercapnia in the setting of severe renal dysfunction, with severe metabolic abnormalities.    1. Patient remained in the ICU given his acute on chronic hypercapnic respiratory failure, in the setting of severe acute renal dysfunction, and severe hyperglycemia complicated by acute metabolic encephalopathy.  Patient is high risk for intubation.  2. Continue BiPAP as needed, will break to 5 to 6 L cannula as able  3. Test swallowing if patient is able to we will supplement hypoglycemia with orange juice.  He is already on D 20 at 100 cc/h which he would like to get off.  I suspect that this is all secondary to acute toxicity of oral hypoglycemics in the setting of acute renal failure.  We will also start patient on octreotide, with the goal to turn down the rate of the D 20 over the course of the day.  Obviously a rate at that high could lead to hyponatremia over the next 24 hours.  4. We will go ahead and hold bicarb drip with acidemia improving.  Patient should  remain on IV fluids given his rhabdomyolysis, I will go back to renal's original recommendation of 100 cc/h of LR.  I will also add D5 to.  In an attempt to get our D 20 down to off.  5. We will continue the patient on ceftriaxone and doxycycline for possible pneumonia, in addition to this I will add Solu-Medrol 40 mg daily with scheduled nebs for treatment of COPD.  6. DVT prophylaxis  7. Continue to monitor for narcotic and benzodiazepine withdrawal  8. Holding Eliquis in the setting of acute renal failure, subcu heparin for now we will re-evaluate as the patient improves for further anticoagulation.      Plan of care and goals reviewed with mulitdisciplinary/antibiotic stewardship team during rounds.   I discussed the patient's findings and my recommendations with patient and nursing staff     Critical Care time spent in direct patient care: 35 minutes (excluding procedure time, if applicable) including high complexity decision making to assess, manipulate, and support vital organ system failure in this individual who has impairment of one or more vital organ systems such that there is a high probability of imminent or life threatening deterioration in the patient's condition.      Cathleen Clancy, DO  Pulmonary, Critical care and Sleep Medicine

## 2020-04-02 ENCOUNTER — APPOINTMENT (OUTPATIENT)
Dept: GENERAL RADIOLOGY | Facility: HOSPITAL | Age: 75
End: 2020-04-02

## 2020-04-02 LAB
ANION GAP SERPL CALCULATED.3IONS-SCNC: 13 MMOL/L (ref 5–15)
ANION GAP SERPL CALCULATED.3IONS-SCNC: 13 MMOL/L (ref 5–15)
BUN BLD-MCNC: 55 MG/DL (ref 8–23)
BUN BLD-MCNC: 66 MG/DL (ref 8–23)
BUN/CREAT SERPL: 15.5 (ref 7–25)
BUN/CREAT SERPL: 17.6 (ref 7–25)
C PEPTIDE SERPL-MCNC: 31 NG/ML (ref 1.1–4.4)
C-ANCA TITR SER IF: NORMAL TITER
CALCIUM SPEC-SCNC: 7.5 MG/DL (ref 8.6–10.5)
CALCIUM SPEC-SCNC: 7.7 MG/DL (ref 8.6–10.5)
CHLORIDE SERPL-SCNC: 101 MMOL/L (ref 98–107)
CHLORIDE SERPL-SCNC: 104 MMOL/L (ref 98–107)
CO2 SERPL-SCNC: 23 MMOL/L (ref 22–29)
CO2 SERPL-SCNC: 24 MMOL/L (ref 22–29)
CREAT BLD-MCNC: 3.13 MG/DL (ref 0.76–1.27)
CREAT BLD-MCNC: 4.26 MG/DL (ref 0.76–1.27)
GFR SERPL CREATININE-BSD FRML MDRD: 14 ML/MIN/1.73
GFR SERPL CREATININE-BSD FRML MDRD: 20 ML/MIN/1.73
GFR SERPL CREATININE-BSD FRML MDRD: ABNORMAL ML/MIN/{1.73_M2}
GLUCOSE BLD-MCNC: 233 MG/DL (ref 65–99)
GLUCOSE BLD-MCNC: 236 MG/DL (ref 65–99)
GLUCOSE BLDC GLUCOMTR-MCNC: 230 MG/DL (ref 70–130)
GLUCOSE BLDC GLUCOMTR-MCNC: 232 MG/DL (ref 70–130)
GLUCOSE BLDC GLUCOMTR-MCNC: 240 MG/DL (ref 70–130)
GLUCOSE BLDC GLUCOMTR-MCNC: 244 MG/DL (ref 70–130)
GLUCOSE BLDC GLUCOMTR-MCNC: 258 MG/DL (ref 70–130)
GLUCOSE BLDC GLUCOMTR-MCNC: 277 MG/DL (ref 70–130)
GLUCOSE BLDC GLUCOMTR-MCNC: 285 MG/DL (ref 70–130)
GLUCOSE BLDC GLUCOMTR-MCNC: 287 MG/DL (ref 70–130)
GLUCOSE BLDC GLUCOMTR-MCNC: 296 MG/DL (ref 70–130)
INSULIN SERPL-ACNC: 137.6 UIU/ML (ref 2.6–24.9)
MAGNESIUM SERPL-MCNC: 2.1 MG/DL (ref 1.6–2.4)
MYELOPEROXIDASE AB SER-ACNC: <9 U/ML (ref 0–9)
P-ANCA ATYPICAL TITR SER IF: NORMAL TITER
P-ANCA TITR SER IF: NORMAL TITER
PHOSPHATE SERPL-MCNC: 4.3 MG/DL (ref 2.5–4.5)
POTASSIUM BLD-SCNC: 4 MMOL/L (ref 3.5–5.2)
POTASSIUM BLD-SCNC: 4.4 MMOL/L (ref 3.5–5.2)
PROTEINASE3 AB SER IA-ACNC: <3.5 U/ML (ref 0–3.5)
SODIUM BLD-SCNC: 138 MMOL/L (ref 136–145)
SODIUM BLD-SCNC: 140 MMOL/L (ref 136–145)

## 2020-04-02 PROCEDURE — 82962 GLUCOSE BLOOD TEST: CPT

## 2020-04-02 PROCEDURE — 63710000001 INSULIN REGULAR HUMAN PER 5 UNITS: Performed by: INTERNAL MEDICINE

## 2020-04-02 PROCEDURE — 25010000002 DIAZEPAM PER 5 MG: Performed by: INTERNAL MEDICINE

## 2020-04-02 PROCEDURE — 80048 BASIC METABOLIC PNL TOTAL CA: CPT | Performed by: INTERNAL MEDICINE

## 2020-04-02 PROCEDURE — 94799 UNLISTED PULMONARY SVC/PX: CPT

## 2020-04-02 PROCEDURE — 84100 ASSAY OF PHOSPHORUS: CPT | Performed by: INTERNAL MEDICINE

## 2020-04-02 PROCEDURE — 83735 ASSAY OF MAGNESIUM: CPT | Performed by: INTERNAL MEDICINE

## 2020-04-02 PROCEDURE — 74018 RADEX ABDOMEN 1 VIEW: CPT

## 2020-04-02 PROCEDURE — 25010000002 HALOPERIDOL LACTATE PER 5 MG: Performed by: INTERNAL MEDICINE

## 2020-04-02 PROCEDURE — 99291 CRITICAL CARE FIRST HOUR: CPT | Performed by: INTERNAL MEDICINE

## 2020-04-02 PROCEDURE — 25010000002 METHYLPREDNISOLONE PER 40 MG: Performed by: INTERNAL MEDICINE

## 2020-04-02 PROCEDURE — 25010000002 CEFTRIAXONE PER 250 MG: Performed by: INTERNAL MEDICINE

## 2020-04-02 PROCEDURE — 94660 CPAP INITIATION&MGMT: CPT

## 2020-04-02 RX ORDER — DIAZEPAM 10 MG/1
10 TABLET ORAL EVERY 6 HOURS PRN
COMMUNITY
End: 2020-04-06 | Stop reason: HOSPADM

## 2020-04-02 RX ORDER — TERAZOSIN 2 MG/1
2 CAPSULE ORAL NIGHTLY
Status: DISCONTINUED | OUTPATIENT
Start: 2020-04-02 | End: 2020-04-03

## 2020-04-02 RX ORDER — SODIUM CHLORIDE, SODIUM LACTATE, POTASSIUM CHLORIDE, CALCIUM CHLORIDE 600; 310; 30; 20 MG/100ML; MG/100ML; MG/100ML; MG/100ML
100 INJECTION, SOLUTION INTRAVENOUS CONTINUOUS
Status: DISCONTINUED | OUTPATIENT
Start: 2020-04-02 | End: 2020-04-06 | Stop reason: HOSPADM

## 2020-04-02 RX ORDER — GLIPIZIDE 10 MG/1
10 TABLET, FILM COATED, EXTENDED RELEASE ORAL
COMMUNITY
End: 2020-04-06 | Stop reason: HOSPADM

## 2020-04-02 RX ORDER — VALSARTAN 320 MG/1
320 TABLET ORAL DAILY
COMMUNITY
End: 2020-04-06 | Stop reason: HOSPADM

## 2020-04-02 RX ORDER — AMINO ACIDS/PROTEIN HYDROLYS 15G-100/30
30 LIQUID (ML) ORAL 2 TIMES DAILY
Status: DISCONTINUED | OUTPATIENT
Start: 2020-04-02 | End: 2020-04-05

## 2020-04-02 RX ORDER — DEXTROSE MONOHYDRATE 25 G/50ML
25 INJECTION, SOLUTION INTRAVENOUS
Status: DISCONTINUED | OUTPATIENT
Start: 2020-04-02 | End: 2020-04-06 | Stop reason: HOSPADM

## 2020-04-02 RX ORDER — INSULIN GLARGINE 100 [IU]/ML
19 INJECTION, SOLUTION SUBCUTANEOUS NIGHTLY
COMMUNITY

## 2020-04-02 RX ORDER — FAMOTIDINE 40 MG/1
40 TABLET, FILM COATED ORAL NIGHTLY
COMMUNITY
End: 2020-04-06 | Stop reason: HOSPADM

## 2020-04-02 RX ORDER — NICOTINE POLACRILEX 4 MG
15 LOZENGE BUCCAL
Status: DISCONTINUED | OUTPATIENT
Start: 2020-04-02 | End: 2020-04-06 | Stop reason: HOSPADM

## 2020-04-02 RX ORDER — MORPHINE SULFATE 100 MG/1
200 TABLET ORAL 2 TIMES DAILY
COMMUNITY
End: 2020-04-06 | Stop reason: HOSPADM

## 2020-04-02 RX ORDER — DIAZEPAM 5 MG/ML
2.5 INJECTION, SOLUTION INTRAMUSCULAR; INTRAVENOUS EVERY 8 HOURS
Status: DISCONTINUED | OUTPATIENT
Start: 2020-04-02 | End: 2020-04-06 | Stop reason: HOSPADM

## 2020-04-02 RX ADMIN — TERAZOSIN HYDROCHLORIDE 2 MG: 2 CAPSULE ORAL at 21:44

## 2020-04-02 RX ADMIN — INSULIN HUMAN 4 UNITS: 100 INJECTION, SOLUTION PARENTERAL at 13:07

## 2020-04-02 RX ADMIN — DIAZEPAM 2.5 MG: 5 INJECTION, SOLUTION INTRAMUSCULAR; INTRAVENOUS at 09:42

## 2020-04-02 RX ADMIN — IPRATROPIUM BROMIDE AND ALBUTEROL SULFATE 3 ML: 2.5; .5 SOLUTION RESPIRATORY (INHALATION) at 07:12

## 2020-04-02 RX ADMIN — SODIUM CHLORIDE, PRESERVATIVE FREE 10 ML: 5 INJECTION INTRAVENOUS at 08:26

## 2020-04-02 RX ADMIN — DIAZEPAM 2.5 MG: 5 INJECTION, SOLUTION INTRAMUSCULAR; INTRAVENOUS at 17:05

## 2020-04-02 RX ADMIN — DOXYCYCLINE 100 MG: 100 INJECTION, POWDER, LYOPHILIZED, FOR SOLUTION INTRAVENOUS at 08:28

## 2020-04-02 RX ADMIN — HALOPERIDOL LACTATE 5 MG: 5 INJECTION, SOLUTION INTRAMUSCULAR at 20:21

## 2020-04-02 RX ADMIN — IPRATROPIUM BROMIDE AND ALBUTEROL SULFATE 3 ML: 2.5; .5 SOLUTION RESPIRATORY (INHALATION) at 19:19

## 2020-04-02 RX ADMIN — DEXMEDETOMIDINE HYDROCHLORIDE 0.4 MCG/KG/HR: 400 INJECTION INTRAVENOUS at 13:43

## 2020-04-02 RX ADMIN — DEXMEDETOMIDINE HYDROCHLORIDE 0.2 MCG/KG/HR: 400 INJECTION INTRAVENOUS at 00:04

## 2020-04-02 RX ADMIN — INSULIN HUMAN 3 UNITS: 100 INJECTION, SOLUTION PARENTERAL at 08:25

## 2020-04-02 RX ADMIN — IPRATROPIUM BROMIDE AND ALBUTEROL SULFATE 3 ML: 2.5; .5 SOLUTION RESPIRATORY (INHALATION) at 03:43

## 2020-04-02 RX ADMIN — Medication 30 ML: at 17:01

## 2020-04-02 RX ADMIN — SODIUM CHLORIDE, POTASSIUM CHLORIDE, SODIUM LACTATE AND CALCIUM CHLORIDE 100 ML/HR: 600; 310; 30; 20 INJECTION, SOLUTION INTRAVENOUS at 04:52

## 2020-04-02 RX ADMIN — IPRATROPIUM BROMIDE AND ALBUTEROL SULFATE 3 ML: 2.5; .5 SOLUTION RESPIRATORY (INHALATION) at 15:14

## 2020-04-02 RX ADMIN — PANTOPRAZOLE SODIUM 40 MG: 40 INJECTION, POWDER, FOR SOLUTION INTRAVENOUS at 06:22

## 2020-04-02 RX ADMIN — IPRATROPIUM BROMIDE AND ALBUTEROL SULFATE 3 ML: 2.5; .5 SOLUTION RESPIRATORY (INHALATION) at 23:09

## 2020-04-02 RX ADMIN — CEFTRIAXONE SODIUM 1 G: 1 INJECTION, POWDER, FOR SOLUTION INTRAMUSCULAR; INTRAVENOUS at 14:43

## 2020-04-02 RX ADMIN — CITALOPRAM HYDROBROMIDE 20 MG: 20 TABLET ORAL at 08:25

## 2020-04-02 RX ADMIN — INSULIN HUMAN 4 UNITS: 100 INJECTION, SOLUTION PARENTERAL at 18:33

## 2020-04-02 RX ADMIN — IPRATROPIUM BROMIDE AND ALBUTEROL SULFATE 3 ML: 2.5; .5 SOLUTION RESPIRATORY (INHALATION) at 12:20

## 2020-04-02 RX ADMIN — HALOPERIDOL LACTATE 5 MG: 5 INJECTION, SOLUTION INTRAMUSCULAR at 10:06

## 2020-04-02 RX ADMIN — DOXYCYCLINE 100 MG: 100 INJECTION, POWDER, LYOPHILIZED, FOR SOLUTION INTRAVENOUS at 20:21

## 2020-04-02 RX ADMIN — DEXMEDETOMIDINE HYDROCHLORIDE 0.5 MCG/KG/HR: 400 INJECTION INTRAVENOUS at 22:07

## 2020-04-02 RX ADMIN — Medication 30 ML: at 20:22

## 2020-04-02 RX ADMIN — METHYLPREDNISOLONE SODIUM SUCCINATE 40 MG: 40 INJECTION, POWDER, FOR SOLUTION INTRAMUSCULAR; INTRAVENOUS at 08:25

## 2020-04-02 NOTE — PLAN OF CARE
Problem: Patient Care Overview  Goal: Plan of Care Review  Outcome: Ongoing (interventions implemented as appropriate)  Flowsheets (Taken 4/1/2020 2027)  Progress: no change  Plan of Care Reviewed With: patient; spouse  Outcome Summary: PT confused throughout shift and becoming combative and uncooperative. At end of shift PT is in 4 point restraints, on Precedex gtt, and 2 doses of Haldol given. ABG drawn and bipap 28%. SB/SA with frequent pacs. Intensivist okay with HR into 40s with MAP >60. NPO. Potential dialysis cath placement 4/2/20. Approx 2.4L UOP. Low grade fevers. Blood sugars now correcting with 1800 BSG into 250s.  Goal: Individualization and Mutuality  Outcome: Ongoing (interventions implemented as appropriate)  Goal: Discharge Needs Assessment  Outcome: Ongoing (interventions implemented as appropriate)  Goal: Interprofessional Rounds/Family Conf  Outcome: Ongoing (interventions implemented as appropriate)     Problem: Fall Risk (Adult)  Goal: Absence of Fall  Outcome: Ongoing (interventions implemented as appropriate)     Problem: Kidney Disease, Chronic/End Stage Renal Disease (Adult)  Goal: Signs and Symptoms of Listed Potential Problems Will be Absent, Minimized or Managed (Kidney Disease, Chronic/End Stage Renal Disease)  Outcome: Ongoing (interventions implemented as appropriate)     Problem: Diabetes, Type 2 (Adult)  Goal: Signs and Symptoms of Listed Potential Problems Will be Absent, Minimized or Managed (Diabetes, Type 2)  Outcome: Ongoing (interventions implemented as appropriate)     Problem: Restraint, Nonbehavioral (Nonviolent)  Goal: Rationale and Justification  Outcome: Ongoing (interventions implemented as appropriate)  Goal: Nonbehavioral (Nonviolent) Restraint: Absence of Injury/Harm  Outcome: Ongoing (interventions implemented as appropriate)  Goal: Nonbehavioral (Nonviolent) Restraint: Achievement of Discontinuation Criteria  Outcome: Ongoing (interventions implemented as  appropriate)  Goal: Nonbehavioral (Nonviolent) Restraint: Preservation of Dignity and Wellbeing  Outcome: Ongoing (interventions implemented as appropriate)     Problem: Skin Injury Risk (Adult)  Goal: Skin Health and Integrity  Outcome: Ongoing (interventions implemented as appropriate)

## 2020-04-02 NOTE — PROGRESS NOTES
Intensive Care Follow-up     Hospital:  LOS: 2 days   Mr. Joseph Rodriguez, 74 y.o. male is followed for:   Acute kidney injury superimposed on chronic kidney disease (CMS/HCC)            History of present illness:   Joseph Rodriguez is a 74 y.o. male admitted to [facility] on 3/31 from [facility] for management of CARLTON, encephalopathy, and hypoglycemia.      Patient was previously admitted to our facility from 2/2-2/4 for dizziness and usnteady gait work-up revealing BPV also found to be in paroxsymal AF. He was seen by neurology placed on meclizine and cardiology discharged on Eliquis and Plavix.      Evidently the patient was evaluated by his PCP a few weeks ago placed on a statin. He additionally takes bumex. Over the past week the wife reports increased somnolence, decreased appetite, and fall with unknown contact to the head.      On 3/31 he was taken to OSH ED with complaints of hypoglycemia in the 30s. Labs showed Cr 9.3, , K 4.8, , and Ca 7.1. EKG sinus bradycardia with 1st degree AVB and negative troponins. CT A/P displayed GB distention, non-obstructing L renal stone, colonic stool retention, and diverticulosis. UA + blood and bacteria given empiric Zosyn and IVF.      The patient was transferred to [facility] for nephrology evaluation and higher level of care. Placed on LR and Rocephin with Cr slightly improved at 7.8. Initial BG 28 given additional hypoglycemic management with D5W fluids with overall improvement. ABG showed 7.10/56/93/17 placed on BiPAP and bicarbonate gtt that was later discontinued per nephrology's recommendation. On arrival he was somnolent with UDS + BZD/opiates not improved with Romazicon/Narcan and CT head benign apart from a large left mastoid effusion noted on previous 2/2 imaging.      The evening on 3/31 the patient was transferred to the ICU for ongoing mixed acidosis and somnolence.  Was given Narcan on the floor and then started on a Narcan drip.  Patient  became agitated, trying to climb out of bed.  Has been fighting use of his BiPAP.      Of note the patient was recently seen by APRN with [facility] for management of COPD with exacerbation. PFTs in 10/2019 as follows: FVC 3.26, 80%; FEV1 1.65, 56%; and ratio 51%.       Subjective   Interval History:  Patient seen prove this morning from last night but continues to be agitated and confused.  He continues on a Precedex drip.  He was on BiPAP throughout the night and gone over to nasal cannula this morning.  Renal function continues to improve, with matching I's and O's over the last 24 hours.  Hypoglycemia has now resolved.             The patient's past medical, surgical and social history were reviewed and updated in Epic as appropriate.       Objective     Infusions:    dexmedetomidine 0.2-1.5 mcg/kg/hr Last Rate: 0.2 mcg/kg/hr (04/02/20 0618)   lactated ringers 100 mL/hr Last Rate: 100 mL/hr (04/02/20 0452)     Medications:    cefTRIAXone 1 g Intravenous Q24H   citalopram 20 mg Oral Daily   doxycycline 100 mg Intravenous Q12H   insulin regular 0-7 Units Subcutaneous Q6H   ipratropium-albuterol 3 mL Nebulization Q4H - RT   methylPREDNISolone sodium succinate 40 mg Intravenous Q24H   pantoprazole 40 mg Intravenous Q AM   sodium chloride 10 mL Intravenous Q12H     I reviewed the patient's medications.    Vital Sign Min/Max for last 24 hours  Temp  Min: 98.2 °F (36.8 °C)  Max: 99 °F (37.2 °C)   BP  Min: 83/59  Max: 147/79   Pulse  Min: 40  Max: 71   Resp  Min: 16  Max: 24   SpO2  Min: 82 %  Max: 100 %   Flow (L/min)  Min: 2  Max: 2       Input/Output for last 24 hour shift  04/01 0701 - 04/02 0700  In: 4043.5 [P.O.:420; I.V.:3273.5]  Out: 4325 [Urine:4325]      GENERAL : NAD, conversant  RESPIRATORY/THORAX : normal respiratory effort and no intercostal retractions, diminished breath sounds bilaterally  CARDIOVASCULAR : Normal S1/S2, RRR. 1+ lower ext edema.  GASTROINTESTINAL : Soft, NT/ND. BS x 4  normoactive. No hepatosplenomegaly.  MUSCULOSKELETAL : No cyanosis, clubbing, or ischemia  NEUROLOGICAL: alert, not oriented to person place or time, moving all extremities    Results from last 7 days   Lab Units 04/01/20  0333 03/31/20 2014 03/31/20  1256   WBC 10*3/mm3 5.74 8.22 7.65   HEMOGLOBIN g/dL 7.9* 8.0* 7.8*   PLATELETS 10*3/mm3 145 134* 136*     Results from last 7 days   Lab Units 04/02/20  0637 04/01/20  1919 04/01/20  0729 04/01/20  0333  03/31/20  1256   SODIUM mmol/L 140 138 141 142   < > 136   POTASSIUM mmol/L 4.4 4.6 4.0 4.3   < > 4.4   CO2 mmol/L 23.0 24.0 19.0* 21.0*   < > 17.0*   BUN mg/dL 66* 73* 94* 90*   < > 101*   CREATININE mg/dL 4.26* 5.09* 6.86* 6.49*   < > 8.64*   MAGNESIUM mg/dL 2.1  --   --  2.8*  --  3.1*   PHOSPHORUS mg/dL 4.3  --   --  7.8*  --  10.3*   GLUCOSE mg/dL 233* 243* 57* 68   < > 122*    < > = values in this interval not displayed.     Estimated Creatinine Clearance: 20.7 mL/min (A) (by C-G formula based on SCr of 4.26 mg/dL (H)).    Results from last 7 days   Lab Units 04/01/20  1909   PH, ARTERIAL pH units 7.318*   PCO2, ARTERIAL mm Hg 46.6*   PO2 ART mm Hg 98.5       I reviewed the patient's new clinical results.  I reviewed the patient's new imaging results/reports including actual images and agree with reports.              Imaging Results (Last 24 Hours)     Procedure Component Value Units Date/Time    XR Chest 1 View [[id number]] Collected:  04/01/20 0835     Updated:  04/01/20 1017    Narrative:       EXAMINATION: XR CHEST 1 VW-04/01/2020:     INDICATION: Respiratory Distress.     COMPARISON: 03/31/2020.     FINDINGS: The heart is enlarged. The vasculature appears upper limits of  normal. Mild generalized coarsening of the pulmonary interstitial  markings is similar to the prior study. No new pulmonary parenchymal  disease is seen. No significant effusion or pneumothorax is appreciated.       Impression:       Cardiomegaly and perhaps mild pulmonary venous  hypertension.  No other evidence of active chest disease.      D:  04/01/2020  E:  04/01/2020     This report was finalized on 4/1/2020 10:14 AM by Dr. David Rivera MD.        Renal Limited [[id number]] Collected:  04/01/20 0830     Updated:  04/01/20 0910    Narrative:       EXAMINATION: US RENAL, LIMITED-03/31/2020:     INDICATION: Elevated BUN and CR.  Renal insufficiency.     TECHNIQUE: Sonographic imaging was obtained of the kidneys in both the  sagittal and transverse planes.     COMPARISON: NONE.     FINDINGS: The right kidney measures in length from pole to pole 11.5 cm.  There is a cyst identified in the right kidney measuring 3.0 cm. No  solid mass identified. No hydronephrosis or nephrolithiasis.     The left kidney measures in length from pole to pole 12.8 cm. There is  no solid cortical mass or renal cortical cysts seen within the left  kidney. No hydronephrosis or nephrolithiasis. A Mckeon catheter balloon  is present within the bladder. There is incomplete distention.       Impression:       Small right renal cortical cyst. The remainder of the renal  ultrasound is grossly unremarkable.     D:  04/01/2020  E:  04/01/2020            This report was finalized on 4/1/2020 9:06 AM by Dr. Aisha Segura MD.       US Gallbladder [[id number]] Collected:  04/01/20 0836     Updated:  04/01/20 0910    Narrative:       EXAMINATION: US GALLBLADDER-03/31/2020:     INDICATION: Followup distended GB seen on outside CT scan, followup  distended gallbladder.     TECHNIQUE: Sonographic imaging was obtained of the right upper quadrant  in both the sagittal and transverse planes. The examination is  suboptimal due to overlying bowel gas.     COMPARISON: NONE.     FINDINGS: The pancreas is homogeneous in appearance in its visualized  portions with no focal mass or abnormal fluid collection. The liver is  homogeneous and normal in echotexture and echogenicity with no focal  mass or intrahepatic biliary ductal dilatation.  The gallbladder reveals  no definite stones, no wall thickening with sludge seen in the dependent  portion. There is no pericholecystic fluid. There is mild distention of  the gallbladder. The common bile duct measures 3 mm. The right kidney is  normal in size, configuration, and texture measuring in length from pole  to pole 11.5 cm. No solid cortical mass seen within the right kidney.  Small renal cortical cyst measuring 2.7 cm. No hydronephrosis or  nephrolithiasis.       Impression:       Mild distention of the gallbladder with sludge. There is no  evidence of wall thickening or biliary ductal dilatation. No evidence of  stones. Small cyst on the right kidney.     D:  04/01/2020  E:  04/01/2020            This report was finalized on 4/1/2020 9:06 AM by Dr. Aisha Segura MD.             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  Who presented to the ICU on 3/31/2020 with acute metabolic encephalopathy secondary to hypoglycemia and acute on chronic hypoxic respiratory failure secondary to hypercapnia in the setting of severe renal dysfunction, with severe metabolic abnormalities.     1. Patient to remain in the ICU given his acute on chronic hypercapnic respiratory failure, in the setting of severe acute renal dysfunction, and severe hyperglycemia  complicated by acute metabolic encephalopathy.  Patient is high risk for intubation.  2. Continue BiPAP as needed, will break to 5 to 6 L cannula as able  3. Place Dobbhoff for NG start p.o. nutrition p.o. meds  4. Discontinue octreotide, and transition fluids to  cc/h.  5. We will continue the patient on ceftriaxone and doxycycline for possible pneumonia, in addition to this I will add Solu-Medrol 40 mg daily with scheduled nebs for treatment of COPD.  6. Start sliding scale insulin  7. Given the patient's delirium and history of benzo abuse, will go ahead and start him back on the low-dose of benzodiazepine at 2.5 mg of Valium every 8 hours this is half of his home dosing with gradually improving renal function and felt that this is appropriate at this time.  8. Continue Precedex and hopefully can wean off this afternoon.  9. DVT prophylaxis  10. Holding Eliquis in the setting of acute renal failure, subcu heparin for now we will re-evaluate as the patient improves for further anticoagulation.      Plan of care and goals reviewed with mulitdisciplinary/antibiotic stewardship team during rounds.   I discussed the patient's findings and my recommendations with patient and nursing staff     Critical Care time spent in direct patient care: 35 minutes (excluding procedure time, if applicable) including high complexity decision making to assess, manipulate, and support vital organ system failure in this individual who has impairment of one or more vital organ systems such that there is a high probability of imminent or life threatening deterioration in the patient's condition.      Cathleen Clancy, DO  Pulmonary, Critical care and Sleep Medicine

## 2020-04-02 NOTE — PLAN OF CARE
Problem: Patient Care Overview  Goal: Plan of Care Review  Outcome: Ongoing (interventions implemented as appropriate)  Flowsheets (Taken 4/2/2020 1845)  Progress: improving  Plan of Care Reviewed With: patient  Outcome Summary: remains confused and agitated-- always requesting male staff, unable to wean precedex, valium started, has bee on NC all day-- refuses BIPAP rips off even in restraints, has been alert and agitated, insulin sliding scale ordered, keofeed placed in duodenum and tube feed started, giving water PRN, large BM, no other changes today.

## 2020-04-02 NOTE — PROGRESS NOTES
"                  Clinical Nutrition     Nutrition Support Assessment  Reason for Visit:   LARRY DICKINSON      Patient Name: Joseph Rodriguez  YOB: 1945  MRN: [id number]  Date of Encounter: 04/02/20 11:15  Admission date: 3/31/2020        Nutrition Assessment   Assessment     Admission diagnosis  CARLTON on CKD (baseline sCr 1.4-1.6)     Additional applicable diagnosis/conditions/procedures this adm:  Acute encephalopathy  Acute on chronic respiratory failure with hypercapnia  Bipap  Hypoglycemia  Acute mixed acidosis  Rhabdomyolysis  (4/1) Confusion, combative, uncooperative     Applicable PMH/PSxH:   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       RD spoke to pts wife via phone, she states pt has not been eating his normal amount for 1.5-2 weeks, decreasing gradually, she states pt ate nothing Sunday due to her being unable to wake him up enough. She states pt may have lost weight but she is unsure, she includes pts UBW is approx 265lb. She states pt has no known food allergies or intolerances. RD informed her that we are starting enteral nutrition, she asked appropriate questioned and expressed understanding of discussion.       Anthropometrics     Height: 178 cm (70.08\")  Last filed wt: Weight: 127 kg (279 lb) (04/01/20 1405)   Method: none listed (3/31)     BMI: BMI (Calculated): 40.17kg/m2   Obese Class III extreme obesity: > or equal to 40kg/m2    Ideal Body Weight (IBW) (kg): 76.7    Last 15 Recorded Weights   Weight Weight (kg) Weight (lbs) Weight Method   4/1/2020 126.554 kg 279 lb -   3/31/2020 127 kg 279 " lb 15.8 oz -   2/2/2020 120.203 kg 265 lb -   2/2/2020 120.521 kg 265 lb 11.2 oz Bed scale   2/2/2020 120.203 kg 265 lb Estimated   10/28/2019 119.387 kg 263 lb 3.2 oz -   10/17/2019 118.48 kg 261 lb 3.2 oz Standing scale   10/17/2019 108.863 kg 240 lb -   2/28/2019 117.482 kg 259 lb -   8/1/2018 121.564 kg 268 lb -   1/29/2018 120.657 kg 266 lb -   8/28/2017 115.214 kg 254 lb -   4/11/2017 111.676 kg 246 lb 3.2 oz Standing scale   4/10/2017 114.125 kg 251 lb 9.6 oz Standing scale   4/9/2017 114.76 kg 253 lb -       Weight Change   UBW: 265lb   Weight change:   % wt change:   Time frame of weight loss:     Labs reviewed     Results from last 7 days   Lab Units 04/02/20  0637 04/01/20  1919 04/01/20  0729 04/01/20 0333 03/31/20 2014 03/31/20  1256   GLUCOSE mg/dL 233* 243* 57* 68   < > 150*   < > 122*   BUN mg/dL 66* 73* 94* 90*   < > 100*   < > 101*   CREATININE mg/dL 4.26* 5.09* 6.86* 6.49*   < > 7.83*   < > 8.64*   SODIUM mmol/L 140 138 141 142   < > 136   < > 136   CHLORIDE mmol/L 104 101 103 104   < > 102   < > 101   POTASSIUM mmol/L 4.4 4.6 4.0 4.3   < > 4.7   < > 4.4   PHOSPHORUS mg/dL 4.3  --   --  7.8*  --   --   --  10.3*   MAGNESIUM mg/dL 2.1  --   --  2.8*  --   --   --  3.1*   ALT (SGPT) U/L  --   --   --  19  --  21  --  20    < > = values in this interval not displayed.     Results from last 7 days   Lab Units 04/01/20 0333 03/31/20 2014 03/31/20  1530   ALBUMIN g/dL 3.70 3.10* 3.3   IONIZED CALCIUM mmol/L 1.05* 1.02*  --        Results from last 7 days   Lab Units 04/02/20  0806 04/02/20  0705 04/02/20  0608 04/02/20  0411 04/02/20  0157 04/01/20  2346   GLUCOSE mg/dL 230* 232* 277* 287* 296* 310*     Lab Results   Lab Value Date/Time    HGBA1C 7.60 (H) 03/31/2020 1256    HGBA1C 10.10 (H) 02/03/2020 0857         Medications reviewed   Pertinent:  GTT:LR@100mL/hr, precedex    Other: protonix, SSI, solumedrol, ABX     Intake/Ouptut 24 hrs (7:00AM - 6:59 AM)     Intake & Output (last day)        04/01 0701 - 04/02 0700 04/02 0701 - 04/03 0700    P.O. 420     I.V. (mL/kg) 3273.5 (25.8) 501 (3.9)    IV Piggyback 350 100    Total Intake(mL/kg) 4043.5 (31.8) 601 (4.7)    Urine (mL/kg/hr) 4325 (1.4) 775 (1.4)    Stool      Total Output 4325 775    Net -281.5 -174                Needs Assessment (4/2)       Height used 177.8cm    Weight used 120kg    IBW: 75.5kg     Estimated need Method/Equation used Result    Energy/Calorie need   ~1700kcals/d   14kcals/kg actbw    1680kcals    Protein   132g/day   1.5-2.0g/kg IBW    113-151g             Current Nutrition Prescription     PO: NPO Diet         Nutrition Diagnosis     4/2  Problem Inadequate energy intake, inadequate protein intake    Etiology Clinical status/Resp status    Signs/Symptoms Confusion/NPO        Nutrition Intervention     1.  Follow treatment progress, Care plan reviewed   2. Once corpak is successfully placed recommend starting Peptamen AF @20mL/hr, increase by 15mL Q8hrs to goal rate of 65mL/hr. Provide 1 packet prostat twice daily. Free water of 10mL/hr. Order placed.     Route: NDT     At Target Goal Volume     % Est needs   Volume  1300ml     Energy/kcals 1760kcals 104%   Protein 129g pro 98%   Fiber 7.8g    Potassium  54.3mEq    Phosphorus  1140mg    Fluid via EN 1054mL    Total Fluid  1254mL    Meets 100% RDI yes      20hr goal volume used based on anticipated interruptions in EN delivery/administration in ICU patient.       3. Weekly Nutrition panel, CRP and Prealbumin ordered to start Monday 4/6. Nutrition panel ordered to be completed 4/3.       Goal:     General: Nutrition support treatment  EN/PN: Initiate EN    Monitoring/Evaluation:   Per protocol, I&O, Pertinent labs, EN delivery/tolerance, Weight      Will Continue to follow per protocol      Judie Barcenas, DEVON, LD, CNSC  Time Spent: 45min

## 2020-04-02 NOTE — PROGRESS NOTES
" LOS: 2 days   Patient Care Team:  Dandy Bailey MD as PCP - General  Juan Pablo Osullivan MD as Surgeon (Cardiothoracic Surgery)  Jong Diop DO as Consulting Physician (Gastroenterology)    Chief Complaint: CARLTON on CKD Stage III     Subjective     Making good UOP. Renal function improving with volume expansion. Mentation getting better. ABG better       Subjective:  Symptoms:  Stable.  No shortness of breath, cough, headache or chest pressure.    Diet:  Adequate intake.    Activity level: Normal.    Pain:  He reports no pain.        History taken from: patient RN    Objective     Vital Sign Min/Max for last 24 hours  Temp  Min: 98.2 °F (36.8 °C)  Max: 99 °F (37.2 °C)   BP  Min: 83/59  Max: 147/79   Pulse  Min: 40  Max: 71   Resp  Min: 16  Max: 26   SpO2  Min: 82 %  Max: 100 %   Flow (L/min)  Min: 2  Max: 2   Weight  Min: 127 kg (279 lb)  Max: 127 kg (279 lb)     Flowsheet Rows      First Filed Value   Admission Height  177.8 cm (70\") Documented at 03/31/2020 1211   Admission Weight  127 kg (279 lb 15.8 oz) Documented at 03/31/2020 1211          I/O this shift:  In: 601 [I.V.:501; IV Piggyback:100]  Out: 775 [Urine:775]  I/O last 3 completed shifts:  In: 5798.8 [P.O.:420; I.V.:4878.8; IV Piggyback:500]  Out: 6465 [Urine:6465]    Objective:  General Appearance:  Comfortable.    Vital signs: (most recent): Blood pressure 136/58, pulse (!) 42, temperature 98.7 °F (37.1 °C), temperature source Axillary, resp. rate 26, height 178 cm (70.08\"), weight 127 kg (279 lb), SpO2 94 %.    Output: Producing urine.    HEENT: Normal HEENT exam.    Lungs:  Normal effort and normal respiratory rate.  Breath sounds clear to auscultation.  He is not in respiratory distress.  No stridor.  No decreased breath sounds or rhonchi.    Heart: Normal rate.  Regular rhythm.  S1 normal and S2 normal.    Abdomen: Abdomen is soft.  Bowel sounds are normal.   There is no abdominal tenderness.     Neurological: Patient is alert and " oriented to person, place and time.    Skin:  Warm and dry.              Results Review:     I reviewed the patient's new clinical results.    WBC WBC   Date Value Ref Range Status   04/01/2020 5.74 3.40 - 10.80 10*3/mm3 Final   03/31/2020 8.22 3.40 - 10.80 10*3/mm3 Final   03/31/2020 7.65 3.40 - 10.80 10*3/mm3 Final      HGB Hemoglobin   Date Value Ref Range Status   04/01/2020 7.9 (L) 13.0 - 17.7 g/dL Final   03/31/2020 8.0 (L) 13.0 - 17.7 g/dL Final   03/31/2020 7.8 (L) 13.0 - 17.7 g/dL Final      HCT Hematocrit   Date Value Ref Range Status   04/01/2020 25.6 (L) 37.5 - 51.0 % Final   03/31/2020 26.2 (L) 37.5 - 51.0 % Final   03/31/2020 26.3 (L) 37.5 - 51.0 % Final      Platlets No results found for: LABPLAT   MCV MCV   Date Value Ref Range Status   04/01/2020 93.1 79.0 - 97.0 fL Final   03/31/2020 95.3 79.0 - 97.0 fL Final   03/31/2020 92.0 79.0 - 97.0 fL Final          Sodium Sodium   Date Value Ref Range Status   04/02/2020 140 136 - 145 mmol/L Final   04/01/2020 138 136 - 145 mmol/L Final   04/01/2020 141 136 - 145 mmol/L Final   04/01/2020 142 136 - 145 mmol/L Final   04/01/2020 141 136 - 145 mmol/L Final   03/31/2020 136 136 - 145 mmol/L Final   03/31/2020 140 136 - 145 mmol/L Final   03/31/2020 136 136 - 145 mmol/L Final      Potassium Potassium   Date Value Ref Range Status   04/02/2020 4.4 3.5 - 5.2 mmol/L Final   04/01/2020 4.6 3.5 - 5.2 mmol/L Final   04/01/2020 4.0 3.5 - 5.2 mmol/L Final   04/01/2020 4.3 3.5 - 5.2 mmol/L Final   04/01/2020 4.6 3.5 - 5.2 mmol/L Final   03/31/2020 4.7 3.5 - 5.2 mmol/L Final   03/31/2020 4.5 3.5 - 5.2 mmol/L Final   03/31/2020 4.4 3.5 - 5.2 mmol/L Final      Chloride Chloride   Date Value Ref Range Status   04/02/2020 104 98 - 107 mmol/L Final   04/01/2020 101 98 - 107 mmol/L Final   04/01/2020 103 98 - 107 mmol/L Final   04/01/2020 104 98 - 107 mmol/L Final   04/01/2020 105 98 - 107 mmol/L Final   03/31/2020 102 98 - 107 mmol/L Final   03/31/2020 104 98 - 107 mmol/L  Final   03/31/2020 101 98 - 107 mmol/L Final      CO2 CO2   Date Value Ref Range Status   04/02/2020 23.0 22.0 - 29.0 mmol/L Final   04/01/2020 24.0 22.0 - 29.0 mmol/L Final   04/01/2020 19.0 (L) 22.0 - 29.0 mmol/L Final   04/01/2020 21.0 (L) 22.0 - 29.0 mmol/L Final   04/01/2020 20.0 (L) 22.0 - 29.0 mmol/L Final   03/31/2020 14.0 (L) 22.0 - 29.0 mmol/L Final   03/31/2020 17.0 (L) 22.0 - 29.0 mmol/L Final   03/31/2020 17.0 (L) 22.0 - 29.0 mmol/L Final      BUN BUN   Date Value Ref Range Status   04/02/2020 66 (H) 8 - 23 mg/dL Final   04/01/2020 73 (H) 8 - 23 mg/dL Final   04/01/2020 94 (H) 8 - 23 mg/dL Final   04/01/2020 90 (H) 8 - 23 mg/dL Final   04/01/2020 94 (H) 8 - 23 mg/dL Final   03/31/2020 100 (H) 8 - 23 mg/dL Final   03/31/2020 103 (H) 8 - 23 mg/dL Final   03/31/2020 101 (H) 8 - 23 mg/dL Final      Creatinine Creatinine   Date Value Ref Range Status   04/02/2020 4.26 (H) 0.76 - 1.27 mg/dL Final   04/01/2020 5.09 (H) 0.76 - 1.27 mg/dL Final   04/01/2020 6.86 (H) 0.76 - 1.27 mg/dL Final   04/01/2020 6.49 (H) 0.76 - 1.27 mg/dL Final   04/01/2020 7.43 (H) 0.76 - 1.27 mg/dL Final   03/31/2020 7.83 (H) 0.76 - 1.27 mg/dL Final   03/31/2020 7.43 (H) 0.76 - 1.27 mg/dL Final   03/31/2020 8.64 (H) 0.76 - 1.27 mg/dL Final      Calcium Calcium   Date Value Ref Range Status   04/02/2020 7.5 (L) 8.6 - 10.5 mg/dL Final   04/01/2020 7.1 (L) 8.6 - 10.5 mg/dL Final   04/01/2020 6.9 (L) 8.6 - 10.5 mg/dL Final   04/01/2020 7.1 (L) 8.6 - 10.5 mg/dL Final   04/01/2020 7.1 (L) 8.6 - 10.5 mg/dL Final   03/31/2020 6.7 (L) 8.6 - 10.5 mg/dL Final   03/31/2020 6.8 (L) 8.6 - 10.5 mg/dL Final   03/31/2020 6.9 (L) 8.6 - 10.5 mg/dL Final      PO4 No results found for: CAPO4   Albumin Albumin   Date Value Ref Range Status   04/01/2020 3.70 3.50 - 5.20 g/dL Final   03/31/2020 3.10 (L) 3.50 - 5.20 g/dL Final   03/31/2020 3.3 2.9 - 4.4 g/dL Final   03/31/2020 3.50 3.50 - 5.20 g/dL Final      Magnesium Magnesium   Date Value Ref Range Status    04/02/2020 2.1 1.6 - 2.4 mg/dL Final   04/01/2020 2.8 (H) 1.6 - 2.4 mg/dL Final   03/31/2020 3.1 (H) 1.6 - 2.4 mg/dL Final      Uric Acid No results found for: URICACID     Medication Review: Yes    Assessment/Plan       CARLTON on CKD (baseline sCr 1.4-1.6)     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   - SPEP no M spike on recent testing   - Urine Na<20      Acid base disturbance.  Mixed acidosis  Respiratory acidosis, metabolic gap and non gap acidosis      AMS: Likely due to CO2 narcosis, polypharmacy related .     Hx of MGUS: Previous free light chain ration 1.6  Recent SPEP negative     Anemia and thrombocytopenia: Seems chronic     Hypocalcemia        ).      Plan:     - CARLTON hypovolemia related improving with volume expansion.  - Continue LR@ 100cc/hr for another 24hr   - Avoid nephrotoxic agents  - Strict I/O  - With improvement in renal function. May be able to avoid RRT during this admission.         I discussed the patients findings and my recommendations with nursing staff and primary care team    Jasen Little MD  04/02/20  11:30

## 2020-04-02 NOTE — PLAN OF CARE
Problem: Patient Care Overview  Goal: Plan of Care Review  4/2/2020 0340 by Lawrence Tadeo, RN  Flowsheets (Taken 4/2/2020 0336)  Outcome Summary: Confused most of shift. Calm and cooperative at times and agitated others. Remains on BiPap 28% overnight. Sinus loretta/arrythmia through shift. Potential dialysis access placement today. Hyperglycemic through shift. ICU APRN notified and aware. Provider may change IVMF this am if remains hyperglycemic. Low dose precedex continues. Bilateral lower extremity restraints d/c'd. Remains in BUE restraints. Will continue to monitor.

## 2020-04-03 ENCOUNTER — APPOINTMENT (OUTPATIENT)
Dept: GENERAL RADIOLOGY | Facility: HOSPITAL | Age: 75
End: 2020-04-03

## 2020-04-03 LAB
ALBUMIN SERPL-MCNC: 3.4 G/DL (ref 3.5–5.2)
ALBUMIN SERPL-MCNC: 3.6 G/DL (ref 3.5–5.2)
ALBUMIN/GLOB SERPL: 1.3 G/DL
ALP SERPL-CCNC: 50 U/L (ref 39–117)
ALP SERPL-CCNC: 53 U/L (ref 39–117)
ALT SERPL W P-5'-P-CCNC: 16 U/L (ref 1–41)
ALT SERPL W P-5'-P-CCNC: 18 U/L (ref 1–41)
AMYLASE SERPL-CCNC: 31 U/L (ref 28–100)
ANION GAP SERPL CALCULATED.3IONS-SCNC: 13 MMOL/L (ref 5–15)
ANION GAP SERPL CALCULATED.3IONS-SCNC: 13 MMOL/L (ref 5–15)
ANION GAP SERPL CALCULATED.3IONS-SCNC: 15 MMOL/L (ref 5–15)
ARTERIAL PATENCY WRIST A: ABNORMAL
AST SERPL-CCNC: 25 U/L (ref 1–40)
AST SERPL-CCNC: 26 U/L (ref 1–40)
ATMOSPHERIC PRESS: ABNORMAL MM[HG]
BASE EXCESS BLDA CALC-SCNC: 0.7 MMOL/L (ref 0–2)
BASOPHILS # BLD AUTO: 0.01 10*3/MM3 (ref 0–0.2)
BASOPHILS # BLD AUTO: 0.04 10*3/MM3 (ref 0–0.2)
BASOPHILS NFR BLD AUTO: 0.1 % (ref 0–1.5)
BASOPHILS NFR BLD AUTO: 0.6 % (ref 0–1.5)
BDY SITE: ABNORMAL
BILIRUB SERPL-MCNC: 0.3 MG/DL (ref 0.2–1.2)
BILIRUB SERPL-MCNC: 0.4 MG/DL (ref 0.2–1.2)
BODY TEMPERATURE: 37 C
BUN BLD-MCNC: 46 MG/DL (ref 8–23)
BUN BLD-MCNC: 51 MG/DL (ref 8–23)
BUN BLD-MCNC: 52 MG/DL (ref 8–23)
BUN/CREAT SERPL: 18.6 (ref 7–25)
BUN/CREAT SERPL: 19.6 (ref 7–25)
CALCIUM SPEC-SCNC: 7.7 MG/DL (ref 8.6–10.5)
CALCIUM SPEC-SCNC: 7.9 MG/DL (ref 8.6–10.5)
CALCIUM SPEC-SCNC: 8.2 MG/DL (ref 8.6–10.5)
CHLORIDE SERPL-SCNC: 103 MMOL/L (ref 98–107)
CHLORIDE SERPL-SCNC: 103 MMOL/L (ref 98–107)
CHLORIDE SERPL-SCNC: 105 MMOL/L (ref 98–107)
CHOLEST SERPL-MCNC: 89 MG/DL (ref 0–200)
CO2 BLDA-SCNC: 25.1 MMOL/L (ref 22–33)
CO2 SERPL-SCNC: 22 MMOL/L (ref 22–29)
CO2 SERPL-SCNC: 23 MMOL/L (ref 22–29)
CO2 SERPL-SCNC: 24 MMOL/L (ref 22–29)
COHGB MFR BLD: 0.7 % (ref 0–2)
CREAT BLD-MCNC: 2.35 MG/DL (ref 0.76–1.27)
CREAT BLD-MCNC: 2.8 MG/DL (ref 0.76–1.27)
CREAT BLD-MCNC: 3.01 MG/DL (ref 0.76–1.27)
D-LACTATE SERPL-SCNC: 1.1 MMOL/L (ref 0.5–2)
DEPRECATED RDW RBC AUTO: 56.9 FL (ref 37–54)
DEPRECATED RDW RBC AUTO: 58.1 FL (ref 37–54)
EOSINOPHIL # BLD AUTO: 0 10*3/MM3 (ref 0–0.4)
EOSINOPHIL # BLD AUTO: 0.01 10*3/MM3 (ref 0–0.4)
EOSINOPHIL NFR BLD AUTO: 0 % (ref 0.3–6.2)
EOSINOPHIL NFR BLD AUTO: 0.1 % (ref 0.3–6.2)
EPAP: 0
ERYTHROCYTE [DISTWIDTH] IN BLOOD BY AUTOMATED COUNT: 17.2 % (ref 12.3–15.4)
ERYTHROCYTE [DISTWIDTH] IN BLOOD BY AUTOMATED COUNT: 17.2 % (ref 12.3–15.4)
GFR SERPL CREATININE-BSD FRML MDRD: 22 ML/MIN/1.73
GFR SERPL CREATININE-BSD FRML MDRD: 27 ML/MIN/1.73
GLOBULIN UR ELPH-MCNC: 2.8 GM/DL
GLUCOSE BLD-MCNC: 190 MG/DL (ref 65–99)
GLUCOSE BLD-MCNC: 218 MG/DL (ref 65–99)
GLUCOSE BLD-MCNC: 250 MG/DL (ref 65–99)
GLUCOSE BLDC GLUCOMTR-MCNC: 191 MG/DL (ref 70–130)
GLUCOSE BLDC GLUCOMTR-MCNC: 223 MG/DL (ref 70–130)
GLUCOSE BLDC GLUCOMTR-MCNC: 226 MG/DL (ref 70–130)
GLUCOSE BLDC GLUCOMTR-MCNC: 237 MG/DL (ref 70–130)
GLUCOSE BLDC GLUCOMTR-MCNC: 250 MG/DL (ref 70–130)
GLUCOSE BLDC GLUCOMTR-MCNC: 292 MG/DL (ref 70–130)
GLUCOSE BLDC GLUCOMTR-MCNC: 296 MG/DL (ref 70–130)
HCO3 BLDA-SCNC: 24.1 MMOL/L (ref 20–26)
HCT VFR BLD AUTO: 24.6 % (ref 37.5–51)
HCT VFR BLD AUTO: 25 % (ref 37.5–51)
HCT VFR BLD CALC: 25 %
HGB BLD-MCNC: 7.5 G/DL (ref 13–17.7)
HGB BLD-MCNC: 8 G/DL (ref 13–17.7)
HGB BLDA-MCNC: 8.1 G/DL (ref 13.5–17.5)
HOROWITZ INDEX BLD+IHG-RTO: 28 %
IGF-I SERPL-MCNC: 189 NG/ML (ref 41–179)
IMM GRANULOCYTES # BLD AUTO: 0.05 10*3/MM3 (ref 0–0.05)
IMM GRANULOCYTES # BLD AUTO: 0.07 10*3/MM3 (ref 0–0.05)
IMM GRANULOCYTES NFR BLD AUTO: 0.7 % (ref 0–0.5)
IMM GRANULOCYTES NFR BLD AUTO: 0.9 % (ref 0–0.5)
IPAP: 0
LIPASE SERPL-CCNC: 32 U/L (ref 13–60)
LYMPHOCYTES # BLD AUTO: 1.81 10*3/MM3 (ref 0.7–3.1)
LYMPHOCYTES # BLD AUTO: 2.2 10*3/MM3 (ref 0.7–3.1)
LYMPHOCYTES NFR BLD AUTO: 24.5 % (ref 19.6–45.3)
LYMPHOCYTES NFR BLD AUTO: 33 % (ref 19.6–45.3)
MAGNESIUM SERPL-MCNC: 1.7 MG/DL (ref 1.6–2.4)
MAGNESIUM SERPL-MCNC: 1.7 MG/DL (ref 1.6–2.4)
MCH RBC QN AUTO: 28 PG (ref 26.6–33)
MCH RBC QN AUTO: 29 PG (ref 26.6–33)
MCHC RBC AUTO-ENTMCNC: 30.5 G/DL (ref 31.5–35.7)
MCHC RBC AUTO-ENTMCNC: 32 G/DL (ref 31.5–35.7)
MCV RBC AUTO: 90.6 FL (ref 79–97)
MCV RBC AUTO: 91.8 FL (ref 79–97)
METHGB BLD QL: 1 % (ref 0–1.5)
MODALITY: ABNORMAL
MONOCYTES # BLD AUTO: 0.38 10*3/MM3 (ref 0.1–0.9)
MONOCYTES # BLD AUTO: 0.62 10*3/MM3 (ref 0.1–0.9)
MONOCYTES NFR BLD AUTO: 5.1 % (ref 5–12)
MONOCYTES NFR BLD AUTO: 9.3 % (ref 5–12)
NEUTROPHILS # BLD AUTO: 3.75 10*3/MM3 (ref 1.7–7)
NEUTROPHILS # BLD AUTO: 5.12 10*3/MM3 (ref 1.7–7)
NEUTROPHILS NFR BLD AUTO: 56.3 % (ref 42.7–76)
NEUTROPHILS NFR BLD AUTO: 69.4 % (ref 42.7–76)
NOTE: ABNORMAL
NRBC BLD AUTO-RTO: 0 /100 WBC (ref 0–0.2)
NRBC BLD AUTO-RTO: 0 /100 WBC (ref 0–0.2)
OXYHGB MFR BLDV: 96.2 % (ref 94–99)
PAW @ PEAK INSP FLOW SETTING VENT: 0 CMH2O
PCO2 BLDA: 32.9 MM HG (ref 35–45)
PCO2 TEMP ADJ BLD: 32.9 MM HG (ref 35–48)
PH BLDA: 7.47 PH UNITS (ref 7.35–7.45)
PH, TEMP CORRECTED: 7.47 PH UNITS
PHOSPHATE SERPL-MCNC: 3 MG/DL (ref 2.5–4.5)
PHOSPHATE SERPL-MCNC: 3 MG/DL (ref 2.5–4.5)
PLATELET # BLD AUTO: 136 10*3/MM3 (ref 140–450)
PLATELET # BLD AUTO: 141 10*3/MM3 (ref 140–450)
PMV BLD AUTO: 10.4 FL (ref 6–12)
PMV BLD AUTO: 10.9 FL (ref 6–12)
PO2 BLDA: 97.2 MM HG (ref 83–108)
PO2 TEMP ADJ BLD: 97.2 MM HG (ref 83–108)
POTASSIUM BLD-SCNC: 3.5 MMOL/L (ref 3.5–5.2)
POTASSIUM BLD-SCNC: 3.9 MMOL/L (ref 3.5–5.2)
POTASSIUM BLD-SCNC: 3.9 MMOL/L (ref 3.5–5.2)
PROT SERPL-MCNC: 5.9 G/DL (ref 6–8.5)
PROT SERPL-MCNC: 6.4 G/DL (ref 6–8.5)
RBC # BLD AUTO: 2.68 10*6/MM3 (ref 4.14–5.8)
RBC # BLD AUTO: 2.76 10*6/MM3 (ref 4.14–5.8)
SODIUM BLD-SCNC: 140 MMOL/L (ref 136–145)
SODIUM BLD-SCNC: 140 MMOL/L (ref 136–145)
SODIUM BLD-SCNC: 141 MMOL/L (ref 136–145)
TOTAL RATE: 0 BREATHS/MINUTE
TRIGL SERPL-MCNC: 114 MG/DL (ref 0–150)
WBC NRBC COR # BLD: 6.67 10*3/MM3 (ref 3.4–10.8)
WBC NRBC COR # BLD: 7.39 10*3/MM3 (ref 3.4–10.8)

## 2020-04-03 PROCEDURE — 80053 COMPREHEN METABOLIC PANEL: CPT | Performed by: INTERNAL MEDICINE

## 2020-04-03 PROCEDURE — 83735 ASSAY OF MAGNESIUM: CPT | Performed by: INTERNAL MEDICINE

## 2020-04-03 PROCEDURE — 94660 CPAP INITIATION&MGMT: CPT

## 2020-04-03 PROCEDURE — 84478 ASSAY OF TRIGLYCERIDES: CPT

## 2020-04-03 PROCEDURE — 25010000002 METHYLPREDNISOLONE PER 40 MG: Performed by: INTERNAL MEDICINE

## 2020-04-03 PROCEDURE — 82805 BLOOD GASES W/O2 SATURATION: CPT

## 2020-04-03 PROCEDURE — 36600 WITHDRAWAL OF ARTERIAL BLOOD: CPT

## 2020-04-03 PROCEDURE — 25010000002 DIAZEPAM PER 5 MG: Performed by: INTERNAL MEDICINE

## 2020-04-03 PROCEDURE — 82465 ASSAY BLD/SERUM CHOLESTEROL: CPT

## 2020-04-03 PROCEDURE — 85025 COMPLETE CBC W/AUTO DIFF WBC: CPT | Performed by: INTERNAL MEDICINE

## 2020-04-03 PROCEDURE — 25010000002 FUROSEMIDE PER 20 MG: Performed by: INTERNAL MEDICINE

## 2020-04-03 PROCEDURE — 84100 ASSAY OF PHOSPHORUS: CPT | Performed by: INTERNAL MEDICINE

## 2020-04-03 PROCEDURE — 83605 ASSAY OF LACTIC ACID: CPT | Performed by: INTERNAL MEDICINE

## 2020-04-03 PROCEDURE — 25010000002 HEPARIN (PORCINE) PER 1000 UNITS: Performed by: INTERNAL MEDICINE

## 2020-04-03 PROCEDURE — 99291 CRITICAL CARE FIRST HOUR: CPT | Performed by: INTERNAL MEDICINE

## 2020-04-03 PROCEDURE — 82962 GLUCOSE BLOOD TEST: CPT

## 2020-04-03 PROCEDURE — 94799 UNLISTED PULMONARY SVC/PX: CPT

## 2020-04-03 PROCEDURE — 82150 ASSAY OF AMYLASE: CPT | Performed by: INTERNAL MEDICINE

## 2020-04-03 PROCEDURE — 84100 ASSAY OF PHOSPHORUS: CPT

## 2020-04-03 PROCEDURE — 74018 RADEX ABDOMEN 1 VIEW: CPT

## 2020-04-03 PROCEDURE — 25010000002 MAGNESIUM SULFATE 2 GM/50ML SOLUTION: Performed by: NURSE PRACTITIONER

## 2020-04-03 PROCEDURE — 63710000001 INSULIN REGULAR HUMAN PER 5 UNITS: Performed by: INTERNAL MEDICINE

## 2020-04-03 PROCEDURE — 25010000002 CEFTRIAXONE PER 250 MG: Performed by: INTERNAL MEDICINE

## 2020-04-03 PROCEDURE — 25010000002 HALOPERIDOL LACTATE PER 5 MG: Performed by: INTERNAL MEDICINE

## 2020-04-03 PROCEDURE — 83690 ASSAY OF LIPASE: CPT | Performed by: INTERNAL MEDICINE

## 2020-04-03 PROCEDURE — 25010000002 HYDRALAZINE PER 20 MG: Performed by: NURSE PRACTITIONER

## 2020-04-03 PROCEDURE — 83735 ASSAY OF MAGNESIUM: CPT

## 2020-04-03 PROCEDURE — 80053 COMPREHEN METABOLIC PANEL: CPT

## 2020-04-03 RX ORDER — QUETIAPINE FUMARATE 25 MG/1
25 TABLET, FILM COATED ORAL EVERY 12 HOURS SCHEDULED
Status: DISCONTINUED | OUTPATIENT
Start: 2020-04-04 | End: 2020-04-03

## 2020-04-03 RX ORDER — DEXMEDETOMIDINE HYDROCHLORIDE 4 UG/ML
.2-1.5 INJECTION, SOLUTION INTRAVENOUS
Status: DISCONTINUED | OUTPATIENT
Start: 2020-04-03 | End: 2020-04-03

## 2020-04-03 RX ORDER — POTASSIUM CHLORIDE 1.5 G/1.77G
20 POWDER, FOR SOLUTION ORAL ONCE
Status: COMPLETED | OUTPATIENT
Start: 2020-04-03 | End: 2020-04-03

## 2020-04-03 RX ORDER — MAGNESIUM SULFATE HEPTAHYDRATE 40 MG/ML
2 INJECTION, SOLUTION INTRAVENOUS ONCE
Status: COMPLETED | OUTPATIENT
Start: 2020-04-03 | End: 2020-04-03

## 2020-04-03 RX ORDER — ACETAMINOPHEN 650 MG/1
650 SUPPOSITORY RECTAL EVERY 4 HOURS PRN
Status: DISCONTINUED | OUTPATIENT
Start: 2020-04-03 | End: 2020-04-06 | Stop reason: HOSPADM

## 2020-04-03 RX ORDER — POTASSIUM CHLORIDE 29.8 MG/ML
20 INJECTION INTRAVENOUS ONCE
Status: DISCONTINUED | OUTPATIENT
Start: 2020-04-03 | End: 2020-04-03

## 2020-04-03 RX ORDER — FUROSEMIDE 10 MG/ML
INJECTION INTRAMUSCULAR; INTRAVENOUS
Status: DISPENSED
Start: 2020-04-03 | End: 2020-04-04

## 2020-04-03 RX ORDER — QUETIAPINE FUMARATE 25 MG/1
25 TABLET, FILM COATED ORAL EVERY 8 HOURS SCHEDULED
Status: DISCONTINUED | OUTPATIENT
Start: 2020-04-03 | End: 2020-04-03

## 2020-04-03 RX ORDER — ENALAPRILAT 2.5 MG/2ML
1.25 INJECTION INTRAVENOUS EVERY 6 HOURS PRN
Status: DISCONTINUED | OUTPATIENT
Start: 2020-04-03 | End: 2020-04-03

## 2020-04-03 RX ORDER — AMLODIPINE BESYLATE 5 MG/1
5 TABLET ORAL
Status: DISCONTINUED | OUTPATIENT
Start: 2020-04-03 | End: 2020-04-05

## 2020-04-03 RX ORDER — AMLODIPINE BESYLATE 5 MG/1
5 TABLET ORAL ONCE
Status: DISCONTINUED | OUTPATIENT
Start: 2020-04-03 | End: 2020-04-03

## 2020-04-03 RX ORDER — METHYLPREDNISOLONE SODIUM SUCCINATE 40 MG/ML
40 INJECTION, POWDER, LYOPHILIZED, FOR SOLUTION INTRAMUSCULAR; INTRAVENOUS EVERY 24 HOURS
Status: COMPLETED | OUTPATIENT
Start: 2020-04-04 | End: 2020-04-05

## 2020-04-03 RX ORDER — HYDRALAZINE HYDROCHLORIDE 20 MG/ML
20 INJECTION INTRAMUSCULAR; INTRAVENOUS EVERY 4 HOURS PRN
Status: DISCONTINUED | OUTPATIENT
Start: 2020-04-03 | End: 2020-04-06 | Stop reason: HOSPADM

## 2020-04-03 RX ORDER — QUETIAPINE FUMARATE 25 MG/1
25 TABLET, FILM COATED ORAL EVERY 12 HOURS SCHEDULED
Status: DISCONTINUED | OUTPATIENT
Start: 2020-04-04 | End: 2020-04-06 | Stop reason: HOSPADM

## 2020-04-03 RX ORDER — QUETIAPINE FUMARATE 25 MG/1
25 TABLET, FILM COATED ORAL EVERY 12 HOURS SCHEDULED
Status: DISCONTINUED | OUTPATIENT
Start: 2020-04-03 | End: 2020-04-03

## 2020-04-03 RX ORDER — FUROSEMIDE 10 MG/ML
60 INJECTION INTRAMUSCULAR; INTRAVENOUS ONCE
Status: COMPLETED | OUTPATIENT
Start: 2020-04-03 | End: 2020-04-03

## 2020-04-03 RX ORDER — OXYCODONE HYDROCHLORIDE 5 MG/1
5 TABLET ORAL EVERY 8 HOURS SCHEDULED
Status: DISCONTINUED | OUTPATIENT
Start: 2020-04-03 | End: 2020-04-04

## 2020-04-03 RX ORDER — HEPARIN SODIUM 5000 [USP'U]/ML
5000 INJECTION, SOLUTION INTRAVENOUS; SUBCUTANEOUS EVERY 8 HOURS SCHEDULED
Status: DISCONTINUED | OUTPATIENT
Start: 2020-04-03 | End: 2020-04-06

## 2020-04-03 RX ADMIN — Medication 30 ML: at 20:39

## 2020-04-03 RX ADMIN — IPRATROPIUM BROMIDE AND ALBUTEROL SULFATE 3 ML: 2.5; .5 SOLUTION RESPIRATORY (INHALATION) at 07:07

## 2020-04-03 RX ADMIN — DIAZEPAM 2.5 MG: 5 INJECTION, SOLUTION INTRAMUSCULAR; INTRAVENOUS at 01:07

## 2020-04-03 RX ADMIN — HALOPERIDOL LACTATE 5 MG: 5 INJECTION, SOLUTION INTRAMUSCULAR at 16:21

## 2020-04-03 RX ADMIN — INSULIN HUMAN 0 UNITS: 100 INJECTION, SOLUTION PARENTERAL at 06:50

## 2020-04-03 RX ADMIN — IPRATROPIUM BROMIDE AND ALBUTEROL SULFATE 3 ML: 2.5; .5 SOLUTION RESPIRATORY (INHALATION) at 23:07

## 2020-04-03 RX ADMIN — QUETIAPINE FUMARATE 25 MG: 25 TABLET ORAL at 13:23

## 2020-04-03 RX ADMIN — IPRATROPIUM BROMIDE AND ALBUTEROL SULFATE 3 ML: 2.5; .5 SOLUTION RESPIRATORY (INHALATION) at 12:30

## 2020-04-03 RX ADMIN — DOXYCYCLINE 100 MG: 100 INJECTION, POWDER, LYOPHILIZED, FOR SOLUTION INTRAVENOUS at 20:26

## 2020-04-03 RX ADMIN — AMLODIPINE BESYLATE 5 MG: 5 TABLET ORAL at 09:25

## 2020-04-03 RX ADMIN — CEFTRIAXONE SODIUM 1 G: 1 INJECTION, POWDER, FOR SOLUTION INTRAMUSCULAR; INTRAVENOUS at 15:16

## 2020-04-03 RX ADMIN — HEPARIN SODIUM 5000 UNITS: 5000 INJECTION, SOLUTION INTRAVENOUS; SUBCUTANEOUS at 13:23

## 2020-04-03 RX ADMIN — OXYCODONE HYDROCHLORIDE 5 MG: 5 TABLET ORAL at 09:25

## 2020-04-03 RX ADMIN — ENALAPRILAT 1.25 MG: 1.25 INJECTION INTRAVENOUS at 17:19

## 2020-04-03 RX ADMIN — ACETAMINOPHEN 650 MG: 650 SUPPOSITORY RECTAL at 17:37

## 2020-04-03 RX ADMIN — ENALAPRILAT 1.25 MG: 1.25 INJECTION INTRAVENOUS at 02:31

## 2020-04-03 RX ADMIN — CITALOPRAM HYDROBROMIDE 20 MG: 20 TABLET ORAL at 09:25

## 2020-04-03 RX ADMIN — POTASSIUM CHLORIDE 20 MEQ: 1.5 POWDER, FOR SOLUTION ORAL at 21:05

## 2020-04-03 RX ADMIN — HEPARIN SODIUM 5000 UNITS: 5000 INJECTION, SOLUTION INTRAVENOUS; SUBCUTANEOUS at 21:02

## 2020-04-03 RX ADMIN — IPRATROPIUM BROMIDE AND ALBUTEROL SULFATE 3 ML: 2.5; .5 SOLUTION RESPIRATORY (INHALATION) at 03:30

## 2020-04-03 RX ADMIN — DOXYCYCLINE 100 MG: 100 INJECTION, POWDER, LYOPHILIZED, FOR SOLUTION INTRAVENOUS at 09:53

## 2020-04-03 RX ADMIN — DEXMEDETOMIDINE HYDROCHLORIDE 0.2 MCG/KG/HR: 400 INJECTION INTRAVENOUS at 17:05

## 2020-04-03 RX ADMIN — HYDRALAZINE HYDROCHLORIDE 20 MG: 20 INJECTION INTRAMUSCULAR; INTRAVENOUS at 23:21

## 2020-04-03 RX ADMIN — IPRATROPIUM BROMIDE AND ALBUTEROL SULFATE 3 ML: 2.5; .5 SOLUTION RESPIRATORY (INHALATION) at 19:43

## 2020-04-03 RX ADMIN — OXYCODONE HYDROCHLORIDE 5 MG: 5 TABLET ORAL at 21:02

## 2020-04-03 RX ADMIN — METHYLPREDNISOLONE SODIUM SUCCINATE 40 MG: 40 INJECTION, POWDER, FOR SOLUTION INTRAMUSCULAR; INTRAVENOUS at 09:25

## 2020-04-03 RX ADMIN — INSULIN HUMAN 3 UNITS: 100 INJECTION, SOLUTION PARENTERAL at 01:01

## 2020-04-03 RX ADMIN — FUROSEMIDE 60 MG: 10 INJECTION, SOLUTION INTRAMUSCULAR; INTRAVENOUS at 17:04

## 2020-04-03 RX ADMIN — MAGNESIUM SULFATE 2 G: 2 INJECTION INTRAVENOUS at 21:05

## 2020-04-03 RX ADMIN — Medication 30 ML: at 09:53

## 2020-04-03 RX ADMIN — DIAZEPAM 2.5 MG: 5 INJECTION, SOLUTION INTRAMUSCULAR; INTRAVENOUS at 09:25

## 2020-04-03 RX ADMIN — PANTOPRAZOLE SODIUM 40 MG: 40 INJECTION, POWDER, FOR SOLUTION INTRAVENOUS at 06:50

## 2020-04-03 RX ADMIN — DIAZEPAM 2.5 MG: 5 INJECTION, SOLUTION INTRAMUSCULAR; INTRAVENOUS at 17:37

## 2020-04-03 RX ADMIN — SODIUM CHLORIDE, POTASSIUM CHLORIDE, SODIUM LACTATE AND CALCIUM CHLORIDE 100 ML/HR: 600; 310; 30; 20 INJECTION, SOLUTION INTRAVENOUS at 01:08

## 2020-04-03 RX ADMIN — INSULIN HUMAN 4 UNITS: 100 INJECTION, SOLUTION PARENTERAL at 17:37

## 2020-04-03 RX ADMIN — INSULIN HUMAN 3 UNITS: 100 INJECTION, SOLUTION PARENTERAL at 12:51

## 2020-04-03 RX ADMIN — IPRATROPIUM BROMIDE AND ALBUTEROL SULFATE 3 ML: 2.5; .5 SOLUTION RESPIRATORY (INHALATION) at 17:02

## 2020-04-03 RX ADMIN — TERAZOSIN HYDROCHLORIDE 2 MG: 2 CAPSULE ORAL at 20:26

## 2020-04-03 RX ADMIN — QUETIAPINE FUMARATE 25 MG: 25 TABLET ORAL at 23:37

## 2020-04-03 NOTE — PROGRESS NOTES
Clinical Nutrition     Multidisciplinary Rounds      Patient Name: Joseph Rodriguez  Date of Encounter: 04/03/20 09:16  MRN: [id number]  Admission date: 3/31/2020      Reason for visit: MDR. RD to continue to follow per protocol.     Additional information obtained during MDR: Pt tolerating EN, currently at 35mL/hr, advancement held due to report of nausea. Plans to continue to advance EN today.     Current diet: NPO Diet    Nutrition Support:       Diet, Tube Feeding Tube Feeding Formula: Peptamen AF (Peptide Based, Critical Care, ALI) goal rate 65mL/hr. Free water 10mL/hr. Prostat (1 packet twice daily).       EMR reviewed   Labs reviewed    Intervention:  Follow treatment plan  Care plan reviewed    Follow up:   Per protocol      Judie Barcenas RDN, KRIS  9:16 AM  Time: 15min

## 2020-04-03 NOTE — PROGRESS NOTES
Intensive Care Follow-up     Hospital:  LOS: 3 days   Mr. Joseph Rodriguez, 74 y.o. male is followed for:   Acute kidney injury superimposed on chronic kidney disease (CMS/HCC)            History of present illness:   Joseph Rodriguez is a 74 y.o. male admitted to [facility] on 3/31 from [facility] for management of CARLTON, encephalopathy, and hypoglycemia.      Patient was previously admitted to our facility from 2/2-2/4 for dizziness and usnteady gait work-up revealing BPV also found to be in paroxsymal AF. He was seen by neurology placed on meclizine and cardiology discharged on Eliquis and Plavix.      Evidently the patient was evaluated by his PCP a few weeks ago placed on a statin. He additionally takes bumex. Over the past week the wife reports increased somnolence, decreased appetite, and fall with unknown contact to the head.      On 3/31 he was taken to OSH ED with complaints of hypoglycemia in the 30s. Labs showed Cr 9.3, , K 4.8, , and Ca 7.1. EKG sinus bradycardia with 1st degree AVB and negative troponins. CT A/P displayed GB distention, non-obstructing L renal stone, colonic stool retention, and diverticulosis. UA + blood and bacteria given empiric Zosyn and IVF.      The patient was transferred to [facility] for nephrology evaluation and higher level of care. Placed on LR and Rocephin with Cr slightly improved at 7.8. Initial BG 28 given additional hypoglycemic management with D5W fluids with overall improvement. ABG showed 7.10/56/93/17 placed on BiPAP and bicarbonate gtt that was later discontinued per nephrology's recommendation. On arrival he was somnolent with UDS + BZD/opiates not improved with Romazicon/Narcan and CT head benign apart from a large left mastoid effusion noted on previous 2/2 imaging.      The evening on 3/31 the patient was transferred to the ICU for ongoing mixed acidosis and somnolence.  Was given Narcan on the floor and then started on a Narcan drip.  Patient  became agitated, trying to climb out of bed.  Has been fighting use of his BiPAP.      Of note the patient was recently seen by APRN with [facility] for management of COPD with exacerbation. PFTs in 10/2019 as follows: FVC 3.26, 80%; FEV1 1.65, 56%; and ratio 51%.       Subjective   Interval History:  Patient doing better this morning.  He is awake and alert and can have conversations although continues to be slightly confused.  But is much less agitated than previously.  He continues on BiPAP at night and nasal cannula throughout the day.  He was weaned off the Precedex drip on 4/2/2020.  He remains afebrile and normotensive.  Creatinine continues to improve.  He does make adequate urine output.           The patient's past medical, surgical and social history were reviewed and updated in Epic as appropriate.       Objective     Infusions:    dexmedetomidine 0.2-1.5 mcg/kg/hr Last Rate: Stopped (04/03/20 0415)   lactated ringers 100 mL/hr Last Rate: 100 mL/hr (04/03/20 0200)     Medications:    cefTRIAXone 1 g Intravenous Q24H   citalopram 20 mg Oral Daily   diazePAM 2.5 mg Intravenous Q8H   doxycycline 100 mg Intravenous Q12H   insulin regular 0-7 Units Subcutaneous Q6H   ipratropium-albuterol 3 mL Nebulization Q4H - RT   methylPREDNISolone sodium succinate 40 mg Intravenous Q24H   pantoprazole 40 mg Intravenous Q AM   PRO-STAT 30 mL Nasogastric BID   sodium chloride 10 mL Intravenous Q12H   terazosin 2 mg Oral Nightly     I reviewed the patient's medications.    Vital Sign Min/Max for last 24 hours  Temp  Min: 97.4 °F (36.3 °C)  Max: 99.2 °F (37.3 °C)   BP  Min: 106/75  Max: 188/73   Pulse  Min: 35  Max: 73   Resp  Min: 19  Max: 30   SpO2  Min: 91 %  Max: 100 %   Flow (L/min)  Min: 2  Max: 2       Input/Output for last 24 hour shift  04/02 0701 - 04/03 0700  In: 4985.7 [P.O.:1200; I.V.:2933.7]  Out: 2825 [Urine:2540]      GENERAL : NAD, conversant  RESPIRATORY/THORAX : normal respiratory effort and no  intercostal retractions, clear to auscultation bilaterally  CARDIOVASCULAR : Normal S1/S2, RRR. 1+ lower ext edema.  GASTROINTESTINAL : Soft, NT/ND. BS x 4 normoactive. No hepatosplenomegaly.  MUSCULOSKELETAL : No cyanosis, clubbing, or ischemia  NEUROLOGICAL: alert, oriented to person but not place or time, moving all extremities    Results from last 7 days   Lab Units 04/03/20  0335 04/01/20  0333 03/31/20 2014   WBC 10*3/mm3 6.67 5.74 8.22   HEMOGLOBIN g/dL 7.5* 7.9* 8.0*   PLATELETS 10*3/mm3 136* 145 134*     Results from last 7 days   Lab Units 04/03/20  0739 04/03/20 0335 04/02/20  1940 04/02/20 0637  04/01/20 0333   SODIUM mmol/L 140 141 138 140   < > 142   POTASSIUM mmol/L 3.5 3.9 4.0 4.4   < > 4.3   CO2 mmol/L 24.0 23.0 24.0 23.0   < > 21.0*   BUN mg/dL 52* 51* 55* 66*   < > 90*   CREATININE mg/dL 2.80* 3.01* 3.13* 4.26*   < > 6.49*   MAGNESIUM mg/dL  --  1.7  1.7  --  2.1  --  2.8*   PHOSPHORUS mg/dL  --  3.0  3.0  --  4.3  --  7.8*   GLUCOSE mg/dL 190* 218* 236* 233*   < > 68    < > = values in this interval not displayed.     Estimated Creatinine Clearance: 31.5 mL/min (A) (by C-G formula based on SCr of 2.8 mg/dL (H)).    Results from last 7 days   Lab Units 04/01/20  1909   PH, ARTERIAL pH units 7.318*   PCO2, ARTERIAL mm Hg 46.6*   PO2 ART mm Hg 98.5       I reviewed the patient's new clinical results.  I reviewed the patient's new imaging results/reports including actual images and agree with reports.              Imaging Results (Last 24 Hours)     Procedure Component Value Units Date/Time    XR Abdomen KUB [[id number]] Collected:  04/02/20 1237     Updated:  04/02/20 1244    Narrative:       EXAMINATION: XR ABDOMEN KUB-     INDICATION: Keofeed placement     COMPARISON: NONE     FINDINGS: Feeding tube is seen passing into the proximal third portion  of duodenum. There is diffusely increased bowel gas suggesting a mild  generalized ileus.          Impression:       Feeding tube in the proximal  third portion of the duodenum.  Suspected mild ileus.         This report was finalized on 4/2/2020 12:41 PM by Dr. David Rivera MD.             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  Who presented to the ICU on 3/31/2020 with acute metabolic encephalopathy secondary to hypoglycemia and acute on chronic hypoxic respiratory failure secondary to hypercapnia in the setting of severe renal dysfunction, with severe metabolic abnormalities.     1. Patient to remain in the ICU given his acute on chronic hypercapnic respiratory failure, in the setting of severe acute renal dysfunction, and severe hyperglycemia complicated by acute metabolic encephalopathy.  Patient is high risk for intubation.  2. Continue BiPAP as needed, will break to the cannula as able  3. Continue nutrition through Keofeed  4. Continue IV fluids with  cc/h  5. We will continue ceftriaxone doxycycline x5 days, and continue Solu-Medrol x5 days.  6. Continue sliding scale insulin, will add Levemir 10 units nightly  7. Continue Valium at 2.5 mg every 8 hours, and attempt to try to prevent benzo withdrawal.  Notably patient also takes a high add dose of morphine on a daily basis, but given his mental status we will hold on restarting his on morphine.  We will go ahead and restart oxycodone 5 mg  every 8 hours in hopes to prevent any withdrawal that may occur.  8. Discontinue Precedex, will use Seroquel at night if the patient comes agitated  9. DVT prophylaxis  10. Holding Eliquis in the setting of acute renal failure, subcu heparin for now we will re-evaluate as the patient improves for further anticoagulation.      Plan of care and goals reviewed with mulitdisciplinary/antibiotic stewardship team during rounds.   I discussed the patient's findings and my recommendations with patient and nursing staff     Critical Care time spent in direct patient care: 35 minutes (excluding procedure time, if applicable) including high complexity decision making to assess, manipulate, and support vital organ system failure in this individual who has impairment of one or more vital organ systems such that there is a high probability of imminent or life threatening deterioration in the patient's condition.      Cathleen Clancy, DO  Pulmonary, Critical care and Sleep Medicine

## 2020-04-03 NOTE — PROGRESS NOTES
Continued Stay Note  [facility]     Patient Name: Joseph Rodriguez  MRN: [id number]  Today's Date: 4/3/2020    Admit Date: 3/31/2020    Discharge Plan     Row Name 04/03/20 1131       Plan    Plan  Home/Wife     Patient/Family in Agreement with Plan  yes    Plan Comments  Patient remains in ICU, confused, bi-pap at night and up to chair. CM continues to follow and assist with dishcarge needs. Caroline @ [phone]        Discharge Codes    No documentation.             Jessica Ray RN

## 2020-04-03 NOTE — PLAN OF CARE
Problem: Patient Care Overview  Goal: Plan of Care Review  Flowsheets (Taken 4/3/2020 0330)  Plan of Care Reviewed With: patient  Outcome Summary: Has been oriented x4 with intermittent confused conversation. Became increasingly bradycardic and hypertensive as shift progressed, low dose terazosin started and PRN vasotec administered with little improvement. Rested on BiPAP most of night. Increasing TF per order. Ashcath placement deferred yesterday, potential need for HD being evaluated on a daily basis. AM labs pending.

## 2020-04-03 NOTE — PLAN OF CARE
Problem: Patient Care Overview  Goal: Plan of Care Review  Outcome: Ongoing (interventions implemented as appropriate)  Flowsheets (Taken 4/3/2020 1926)  Progress: declining  Plan of Care Reviewed With: patient  Outcome Summary: PT confused, agitated, and restless. At 1700, PT persuaded to wear Bipap. Precedex restarted. 60 Lasix given respiratory distress as well as fluids stopped. Brenda and Valium per Intensivist. KUB ordered d/t PT's complaint of severe abdominal pain. Labs drawn which were normal.  Goal: Individualization and Mutuality  Outcome: Ongoing (interventions implemented as appropriate)  Goal: Discharge Needs Assessment  Outcome: Ongoing (interventions implemented as appropriate)  Goal: Interprofessional Rounds/Family Conf  Outcome: Ongoing (interventions implemented as appropriate)     Problem: Fall Risk (Adult)  Goal: Absence of Fall  Outcome: Ongoing (interventions implemented as appropriate)     Problem: Kidney Disease, Chronic/End Stage Renal Disease (Adult)  Goal: Signs and Symptoms of Listed Potential Problems Will be Absent, Minimized or Managed (Kidney Disease, Chronic/End Stage Renal Disease)  Outcome: Ongoing (interventions implemented as appropriate)     Problem: Diabetes, Type 2 (Adult)  Goal: Signs and Symptoms of Listed Potential Problems Will be Absent, Minimized or Managed (Diabetes, Type 2)  Outcome: Ongoing (interventions implemented as appropriate)     Problem: Restraint, Nonbehavioral (Nonviolent)  Goal: Rationale and Justification  Outcome: Ongoing (interventions implemented as appropriate)  Goal: Nonbehavioral (Nonviolent) Restraint: Absence of Injury/Harm  Outcome: Ongoing (interventions implemented as appropriate)  Goal: Nonbehavioral (Nonviolent) Restraint: Achievement of Discontinuation Criteria  Outcome: Ongoing (interventions implemented as appropriate)  Goal: Nonbehavioral (Nonviolent) Restraint: Preservation of Dignity and Wellbeing  Outcome: Ongoing (interventions  implemented as appropriate)     Problem: Skin Injury Risk (Adult)  Goal: Skin Health and Integrity  Outcome: Ongoing (interventions implemented as appropriate)     Problem: NPPV/CPAP (Adult)  Goal: Signs and Symptoms of Listed Potential Problems Will be Absent, Minimized or Managed (NPPV/CPAP)  Outcome: Ongoing (interventions implemented as appropriate)

## 2020-04-03 NOTE — PROGRESS NOTES
"   LOS: 3 days    Patient Care Team:  Dandy Bailey MD as PCP - General  Juan Pablo Osullivan MD as Surgeon (Cardiothoracic Surgery)  Jong Diop DO as Consulting Physician (Gastroenterology)    Chief Complaint:  CARLTON on CKD stage III     Subjective     Interval History:   No acute events overnight. Confused.     Review of Systems:    Review of systems could not be obtained due to  patient confusion.    Objective     Vital Sign Min/Max for last 24 hours  Temp  Min: 97.4 °F (36.3 °C)  Max: 99.2 °F (37.3 °C)   BP  Min: 106/75  Max: 188/73   Pulse  Min: 35  Max: 73   Resp  Min: 19  Max: 30   SpO2  Min: 91 %  Max: 100 %   No data recorded   No data recorded     Flowsheet Rows      First Filed Value   Admission Height  177.8 cm (70\") Documented at 03/31/2020 1211   Admission Weight  127 kg (279 lb 15.8 oz) Documented at 03/31/2020 1211          I/O this shift:  In: 590 [P.O.:490; IV Piggyback:100]  Out: 275 [Urine:275]  I/O last 3 completed shifts:  In: 6517.2 [P.O.:1380; I.V.:4185.2; Other:142; NG/GT:410; IV Piggyback:400]  Out: 4800 [Urine:4515; Stool:285]    Physical Exam:     General Appearance:    Alert, cooperative, in no acute distress   Head:    Normocephalic, without obvious abnormality, atraumatic               Neck:   Supple, trachea midline, no thyromegaly       Lungs:     Clear to auscultation,respirations regular, even and                  unlabored    Heart:    Regular rhythm and normal rate, normal S1 and S2, no            murmur, no gallop, no rub, no click       Abdomen:     Normal bowel sounds,  soft        non-tender, non-distended       Extremities:   Moves all extremities well, no edema, no cyanosis, no             redness               Neurologic:   Confused, No focal deficit noted grossly       WBC WBC   Date Value Ref Range Status   04/03/2020 6.67 3.40 - 10.80 10*3/mm3 Final   04/01/2020 5.74 3.40 - 10.80 10*3/mm3 Final   03/31/2020 8.22 3.40 - 10.80 10*3/mm3 Final   03/31/2020 7.65 " 3.40 - 10.80 10*3/mm3 Final      HGB Hemoglobin   Date Value Ref Range Status   04/03/2020 7.5 (L) 13.0 - 17.7 g/dL Final   04/01/2020 7.9 (L) 13.0 - 17.7 g/dL Final   03/31/2020 8.0 (L) 13.0 - 17.7 g/dL Final   03/31/2020 7.8 (L) 13.0 - 17.7 g/dL Final      HCT Hematocrit   Date Value Ref Range Status   04/03/2020 24.6 (L) 37.5 - 51.0 % Final   04/01/2020 25.6 (L) 37.5 - 51.0 % Final   03/31/2020 26.2 (L) 37.5 - 51.0 % Final   03/31/2020 26.3 (L) 37.5 - 51.0 % Final      Platlets No results found for: LABPLAT   MCV MCV   Date Value Ref Range Status   04/03/2020 91.8 79.0 - 97.0 fL Final   04/01/2020 93.1 79.0 - 97.0 fL Final   03/31/2020 95.3 79.0 - 97.0 fL Final   03/31/2020 92.0 79.0 - 97.0 fL Final          Sodium Sodium   Date Value Ref Range Status   04/03/2020 140 136 - 145 mmol/L Final   04/03/2020 141 136 - 145 mmol/L Final   04/02/2020 138 136 - 145 mmol/L Final   04/02/2020 140 136 - 145 mmol/L Final   04/01/2020 138 136 - 145 mmol/L Final   04/01/2020 141 136 - 145 mmol/L Final   04/01/2020 142 136 - 145 mmol/L Final   04/01/2020 141 136 - 145 mmol/L Final   03/31/2020 136 136 - 145 mmol/L Final   03/31/2020 140 136 - 145 mmol/L Final   03/31/2020 136 136 - 145 mmol/L Final      Potassium Potassium   Date Value Ref Range Status   04/03/2020 3.5 3.5 - 5.2 mmol/L Final   04/03/2020 3.9 3.5 - 5.2 mmol/L Final   04/02/2020 4.0 3.5 - 5.2 mmol/L Final   04/02/2020 4.4 3.5 - 5.2 mmol/L Final   04/01/2020 4.6 3.5 - 5.2 mmol/L Final   04/01/2020 4.0 3.5 - 5.2 mmol/L Final   04/01/2020 4.3 3.5 - 5.2 mmol/L Final   04/01/2020 4.6 3.5 - 5.2 mmol/L Final   03/31/2020 4.7 3.5 - 5.2 mmol/L Final   03/31/2020 4.5 3.5 - 5.2 mmol/L Final   03/31/2020 4.4 3.5 - 5.2 mmol/L Final      Chloride Chloride   Date Value Ref Range Status   04/03/2020 103 98 - 107 mmol/L Final   04/03/2020 105 98 - 107 mmol/L Final   04/02/2020 101 98 - 107 mmol/L Final   04/02/2020 104 98 - 107 mmol/L Final   04/01/2020 101 98 - 107 mmol/L Final    04/01/2020 103 98 - 107 mmol/L Final   04/01/2020 104 98 - 107 mmol/L Final   04/01/2020 105 98 - 107 mmol/L Final   03/31/2020 102 98 - 107 mmol/L Final   03/31/2020 104 98 - 107 mmol/L Final   03/31/2020 101 98 - 107 mmol/L Final      CO2 CO2   Date Value Ref Range Status   04/03/2020 24.0 22.0 - 29.0 mmol/L Final   04/03/2020 23.0 22.0 - 29.0 mmol/L Final   04/02/2020 24.0 22.0 - 29.0 mmol/L Final   04/02/2020 23.0 22.0 - 29.0 mmol/L Final   04/01/2020 24.0 22.0 - 29.0 mmol/L Final   04/01/2020 19.0 (L) 22.0 - 29.0 mmol/L Final   04/01/2020 21.0 (L) 22.0 - 29.0 mmol/L Final   04/01/2020 20.0 (L) 22.0 - 29.0 mmol/L Final   03/31/2020 14.0 (L) 22.0 - 29.0 mmol/L Final   03/31/2020 17.0 (L) 22.0 - 29.0 mmol/L Final   03/31/2020 17.0 (L) 22.0 - 29.0 mmol/L Final      BUN BUN   Date Value Ref Range Status   04/03/2020 52 (H) 8 - 23 mg/dL Final   04/03/2020 51 (H) 8 - 23 mg/dL Final   04/02/2020 55 (H) 8 - 23 mg/dL Final   04/02/2020 66 (H) 8 - 23 mg/dL Final   04/01/2020 73 (H) 8 - 23 mg/dL Final   04/01/2020 94 (H) 8 - 23 mg/dL Final   04/01/2020 90 (H) 8 - 23 mg/dL Final   04/01/2020 94 (H) 8 - 23 mg/dL Final   03/31/2020 100 (H) 8 - 23 mg/dL Final   03/31/2020 103 (H) 8 - 23 mg/dL Final   03/31/2020 101 (H) 8 - 23 mg/dL Final      Creatinine Creatinine   Date Value Ref Range Status   04/03/2020 2.80 (H) 0.76 - 1.27 mg/dL Final   04/03/2020 3.01 (H) 0.76 - 1.27 mg/dL Final   04/02/2020 3.13 (H) 0.76 - 1.27 mg/dL Final   04/02/2020 4.26 (H) 0.76 - 1.27 mg/dL Final   04/01/2020 5.09 (H) 0.76 - 1.27 mg/dL Final   04/01/2020 6.86 (H) 0.76 - 1.27 mg/dL Final   04/01/2020 6.49 (H) 0.76 - 1.27 mg/dL Final   04/01/2020 7.43 (H) 0.76 - 1.27 mg/dL Final   03/31/2020 7.83 (H) 0.76 - 1.27 mg/dL Final   03/31/2020 7.43 (H) 0.76 - 1.27 mg/dL Final   03/31/2020 8.64 (H) 0.76 - 1.27 mg/dL Final      Calcium Calcium   Date Value Ref Range Status   04/03/2020 7.9 (L) 8.6 - 10.5 mg/dL Final   04/03/2020 7.7 (L) 8.6 - 10.5 mg/dL  Final   04/02/2020 7.7 (L) 8.6 - 10.5 mg/dL Final   04/02/2020 7.5 (L) 8.6 - 10.5 mg/dL Final   04/01/2020 7.1 (L) 8.6 - 10.5 mg/dL Final   04/01/2020 6.9 (L) 8.6 - 10.5 mg/dL Final   04/01/2020 7.1 (L) 8.6 - 10.5 mg/dL Final   04/01/2020 7.1 (L) 8.6 - 10.5 mg/dL Final   03/31/2020 6.7 (L) 8.6 - 10.5 mg/dL Final   03/31/2020 6.8 (L) 8.6 - 10.5 mg/dL Final   03/31/2020 6.9 (L) 8.6 - 10.5 mg/dL Final      PO4 No results found for: CAPO4   Albumin Albumin   Date Value Ref Range Status   04/03/2020 3.40 (L) 3.50 - 5.20 g/dL Final   04/01/2020 3.70 3.50 - 5.20 g/dL Final   03/31/2020 3.10 (L) 3.50 - 5.20 g/dL Final   03/31/2020 3.3 2.9 - 4.4 g/dL Final   03/31/2020 3.50 3.50 - 5.20 g/dL Final      Magnesium Magnesium   Date Value Ref Range Status   04/03/2020 1.7 1.6 - 2.4 mg/dL Final   04/03/2020 1.7 1.6 - 2.4 mg/dL Final   04/02/2020 2.1 1.6 - 2.4 mg/dL Final   04/01/2020 2.8 (H) 1.6 - 2.4 mg/dL Final   03/31/2020 3.1 (H) 1.6 - 2.4 mg/dL Final      Uric Acid No results found for: URICACID        Results Review:     I reviewed the patient's new clinical results.      amLODIPine 5 mg Oral Q24H   cefTRIAXone 1 g Intravenous Q24H   citalopram 20 mg Oral Daily   diazePAM 2.5 mg Intravenous Q8H   doxycycline 100 mg Intravenous Q12H   insulin regular 0-7 Units Subcutaneous Q6H   ipratropium-albuterol 3 mL Nebulization Q4H - RT   [START ON 4/4/2020] methylPREDNISolone sodium succinate 40 mg Intravenous Q24H   oxyCODONE 5 mg Oral Q8H   pantoprazole 40 mg Intravenous Q AM   PRO-STAT 30 mL Nasogastric BID   sodium chloride 10 mL Intravenous Q12H   terazosin 2 mg Oral Nightly       dexmedetomidine 0.2-1.5 mcg/kg/hr Last Rate: Stopped (04/03/20 0415)   lactated ringers 100 mL/hr Last Rate: 100 mL/hr (04/03/20 0200)       Medication Review:     Assessment/Plan       CARLTON on CKD (baseline sCr 1.4-1.6)     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   2- Hx of MGUS   3- CKD stage III - baseline Scr 1.7   4- Anemia     Plan:  - Continue with hydration. Monitor I/O   - Avoid nephrotoxic agents.   - renal diet.   - Adjust meds per renal function.       Ubaldo Wheeler MD  04/03/20  10:13

## 2020-04-04 PROBLEM — F11.90 CHRONIC NARCOTIC USE: Status: ACTIVE | Noted: 2020-04-04

## 2020-04-04 LAB
ANION GAP SERPL CALCULATED.3IONS-SCNC: 14 MMOL/L (ref 5–15)
BUN BLD-MCNC: 45 MG/DL (ref 8–23)
BUN/CREAT SERPL: 18.8 (ref 7–25)
CALCIUM SPEC-SCNC: 8 MG/DL (ref 8.6–10.5)
CHLORIDE SERPL-SCNC: 101 MMOL/L (ref 98–107)
CO2 SERPL-SCNC: 25 MMOL/L (ref 22–29)
CREAT BLD-MCNC: 2.4 MG/DL (ref 0.76–1.27)
GFR SERPL CREATININE-BSD FRML MDRD: 27 ML/MIN/1.73
GLUCOSE BLD-MCNC: 161 MG/DL (ref 65–99)
GLUCOSE BLDC GLUCOMTR-MCNC: 339 MG/DL (ref 70–130)
POTASSIUM BLD-SCNC: 3.2 MMOL/L (ref 3.5–5.2)
POTASSIUM BLD-SCNC: 4.2 MMOL/L (ref 3.5–5.2)
SODIUM BLD-SCNC: 140 MMOL/L (ref 136–145)

## 2020-04-04 PROCEDURE — 84132 ASSAY OF SERUM POTASSIUM: CPT | Performed by: INTERNAL MEDICINE

## 2020-04-04 PROCEDURE — 99291 CRITICAL CARE FIRST HOUR: CPT | Performed by: INTERNAL MEDICINE

## 2020-04-04 PROCEDURE — 25010000002 HYDROMORPHONE PER 4 MG: Performed by: INTERNAL MEDICINE

## 2020-04-04 PROCEDURE — 63710000001 INSULIN REGULAR HUMAN PER 5 UNITS: Performed by: INTERNAL MEDICINE

## 2020-04-04 PROCEDURE — 93005 ELECTROCARDIOGRAM TRACING: CPT | Performed by: INTERNAL MEDICINE

## 2020-04-04 PROCEDURE — 25010000002 HEPARIN (PORCINE) PER 1000 UNITS: Performed by: INTERNAL MEDICINE

## 2020-04-04 PROCEDURE — 82962 GLUCOSE BLOOD TEST: CPT

## 2020-04-04 PROCEDURE — 25010000002 PROCHLORPERAZINE 10 MG/2ML SOLUTION: Performed by: INTERNAL MEDICINE

## 2020-04-04 PROCEDURE — 97161 PT EVAL LOW COMPLEX 20 MIN: CPT

## 2020-04-04 PROCEDURE — 94799 UNLISTED PULMONARY SVC/PX: CPT

## 2020-04-04 PROCEDURE — 94770: CPT

## 2020-04-04 PROCEDURE — 25010000002 DIAZEPAM PER 5 MG: Performed by: INTERNAL MEDICINE

## 2020-04-04 PROCEDURE — 93010 ELECTROCARDIOGRAM REPORT: CPT | Performed by: INTERNAL MEDICINE

## 2020-04-04 PROCEDURE — 25010000002 CEFTRIAXONE PER 250 MG: Performed by: INTERNAL MEDICINE

## 2020-04-04 PROCEDURE — 80048 BASIC METABOLIC PNL TOTAL CA: CPT | Performed by: INTERNAL MEDICINE

## 2020-04-04 PROCEDURE — 25010000002 METHYLPREDNISOLONE PER 40 MG: Performed by: INTERNAL MEDICINE

## 2020-04-04 RX ORDER — OXYCODONE HYDROCHLORIDE 5 MG/1
10 TABLET ORAL EVERY 6 HOURS PRN
Status: DISCONTINUED | OUTPATIENT
Start: 2020-04-04 | End: 2020-04-06 | Stop reason: HOSPADM

## 2020-04-04 RX ORDER — DONEPEZIL HYDROCHLORIDE 10 MG/1
10 TABLET, FILM COATED ORAL NIGHTLY
Status: DISCONTINUED | OUTPATIENT
Start: 2020-04-04 | End: 2020-04-06 | Stop reason: HOSPADM

## 2020-04-04 RX ORDER — HYDROMORPHONE HCL 110MG/55ML
2 PATIENT CONTROLLED ANALGESIA SYRINGE INTRAVENOUS
Status: DISCONTINUED | OUTPATIENT
Start: 2020-04-04 | End: 2020-04-05

## 2020-04-04 RX ORDER — ROSUVASTATIN CALCIUM 20 MG/1
40 TABLET, COATED ORAL NIGHTLY
Status: DISCONTINUED | OUTPATIENT
Start: 2020-04-04 | End: 2020-04-06 | Stop reason: HOSPADM

## 2020-04-04 RX ORDER — POTASSIUM CHLORIDE 1.5 G/1.77G
40 POWDER, FOR SOLUTION ORAL EVERY 6 HOURS
Status: COMPLETED | OUTPATIENT
Start: 2020-04-04 | End: 2020-04-04

## 2020-04-04 RX ORDER — MORPHINE SULFATE 30 MG/1
60 TABLET, FILM COATED, EXTENDED RELEASE ORAL EVERY 12 HOURS SCHEDULED
Status: DISCONTINUED | OUTPATIENT
Start: 2020-04-04 | End: 2020-04-06 | Stop reason: HOSPADM

## 2020-04-04 RX ADMIN — OXYCODONE HYDROCHLORIDE 5 MG: 5 TABLET ORAL at 05:09

## 2020-04-04 RX ADMIN — ROSUVASTATIN CALCIUM 40 MG: 20 TABLET, COATED ORAL at 21:15

## 2020-04-04 RX ADMIN — HEPARIN SODIUM 5000 UNITS: 5000 INJECTION, SOLUTION INTRAVENOUS; SUBCUTANEOUS at 21:14

## 2020-04-04 RX ADMIN — HEPARIN SODIUM 5000 UNITS: 5000 INJECTION, SOLUTION INTRAVENOUS; SUBCUTANEOUS at 05:07

## 2020-04-04 RX ADMIN — OXYCODONE HYDROCHLORIDE 10 MG: 5 TABLET ORAL at 16:04

## 2020-04-04 RX ADMIN — IPRATROPIUM BROMIDE AND ALBUTEROL SULFATE 3 ML: 2.5; .5 SOLUTION RESPIRATORY (INHALATION) at 15:44

## 2020-04-04 RX ADMIN — HYDROMORPHONE HYDROCHLORIDE 2 MG: 2 INJECTION, SOLUTION INTRAMUSCULAR; INTRAVENOUS; SUBCUTANEOUS at 03:07

## 2020-04-04 RX ADMIN — HYDROMORPHONE HYDROCHLORIDE 2 MG: 2 INJECTION, SOLUTION INTRAMUSCULAR; INTRAVENOUS; SUBCUTANEOUS at 01:50

## 2020-04-04 RX ADMIN — INSULIN HUMAN 2 UNITS: 100 INJECTION, SOLUTION PARENTERAL at 05:07

## 2020-04-04 RX ADMIN — QUETIAPINE FUMARATE 25 MG: 25 TABLET ORAL at 21:15

## 2020-04-04 RX ADMIN — METHYLPREDNISOLONE SODIUM SUCCINATE 40 MG: 40 INJECTION, POWDER, FOR SOLUTION INTRAMUSCULAR; INTRAVENOUS at 08:23

## 2020-04-04 RX ADMIN — MORPHINE SULFATE 60 MG: 30 TABLET, FILM COATED, EXTENDED RELEASE ORAL at 08:54

## 2020-04-04 RX ADMIN — DOXYCYCLINE 100 MG: 100 INJECTION, POWDER, LYOPHILIZED, FOR SOLUTION INTRAVENOUS at 08:23

## 2020-04-04 RX ADMIN — TERAZOSIN HYDROCHLORIDE 5 MG: 2 CAPSULE ORAL at 21:15

## 2020-04-04 RX ADMIN — QUETIAPINE FUMARATE 25 MG: 25 TABLET ORAL at 08:31

## 2020-04-04 RX ADMIN — IPRATROPIUM BROMIDE AND ALBUTEROL SULFATE 3 ML: 2.5; .5 SOLUTION RESPIRATORY (INHALATION) at 12:20

## 2020-04-04 RX ADMIN — SODIUM CHLORIDE, PRESERVATIVE FREE 10 ML: 5 INJECTION INTRAVENOUS at 08:31

## 2020-04-04 RX ADMIN — DIAZEPAM 2.5 MG: 5 INJECTION, SOLUTION INTRAMUSCULAR; INTRAVENOUS at 17:07

## 2020-04-04 RX ADMIN — INSULIN HUMAN 5 UNITS: 100 INJECTION, SOLUTION PARENTERAL at 16:59

## 2020-04-04 RX ADMIN — AMLODIPINE BESYLATE 5 MG: 5 TABLET ORAL at 08:23

## 2020-04-04 RX ADMIN — POTASSIUM CHLORIDE 40 MEQ: 1.5 POWDER, FOR SOLUTION ORAL at 12:38

## 2020-04-04 RX ADMIN — IPRATROPIUM BROMIDE AND ALBUTEROL SULFATE 3 ML: 2.5; .5 SOLUTION RESPIRATORY (INHALATION) at 07:02

## 2020-04-04 RX ADMIN — INSULIN HUMAN 3 UNITS: 100 INJECTION, SOLUTION PARENTERAL at 21:15

## 2020-04-04 RX ADMIN — IPRATROPIUM BROMIDE AND ALBUTEROL SULFATE 3 ML: 2.5; .5 SOLUTION RESPIRATORY (INHALATION) at 19:18

## 2020-04-04 RX ADMIN — MORPHINE SULFATE 60 MG: 30 TABLET, FILM COATED, EXTENDED RELEASE ORAL at 21:15

## 2020-04-04 RX ADMIN — SODIUM CHLORIDE, PRESERVATIVE FREE 10 ML: 5 INJECTION INTRAVENOUS at 21:16

## 2020-04-04 RX ADMIN — DIAZEPAM 2.5 MG: 5 INJECTION, SOLUTION INTRAMUSCULAR; INTRAVENOUS at 01:06

## 2020-04-04 RX ADMIN — Medication 30 ML: at 08:31

## 2020-04-04 RX ADMIN — Medication 30 ML: at 21:15

## 2020-04-04 RX ADMIN — CEFTRIAXONE SODIUM 1 G: 1 INJECTION, POWDER, FOR SOLUTION INTRAMUSCULAR; INTRAVENOUS at 14:18

## 2020-04-04 RX ADMIN — INSULIN HUMAN 5 UNITS: 100 INJECTION, SOLUTION PARENTERAL at 12:38

## 2020-04-04 RX ADMIN — HYDROMORPHONE HYDROCHLORIDE 2 MG: 2 INJECTION, SOLUTION INTRAMUSCULAR; INTRAVENOUS; SUBCUTANEOUS at 00:53

## 2020-04-04 RX ADMIN — HEPARIN SODIUM 5000 UNITS: 5000 INJECTION, SOLUTION INTRAVENOUS; SUBCUTANEOUS at 14:18

## 2020-04-04 RX ADMIN — IPRATROPIUM BROMIDE AND ALBUTEROL SULFATE 3 ML: 2.5; .5 SOLUTION RESPIRATORY (INHALATION) at 04:07

## 2020-04-04 RX ADMIN — PROCHLORPERAZINE EDISYLATE 5 MG: 5 INJECTION INTRAMUSCULAR; INTRAVENOUS at 15:48

## 2020-04-04 RX ADMIN — HYDROMORPHONE HYDROCHLORIDE 2 MG: 2 INJECTION, SOLUTION INTRAMUSCULAR; INTRAVENOUS; SUBCUTANEOUS at 05:09

## 2020-04-04 RX ADMIN — DOXYCYCLINE 100 MG: 100 INJECTION, POWDER, LYOPHILIZED, FOR SOLUTION INTRAVENOUS at 21:15

## 2020-04-04 RX ADMIN — DONEPEZIL HYDROCHLORIDE 10 MG: 10 TABLET, FILM COATED ORAL at 21:15

## 2020-04-04 RX ADMIN — POTASSIUM CHLORIDE 40 MEQ: 1.5 POWDER, FOR SOLUTION ORAL at 06:03

## 2020-04-04 RX ADMIN — PANTOPRAZOLE SODIUM 40 MG: 40 INJECTION, POWDER, FOR SOLUTION INTRAVENOUS at 05:09

## 2020-04-04 RX ADMIN — DIAZEPAM 2.5 MG: 5 INJECTION, SOLUTION INTRAMUSCULAR; INTRAVENOUS at 09:40

## 2020-04-04 RX ADMIN — HYDROMORPHONE HYDROCHLORIDE 2 MG: 2 INJECTION, SOLUTION INTRAMUSCULAR; INTRAVENOUS; SUBCUTANEOUS at 07:04

## 2020-04-04 RX ADMIN — CITALOPRAM HYDROBROMIDE 20 MG: 20 TABLET ORAL at 08:23

## 2020-04-04 NOTE — THERAPY EVALUATION
Patient Name: Joseph Rodriguez  : 1945    MRN: [id number]                              Today's Date: 2020       Admit Date: 3/31/2020    Visit Dx: No diagnosis found.  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    • Anemia   • Acute tubular necrosis (CMS/HCC)   • B12 deficiency   • Monoclonal gammopathy   • CKD (chronic kidney disease), stage III (CMS/HCC)   • T2DM on oral agents    • Essential hypertension   • Gastroesophageal reflux disease with esophagitis   • COPD    • Sleep-disordered breathing   • CAD s/p stent    • Obesity (BMI 30-39.9)   • Frequent falls   • Dizziness   • BPH    • CARLTON on CKD (baseline sCr 1.4-1.6)    • Acute encephalopathy   • Polypharmacy   • Hypoglycemia   • Acute mixed acidosis   • Rhabdomyolysis   • Paroxysmal A-fib   • Acute on chronic respiratory failure with hypercapnia   • BPV    • Chronic anticoagulation on Eliquis    • Former smoker   • GAGE non-complaint w/CPAP    • Chronic narcotic use     Past Medical History:   Diagnosis Date   • Acute kidney injury (CMS/HCC)    • Acute tubular necrosis (CMS/HCC)    • Anemia    • Arthritis    • B12 deficiency    • Bone pain    • Carcinoma of lung (CMS/HCC)     stage IA   • Chronic kidney disease (CKD), stage III (moderate) (CMS/HCC)    • Confusion, postoperative    • COPD (chronic obstructive pulmonary disease) (CMS/HCC)    • Coronary artery disease s/p stent    • Depression    • Diabetes mellitus (CMS/HCC)    • Fatigue    • Fractures    • GERD (gastroesophageal reflux disease)    • H/O colonoscopy 2016   • History of BPH    • Hyperlipidemia    • Hypertension    • Impotence    • Kidney stones    • Leaking of urine    • Lung cancer (CMS/HCC) 2016   • Lung mass    • Obesity    • Peptic ulcer disease    • Post-thoracotomy pain, mild      Past Surgical History:   Procedure Laterality Date   • ANKLE SURGERY     • BACK SURGERY     • BONE MARROW BIOPSY     • BRONCHOSCOPY N/A  4/13/2017    Procedure: BRONCHOSCOPY WITH ENDOBRONCHIAL ULTRASOUND;  Surgeon: Kingsley Hodge MD;  Location: [facility] ENDOSCOPY;  Service:    • CAROTID STENT  1991   • CORONARY STENT PLACEMENT     • KIDNEY STONE SURGERY  1976   • KNEE SURGERY Bilateral 2010   • LUNG REMOVAL, PARTIAL Right     Right upper and lower lobe wedge resections (2016)   • THORACOTOMY      RT THORACOTOMY.  WIDE WEDGE EXCISION OG RT UPPER LOBE. WIDE WEDGE EXCISION OF RIGHT LOWER LOBE.     General Information     Row Name 04/04/20 0840          PT Evaluation Time/Intention    Document Type  evaluation  -ESTUARDO     Mode of Treatment  individual therapy;physical therapy  -ESTUARDO     Row Name 04/04/20 0840          General Information    Patient Profile Reviewed?  yes  -ESTUARDO     Prior Level of Function  independent:;transfer;bathing;bed mobility;ADL's;all household mobility  -ESTUARDO     Existing Precautions/Restrictions  fall  -ESTUARDO     Barriers to Rehab  medically complex  -ESTUARDO     Row Name 04/04/20 0840          Relationship/Environment    Lives With  spouse  -ESTUARDO     Row Name 04/04/20 0840          Resource/Environmental Concerns    Current Living Arrangements  home/apartment/condo  -ESTUARDO     Row Name 04/04/20 0840          Home Main Entrance    Number of Stairs, Main Entrance  two  -ESTUARDO     Stair Railings, Main Entrance  none  -ESTUARDO     Row Name 04/04/20 0840          Stairs Within Home, Primary    Stairs, Within Home, Primary  0  -ESTUARDO     Number of Stairs, Within Home, Primary  none  -ESTUARDO     Row Name 04/04/20 0840          Cognitive Assessment/Intervention- PT/OT    Orientation Status (Cognition)  oriented x 3  -ESTUARDO     Row Name 04/04/20 0840          Safety Issues, Functional Mobility    Safety Issues Affecting Function (Mobility)  ability to follow commands;insight into deficits/self awareness;safety precautions follow-through/compliance;safety precaution awareness  -ESTUARDO     Impairments Affecting Function (Mobility)  balance;endurance/activity  tolerance;shortness of breath;pain  -ESTUARDO       User Key  (r) = Recorded By, (t) = Taken By, (c) = Cosigned By    Initials Name Provider Type    ESTUARDO Aaron Anderson, PT Physical Therapist        Mobility     Row Name 04/04/20 0840          Bed Mobility Assessment/Treatment    Bed Mobility Assessment/Treatment  scooting/bridging;supine-sit  -ESTUARDO     Scooting/Bridging [place] (Bed Mobility)  verbal cues;moderate assist (50% patient effort);2 person assist  -ESTUARDO     Supine-Sit [place] (Bed Mobility)  verbal cues;moderate assist (50% patient effort);2 person assist  -ESTUARDO     Assistive Device (Bed Mobility)  head of bed elevated;draw sheet;bed rails  -ESTUARDO     Comment (Bed Mobility)  Verbal cues for LE sequencing off of EOB and mod A for assisting trunk into upright position  -     Row Name 04/04/20 0840          Transfer Assessment/Treatment    Comment (Transfers)  Educated on safe hand placement during standing/sitting  -     Row Name 04/04/20 0840          Sit-Stand Transfer    Sit-Stand [place] (Transfers)  verbal cues;contact guard;2 person assist tech for line management  -ESTUARDO     Assistive Device (Sit-Stand Transfers)  walker, front-wheeled  -ESTUARDO     Row Name 04/04/20 0840          Gait/Stairs Assessment/Training    Gait/Stairs Assessment/Training  gait/ambulation independence  -     [place] Level (Gait)  verbal cues;contact guard;1 person to manage  for line management  -ESTUARDO     Assistive Device (Gait)  walker, front-wheeled  -ESTUARDO     Distance in Feet (Gait)  110 feet  -     Pattern (Gait)  step-through  -ESTUARDO     Deviations/Abnormal Patterns (Gait)  bilateral deviations;florinda decreased;gait speed decreased;stride length decreased  -     Bilateral Gait Deviations  forward flexed posture;weight shift ability decreased  -     [place] Level (Stairs)  not tested  -     Comment (Gait/Stairs)  Pt ambulated with step through pattern and decreased speed. Verbal cues for maintaining  body within walker, upright posture, increase step length and SANDRA. Gait limited by fatigue.   -       User Key  (r) = Recorded By, (t) = Taken By, (c) = Cosigned By    Initials Name Provider Type    Aaron Worley, ALINA Physical Therapist        Obj/Interventions     Row Name 04/04/20 0840          General ROM    GENERAL ROM COMMENTS  Bilateral LE AROM WFL   -     Row Name 04/04/20 0840          MMT (Manual Muscle Testing)    General MMT Comments  Bilateral LE functionally 4/5  -     Row Name 04/04/20 0840          Static Sitting Balance    Level of [place] (Unsupported Sitting, Static Balance)  contact guard assist  -ESTUARDO     Sitting Position (Unsupported Sitting, Static Balance)  sitting on edge of bed  -ESTUARDO     Time Able to Maintain Position (Unsupported Sitting, Static Balance)  2 to 3 minutes  -[facility] Name 04/04/20 0840          Static Standing Balance    Level of [place] (Supported Standing, Static Balance)  contact guard assist  -ESTUARDO     Time Able to Maintain Position (Supported Standing, Static Balance)  45 to 60 seconds  -     Assistive Device Utilized (Supported Standing, Static Balance)  walker, rolling  -[facility] Name 04/04/20 08          Dynamic Standing Balance    Level of [place], Reaches Outside Midline (Standing, Dynamic Balance)  contact guard assist;2 person assist  -ESTUARDO     Time Able to Maintain Position, Reaches Outside Midline (Standing, Dynamic Balance)  4 to 5 minutes  -     Assistive Device Utilized (Supported Standing, Dynamic Balance)  walker, rolling  -     Row Name 04/04/20 0840          Sensory Assessment/Intervention    Sensory General Assessment  no sensation deficits identified  -       User Key  (r) = Recorded By, (t) = Taken By, (c) = Cosigned By    Initials Name Provider Type    Aaron Worley PT Physical Therapist        Goals/Plan     Row Name 04/04/20 0840          Bed Mobility Goal 1 (PT)    Activity/Assistive Device (Bed Mobility Goal 1, PT)  sit  to supine/supine to sit  -ESTUARDO     [place] Level/Cues Needed (Bed Mobility Goal 1, PT)  conditional independence  -ESTUARDO     Time Frame (Bed Mobility Goal 1, PT)  long term goal (LTG);5 days  -ESTUARDO     Row Name 04/04/20 0840          Transfer Goal 1 (PT)    Activity/Assistive Device (Transfer Goal 1, PT)  sit-to-stand/stand-to-sit;walker, rolling  -ESTUARDO     [place] Level/Cues Needed (Transfer Goal 1, PT)  conditional independence  -ESTUARDO     Time Frame (Transfer Goal 1, PT)  long term goal (LTG);5 days  -ESTUARDO     Row Name 04/04/20 0840          Gait Training Goal 1 (PT)    Activity/Assistive Device (Gait Training Goal 1, PT)  gait (walking locomotion);walker, rolling  -ESTUARDO     [place] Level (Gait Training Goal 1, PT)  conditional independence  -ESTUARDO     Distance (Gait Goal 1, PT)  150 feet  -ESTUARDO     Time Frame (Gait Training Goal 1, PT)  long term goal (LTG);5 days  -ESTUARDO     Row Name 04/04/20 0840          Stairs Goal 1 (PT)    Activity/Assistive Device (Stairs Goal 1, PT)  stairs, all skills;cane, straight  -ESTUARDO     [place] Level/Cues Needed (Stairs Goal 1, PT)  contact guard assist  -ESTUARDO     Number of Stairs (Stairs Goal 1, PT)  2  -ESTUARDO     Time Frame (Stairs Goal 1, PT)  long term goal (LTG);5 days  -ESTUARDO       User Key  (r) = Recorded By, (t) = Taken By, (c) = Cosigned By    Initials Name Provider Type    ESTUARDO Aaron Anderson, PT Physical Therapist        Clinical Impression     Row Name 04/04/20 0840          Pain Assessment    Additional Documentation  Pain Scale: Numbers Pre/Post-Treatment (Group)  -ESTUARDO     Row Name 04/04/20 0840          Pain Scale: Numbers Pre/Post-Treatment    Pain Scale: Numbers, Pretreatment  8/10  -ESTUARDO     Pain Scale: Numbers, Post-Treatment  8/10  -ESTUARDO     Pain Location - Side  Bilateral  -ESTUARDO     Pain Location - Orientation  lower  -ESTUARDO     Pain Location  back  -ESTUARDO     Pain Intervention(s)  Repositioned;Ambulation/increased activity  -ESTUARDO     Row Name 04/04/20 0840          Physical Therapy Clinical  Impression    Patient/Family Goals Statement (PT Clinical Impression)  To return home  -ESTUARDO     Criteria for Skilled Interventions Met (PT Clinical Impression)  yes;treatment indicated  -ESTUARDO     Rehab Potential (PT Clinical Summary)  good, to achieve stated therapy goals  -ESTUARDO     Row Name 04/04/20 0840          Vital Signs    Pre Systolic BP Rehab  124  -ESTUARDO     Pre Treatment Diastolic BP  61  -ESTUARDO     Post Systolic BP Rehab  -- RN present with pt  -ESTUARDO     Pretreatment Heart Rate (beats/min)  50  -ESTUARDO     Posttreatment Heart Rate (beats/min)  62  -ESTUARDO     Pre SpO2 (%)  100  -ESTUARDO     O2 Delivery Pre Treatment  supplemental O2  -ESTUARDO     Post SpO2 (%)  99  -ESTUARDO     O2 Delivery Post Treatment  supplemental O2  -ESTUARDO     Pre Patient Position  Supine  -ESTUARDO     Intra Patient Position  Standing  -ESTUARDO     Post Patient Position  Sitting  -ESTUARDO     Row Name 04/04/20 0840          Positioning and Restraints    Pre-Treatment Position  in bed  -ESTUARDO     Post Treatment Position  chair  -ESTUARDO     In Chair  notified nsg;reclined;call light within reach;encouraged to call for assist;exit alarm on;legs elevated;with nsg  -ESTUARDO       User Key  (r) = Recorded By, (t) = Taken By, (c) = Cosigned By    Initials Name Provider Type    ESTUARDO Aaron Anderson, PT Physical Therapist        Outcome Measures     Row Name 04/04/20 0840          How much help from another person do you currently need...    Turning from your back to your side while in flat bed without using bedrails?  3  -ESTUARDO     Moving from lying on back to sitting on the side of a flat bed without bedrails?  2  -ESTUARDO     Moving to and from a bed to a chair (including a wheelchair)?  3  -ESTUARDO     Standing up from a chair using your arms (e.g., wheelchair, bedside chair)?  3  -ESTUARDO     Climbing 3-5 steps with a railing?  2  -ESTUARDO     To walk in hospital room?  3  -ESTUARDO     AM-PAC 6 Clicks Score (PT)  16  -ESTUARDO     Row Name 04/04/20 0840          Functional Assessment    Outcome Measure Options  AM-PAC 6 Clicks Basic Mobility  (PT)  -ESTUARDO       User Key  (r) = Recorded By, (t) = Taken By, (c) = Cosigned By    Initials Name Provider Type    Aaron Worley PT Physical Therapist          PT Recommendation and Plan  Planned Therapy Interventions (PT Eval): balance training, bed mobility training, gait training, home exercise program, patient/family education, strengthening, stair training, transfer training  Outcome Summary/Treatment Plan (PT)  Anticipated Equipment Needs at Discharge (PT): other (see comments)(none)  Anticipated Discharge Disposition (PT): home with assist, home with home health  Plan of Care Reviewed With: patient  Progress: improving  Outcome Summary: PT eval complete. Pt oriented x3 and following all commands. Pt ambulated 110 feet using RW with CGA. Gait limited by fatigue. Bed mobility requiring mod Ax2 and STS requiring CGA x2. No dizziness/LH noted with mobility tasks. Recommend pt d/c home with assist and HHPT when appropriate.     Time Calculation:   PT Charges     Row Name 04/04/20 0840             Time Calculation    Start Time  0840  -      PT Received On  04/04/20  -ESTUARDO      PT Goal Re-Cert Due Date  04/14/20  -         Time Calculation- PT    Total Timed Code Minutes- PT  0 minute(s)  -ESTUARDO        User Key  (r) = Recorded By, (t) = Taken By, (c) = Cosigned By    Initials Name Provider Type    Aaron Worley PT Physical Therapist        Therapy Charges for Today     Code Description Service Date Service Provider Modifiers Qty    39005812881 HC PT EVAL LOW COMPLEXITY 4 4/4/2020 Aaron Anderson, PT GP 1    68350657217 HC PT THER SUPP EA 15 MIN 4/4/2020 Aaron Anderson, PT GP 3          PT G-Codes  Outcome Measure Options: AM-PAC 6 Clicks Basic Mobility (PT)  AM-PAC 6 Clicks Score (PT): 16    Aaron Anderson PT  4/4/2020

## 2020-04-04 NOTE — PLAN OF CARE
Problem: Patient Care Overview  Goal: Plan of Care Review  Outcome: Ongoing (interventions implemented as appropriate)  Flowsheets (Taken 4/4/2020 0213)  Plan of Care Reviewed With: patient  Outcome Summary: Diuresed 6L after IV Lasix given last evening. Renal function normalizing. Potassium and magnesium replaced. Precedex discontinued d/t profound bradycardia HR as low as 35, and hypertension with SBP as high as 200mmHg. PRN antihypertensives administered. Seroquel administered with little improvement in restlessness/agitation. PRN Dilaudid ordered per Intensivist. Resting on BiPAP through night.

## 2020-04-04 NOTE — PLAN OF CARE
Problem: Patient Care Overview  Goal: Plan of Care Review  Flowsheets (Taken 4/4/2020 0832)  Progress: improving  Plan of Care Reviewed With: patient  Outcome Summary: PT eval complete. Pt oriented x3 and following all commands. Pt ambulated 110 feet using RW with CGA. Gait limited by fatigue. Bed mobility requiring mod Ax2 and STS requiring CGA x2. No dizziness/LH noted with mobility tasks. Recommend pt d/c home with assist and HHPT when appropriate.

## 2020-04-04 NOTE — PLAN OF CARE
Pt more alert and oriented today.  Complains of back pain and abdominal discomfort.  Pain medications increased to partial home doses.  Mckeon catheter removed.  Ambulated 3 times up to 120feet.  Patient requiring frequent repositioning and attention.  NSR with slight 1st degree block, PAC's and occasional bradycardia. Potassium replaced. Elevated glucose levels above 300. PO intake adequate today, need to clarify keofeed removal with MD in am.

## 2020-04-04 NOTE — PROGRESS NOTES
Intensive Care Follow-up     Hospital:  LOS: 4 days   Mr. Joseph Rodriguez, 74 y.o. male is followed for:   Acute kidney injury superimposed on chronic kidney disease (CMS/HCC)            History of present illness:   Joseph Rodriguez is a 74 y.o. male admitted to [facility] on 3/31 from [facility] for management of CARLTON, encephalopathy, and hypoglycemia.      Patient was previously admitted to our facility from 2/2-2/4 for dizziness and usnteady gait work-up revealing BPV also found to be in paroxsymal AF. He was seen by neurology placed on meclizine and cardiology discharged on Eliquis and Plavix.      Evidently the patient was evaluated by his PCP a few weeks ago placed on a statin. He additionally takes bumex. Over the past week the wife reports increased somnolence, decreased appetite, and fall with unknown contact to the head.      On 3/31 he was taken to OSH ED with complaints of hypoglycemia in the 30s. Labs showed Cr 9.3, , K 4.8, , and Ca 7.1. EKG sinus bradycardia with 1st degree AVB and negative troponins. CT A/P displayed GB distention, non-obstructing L renal stone, colonic stool retention, and diverticulosis. UA + blood and bacteria given empiric Zosyn and IVF.      The patient was transferred to [facility] for nephrology evaluation and higher level of care. Placed on LR and Rocephin with Cr slightly improved at 7.8. Initial BG 28 given additional hypoglycemic management with D5W fluids with overall improvement. ABG showed 7.10/56/93/17 placed on BiPAP and bicarbonate gtt that was later discontinued per nephrology's recommendation. On arrival he was somnolent with UDS + BZD/opiates not improved with Romazicon/Narcan and CT head benign apart from a large left mastoid effusion noted on previous 2/2 imaging.      The evening on 3/31 the patient was transferred to the ICU for ongoing mixed acidosis and somnolence.  Was given Narcan on the floor and then started on a Narcan drip.  Patient  became agitated, trying to climb out of bed.  Has been fighting use of his BiPAP.      Of note the patient was recently seen by APRN with [facility] for management of COPD with exacerbation. PFTs in 10/2019 as follows: FVC 3.26, 80%; FEV1 1.65, 56%; and ratio 51%.    Subjective   Interval History:  Patient doing well this morning.  His breathing is much better after the one-time dose of Lasix.  Yesterday afternoon he started developing signs of decompensated heart failure worsening shortness of breath.  IV fluids were held and he was given a one-time dose of Lasix.  Creatinine is stable this morning.  Patient taking his BiPAP at night nasal cannula throughout the day.  He is off Precedex now for 24 hours.  Mental status is much improved overall.  Is an attempt to trial p.o. diet this morning.         The patient's past medical, surgical and social history were reviewed and updated in Epic as appropriate.       Objective     Infusions:    lactated ringers 100 mL/hr Last Rate: Stopped (04/03/20 1600)     Medications:    amLODIPine 5 mg Oral Q24H   cefTRIAXone 1 g Intravenous Q24H   citalopram 20 mg Oral Daily   diazePAM 2.5 mg Intravenous Q8H   doxycycline 100 mg Intravenous Q12H   heparin (porcine) 5,000 Units Subcutaneous Q8H   insulin regular 0-7 Units Subcutaneous Q6H   ipratropium-albuterol 3 mL Nebulization Q4H - RT   methylPREDNISolone sodium succinate 40 mg Intravenous Q24H   Morphine 60 mg Oral Q12H   pantoprazole 40 mg Intravenous Q AM   potassium chloride 40 mEq Nasogastric Q6H   PRO-STAT 30 mL Nasogastric BID   QUEtiapine 25 mg Oral Q12H   sodium chloride 10 mL Intravenous Q12H   terazosin 5 mg Oral Nightly     I reviewed the patient's medications.    Vital Sign Min/Max for last 24 hours  Temp  Min: 98.1 °F (36.7 °C)  Max: 100.5 °F (38.1 °C)   BP  Min: 116/46  Max: 189/76   Pulse  Min: 37  Max: 70   Resp  Min: 18  Max: 28   SpO2  Min: 89 %  Max: 100 %   Flow (L/min)  Min: 2  Max: 2        Input/Output for last 24 hour shift  04/03 0701 - 04/04 0700  In: 1775.7 [P.O.:610; I.V.:845.7]  Out: 6425 [Urine:6425]      GENERAL : NAD, conversant  RESPIRATORY/THORAX : normal respiratory effort and no intercostal retractions, clear to auscultation bilaterally  CARDIOVASCULAR : Normal S1/S2, RRR. 1+ lower ext edema.  GASTROINTESTINAL : Soft, NT/ND. BS x 4 normoactive. No hepatosplenomegaly.  MUSCULOSKELETAL : No cyanosis, clubbing, or ischemia  NEUROLOGICAL: alert and oriented to person, place but not time  PSYCHOLOGICAL : Appropriate affect      Results from last 7 days   Lab Units 04/03/20 1739 04/03/20 0335 04/01/20  0333   WBC 10*3/mm3 7.39 6.67 5.74   HEMOGLOBIN g/dL 8.0* 7.5* 7.9*   PLATELETS 10*3/mm3 141 136* 145     Results from last 7 days   Lab Units 04/04/20  0347 04/03/20 1739 04/03/20  0739 04/03/20  0335  04/02/20  0637  04/01/20  0333   SODIUM mmol/L 140 140 140 141   < > 140   < > 142   POTASSIUM mmol/L 3.2* 3.9 3.5 3.9   < > 4.4   < > 4.3   CO2 mmol/L 25.0 22.0 24.0 23.0   < > 23.0   < > 21.0*   BUN mg/dL 45* 46* 52* 51*   < > 66*   < > 90*   CREATININE mg/dL 2.40* 2.35* 2.80* 3.01*   < > 4.26*   < > 6.49*   MAGNESIUM mg/dL  --   --   --  1.7  1.7  --  2.1  --  2.8*   PHOSPHORUS mg/dL  --   --   --  3.0  3.0  --  4.3  --  7.8*   GLUCOSE mg/dL 161* 250* 190* 218*   < > 233*   < > 68    < > = values in this interval not displayed.     Estimated Creatinine Clearance: 36.7 mL/min (A) (by C-G formula based on SCr of 2.4 mg/dL (H)).    Results from last 7 days   Lab Units 04/03/20  1753   PH, ARTERIAL pH units 7.473*   PCO2, ARTERIAL mm Hg 32.9*   PO2 ART mm Hg 97.2       I reviewed the patient's new clinical results.  I reviewed the patient's new imaging results/reports including actual images and agree with reports.       Imaging Results (Last 24 Hours)     Procedure Component Value Units Date/Time    XR Abdomen KUB [[id number]] Collected:  04/03/20 1836     Updated:  04/03/20 1841    Narrative:           EXAMINATION: XR ABDOMEN KUB - 04/03/2020     INDICATION: Follow up ileus.      COMPARISON: 04/02/2020     FINDINGS: KUB reveals feeding tube in satisfactory position with bowel  gas pattern unchanged with several dilated loops of small bowel  identified in the left flank. Findings may suggest a mild ileus. There  is no significant bowel distention. There is air seen in the colon.  Degenerative changes seen within the spine.       Impression:       Feeding tube in satisfactory position with mild air-filled  loops of small bowel in the left flank suggesting possibly a mild ileus,  however, no significant distention is identified. There is air within  the colon. Findings are stable and unchanged. No free intraperitoneal  air.     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     Monoclonal gammopathy    T2DM on oral agents     COPD     CAD s/p stent     BPH     Acute encephalopathy    Polypharmacy    Hypoglycemia    Acute mixed acidosis    Rhabdomyolysis    Paroxysmal A-fib    Acute on chronic respiratory failure with hypercapnia    BPV     Chronic anticoagulation on Eliquis     Former smoker    GAGE non-complaint w/CPAP     Chronic narcotic use       Plan        74-year-old male with past medical history significant for CKD stage III, monoclonal gammopathy, type 2 diabetes mellitus on oral hypoglycemic agents, COPD, coronary artery disease, and atrial fibrillation.  Who presented to the ICU on 3/31/2020 with acute metabolic encephalopathy secondary to hypoglycemia and acute on chronic hypoxic respiratory failure secondary to hypercapnia in the setting of severe renal dysfunction, with severe metabolic abnormalities.     1. Patient to remain in the ICU given his acute on chronic hypercapnic respiratory failure, in the setting of severe acute renal dysfunction, and  severe hyperglycemia complicated by acute metabolic encephalopathy.  Patient is high risk for intubation.  2. Continue BiPAP as needed, will break to the cannula as able  3. IV fluids held on 4/3/2020 secondary to signs of decompensated heart failure and worsening shortness of breath.  He was given a one-time dose of IV Lasix 60 mg.  Hold on IV fluids for now continue to monitor renal function.  Will start back as needed.  4. Patient attempting to tolerate p.o. diet, if he does well throughout the day from a mental status standpoint he can tolerate a p.o. diet we will discontinue Keofeed tomorrow morning  5. Completed 5 days of therapy for COPD exacerbation and [facility] pneumonia.  6. Continue current insulin regimen  7. Continue current volume at 2.5 mg every 8 hours, home doses 10 mg every 8 hours.  8. I will go ahead and restart a third of the patient's home morphine, as he is currently receiving frequent Dilaudid.  We will start him back initially at 60 mg of extended list morphine twice daily.  His home dose is 200 mg twice daily.  I will further start very slowly given his renal function still is impaired.  We will slowly escalate back up to his home dose as renal function allows.  He also has 10 mg of oxycodone every 6 hours as needed.  We will attempt this regimen of pain meds and attempt to avoid IV medications to control the patient's chronic pain.  9. DVT prophylaxis  10. Holding Eliquis in the setting of acute renal failure, subcu heparin for now we will re-evaluate as the patient improves for further anticoagulation.   11. Continue duo nebs  12. Continue Seroquel for delirium, and restart patient's home donepezil  13. PT/OT consulted    Plan of care and goals reviewed with mulitdisciplinary/antibiotic stewardship team during rounds.   I discussed the patient's findings and my recommendations with patient and nursing staff     Critical Care time spent in direct patient care: 35 minutes (excluding  procedure time, if applicable) including high complexity decision making to assess, manipulate, and support vital organ system failure in this individual who has impairment of one or more vital organ systems such that there is a high probability of imminent or life threatening deterioration in the patient's condition.      Cathleen Clancy, DO  Pulmonary, Critical care and Sleep Medicine

## 2020-04-05 LAB
ANION GAP SERPL CALCULATED.3IONS-SCNC: 12 MMOL/L (ref 5–15)
BUN BLD-MCNC: 35 MG/DL (ref 8–23)
BUN/CREAT SERPL: 19.1 (ref 7–25)
CALCIUM SPEC-SCNC: 7.6 MG/DL (ref 8.6–10.5)
CHLORIDE SERPL-SCNC: 104 MMOL/L (ref 98–107)
CO2 SERPL-SCNC: 25 MMOL/L (ref 22–29)
CREAT BLD-MCNC: 1.83 MG/DL (ref 0.76–1.27)
GFR SERPL CREATININE-BSD FRML MDRD: 36 ML/MIN/1.73
GLUCOSE BLD-MCNC: 117 MG/DL (ref 65–99)
GLUCOSE BLDC GLUCOMTR-MCNC: 188 MG/DL (ref 70–130)
GLUCOSE BLDC GLUCOMTR-MCNC: 201 MG/DL (ref 70–130)
GLUCOSE BLDC GLUCOMTR-MCNC: 218 MG/DL (ref 70–130)
GLUCOSE BLDC GLUCOMTR-MCNC: 296 MG/DL (ref 70–130)
GLUCOSE BLDC GLUCOMTR-MCNC: 299 MG/DL (ref 70–130)
GLUCOSE BLDC GLUCOMTR-MCNC: 301 MG/DL (ref 70–130)
GLUCOSE BLDC GLUCOMTR-MCNC: 302 MG/DL (ref 70–130)
POTASSIUM BLD-SCNC: 3.6 MMOL/L (ref 3.5–5.2)
SODIUM BLD-SCNC: 141 MMOL/L (ref 136–145)

## 2020-04-05 PROCEDURE — 94799 UNLISTED PULMONARY SVC/PX: CPT

## 2020-04-05 PROCEDURE — 82962 GLUCOSE BLOOD TEST: CPT

## 2020-04-05 PROCEDURE — 25010000002 METHYLPREDNISOLONE PER 40 MG: Performed by: INTERNAL MEDICINE

## 2020-04-05 PROCEDURE — 97530 THERAPEUTIC ACTIVITIES: CPT

## 2020-04-05 PROCEDURE — 99232 SBSQ HOSP IP/OBS MODERATE 35: CPT | Performed by: INTERNAL MEDICINE

## 2020-04-05 PROCEDURE — 80048 BASIC METABOLIC PNL TOTAL CA: CPT | Performed by: INTERNAL MEDICINE

## 2020-04-05 PROCEDURE — 25010000002 DIAZEPAM PER 5 MG: Performed by: INTERNAL MEDICINE

## 2020-04-05 PROCEDURE — 25010000002 HEPARIN (PORCINE) PER 1000 UNITS: Performed by: INTERNAL MEDICINE

## 2020-04-05 PROCEDURE — 63710000001 INSULIN REGULAR HUMAN PER 5 UNITS: Performed by: INTERNAL MEDICINE

## 2020-04-05 RX ORDER — PANTOPRAZOLE SODIUM 40 MG/1
40 TABLET, DELAYED RELEASE ORAL
Status: DISCONTINUED | OUTPATIENT
Start: 2020-04-05 | End: 2020-04-06 | Stop reason: HOSPADM

## 2020-04-05 RX ORDER — AMLODIPINE BESYLATE 10 MG/1
10 TABLET ORAL
Status: DISCONTINUED | OUTPATIENT
Start: 2020-04-06 | End: 2020-04-06 | Stop reason: HOSPADM

## 2020-04-05 RX ADMIN — OXYCODONE HYDROCHLORIDE 10 MG: 5 TABLET ORAL at 04:39

## 2020-04-05 RX ADMIN — ROSUVASTATIN CALCIUM 40 MG: 20 TABLET, COATED ORAL at 20:31

## 2020-04-05 RX ADMIN — DIAZEPAM 2.5 MG: 5 INJECTION, SOLUTION INTRAMUSCULAR; INTRAVENOUS at 17:08

## 2020-04-05 RX ADMIN — QUETIAPINE FUMARATE 25 MG: 25 TABLET ORAL at 20:31

## 2020-04-05 RX ADMIN — INSULIN HUMAN 2 UNITS: 100 INJECTION, SOLUTION PARENTERAL at 07:58

## 2020-04-05 RX ADMIN — INSULIN HUMAN 4 UNITS: 100 INJECTION, SOLUTION PARENTERAL at 12:17

## 2020-04-05 RX ADMIN — INSULIN HUMAN 4 UNITS: 100 INJECTION, SOLUTION PARENTERAL at 17:13

## 2020-04-05 RX ADMIN — CITALOPRAM HYDROBROMIDE 20 MG: 20 TABLET ORAL at 08:09

## 2020-04-05 RX ADMIN — DIAZEPAM 2.5 MG: 5 INJECTION, SOLUTION INTRAMUSCULAR; INTRAVENOUS at 01:35

## 2020-04-05 RX ADMIN — MORPHINE SULFATE 60 MG: 30 TABLET, FILM COATED, EXTENDED RELEASE ORAL at 08:09

## 2020-04-05 RX ADMIN — TERAZOSIN HYDROCHLORIDE 5 MG: 2 CAPSULE ORAL at 20:31

## 2020-04-05 RX ADMIN — PANTOPRAZOLE SODIUM 40 MG: 40 TABLET, DELAYED RELEASE ORAL at 10:24

## 2020-04-05 RX ADMIN — HEPARIN SODIUM 5000 UNITS: 5000 INJECTION, SOLUTION INTRAVENOUS; SUBCUTANEOUS at 20:31

## 2020-04-05 RX ADMIN — OXYCODONE HYDROCHLORIDE 10 MG: 5 TABLET ORAL at 18:41

## 2020-04-05 RX ADMIN — PANTOPRAZOLE SODIUM 40 MG: 40 INJECTION, POWDER, FOR SOLUTION INTRAVENOUS at 05:13

## 2020-04-05 RX ADMIN — IPRATROPIUM BROMIDE AND ALBUTEROL SULFATE 3 ML: 2.5; .5 SOLUTION RESPIRATORY (INHALATION) at 15:26

## 2020-04-05 RX ADMIN — MORPHINE SULFATE 60 MG: 30 TABLET, FILM COATED, EXTENDED RELEASE ORAL at 20:31

## 2020-04-05 RX ADMIN — DOXYCYCLINE 100 MG: 100 INJECTION, POWDER, LYOPHILIZED, FOR SOLUTION INTRAVENOUS at 08:09

## 2020-04-05 RX ADMIN — IPRATROPIUM BROMIDE AND ALBUTEROL SULFATE 3 ML: 2.5; .5 SOLUTION RESPIRATORY (INHALATION) at 12:11

## 2020-04-05 RX ADMIN — DIAZEPAM 2.5 MG: 5 INJECTION, SOLUTION INTRAMUSCULAR; INTRAVENOUS at 10:24

## 2020-04-05 RX ADMIN — HEPARIN SODIUM 5000 UNITS: 5000 INJECTION, SOLUTION INTRAVENOUS; SUBCUTANEOUS at 14:12

## 2020-04-05 RX ADMIN — QUETIAPINE FUMARATE 25 MG: 25 TABLET ORAL at 08:09

## 2020-04-05 RX ADMIN — IPRATROPIUM BROMIDE AND ALBUTEROL SULFATE 3 ML: 2.5; .5 SOLUTION RESPIRATORY (INHALATION) at 19:16

## 2020-04-05 RX ADMIN — HEPARIN SODIUM 5000 UNITS: 5000 INJECTION, SOLUTION INTRAVENOUS; SUBCUTANEOUS at 05:13

## 2020-04-05 RX ADMIN — METHYLPREDNISOLONE SODIUM SUCCINATE 40 MG: 40 INJECTION, POWDER, FOR SOLUTION INTRAMUSCULAR; INTRAVENOUS at 08:09

## 2020-04-05 RX ADMIN — DONEPEZIL HYDROCHLORIDE 10 MG: 10 TABLET, FILM COATED ORAL at 20:31

## 2020-04-05 RX ADMIN — IPRATROPIUM BROMIDE AND ALBUTEROL SULFATE 3 ML: 2.5; .5 SOLUTION RESPIRATORY (INHALATION) at 07:50

## 2020-04-05 RX ADMIN — SODIUM CHLORIDE, PRESERVATIVE FREE 10 ML: 5 INJECTION INTRAVENOUS at 08:10

## 2020-04-05 RX ADMIN — SODIUM CHLORIDE, PRESERVATIVE FREE 10 ML: 5 INJECTION INTRAVENOUS at 20:32

## 2020-04-05 RX ADMIN — INSULIN HUMAN 3 UNITS: 100 INJECTION, SOLUTION PARENTERAL at 22:27

## 2020-04-05 RX ADMIN — AMLODIPINE BESYLATE 5 MG: 5 TABLET ORAL at 08:09

## 2020-04-05 NOTE — THERAPY TREATMENT NOTE
Patient Name: Joseph Rodriguez  : 1945    MRN: [id number]                              Today's Date: 2020       Admit Date: 3/31/2020    Visit Dx: No diagnosis found.  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    • Anemia   • Acute tubular necrosis (CMS/HCC)   • B12 deficiency   • Monoclonal gammopathy   • CKD (chronic kidney disease), stage III (CMS/HCC)   • T2DM on oral agents    • Essential hypertension   • Gastroesophageal reflux disease with esophagitis   • COPD    • Sleep-disordered breathing   • CAD s/p stent    • Obesity (BMI 30-39.9)   • Frequent falls   • Dizziness   • BPH    • CARLTON on CKD (baseline sCr 1.4-1.6)    • Acute encephalopathy   • Polypharmacy   • Hypoglycemia   • Acute mixed acidosis   • Rhabdomyolysis   • Paroxysmal A-fib   • Acute on chronic respiratory failure with hypercapnia   • BPV    • Chronic anticoagulation on Eliquis    • Former smoker   • GAGE non-complaint w/CPAP    • Chronic narcotic use     Past Medical History:   Diagnosis Date   • Acute kidney injury (CMS/HCC)    • Acute tubular necrosis (CMS/HCC)    • Anemia    • Arthritis    • B12 deficiency    • Bone pain    • Carcinoma of lung (CMS/HCC)     stage IA   • Chronic kidney disease (CKD), stage III (moderate) (CMS/HCC)    • Confusion, postoperative    • COPD (chronic obstructive pulmonary disease) (CMS/HCC)    • Coronary artery disease s/p stent    • Depression    • Diabetes mellitus (CMS/HCC)    • Fatigue    • Fractures    • GERD (gastroesophageal reflux disease)    • H/O colonoscopy 2016   • History of BPH    • Hyperlipidemia    • Hypertension    • Impotence    • Kidney stones    • Leaking of urine    • Lung cancer (CMS/HCC) 2016   • Lung mass    • Obesity    • Peptic ulcer disease    • Post-thoracotomy pain, mild      Past Surgical History:   Procedure Laterality Date   • ANKLE SURGERY     • BACK SURGERY     • BONE MARROW BIOPSY     • BRONCHOSCOPY N/A  4/13/2017    Procedure: BRONCHOSCOPY WITH ENDOBRONCHIAL ULTRASOUND;  Surgeon: Kingsley Hodge MD;  Location: [facility] ENDOSCOPY;  Service:    • CAROTID STENT  1991   • CORONARY STENT PLACEMENT     • KIDNEY STONE SURGERY  1976   • KNEE SURGERY Bilateral 2010   • LUNG REMOVAL, PARTIAL Right     Right upper and lower lobe wedge resections (2016)   • THORACOTOMY      RT THORACOTOMY.  WIDE WEDGE EXCISION OG RT UPPER LOBE. WIDE WEDGE EXCISION OF RIGHT LOWER LOBE.     General Information     Row Name 04/05/20 0800          PT Evaluation Time/Intention    Document Type  therapy note (daily note)  -     Mode of Treatment  individual therapy;physical therapy  -     Row Name 04/05/20 0800          General Information    Patient Profile Reviewed?  yes  -     Existing Precautions/Restrictions  fall  -     Row Name 04/05/20 0800          Cognitive Assessment/Intervention- PT/OT    Orientation Status (Cognition)  oriented x 3  -     Row Name 04/05/20 0800          Safety Issues, Functional Mobility    Safety Issues Affecting Function (Mobility)  ability to follow commands;insight into deficits/self awareness;safety precautions follow-through/compliance;safety precaution awareness  -     Impairments Affecting Function (Mobility)  balance;endurance/activity tolerance;shortness of breath;pain  -       User Key  (r) = Recorded By, (t) = Taken By, (c) = Cosigned By    Initials Name Provider Type    ESTUARDO Aaron Anderson, PT Physical Therapist        Mobility     Row Name 04/05/20 0800          Bed Mobility Assessment/Treatment    Comment (Bed Mobility)  Pt received UIC  -     Row Name 04/05/20 0800          Transfer Assessment/Treatment    Comment (Transfers)  Verbal cues for safe hand placement during standing/sitting  -     Row Name 04/05/20 0800          Sit-Stand Transfer    Sit-Stand [place] (Transfers)  verbal cues;contact guard;2 person assist  -     Assistive Device (Sit-Stand Transfers)  walker,  front-wheeled  -ESTUARDO     Row Name 04/05/20 0800          Gait/Stairs Assessment/Training    Gait/Stairs Assessment/Training  gait/ambulation independence;gait/ambulation assistive device  -ESTUARDO     [place] Level (Gait)  verbal cues;contact guard  -ESTUARDO     Assistive Device (Gait)  walker, front-wheeled  -ESTUARDO     Distance in Feet (Gait)  110 feet  -ESTUARDO     Pattern (Gait)  step-through  -ESTUARDO     Deviations/Abnormal Patterns (Gait)  bilateral deviations;florinda decreased;gait speed decreased;stride length decreased  -ESTUARDO     Bilateral Gait Deviations  forward flexed posture;weight shift ability decreased  -ESTUARDO     [place] Level (Stairs)  not tested  -ESTUARDO     Comment (Gait/Stairs)  Pt ambulated with step through pattern and decreased speed. Pt experiencing labored breathing with gait. Verbal cues for increasing step length, SANDRA, and maintaining body within walker. Gait limited by SOA and fatigue.   -ESTUARDO       User Key  (r) = Recorded By, (t) = Taken By, (c) = Cosigned By    Initials Name Provider Type    ESTUARDO Aaron Anderson, PT Physical Therapist        Obj/Interventions     Row Name 04/05/20 0800          Static Sitting Balance    Level of [place] (Unsupported Sitting, Static Balance)  contact guard assist  -ESTUARDO     Sitting Position (Unsupported Sitting, Static Balance)  sitting in chair  -ESTUARDO     Time Able to Maintain Position (Unsupported Sitting, Static Balance)  2 to 3 minutes  -ESTUARDO     Row Name 04/05/20 0800          Static Standing Balance    Level of [place] (Supported Standing, Static Balance)  contact guard assist  -ESTUARDO     Time Able to Maintain Position (Supported Standing, Static Balance)  45 to 60 seconds  -ESTUARDO     Assistive Device Utilized (Supported Standing, Static Balance)  walker, rolling  -ESTUARDO     Row Name 04/05/20 0800          Dynamic Standing Balance    Level of [place], Reaches Outside Midline (Standing, Dynamic Balance)  contact guard assist  -ESTUARDO     Time Able to Maintain Position, Reaches  Outside Midline (Standing, Dynamic Balance)  4 to 5 minutes  -     Assistive Device Utilized (Supported Standing, Dynamic Balance)  walker, rolling  -       User Key  (r) = Recorded By, (t) = Taken By, (c) = Cosigned By    Initials Name Provider Type    Aaron Worley, PT Physical Therapist        Goals/Plan    No documentation.       Clinical Impression     [facility] Name 04/05/20 0800          Pain Assessment    Additional Documentation  Pain Scale: Numbers Pre/Post-Treatment (Group)  -ESTUARDO     Row Name 04/05/20 0800          Pain Scale: Numbers Pre/Post-Treatment    Pain Scale: Numbers, Pretreatment  2/10  -ESTUARDO     Pain Scale: Numbers, Post-Treatment  3/10  -ESTUARDO     Pain Location - Side  Bilateral  -ESTUARDO     Pain Location - Orientation  lower  -ESTUARDO     Pain Location  back  -ESTUARDO     Pain Intervention(s)  Repositioned;Ambulation/increased activity  -[facility] Name 04/05/20 0800          Physical Therapy Clinical Impression    Criteria for Skilled Interventions Met (PT Clinical Impression)  yes;treatment indicated  -     Rehab Potential (PT Clinical Summary)  good, to achieve stated therapy goals  -ESTUARDO     Row Name 04/05/20 0800          Vital Signs    Pre Systolic BP Rehab  136  -ESTUARDO     Pre Treatment Diastolic BP  80  -ESTUARDO     Post Systolic BP Rehab  -- Rn present  -ESTUARDO     Pretreatment Heart Rate (beats/min)  80  -ESTUARDO     Posttreatment Heart Rate (beats/min)  75  -ESTUARDO     Pre SpO2 (%)  97  -ESTUARDO     O2 Delivery Pre Treatment  supplemental O2  -ESTUARDO     Post SpO2 (%)  100  -ESTUARDO     O2 Delivery Post Treatment  supplemental O2  -ESTUARDO     Pre Patient Position  Sitting  -ESTUARDO     Intra Patient Position  Standing  -ESTUARDO     Post Patient Position  Sitting  -ESTUARDO     Row Name 04/05/20 0800          Positioning and Restraints    Pre-Treatment Position  sitting in chair/recliner  -ESTUARDO     Post Treatment Position  chair  -ESTUARDO     In Chair  notified nsg;reclined;call light within reach;encouraged to call for assist;exit alarm on;with nsg;legs elevated   -ESTUARDO       User Key  (r) = Recorded By, (t) = Taken By, (c) = Cosigned By    Initials Name Provider Type    Aaron Worley, ALINA Physical Therapist        Outcome Measures     Row Name 04/05/20 0800          How much help from another person do you currently need...    Turning from your back to your side while in flat bed without using bedrails?  3  -ESTUARDO     Moving from lying on back to sitting on the side of a flat bed without bedrails?  3  -ESTUARDO     Moving to and from a bed to a chair (including a wheelchair)?  3  -ESTUARDO     Standing up from a chair using your arms (e.g., wheelchair, bedside chair)?  3  -ESTUARDO     Climbing 3-5 steps with a railing?  2  -ESTUARDO     To walk in hospital room?  3  -ESTUARDO     AM-PAC 6 Clicks Score (PT)  17  -ESTUARDO     Row Name 04/05/20 0800          Functional Assessment    Outcome Measure Options  AM-PAC 6 Clicks Basic Mobility (PT)  -ESTUARDO       User Key  (r) = Recorded By, (t) = Taken By, (c) = Cosigned By    Initials Name Provider Type    Aaron Worley PT Physical Therapist          PT Recommendation and Plan  Planned Therapy Interventions (PT Eval): balance training, bed mobility training, gait training, home exercise program, patient/family education, strengthening, stair training, transfer training  Outcome Summary/Treatment Plan (PT)  Anticipated Equipment Needs at Discharge (PT): other (see comments)(none)  Anticipated Discharge Disposition (PT): home with assist, home with home health  Plan of Care Reviewed With: patient  Progress: improving  Outcome Summary: Pt ambulated 110 feet using RW and CGA. No rest breaks during gait this session, however, gait limited by SOA and fatigue. STS requiring CGA x2. Will continue to progress strength and mobility as able.     Time Calculation:   PT Charges     Row Name 04/05/20 0800             Time Calculation    Start Time  0800  -      PT Received On  04/05/20  -      PT Goal Re-Cert Due Date  04/14/20  -         Time Calculation- PT    Total Timed Code  Minutes- PT  23 minute(s)  -ESTUARDO         Timed Charges    80989 - PT Therapeutic Activity Minutes  23  -ESTUARDO        User Key  (r) = Recorded By, (t) = Taken By, (c) = Cosigned By    Initials Name Provider Type    Aaron Worley, PT Physical Therapist        Therapy Charges for Today     Code Description Service Date Service Provider Modifiers Qty    67068897515 HC PT EVAL LOW COMPLEXITY 4 4/4/2020 Aaron Anderson, PT GP 1    07197793508 HC PT THER SUPP EA 15 MIN 4/4/2020 Aaron Anderson, PT GP 3    [id number]  PT THERAPEUTIC ACT EA 15 MIN 4/5/2020 Aaron Anderson, PT GP 2    [id number]  PT THER SUPP EA 15 MIN 4/5/2020 Aaron Anderson, PT GP 2          PT G-Codes  Outcome Measure Options: AM-PAC 6 Clicks Basic Mobility (PT)  AM-PAC 6 Clicks Score (PT): 17    Aaron Anderson PT  4/5/2020

## 2020-04-05 NOTE — PLAN OF CARE
Problem: Patient Care Overview  Goal: Plan of Care Review  Outcome: Ongoing (interventions implemented as appropriate)  Flowsheets (Taken 4/5/2020 6509)  Progress: improving  Plan of Care Reviewed With: patient  Note:   Pt. Neuro intact, alert: did sleep well through the night. Up with one assist.  On 2L NC, refused evening Bipap. HR: 40s-60s sinus rhythm. VSS. Pt. Voiding spontaneously 600 ml, had small bowel movement. Will continue to monitor.

## 2020-04-05 NOTE — PROGRESS NOTES
"   LOS: 5 days    Patient Care Team:  Dandy Bailey MD as PCP - General  Juan Pablo Osullivan MD as Surgeon (Cardiothoracic Surgery)  Jong Diop DO as Consulting Physician (Gastroenterology)    Chief Complaint:  CARLTON on CKD stage III     Subjective     Interval History:   No acute events overnight. No new complaints.   Review of Systems:    The following systems were reviewed and negative;  constitution, respiratory, cardiovascular and gastrointestinal    Objective     Vital Sign Min/Max for last 24 hours  Temp  Min: 98.3 °F (36.8 °C)  Max: 98.9 °F (37.2 °C)   BP  Min: 123/60  Max: 161/63   Pulse  Min: 40  Max: 86   Resp  Min: 20  Max: 26   SpO2  Min: 94 %  Max: 100 %   No data recorded   No data recorded     Flowsheet Rows      First Filed Value   Admission Height  177.8 cm (70\") Documented at 03/31/2020 1211   Admission Weight  127 kg (279 lb 15.8 oz) Documented at 03/31/2020 1211          I/O this shift:  In: 120 [P.O.:120]  Out: 400 [Urine:400]  I/O last 3 completed shifts:  In: 2038 [P.O.:1460; I.V.:338; Other:240]  Out: 7075 [Urine:7075]    Physical Exam:     General Appearance:    Alert, cooperative, in no acute distress   Head:    Normocephalic, without obvious abnormality, atraumatic               Neck:   Supple, trachea midline, no thyromegaly       Lungs:     Clear to auscultation,respirations regular, even and                  unlabored    Heart:    Regular rhythm and normal rate, normal S1 and S2, no            murmur, no gallop, no rub, no click       Abdomen:     Normal bowel sounds,  soft        non-tender, non-distended       Extremities:   Moves all extremities well, no edema, no cyanosis, no             redness               Neurologic:    No focal deficit noted grossly       WBC WBC   Date Value Ref Range Status   04/03/2020 7.39 3.40 - 10.80 10*3/mm3 Final     Comment:     Modified report. Previous result was 7.65 10*3/mm3 on 4/3/2020 at 1751 EDT.   04/03/2020 6.67 3.40 - 10.80 " 10*3/mm3 Final      HGB Hemoglobin   Date Value Ref Range Status   04/03/2020 8.0 (L) 13.0 - 17.7 g/dL Final   04/03/2020 7.5 (L) 13.0 - 17.7 g/dL Final      HCT Hematocrit   Date Value Ref Range Status   04/03/2020 25.0 (L) 37.5 - 51.0 % Final     Comment:     Modified report. Previous result was 25.3 % on 4/3/2020 at 1751 EDT.   04/03/2020 24.6 (L) 37.5 - 51.0 % Final      Platlets No results found for: LABPLAT   MCV MCV   Date Value Ref Range Status   04/03/2020 90.6 79.0 - 97.0 fL Final     Comment:     Modified report. Previous result was 91.0 fL on 4/3/2020 at 1751 EDT.   04/03/2020 91.8 79.0 - 97.0 fL Final          Sodium Sodium   Date Value Ref Range Status   04/05/2020 141 136 - 145 mmol/L Final   04/04/2020 140 136 - 145 mmol/L Final   04/03/2020 140 136 - 145 mmol/L Final   04/03/2020 140 136 - 145 mmol/L Final   04/03/2020 141 136 - 145 mmol/L Final   04/02/2020 138 136 - 145 mmol/L Final      Potassium Potassium   Date Value Ref Range Status   04/05/2020 3.6 3.5 - 5.2 mmol/L Final   04/04/2020 4.2 3.5 - 5.2 mmol/L Final   04/04/2020 3.2 (L) 3.5 - 5.2 mmol/L Final   04/03/2020 3.9 3.5 - 5.2 mmol/L Final   04/03/2020 3.5 3.5 - 5.2 mmol/L Final   04/03/2020 3.9 3.5 - 5.2 mmol/L Final   04/02/2020 4.0 3.5 - 5.2 mmol/L Final      Chloride Chloride   Date Value Ref Range Status   04/05/2020 104 98 - 107 mmol/L Final   04/04/2020 101 98 - 107 mmol/L Final   04/03/2020 103 98 - 107 mmol/L Final   04/03/2020 103 98 - 107 mmol/L Final   04/03/2020 105 98 - 107 mmol/L Final   04/02/2020 101 98 - 107 mmol/L Final      CO2 CO2   Date Value Ref Range Status   04/05/2020 25.0 22.0 - 29.0 mmol/L Final   04/04/2020 25.0 22.0 - 29.0 mmol/L Final   04/03/2020 22.0 22.0 - 29.0 mmol/L Final   04/03/2020 24.0 22.0 - 29.0 mmol/L Final   04/03/2020 23.0 22.0 - 29.0 mmol/L Final   04/02/2020 24.0 22.0 - 29.0 mmol/L Final      BUN BUN   Date Value Ref Range Status   04/05/2020 35 (H) 8 - 23 mg/dL Final   04/04/2020 45 (H) 8 -  23 mg/dL Final   04/03/2020 46 (H) 8 - 23 mg/dL Final   04/03/2020 52 (H) 8 - 23 mg/dL Final   04/03/2020 51 (H) 8 - 23 mg/dL Final   04/02/2020 55 (H) 8 - 23 mg/dL Final      Creatinine Creatinine   Date Value Ref Range Status   04/05/2020 1.83 (H) 0.76 - 1.27 mg/dL Final   04/04/2020 2.40 (H) 0.76 - 1.27 mg/dL Final   04/03/2020 2.35 (H) 0.76 - 1.27 mg/dL Final   04/03/2020 2.80 (H) 0.76 - 1.27 mg/dL Final   04/03/2020 3.01 (H) 0.76 - 1.27 mg/dL Final   04/02/2020 3.13 (H) 0.76 - 1.27 mg/dL Final      Calcium Calcium   Date Value Ref Range Status   04/05/2020 7.6 (L) 8.6 - 10.5 mg/dL Final   04/04/2020 8.0 (L) 8.6 - 10.5 mg/dL Final   04/03/2020 8.2 (L) 8.6 - 10.5 mg/dL Final   04/03/2020 7.9 (L) 8.6 - 10.5 mg/dL Final   04/03/2020 7.7 (L) 8.6 - 10.5 mg/dL Final   04/02/2020 7.7 (L) 8.6 - 10.5 mg/dL Final      PO4 No results found for: CAPO4   Albumin Albumin   Date Value Ref Range Status   04/03/2020 3.60 3.50 - 5.20 g/dL Final   04/03/2020 3.40 (L) 3.50 - 5.20 g/dL Final      Magnesium Magnesium   Date Value Ref Range Status   04/03/2020 1.7 1.6 - 2.4 mg/dL Final   04/03/2020 1.7 1.6 - 2.4 mg/dL Final      Uric Acid No results found for: URICACID        Results Review:     I reviewed the patient's new clinical results.      amLODIPine 5 mg Oral Q24H   citalopram 20 mg Oral Daily   diazePAM 2.5 mg Intravenous Q8H   donepezil 10 mg Oral Nightly   doxycycline 100 mg Intravenous Q12H   heparin (porcine) 5,000 Units Subcutaneous Q8H   insulin regular 0-7 Units Subcutaneous 4x Daily AC & at Bedtime   ipratropium-albuterol 3 mL Nebulization Q4H - RT   Morphine 60 mg Oral Q12H   pantoprazole 40 mg Intravenous Q AM   PRO-STAT 30 mL Nasogastric BID   QUEtiapine 25 mg Oral Q12H   rosuvastatin 40 mg Oral Nightly   sodium chloride 10 mL Intravenous Q12H   terazosin 5 mg Oral Nightly       lactated ringers 100 mL/hr Last Rate: Stopped (04/03/20 1600)       Medication Review:     Assessment/Plan       CARLTON on CKD (baseline sCr  1.4-1.6)     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    2- Hx of MGUS   3- CKD stage III - baseline Scr 1.7   4- Anemia     Plan:  - Continue with current.   - Avoid nephrotoxic agents.   - renal diet.   - Adjust meds per renal function.       Ubaldo Wheeler MD  04/05/20  08:58

## 2020-04-05 NOTE — PROGRESS NOTES
Intensive Care Follow-up     Hospital:  LOS: 5 days   Mr. Joseph Rodriguez, 74 y.o. male is followed for:   Acute kidney injury superimposed on chronic kidney disease (CMS/HCC)            History of present illness:   Joseph Rodriguez is a 74 y.o. male admitted to [facility] on 3/31 from [facility] for management of CARLTON, encephalopathy, and hypoglycemia.      Patient was previously admitted to our facility from 2/2-2/4 for dizziness and usnteady gait work-up revealing BPV also found to be in paroxsymal AF. He was seen by neurology placed on meclizine and cardiology discharged on Eliquis and Plavix.      Evidently the patient was evaluated by his PCP a few weeks ago placed on a statin. He additionally takes bumex. Over the past week the wife reports increased somnolence, decreased appetite, and fall with unknown contact to the head.      On 3/31 he was taken to OSH ED with complaints of hypoglycemia in the 30s. Labs showed Cr 9.3, , K 4.8, , and Ca 7.1. EKG sinus bradycardia with 1st degree AVB and negative troponins. CT A/P displayed GB distention, non-obstructing L renal stone, colonic stool retention, and diverticulosis. UA + blood and bacteria given empiric Zosyn and IVF.      The patient was transferred to [facility] for nephrology evaluation and higher level of care. Placed on LR and Rocephin with Cr slightly improved at 7.8. Initial BG 28 given additional hypoglycemic management with D5W fluids with overall improvement. ABG showed 7.10/56/93/17 placed on BiPAP and bicarbonate gtt that was later discontinued per nephrology's recommendation. On arrival he was somnolent with UDS + BZD/opiates not improved with Romazicon/Narcan and CT head benign apart from a large left mastoid effusion noted on previous 2/2 imaging.      The evening on 3/31 the patient was transferred to the ICU for ongoing mixed acidosis and somnolence.  Was given Narcan on the floor and then started on a Narcan drip.  Patient  became agitated, trying to climb out of bed.  Has been fighting use of his BiPAP.      Of note the patient was recently seen by APRN with [facility] for management of COPD with exacerbation. PFTs in 10/2019 as follows: FVC 3.26, 80%; FEV1 1.65, 56%; and ratio 51%.      Subjective   Interval History:  Patient is doing quite well this morning.  He is up in the chair eating breakfast without difficulty.  Per nursing has been able to walk up and on the hallways multiple times a day.  He continues to be on Seroquel has not required any further Precedex.  Overall mental status improving and renal function is gradually improving as well.  We are titrating up his home narcotics which he is on a significant amount to see what he will need at the time of discharge.             The patient's past medical, surgical and social history were reviewed and updated in Epic as appropriate.       Objective     Infusions:    lactated ringers 100 mL/hr Last Rate: Stopped (04/03/20 1600)     Medications:    amLODIPine 5 mg Oral Q24H   citalopram 20 mg Oral Daily   diazePAM 2.5 mg Intravenous Q8H   donepezil 10 mg Oral Nightly   heparin (porcine) 5,000 Units Subcutaneous Q8H   insulin regular 0-7 Units Subcutaneous 4x Daily AC & at Bedtime   ipratropium-albuterol 3 mL Nebulization Q4H - RT   Morphine 60 mg Oral Q12H   pantoprazole 40 mg Intravenous Q AM   PRO-STAT 30 mL Nasogastric BID   QUEtiapine 25 mg Oral Q12H   rosuvastatin 40 mg Oral Nightly   sodium chloride 10 mL Intravenous Q12H   terazosin 5 mg Oral Nightly     I reviewed the patient's medications.    Vital Sign Min/Max for last 24 hours  Temp  Min: 98.3 °F (36.8 °C)  Max: 98.9 °F (37.2 °C)   BP  Min: 123/60  Max: 161/63   Pulse  Min: 40  Max: 86   Resp  Min: 20  Max: 26   SpO2  Min: 94 %  Max: 99 %   Flow (L/min)  Min: 2  Max: 2       Input/Output for last 24 hour shift  04/04 0701 - 04/05 0700  In: 1890 [P.O.:1460; I.V.:310]  Out: 2825 [Urine:2825]      GENERAL : NAD,  conversant  RESPIRATORY/THORAX : normal respiratory effort and no intercostal retractions, clear to auscultation bilaterally  CARDIOVASCULAR : Normal S1/S2, RRR. 1+ lower ext edema.  GASTROINTESTINAL : Soft, NT/ND. BS x 4 normoactive. No hepatosplenomegaly.  MUSCULOSKELETAL : No cyanosis, clubbing, or ischemia  NEUROLOGICAL: alert and oriented to person, place but not time  PSYCHOLOGICAL : Appropriate affect    Results from last 7 days   Lab Units 04/03/20 1739 04/03/20 0335 04/01/20 0333   WBC 10*3/mm3 7.39 6.67 5.74   HEMOGLOBIN g/dL 8.0* 7.5* 7.9*   PLATELETS 10*3/mm3 141 136* 145     Results from last 7 days   Lab Units 04/05/20 0338 04/04/20 1752 04/04/20 0347 04/03/20 1739 04/03/20 0335 04/02/20  0637  04/01/20 0333   SODIUM mmol/L 141  --  140 140   < > 141   < > 140   < > 142   POTASSIUM mmol/L 3.6 4.2 3.2* 3.9   < > 3.9   < > 4.4   < > 4.3   CO2 mmol/L 25.0  --  25.0 22.0   < > 23.0   < > 23.0   < > 21.0*   BUN mg/dL 35*  --  45* 46*   < > 51*   < > 66*   < > 90*   CREATININE mg/dL 1.83*  --  2.40* 2.35*   < > 3.01*   < > 4.26*   < > 6.49*   MAGNESIUM mg/dL  --   --   --   --   --  1.7  1.7  --  2.1  --  2.8*   PHOSPHORUS mg/dL  --   --   --   --   --  3.0  3.0  --  4.3  --  7.8*   GLUCOSE mg/dL 117*  --  161* 250*   < > 218*   < > 233*   < > 68    < > = values in this interval not displayed.     Estimated Creatinine Clearance: 48.2 mL/min (A) (by C-G formula based on SCr of 1.83 mg/dL (H)).    Results from last 7 days   Lab Units 04/03/20 1753   PH, ARTERIAL pH units 7.473*   PCO2, ARTERIAL mm Hg 32.9*   PO2 ART mm Hg 97.2       I reviewed the patient's new clinical results.  I reviewed the patient's new imaging results/reports including actual images and agree with reports.              Imaging Results (Last 24 Hours)     Procedure Component Value Units Date/Time    XR Abdomen KUB [[id number]] Collected:  04/03/20 1836     Updated:  04/04/20 0926    Narrative:          EXAMINATION: XR ABDOMEN  KUB - 04/03/2020     INDICATION: Follow up ileus.      COMPARISON: 04/02/2020     FINDINGS: KUB reveals feeding tube in satisfactory position with bowel  gas pattern unchanged with several dilated loops of small bowel  identified in the left flank. Findings may suggest a mild ileus. There  is no significant bowel distention. There is air seen in the colon.  Degenerative changes seen within the spine.       Impression:       Feeding tube in satisfactory position with mild air-filled  loops of small bowel in the left flank suggesting possibly a mild ileus,  however, no significant distention is identified. There is air within  the colon. Findings are stable and unchanged. No free intraperitoneal  air.     DICTATED:   04/03/2020  EDITED/ls :   04/03/2020      This report was finalized on 4/4/2020 9:23 AM by Dr. Aisha Segura MD.             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     Monoclonal gammopathy    T2DM on oral agents     COPD     CAD s/p stent     BPH     Acute encephalopathy    Polypharmacy    Hypoglycemia    Acute mixed acidosis    Rhabdomyolysis    Paroxysmal A-fib    Acute on chronic respiratory failure with hypercapnia    BPV     Chronic anticoagulation on Eliquis     Former smoker    GAGE non-complaint w/CPAP     Chronic narcotic use       Plan        74-year-old male with past medical history significant for CKD stage III, monoclonal gammopathy, type 2 diabetes mellitus on oral hypoglycemic agents, COPD, coronary artery disease, and atrial fibrillation.  Who presented to the ICU on 3/31/2020 with acute metabolic encephalopathy secondary to hypoglycemia and acute on chronic hypoxic respiratory failure secondary to hypercapnia in the setting of severe renal dysfunction, with severe metabolic abnormalities.     1. Continue BiPAP as needed, will break to the cannula as able  2. Continue to hold IV fluids and  monitor renal function.  Patient with signs of decompensated heart failure on 4/3/2020 and received 1 dose of Lasix.  With resolution of symptoms  3. Continue to advance p.o. diet, discontinue Keofeed  4. Completed 5 days of therapy for COPD exacerbation and community-acquired pneumonia.  5. Continue current insulin regimen  6. Continue current volume at 2.5 mg every 8 hours, home doses 10 mg every 8 hours.  7. Continue the extended release morphine at 60 mg every 12 hours, his home dose is 200 mg every 12 hours.  Currently this seems to be enough to keep his pain at minimal.  He does have oxycodone for breakthrough pain.  He does have significant chronic narcotic abuse.  We will need to make a decision about long-term narcotics prior to discharge.  8. DVT prophylaxis  9. Holding Eliquis in the setting of acute renal failure, subcu heparin for now we will re-evaluate as the patient improves for further anticoagulation.   10. Continue duo nebs  11. Continue Seroquel for delirium, and continue patient's home donepezil  12. PT/OT consulted  13. Patient okay to be moved to the floor, continues to require monitoring of his renal function and titration of his narcotics prior to discharge.    Plan of care and goals reviewed with mulitdisciplinary/antibiotic stewardship team during rounds.   I discussed the patient's findings and my recommendations with patient and nursing staff       Cathleen Clancy,   Pulmonary, Critical care and Sleep Medicine

## 2020-04-05 NOTE — PLAN OF CARE
Problem: Patient Care Overview  Goal: Plan of Care Review  Flowsheets (Taken 4/5/2020 0800)  Progress: improving  Plan of Care Reviewed With: patient  Outcome Summary: Pt ambulated 110 feet using RW and CGA. No rest breaks during gait this session, however, gait limited by SOA and fatigue. STS requiring CGA x2. Will continue to progress strength and mobility as able.

## 2020-04-06 VITALS
OXYGEN SATURATION: 97 % | SYSTOLIC BLOOD PRESSURE: 157 MMHG | DIASTOLIC BLOOD PRESSURE: 80 MMHG | WEIGHT: 279 LBS | HEIGHT: 70 IN | HEART RATE: 84 BPM | TEMPERATURE: 97.4 F | RESPIRATION RATE: 18 BRPM | BODY MASS INDEX: 39.94 KG/M2

## 2020-04-06 PROBLEM — N17.9 ACUTE KIDNEY INJURY SUPERIMPOSED ON CHRONIC KIDNEY DISEASE (HCC): Status: RESOLVED | Noted: 2020-03-31 | Resolved: 2020-04-06

## 2020-04-06 PROBLEM — M62.82 RHABDOMYOLYSIS: Status: RESOLVED | Noted: 2020-03-31 | Resolved: 2020-04-06

## 2020-04-06 PROBLEM — N18.9 ACUTE KIDNEY INJURY SUPERIMPOSED ON CHRONIC KIDNEY DISEASE (HCC): Status: RESOLVED | Noted: 2020-03-31 | Resolved: 2020-04-06

## 2020-04-06 LAB
ALBUMIN SERPL-MCNC: 3.4 G/DL (ref 3.5–5.2)
ALP SERPL-CCNC: 47 U/L (ref 39–117)
ALT SERPL W P-5'-P-CCNC: 16 U/L (ref 1–41)
ANION GAP SERPL CALCULATED.3IONS-SCNC: 13 MMOL/L (ref 5–15)
AST SERPL-CCNC: 12 U/L (ref 1–40)
BILIRUB SERPL-MCNC: 0.5 MG/DL (ref 0.2–1.2)
BUN BLD-MCNC: 30 MG/DL (ref 8–23)
CALCIUM SPEC-SCNC: 7.8 MG/DL (ref 8.6–10.5)
CHLORIDE SERPL-SCNC: 104 MMOL/L (ref 98–107)
CHOLEST SERPL-MCNC: 90 MG/DL (ref 0–200)
CO2 SERPL-SCNC: 24 MMOL/L (ref 22–29)
CREAT BLD-MCNC: 1.66 MG/DL (ref 0.76–1.27)
CRP SERPL-MCNC: 4.4 MG/DL (ref 0–0.5)
GLUCOSE BLD-MCNC: 132 MG/DL (ref 65–99)
GLUCOSE BLDC GLUCOMTR-MCNC: 161 MG/DL (ref 70–130)
MAGNESIUM SERPL-MCNC: 1.7 MG/DL (ref 1.6–2.4)
PHOSPHATE SERPL-MCNC: 3.4 MG/DL (ref 2.5–4.5)
POTASSIUM BLD-SCNC: 3.7 MMOL/L (ref 3.5–5.2)
PREALB SERPL-MCNC: 15.4 MG/DL (ref 20–40)
PROT SERPL-MCNC: 6 G/DL (ref 6–8.5)
SODIUM BLD-SCNC: 141 MMOL/L (ref 136–145)
TRIGL SERPL-MCNC: 90 MG/DL (ref 0–150)

## 2020-04-06 PROCEDURE — 25010000002 DIAZEPAM PER 5 MG: Performed by: INTERNAL MEDICINE

## 2020-04-06 PROCEDURE — 94799 UNLISTED PULMONARY SVC/PX: CPT

## 2020-04-06 PROCEDURE — 84100 ASSAY OF PHOSPHORUS: CPT

## 2020-04-06 PROCEDURE — 84478 ASSAY OF TRIGLYCERIDES: CPT

## 2020-04-06 PROCEDURE — 82465 ASSAY BLD/SERUM CHOLESTEROL: CPT

## 2020-04-06 PROCEDURE — 86140 C-REACTIVE PROTEIN: CPT

## 2020-04-06 PROCEDURE — 83735 ASSAY OF MAGNESIUM: CPT

## 2020-04-06 PROCEDURE — 82962 GLUCOSE BLOOD TEST: CPT

## 2020-04-06 PROCEDURE — 99239 HOSP IP/OBS DSCHRG MGMT >30: CPT | Performed by: INTERNAL MEDICINE

## 2020-04-06 PROCEDURE — 97116 GAIT TRAINING THERAPY: CPT | Performed by: PHYSICAL THERAPIST

## 2020-04-06 PROCEDURE — 84134 ASSAY OF PREALBUMIN: CPT

## 2020-04-06 PROCEDURE — 80053 COMPREHEN METABOLIC PANEL: CPT

## 2020-04-06 PROCEDURE — 25010000002 HEPARIN (PORCINE) PER 1000 UNITS: Performed by: INTERNAL MEDICINE

## 2020-04-06 PROCEDURE — 63710000001 INSULIN REGULAR HUMAN PER 5 UNITS: Performed by: INTERNAL MEDICINE

## 2020-04-06 RX ORDER — QUETIAPINE FUMARATE 25 MG/1
25 TABLET, FILM COATED ORAL NIGHTLY
Qty: 30 TABLET | Refills: 1 | Status: SHIPPED | OUTPATIENT
Start: 2020-04-06

## 2020-04-06 RX ORDER — AMLODIPINE BESYLATE 10 MG/1
10 TABLET ORAL
Qty: 30 TABLET | Refills: 1 | Status: SHIPPED | OUTPATIENT
Start: 2020-04-07 | End: 2020-11-17

## 2020-04-06 RX ORDER — BUMETANIDE 1 MG/1
1 TABLET ORAL DAILY
Qty: 30 TABLET | Refills: 5 | Status: SHIPPED | OUTPATIENT
Start: 2020-04-06

## 2020-04-06 RX ORDER — OXYCODONE HYDROCHLORIDE 10 MG/1
10 TABLET ORAL EVERY 6 HOURS PRN
Qty: 20 TABLET | Refills: 0 | Status: SHIPPED | OUTPATIENT
Start: 2020-04-06 | End: 2020-04-11

## 2020-04-06 RX ORDER — MORPHINE SULFATE 60 MG/1
60 TABLET, FILM COATED, EXTENDED RELEASE ORAL EVERY 12 HOURS SCHEDULED
Qty: 15 TABLET | Refills: 0 | Status: SHIPPED | OUTPATIENT
Start: 2020-04-06 | End: 2020-04-14

## 2020-04-06 RX ORDER — MECLIZINE HCL 12.5 MG/1
12.5 TABLET ORAL 3 TIMES DAILY PRN
Qty: 30 TABLET | Refills: 0 | Status: SHIPPED | OUTPATIENT
Start: 2020-04-06

## 2020-04-06 RX ORDER — CLOPIDOGREL BISULFATE 75 MG/1
75 TABLET ORAL DAILY
Qty: 90 TABLET | Refills: 0 | Status: SHIPPED | OUTPATIENT
Start: 2020-04-06

## 2020-04-06 RX ORDER — TERAZOSIN 5 MG/1
5 CAPSULE ORAL NIGHTLY
Qty: 30 CAPSULE | Refills: 1 | Status: SHIPPED | OUTPATIENT
Start: 2020-04-06

## 2020-04-06 RX ADMIN — IPRATROPIUM BROMIDE AND ALBUTEROL SULFATE 3 ML: 2.5; .5 SOLUTION RESPIRATORY (INHALATION) at 13:05

## 2020-04-06 RX ADMIN — HEPARIN SODIUM 5000 UNITS: 5000 INJECTION, SOLUTION INTRAVENOUS; SUBCUTANEOUS at 05:11

## 2020-04-06 RX ADMIN — PANTOPRAZOLE SODIUM 40 MG: 40 TABLET, DELAYED RELEASE ORAL at 05:11

## 2020-04-06 RX ADMIN — CITALOPRAM HYDROBROMIDE 20 MG: 20 TABLET ORAL at 09:36

## 2020-04-06 RX ADMIN — INSULIN HUMAN 2 UNITS: 100 INJECTION, SOLUTION PARENTERAL at 09:35

## 2020-04-06 RX ADMIN — IPRATROPIUM BROMIDE AND ALBUTEROL SULFATE 3 ML: 2.5; .5 SOLUTION RESPIRATORY (INHALATION) at 07:57

## 2020-04-06 RX ADMIN — MORPHINE SULFATE 60 MG: 30 TABLET, FILM COATED, EXTENDED RELEASE ORAL at 10:39

## 2020-04-06 RX ADMIN — AMLODIPINE BESYLATE 10 MG: 10 TABLET ORAL at 09:36

## 2020-04-06 RX ADMIN — DIAZEPAM 2.5 MG: 5 INJECTION, SOLUTION INTRAMUSCULAR; INTRAVENOUS at 09:36

## 2020-04-06 RX ADMIN — QUETIAPINE FUMARATE 25 MG: 25 TABLET ORAL at 09:36

## 2020-04-06 RX ADMIN — APIXABAN 2.5 MG: 2.5 TABLET, FILM COATED ORAL at 12:04

## 2020-04-06 NOTE — PLAN OF CARE
Problem: Patient Care Overview  Goal: Plan of Care Review  4/6/2020 1316 by Rosalia Tolliver, PT  Outcome: Outcome(s) achieved  Flowsheets (Taken 4/6/2020 1059)  Outcome Summary: Pt progressing well towards skilled PT goals.  Pt stood with SBA, ambulated 244 feet without AD with CGA/MinAx1, and transferred sit-->supine modified independently.  Pt mainly required CGA with ambulation - however, 2 LOB's experienced requiring Emani to correct.  Pt completed BLE ther ex without complaint.  Continue with skilled PT to improve mobility and safety.

## 2020-04-06 NOTE — PLAN OF CARE
Pt resting in bed notified chaparro mcconnell about low HR , pt  asymptomatic bp 155/80's.Will continue to  monitor

## 2020-04-06 NOTE — PROGRESS NOTES
Continued Stay Note  [facility]     Patient Name: Joseph Rodriguez  MRN: [id number]  Today's Date: 4/6/2020    Admit Date: 3/31/2020    Discharge Plan     Row Name 04/06/20 1017       Plan    Plan  HOME    Patient/Family in Agreement with Plan  yes    Plan Comments  Met with pt at bedside to f/u DCP.  Tolerating diet.  Working well with therapy.  Weaning O2.  Pt denies any need for HH.  Plan is home with wife upon DC.  No immediate needs identified/voiced.  CM will cont to follow.    Final Discharge Disposition Code  01 - home or self-care        Discharge Codes    No documentation.       Expected Discharge Date and Time     Expected Discharge Date Expected Discharge Time    Apr 8, 2020             Sarah Gamboa RN

## 2020-04-06 NOTE — DISCHARGE SUMMARY
[facility] Medicine Services  DISCHARGE SUMMARY    Patient Name: Joseph Rodriguez  : 1945  MRN: [id number]    Date of Admission: 3/31/2020 12:01 PM  Date of Discharge: 2020  Primary Care Physician: Dandy Bailey MD    Consults     Date and Time Order Name Status Description    3/31/2020 1411 Inpatient Nephrology Consult            Hospital Course     Presenting Problem:   CARLTON (acute kidney injury) (CMS/HCC) [N17.9]  CARLTON (acute kidney injury) (CMS/HCC) [N17.9]    Active Hospital Problems    Diagnosis  POA   • Chronic narcotic use [F11.90]  Unknown   • Acute encephalopathy [G93.40]  Yes   • Polypharmacy [Z79.899]  Not Applicable   • Hypoglycemia [E16.2]  Yes   • Acute mixed acidosis [E87.2]  Yes   • Paroxysmal A-fib [I48.0]  Yes   • Acute on chronic respiratory failure with hypercapnia [J96.22]  Yes   • BPV  [H81.10]  Yes   • Chronic anticoagulation on Eliquis  [Z79.01]  Not Applicable   • Former smoker [Z87.891]  Not Applicable   • GAGE non-complaint w/CPAP  [G47.33]  Yes   • BPH  [N40.0]  Yes   • CAD s/p stent  [I25.10]  Yes   • COPD  [J44.9]  Yes   • T2DM on oral agents  [E11.9]  Yes   • 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 male who was admitted to [facility] on 3/31/2020 from [facility] for management of acute kidney injury, encephalopathy and hypoglycemia.  Patient was previously admitted in 2020 for dizziness and unsteady gait and was found to be in paroxysmal A. fib.  He was seen by neurology and placed on meclizine.  Cardiology had discharged him on Eliquis and Plavix.    Apparently, the patient was evaluated by his PCP a few  weeks ago and had been put on a statin.  He was also taking Bumex and arb.  Over the past week the patient has become increasingly somnolent, with decreased appetite and fell at home.    On 3/31, he was taken to [facility] ER with complaints of hypoglycemia.  Labs showed creatinine of 9.3 and .  UA was positive for blood and bacteria he was started on antibiotics and IV fluids.  Patient was transferred to [facility] for nephrology evaluation.  On arrival he was somnolent with a positive UDS that did not improve with flumazenil/Narcan.  CT head was negative.  The patient was then transferred to the ICU on 331 and started on a Narcan drip the patient was agitated and tried to climb out of bed.  His ABG demonstrated an elevated PCO2 he was placed on BiPAP.  He was transferred from ICU to the general medical floor on 4/5/2020.  His dose of pain medicines were reduced and he is doing well on that regimen.  Eliquis was restarted today.  I asked him to follow-up with PCP regarding restart of Plavix.  His blood pressure medications were adjusted.  He needs a repeat renal panel in 1 week      Discharge Follow Up Recommendations for outpatient labs/diagnostics:  Renal panel in 1 week    Day of Discharge     HPI:   Doing well no complaints, wants to go home    Review of Systems  Gen- No fevers, chills  CV- No chest pain, palpitations  Resp- No cough, dyspnea  GI- No N/V/D, abd pain      Vital Signs:   Temp:  [97.4 °F (36.3 °C)-98.5 °F (36.9 °C)] 97.4 °F (36.3 °C)  Heart Rate:  [43-75] 57  Resp:  [18-24] 18  BP: (118-163)/(56-98) 157/80     Physical Exam:  Constitutional: No acute distress, awake, alert  HENT: NCAT, mucous membranes moist  Respiratory: Clear to auscultation bilaterally, respiratory effort normal   Cardiovascular: RRR, no murmurs  Gastrointestinal: Positive bowel sounds, soft, nontender, nondistended  Musculoskeletal: No bilateral ankle edema  Psychiatric: Appropriate affect, cooperative  Neurologic:  Oriented x 3, speech clear  Skin: No rashes    Pertinent  and/or Most Recent Results     Results from last 7 days   Lab Units 04/06/20 0347 04/05/20 0338 04/04/20  1752 04/04/20 0347 04/03/20  1739 04/03/20  0739 04/03/20  0335 04/02/20 1940 04/01/20 0333 03/31/20 2014 03/31/20  1256   WBC 10*3/mm3  --   --   --   --  7.39  --  6.67  --   --  5.74  --  8.22  --  7.65   HEMOGLOBIN g/dL  --   --   --   --  8.0*  --  7.5*  --   --  7.9*  --  8.0*  --  7.8*   HEMATOCRIT %  --   --   --   --  25.0*  --  24.6*  --   --  25.6*  --  26.2*  --  26.3*   PLATELETS 10*3/mm3  --   --   --   --  141  --  136*  --   --  145  --  134*  --  136*   SODIUM mmol/L 141 141  --  140 140 140 141 138   < > 142   < > 136   < > 136   POTASSIUM mmol/L 3.7 3.6 4.2 3.2* 3.9 3.5 3.9 4.0   < > 4.3   < > 4.7   < > 4.4   CHLORIDE mmol/L 104 104  --  101 103 103 105 101   < > 104   < > 102   < > 101   CO2 mmol/L 24.0 25.0  --  25.0 22.0 24.0 23.0 24.0   < > 21.0*   < > 14.0*   < > 17.0*   BUN mg/dL 30* 35*  --  45* 46* 52* 51* 55*   < > 90*   < > 100*   < > 101*   CREATININE mg/dL 1.66* 1.83*  --  2.40* 2.35* 2.80* 3.01* 3.13*   < > 6.49*   < > 7.83*   < > 8.64*   GLUCOSE mg/dL 132* 117*  --  161* 250* 190* 218* 236*   < > 68   < > 150*   < > 122*   CALCIUM mg/dL 7.8* 7.6*  --  8.0* 8.2* 7.9* 7.7* 7.7*   < > 7.1*   < > 6.7*   < > 6.9*    < > = values in this interval not displayed.     Results from last 7 days   Lab Units 04/06/20 0347 04/03/20 1739 04/03/20 0335 04/01/20 0333 03/31/20 2014 03/31/20  1256   BILIRUBIN mg/dL 0.5 0.4 0.3 0.2 0.3 0.3   ALK PHOS U/L 47 53 50 57 56 56   ALT (SGPT) U/L 16 18 16 19 21 20   AST (SGOT) U/L 12 26 25 74* 77* 77*   PROTIME Seconds  --   --   --   --   --  15.5*   INR   --   --   --   --   --  1.26*     Results from last 7 days   Lab Units 04/06/20 0347 04/03/20 0335   CHOLESTEROL mg/dL 90 89   TRIGLYCERIDES mg/dL 90 114     Results from last 7 days   Lab Units 04/03/20 1739 04/01/20 0333  03/31/20 2014 03/31/20  1256   TSH uIU/mL  --  0.338 0.340  --   --    HEMOGLOBIN A1C %  --   --   --   --  7.60*   TROPONIN T ng/mL  --   --  0.015  --   --    PROCALCITONIN ng/mL  --   --  0.17  --   --    LACTATE mmol/L 1.1 0.8 0.5   < >  --     < > = values in this interval not displayed.       Brief Urine Lab Results  (Last result in the past 365 days)      Color   Clarity   Blood   Leuk Est   Nitrite   Protein   CREAT   Urine HCG        03/31/20 1331             99.9       03/31/20 1331 Yellow Cloudy Large (3+) Small (1+) Negative 100 mg/dL (2+)               Microbiology Results Abnormal     Procedure Component Value - Date/Time    Eosinophil Smear - Urine, Urine, Clean Catch [[id number]]  (Normal) Collected:  03/31/20 1331    Lab Status:  Final result Specimen:  Urine, Clean Catch Updated:  03/31/20 1516     Eosinophil Smear 0 % EOS/100 Cells     Narrative:       No eosinophil seen          Imaging Results (All)     Procedure Component Value Units Date/Time    XR Abdomen KUB [[id number]] Collected:  04/03/20 1836     Updated:  04/04/20 0926    Narrative:          EXAMINATION: XR ABDOMEN KUB - 04/03/2020     INDICATION: Follow up ileus.      COMPARISON: 04/02/2020     FINDINGS: KUB reveals feeding tube in satisfactory position with bowel  gas pattern unchanged with several dilated loops of small bowel  identified in the left flank. Findings may suggest a mild ileus. There  is no significant bowel distention. There is air seen in the colon.  Degenerative changes seen within the spine.       Impression:       Feeding tube in satisfactory position with mild air-filled  loops of small bowel in the left flank suggesting possibly a mild ileus,  however, no significant distention is identified. There is air within  the colon. Findings are stable and unchanged. No free intraperitoneal  air.     DICTATED:   04/03/2020  EDITED/ls :   04/03/2020      This report was finalized on 4/4/2020 9:23 AM by Dr. Leonard  MD Col[place].       XR Abdomen KUB [[id number]] Collected:  04/02/20 1237     Updated:  04/02/20 1244    Narrative:       EXAMINATION: XR ABDOMEN KUB-     INDICATION: Keofeed placement     COMPARISON: NONE     FINDINGS: Feeding tube is seen passing into the proximal third portion  of duodenum. There is diffusely increased bowel gas suggesting a mild  generalized ileus.          Impression:       Feeding tube in the proximal third portion of the duodenum.  Suspected mild ileus.         This report was finalized on 4/2/2020 12:41 PM by Dr. David Rivera MD.       XR Chest 1 View [[id number]] Collected:  04/01/20 0835     Updated:  04/01/20 1017    Narrative:       EXAMINATION: XR CHEST 1 VW-04/01/2020:     INDICATION: Respiratory Distress.     COMPARISON: 03/31/2020.     FINDINGS: The heart is enlarged. The vasculature appears upper limits of  normal. Mild generalized coarsening of the pulmonary interstitial  markings is similar to the prior study. No new pulmonary parenchymal  disease is seen. No significant effusion or pneumothorax is appreciated.       Impression:       Cardiomegaly and perhaps mild pulmonary venous hypertension.  No other evidence of active chest disease.      D:  04/01/2020  E:  04/01/2020     This report was finalized on 4/1/2020 10:14 AM by Dr. David Rivera MD.        Renal Limited [[id number]] Collected:  04/01/20 0830     Updated:  04/01/20 0910    Narrative:       EXAMINATION: US RENAL, LIMITED-03/31/2020:     INDICATION: Elevated BUN and CR.  Renal insufficiency.     TECHNIQUE: Sonographic imaging was obtained of the kidneys in both the  sagittal and transverse planes.     COMPARISON: NONE.     FINDINGS: The right kidney measures in length from pole to pole 11.5 cm.  There is a cyst identified in the right kidney measuring 3.0 cm. No  solid mass identified. No hydronephrosis or nephrolithiasis.     The left kidney measures in length from pole to pole 12.8 cm. There is  no solid cortical mass or  renal cortical cysts seen within the left  kidney. No hydronephrosis or nephrolithiasis. A Mckeon catheter balloon  is present within the bladder. There is incomplete distention.       Impression:       Small right renal cortical cyst. The remainder of the renal  ultrasound is grossly unremarkable.     D:  04/01/2020  E:  04/01/2020            This report was finalized on 4/1/2020 9:06 AM by Dr. Aisha Segura MD.       US Gallbladder [[id number]] Collected:  04/01/20 0836     Updated:  04/01/20 0910    Narrative:       EXAMINATION: US GALLBLADDER-03/31/2020:     INDICATION: Followup distended GB seen on outside CT scan, followup  distended gallbladder.     TECHNIQUE: Sonographic imaging was obtained of the right upper quadrant  in both the sagittal and transverse planes. The examination is  suboptimal due to overlying bowel gas.     COMPARISON: NONE.     FINDINGS: The pancreas is homogeneous in appearance in its visualized  portions with no focal mass or abnormal fluid collection. The liver is  homogeneous and normal in echotexture and echogenicity with no focal  mass or intrahepatic biliary ductal dilatation. The gallbladder reveals  no definite stones, no wall thickening with sludge seen in the dependent  portion. There is no pericholecystic fluid. There is mild distention of  the gallbladder. The common bile duct measures 3 mm. The right kidney is  normal in size, configuration, and texture measuring in length from pole  to pole 11.5 cm. No solid cortical mass seen within the right kidney.  Small renal cortical cyst measuring 2.7 cm. No hydronephrosis or  nephrolithiasis.       Impression:       Mild distention of the gallbladder with sludge. There is no  evidence of wall thickening or biliary ductal dilatation. No evidence of  stones. Small cyst on the right kidney.     D:  04/01/2020  E:  04/01/2020            This report was finalized on 4/1/2020 9:06 AM by Dr. Aisha Segura MD.       XR Chest 1 View  [[id number]] Collected:  03/31/20 2030     Updated:  03/31/20 2033    Narrative:       CR Chest 1 Vw    INDICATION:   Dyspnea beginning prior to arrival     COMPARISON:    3/31/2020 earlier in the day    FINDINGS:  Single portable AP view(s) of the chest.        Since previous examination no changes are seen. Again noted is volume loss at the right lung base. No new infiltrates are seen. Vascular markings are normal and no effusions are seen.       Impression:       No new infiltrates are seen since the previous examination.    Signer Name: Vicente Wilkins MD   Signed: 3/31/2020 8:30 PM   Workstation Name: [id number]    [facility]    CT Head Without Contrast [[id number]] Collected:  03/31/20 1702     Updated:  03/31/20 1759    Narrative:       EXAMINATION: CT HEAD WO CONTRAST- 03/31/2020     INDICATION: confusion, s/p fall, rule out intracranial disease/hematoma      TECHNIQUE: CT head without intravenous contrast     The radiation dose reduction device was turned on for each scan per the  ALARA (As Low as Reasonably Achievable) protocol.     COMPARISON: MRI brain dated 02/02/2020, CT head 02/20/2020.     FINDINGS: Midline structures are symmetric without evidence of mass,  mass effect or midline shift. Ventricles and sulci demonstrate stable  appearance from prior without hydrocephalus or focal atrophy  development. Mildly asymmetric and persistent calcifications of the  basal ganglia of normal aging variance. No intra-axial hemorrhage or  extra-axial fluid collection. Globes and orbits unremarkable. Visualized  paranasal sinuses and mastoid air cells are grossly clear and  well-pneumatized with the exception of a small left maxillary mucus  retention cyst and mild mucosal thickening of the posterior wall right  maxillary sinus. Large left mastoid effusion. Calvarium intact.       Impression:       1. No acute intracranial abnormality specifically no acute intracranial  hemorrhage. Calvarium  intact.      2. Large left mastoid effusion stable from prior.     D:  03/31/2020  E:  03/31/2020     This report was finalized on 3/31/2020 5:56 PM by Dr. Álvaro Cooper.       XR Chest 1 View [[id number]] Collected:  03/31/20 1332     Updated:  03/31/20 1756    Narrative:       EXAMINATION: XR CHEST 1 VW- 03/31/2020     INDICATION: requires oxygen      COMPARISON: Chest x-ray to 02/02/2020     FINDINGS: Cardiomegaly with increased prominence from prior comparison  along with increased pulmonary vascularity and perihilar prominence of  vascular congestion and/or peribronchial inflammatory process.  Additionally noted increased opacifications obscuring the right  hemidiaphragm margin in the right infrahilar region concerning for  developing airspace disease. No pneumothorax or pleural effusion.       Impression:       Cardiomegaly with increased prominence from prior comparison  along with increased pulmonary vascularity and perihilar prominence of  vascular congestion and/or peribronchial inflammatory process.  Additionally noted increased opacifications obscuring the right  hemidiaphragm margin in the right infrahilar region concerning for  developing airspace disease. No pneumothorax or pleural effusion.     D:  03/31/2020  E:  03/31/2020     This report was finalized on 3/31/2020 5:53 PM by Dr. Álvaro Cooper.             Results for orders placed during the hospital encounter of 02/02/20   Duplex Carotid Ultrasound CAR    Narrative · Right internal carotid artery stenosis of 50-69%.  · Left internal carotid artery stenosis of 50-69%.  · Nominal antegrade bilateral vertebral artery flow demonstrated.          Results for orders placed during the hospital encounter of 02/02/20   Duplex Carotid Ultrasound CAR    Narrative · Right internal carotid artery stenosis of 50-69%.  · Left internal carotid artery stenosis of 50-69%.  · Nominal antegrade bilateral vertebral artery flow demonstrated.          Results for orders  placed during the hospital encounter of 03/31/20   Adult Transthoracic Echo Complete W/ Cont if Necessary Per Protocol    Narrative · Calculated EF = 67.0%  · Left ventricular systolic function is normal.  · Left ventricular diastolic dysfunction (grade II) consistent with   pseudonormalization.  · Right ventricular cavity is mild-to-moderately dilated.  · Mild to moderate aortic valve stenosis is present.  · Mild tricuspid valve regurgitation is present.  · Calculated right ventricular systolic pressure from tricuspid   regurgitation is 29 mmHg.          Plan for Follow-up of Pending Labs/Results: PCP in 1 week to review RFP    Discharge Details        Discharge Medications      New Medications      Instructions Start Date   amLODIPine 10 MG tablet  Commonly known as:  NORVASC   10 mg, Oral, Every 24 Hours Scheduled   Start Date:  April 7, 2020     oxyCODONE 10 MG tablet  Commonly known as:  ROXICODONE   10 mg, Oral, Every 6 Hours PRN      QUEtiapine 25 MG tablet  Commonly known as:  SEROquel   25 mg, Oral, Nightly      terazosin 5 MG capsule  Commonly known as:  HYTRIN   5 mg, Oral, Nightly         Changes to Medications      Instructions Start Date   bumetanide 1 MG tablet  Commonly known as:  BUMEX  What changed:  additional instructions   1 mg, Oral, Daily, Hold until seen by pcp      clopidogrel 75 MG tablet  Commonly known as:  PLAVIX  What changed:  additional instructions   75 mg, Oral, Daily, Hold until seen by pcp      meclizine 12.5 MG tablet  Commonly known as:  ANTIVERT  What changed:    · medication strength  · how much to take  · when to take this  · reasons to take this   12.5 mg, Oral, 3 Times Daily PRN      Morphine 60 MG 12 hr tablet  Commonly known as:  MS CONTIN  What changed:    · medication strength  · how much to take  · when to take this   60 mg, Oral, Every 12 Hours Scheduled         Continue These Medications      Instructions Start Date   albuterol sulfate  (90 Base) MCG/ACT  inhaler  Commonly known as:  PROVENTIL HFA;VENTOLIN HFA;PROAIR HFA   2 puffs, Inhalation, Every 4 Hours PRN      apixaban 2.5 MG tablet tablet  Commonly known as:  ELIQUIS   2.5 mg, Oral, 2 Times Daily      budesonide-formoterol 160-4.5 MCG/ACT inhaler  Commonly known as:  SYMBICORT   2 puffs, Inhalation, 2 Times Daily      citalopram 20 MG tablet  Commonly known as:  CeleXA   20 mg, Oral, Daily      Co Q 10 100 MG capsule   200 mg, Oral, Daily      docusate sodium 100 MG capsule  Commonly known as:  COLACE   100 mg, Oral, 2 Times Daily      donepezil 10 MG tablet  Commonly known as:  ARICEPT   10 mg, Oral, Nightly      doxazosin 4 MG tablet  Commonly known as:  CARDURA   TAKE 1 TABLET BY MOUTH DAILY      finasteride 5 MG tablet  Commonly known as:  PROSCAR   5 mg, Oral, Daily      insulin glargine 100 UNIT/ML injection  Commonly known as:  LANTUS   19 Units, Subcutaneous, Nightly      linagliptin 5 MG tablet tablet  Commonly known as:  TRADJENTA   5 mg, Oral, Daily      melatonin 5 MG tablet tablet   5 mg, Oral, Nightly      nitroglycerin 0.4 MG SL tablet  Commonly known as:  NITROSTAT   DISSOLVE 1 TABLET(S) UNDER THE TONGUE MAY REPEAT EVERY 5 MINUTES. MAXIMUM OF 3 DOSES IN 15 MINUTES      pantoprazole 40 MG EC tablet  Commonly known as:  PROTONIX   40 mg, Oral, 2 Times Daily         Stop These Medications    diazePAM 10 MG tablet  Commonly known as:  VALIUM     famotidine 40 MG tablet  Commonly known as:  PEPCID     glipizide 10 MG 24 hr tablet  Commonly known as:  GLUCOTROL XL     rosuvastatin 40 MG tablet  Commonly known as:  CRESTOR     valsartan 320 MG tablet  Commonly known as:  DIOVAN            Allergies   Allergen Reactions   • Atorvastatin    • Gabapentin Dizziness   • Sulfa Antibiotics          Discharge Disposition:  Home or Self Care    Diet:  Hospital:  Diet Order   Procedures   • Diet Regular; Cardiac, Consistent Carbohydrate       Activity:  as tolerated    Restrictions or Other  Recommendations:  none       CODE STATUS:    Code Status and Medical Interventions:   Ordered at: 03/31/20 1411     Code Status:    CPR     Medical Interventions (Level of Support Prior to Arrest):    Full       Future Appointments   Date Time Provider Department Center   4/13/2020 11:30 AM ZARINA DENISE CT 1  [facility] [place]   4/27/2020  9:30 AM [facility], PFT LAB 1 [facility] None   4/27/2020 10:00 AM Ivania Delcid APRN [facility] None       Additional Instructions for the Follow-ups that You Need to Schedule     Renal Function Panel    Apr 11, 2020 (Approximate)            Time Spent on Discharge:  48 minutes    Electronically signed by Conchis Almanzar DO, 04/06/20, 11:52 AM.

## 2020-04-06 NOTE — THERAPY TREATMENT NOTE
Patient Name: Joseph Rodriguez  : 1945    MRN: [id number]                              Today's Date: 2020       Admit Date: 3/31/2020    Visit Dx:     ICD-10-CM ICD-9-CM   1. 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    • Anemia   • Acute tubular necrosis (CMS/HCC)   • B12 deficiency   • Monoclonal gammopathy   • CKD (chronic kidney disease), stage III (CMS/HCC)   • T2DM on oral agents    • Essential hypertension   • Gastroesophageal reflux disease with esophagitis   • COPD    • Sleep-disordered breathing   • CAD s/p stent    • Obesity (BMI 30-39.9)   • Frequent falls   • Dizziness   • BPH    • Acute encephalopathy   • Polypharmacy   • Hypoglycemia   • Acute mixed acidosis   • Paroxysmal A-fib   • Acute on chronic respiratory failure with hypercapnia   • BPV    • Chronic anticoagulation on Eliquis    • Former smoker   • GAGE non-complaint w/CPAP    • Chronic narcotic use     Past Medical History:   Diagnosis Date   • Acute kidney injury (CMS/HCC)    • Acute tubular necrosis (CMS/HCC)    • Anemia    • Arthritis    • B12 deficiency    • Bone pain    • Carcinoma of lung (CMS/HCC)     stage IA   • Chronic kidney disease (CKD), stage III (moderate) (CMS/HCC)    • Confusion, postoperative    • COPD (chronic obstructive pulmonary disease) (CMS/HCC)    • Coronary artery disease s/p stent    • Depression    • Diabetes mellitus (CMS/HCC)    • Fatigue    • Fractures    • GERD (gastroesophageal reflux disease)    • H/O colonoscopy 2016   • History of BPH    • Hyperlipidemia    • Hypertension    • Impotence    • Kidney stones    • Leaking of urine    • Lung cancer (CMS/HCC) 2016   • Lung mass    • Obesity    • Peptic ulcer disease    • Post-thoracotomy pain, mild      Past Surgical History:   Procedure Laterality  Date   • ANKLE SURGERY     • BACK SURGERY     • BONE MARROW BIOPSY     • BRONCHOSCOPY N/A 4/13/2017    Procedure: BRONCHOSCOPY WITH ENDOBRONCHIAL ULTRASOUND;  Surgeon: Kingsley Hodge MD;  Location: [facility] ENDOSCOPY;  Service:    • CAROTID STENT  1991   • CORONARY STENT PLACEMENT     • KIDNEY STONE SURGERY  1976   • KNEE SURGERY Bilateral 2010   • LUNG REMOVAL, PARTIAL Right     Right upper and lower lobe wedge resections (2016)   • THORACOTOMY      RT THORACOTOMY.  WIDE WEDGE EXCISION OG RT UPPER LOBE. WIDE WEDGE EXCISION OF RIGHT LOWER LOBE.     General Information     Row Name 04/06/20 1059          PT Evaluation Time/Intention    Document Type  therapy note (daily note)  -LM     Mode of Treatment  individual therapy;physical therapy  -LM     Row Name 04/06/20 1059          General Information    Patient Profile Reviewed?  yes  -LM     Existing Precautions/Restrictions  fall  -LM     Row Name 04/06/20 1059          Cognitive Assessment/Intervention- PT/OT    Orientation Status (Cognition)  oriented x 3  -LM     Row Name 04/06/20 1059          Safety Issues, Functional Mobility    Safety Issues Affecting Function (Mobility)  safety precaution awareness;safety precautions follow-through/compliance;insight into deficits/self awareness  -LM     Impairments Affecting Function (Mobility)  balance;endurance/activity tolerance;shortness of breath  -LM       User Key  (r) = Recorded By, (t) = Taken By, (c) = Cosigned By    Initials Name Provider Type    LM Rosalia Tolliver, PT Physical Therapist        Mobility     Row Name 04/06/20 1059          Bed Mobility Assessment/Treatment    Bed Mobility Assessment/Treatment  sit-supine  -LM     Sit-Supine [place] (Bed Mobility)  conditional independence  -LM     Comment (Bed Mobility)  Pt sitting EOB at initial tx.  -LM     Row Name 04/06/20 1059          Sit-Stand Transfer    Sit-Stand [place] (Transfers)  stand by assist  -LM     Assistive Device (Sit-Stand  Transfers)  -- No AD  -LM     Row Name 04/06/20 1059          Gait/Stairs Assessment/Training    Gait/Stairs Assessment/Training  gait/ambulation independence  -LM     [place] Level (Gait)  contact guard;minimum assist (75% patient effort);1 person assist  -LM     Distance in Feet (Gait)  244 feet  -LM     Deviations/Abnormal Patterns (Gait)  bilateral deviations;florinda decreased;stride length decreased  -LM     Comment (Gait/Stairs)  Pt ambulated without AD mainly requiring CGAx1.  However, pt had 2 LOB's requiring MinAx1 to correct.  Pt very SOA post gait - O2 noted to be 95% post gait - educated on PLB.  -LM       User Key  (r) = Recorded By, (t) = Taken By, (c) = Cosigned By    Initials Name Provider Type    Rosalia Lockwood PT Physical Therapist        Obj/Interventions     Row Name 04/06/20 1059          Therapeutic Exercise    Lower Extremity (Therapeutic Exercise)  LAQ (long arc quad), bilateral;marching while seated  -LM     Lower Extremity Range of Motion (Therapeutic Exercise)  ankle dorsiflexion/plantar flexion, bilateral;hip abduction/adduction, bilateral  -LM     Position (Therapeutic Exercise)  seated  -LM     Sets/Reps (Therapeutic Exercise)  x10 each  -LM     Expected Outcome (Therapeutic Exercise)  improve functional stability;improve performance, gait skills;improve performance, transfer skills  -LM       User Key  (r) = Recorded By, (t) = Taken By, (c) = Cosigned By    Initials Name Provider Type    Rosalia Lockwood PT Physical Therapist        Goals/Plan    No documentation.       Clinical Impression     Row Name 04/06/20 1059          Pain Assessment    Additional Documentation  Pain Scale: Numbers Pre/Post-Treatment (Group)  -LM     Row Name 04/06/20 1059          Pain Scale: Numbers Pre/Post-Treatment    Pain Scale: Numbers, Pretreatment  0/10 - no pain  -LM     Pain Scale: Numbers, Post-Treatment  0/10 - no pain  -LM     Row Name 04/06/20 1051          Plan of Care Review    Plan of  Care Reviewed With  patient  -LM     Progress  improving  -LM     Row Name 04/06/20 1059          Vital Signs    Pretreatment Heart Rate (beats/min)  60  -LM     Posttreatment Heart Rate (beats/min)  56  -LM     Pre SpO2 (%)  96  -LM     O2 Delivery Pre Treatment  room air  -LM     Intra SpO2 (%)  95  -LM     O2 Delivery Intra Treatment  room air  -LM     Post SpO2 (%)  98  -LM     O2 Delivery Post Treatment  room air  -LM     Pre Patient Position  Sitting  -LM     Intra Patient Position  Standing  -LM     Post Patient Position  Supine  -LM     Row Name 04/06/20 1059          Positioning and Restraints    Pre-Treatment Position  in bed  -LM     Post Treatment Position  bed  -LM     In Bed  supine;call light within reach;encouraged to call for assist;notified nsg  -LM       User Key  (r) = Recorded By, (t) = Taken By, (c) = Cosigned By    Initials Name Provider Type    Rosalia Lockwood, ALINA Physical Therapist        Outcome Measures     Row Name 04/06/20 1059          How much help from another person do you currently need...    Turning from your back to your side while in flat bed without using bedrails?  4  -LM     Moving from lying on back to sitting on the side of a flat bed without bedrails?  4  -LM     Moving to and from a bed to a chair (including a wheelchair)?  3  -LM     Standing up from a chair using your arms (e.g., wheelchair, bedside chair)?  3  -LM     Climbing 3-5 steps with a railing?  3  -LM     To walk in hospital room?  3  -LM     AM-PAC 6 Clicks Score (PT)  20  -LM     Row Name 04/06/20 1059          Functional Assessment    Outcome Measure Options  AM-PAC 6 Clicks Basic Mobility (PT)  -LM       User Key  (r) = Recorded By, (t) = Taken By, (c) = Cosigned By    Initials Name Provider Type    Rosalia Lockwood PT Physical Therapist          PT Recommendation and Plan     Plan of Care Reviewed With: patient  Progress: improving  Outcome Summary: Pt progressing well towards skilled PT goals.  Pt stood  with SBA, ambulated 244 feet without AD with CGA/MinAx1, and transferred sit-->supine modified independently.  Pt mainly required CGA with ambulation - however, 2 LOB's experienced requiring Emani to correct.  Pt completed BLE ther ex without complaint.  Continue with skilled PT to improve mobility and safety.     Time Calculation:   PT Charges     Row Name 04/06/20 1059             Time Calculation    Start Time  1059  -LM      PT Received On  04/06/20  -LM      PT Goal Re-Cert Due Date  04/14/20  -LM         Timed Charges    65813 - PT Therapeutic Exercise Minutes  6  -LM      19624 - Gait Training Minutes   10  -LM        User Key  (r) = Recorded By, (t) = Taken By, (c) = Cosigned By    Initials Name Provider Type    LM Rosalia Tolliver, PT Physical Therapist        Therapy Charges for Today     Code Description Service Date Service Provider Modifiers Qty    47506978142 HC GAIT TRAINING EA 15 MIN 4/6/2020 Rosalia Tolliver, PT GP 1          PT G-Codes  Outcome Measure Options: AM-PAC 6 Clicks Basic Mobility (PT)  AM-PAC 6 Clicks Score (PT): 20    Rosalia Tolliver PT  4/6/2020

## 2020-04-06 NOTE — PROGRESS NOTES
"   LOS: 6 days    Patient Care Team:  Dandy Bailey MD as PCP - General  Juan Pablo Osullivan MD as Surgeon (Cardiothoracic Surgery)  Jong Diop DO as Consulting Physician (Gastroenterology)    Chief Complaint:  CARLTON on CKD stage III     Subjective     Interval History:   No acute events overnight. No new complaints. Transferred to floor.     Review of Systems:    The following systems were reviewed and negative;  constitution, respiratory, cardiovascular and gastrointestinal    Objective     Vital Sign Min/Max for last 24 hours  Temp  Min: 97.4 °F (36.3 °C)  Max: 98.5 °F (36.9 °C)   BP  Min: 118/98  Max: 163/70   Pulse  Min: 43  Max: 75   Resp  Min: 18  Max: 24   SpO2  Min: 92 %  Max: 100 %   No data recorded   No data recorded     Flowsheet Rows      First Filed Value   Admission Height  177.8 cm (70\") Documented at 03/31/2020 1211   Admission Weight  127 kg (279 lb 15.8 oz) Documented at 03/31/2020 1211          No intake/output data recorded.  I/O last 3 completed shifts:  In: 1190 [P.O.:860; I.V.:210; Other:120]  Out: 2650 [Urine:2650]    Physical Exam:     General Appearance:    Alert, cooperative, in no acute distress   Head:    Normocephalic, without obvious abnormality, atraumatic               Neck:   Supple, trachea midline, no thyromegaly       Lungs:     Clear to auscultation,respirations regular, even and                  unlabored    Heart:    Regular rhythm and normal rate, normal S1 and S2, no            murmur, no gallop, no rub, no click       Abdomen:     Normal bowel sounds,  soft        non-tender, non-distended       Extremities:   Moves all extremities well, no edema, no cyanosis, no             redness               Neurologic:    No focal deficit noted grossly       WBC WBC   Date Value Ref Range Status   04/03/2020 7.39 3.40 - 10.80 10*3/mm3 Final     Comment:     Modified report. Previous result was 7.65 10*3/mm3 on 4/3/2020 at 1751 EDT.      HGB Hemoglobin   Date Value Ref " Range Status   04/03/2020 8.0 (L) 13.0 - 17.7 g/dL Final      HCT Hematocrit   Date Value Ref Range Status   04/03/2020 25.0 (L) 37.5 - 51.0 % Final     Comment:     Modified report. Previous result was 25.3 % on 4/3/2020 at 1751 EDT.      Platlets No results found for: LABPLAT   MCV MCV   Date Value Ref Range Status   04/03/2020 90.6 79.0 - 97.0 fL Final     Comment:     Modified report. Previous result was 91.0 fL on 4/3/2020 at 1751 EDT.          Sodium Sodium   Date Value Ref Range Status   04/06/2020 141 136 - 145 mmol/L Final   04/05/2020 141 136 - 145 mmol/L Final   04/04/2020 140 136 - 145 mmol/L Final   04/03/2020 140 136 - 145 mmol/L Final      Potassium Potassium   Date Value Ref Range Status   04/06/2020 3.7 3.5 - 5.2 mmol/L Final   04/05/2020 3.6 3.5 - 5.2 mmol/L Final   04/04/2020 4.2 3.5 - 5.2 mmol/L Final   04/04/2020 3.2 (L) 3.5 - 5.2 mmol/L Final   04/03/2020 3.9 3.5 - 5.2 mmol/L Final      Chloride Chloride   Date Value Ref Range Status   04/06/2020 104 98 - 107 mmol/L Final   04/05/2020 104 98 - 107 mmol/L Final   04/04/2020 101 98 - 107 mmol/L Final   04/03/2020 103 98 - 107 mmol/L Final      CO2 CO2   Date Value Ref Range Status   04/06/2020 24.0 22.0 - 29.0 mmol/L Final   04/05/2020 25.0 22.0 - 29.0 mmol/L Final   04/04/2020 25.0 22.0 - 29.0 mmol/L Final   04/03/2020 22.0 22.0 - 29.0 mmol/L Final      BUN BUN   Date Value Ref Range Status   04/06/2020 30 (H) 8 - 23 mg/dL Final   04/05/2020 35 (H) 8 - 23 mg/dL Final   04/04/2020 45 (H) 8 - 23 mg/dL Final   04/03/2020 46 (H) 8 - 23 mg/dL Final      Creatinine Creatinine   Date Value Ref Range Status   04/06/2020 1.66 (H) 0.76 - 1.27 mg/dL Final   04/05/2020 1.83 (H) 0.76 - 1.27 mg/dL Final   04/04/2020 2.40 (H) 0.76 - 1.27 mg/dL Final   04/03/2020 2.35 (H) 0.76 - 1.27 mg/dL Final      Calcium Calcium   Date Value Ref Range Status   04/06/2020 7.8 (L) 8.6 - 10.5 mg/dL Final   04/05/2020 7.6 (L) 8.6 - 10.5 mg/dL Final   04/04/2020 8.0 (L) 8.6 -  10.5 mg/dL Final   04/03/2020 8.2 (L) 8.6 - 10.5 mg/dL Final      PO4 No results found for: CAPO4   Albumin Albumin   Date Value Ref Range Status   04/06/2020 3.40 (L) 3.50 - 5.20 g/dL Final   04/03/2020 3.60 3.50 - 5.20 g/dL Final      Magnesium Magnesium   Date Value Ref Range Status   04/06/2020 1.7 1.6 - 2.4 mg/dL Final      Uric Acid No results found for: URICACID        Results Review:     I reviewed the patient's new clinical results.      amLODIPine 10 mg Oral Q24H   citalopram 20 mg Oral Daily   diazePAM 2.5 mg Intravenous Q8H   donepezil 10 mg Oral Nightly   heparin (porcine) 5,000 Units Subcutaneous Q8H   insulin regular 0-7 Units Subcutaneous 4x Daily AC & at Bedtime   ipratropium-albuterol 3 mL Nebulization Q4H - RT   Morphine 60 mg Oral Q12H   pantoprazole 40 mg Oral Q AM   QUEtiapine 25 mg Oral Q12H   rosuvastatin 40 mg Oral Nightly   sodium chloride 10 mL Intravenous Q12H   terazosin 5 mg Oral Nightly       lactated ringers 100 mL/hr Last Rate: Stopped (04/03/20 1600)       Medication Review:     Assessment/Plan       CARLTON on CKD (baseline sCr 1.4-1.6)     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    2- Hx of MGUS   3- CKD stage III - baseline Scr 1.7   4- Anemia     Plan:  - Continue with current.   - Avoid nephrotoxic agents.   - renal diet.   - Adjust meds per renal function.       Ubaldo Wheeler MD  04/06/20  09:28

## 2020-04-06 NOTE — PROGRESS NOTES
"Adult Nutrition  Assessment/PES    Patient Name:  Joseph Rodriguez  YOB: 1945  MRN: [id number]  Admit Date:  3/31/2020    Assessment Date:  4/6/2020    Comments:      Reason for Assessment     Row Name 04/06/20 0905          Reason for Assessment    Reason For Assessment  follow-up protocol 15\"                   Nutrition Prescription Ordered     Row Name 04/06/20 0905          Nutrition Prescription PO    Current PO Diet  Regular     Common Modifiers  Cardiac;Consistent Carbohydrate                 Problem/Interventions:  Problem 1     Row Name 04/06/20 0906          Nutrition Diagnoses Problem 1    Problem 1  Inadequate Nutrient Intake     Signs/Symptoms (evidenced by)  PO Intake     Percent (%) intake recorded  75 %     Over number of meals  3                     Education/Evaluation     Row Name 04/06/20 0907          Monitor/Evaluation    Monitor  Per protocol           Electronically signed by:  Myrna Jeffery RD  04/06/20 09:07  "

## 2020-04-07 ENCOUNTER — TELEPHONE (OUTPATIENT)
Dept: ULTRASOUND IMAGING | Facility: HOSPITAL | Age: 75
End: 2020-04-07

## 2020-04-07 ENCOUNTER — READMISSION MANAGEMENT (OUTPATIENT)
Dept: CALL CENTER | Facility: HOSPITAL | Age: 75
End: 2020-04-07

## 2020-04-07 NOTE — OUTREACH NOTE
Prep Survey      Responses   [facility]  [place]   Is LACE score < 7 ?  No   Eligibility  Readm Mgmt   Discharge diagnosis  CARLTON on CKD Rhabdomyolysis    COVID-19 Test Status  Not tested   Does the patient have one of the following disease processes/diagnoses(primary or secondary)?  Other   Does the patient have Home health ordered?  No   Is there a DME ordered?  No   Prep survey completed?  Yes          Kat Haywood RN

## 2020-04-07 NOTE — TELEPHONE ENCOUNTER
Spoke with patients wife to r/s pt location from [place] to [facility]. Maribel at [facility] wanted pt to have scan before f/u on 4/27/20.

## 2020-04-08 ENCOUNTER — READMISSION MANAGEMENT (OUTPATIENT)
Dept: CALL CENTER | Facility: HOSPITAL | Age: 75
End: 2020-04-08

## 2020-04-08 NOTE — OUTREACH NOTE
Medical Week 1 Survey      Responses   [facility] patient discharged from?  [place]   COVID-19 Test Status  Not tested   Does the patient have one of the following disease processes/diagnoses(primary or secondary)?  Other   Is there a successful TCM telephone encounter documented?  No   Week 1 attempt successful?  Yes   Call start time  1014   Call end time  1016   Discharge diagnosis  CARLTON on CKD Rhabdomyolysis    Is patient permission given to speak with other caregiver?  Yes   List who call center can speak with  spouse- Yvette   Person spoke with today (if not patient) and relationship  spouse- Yvette   Meds reviewed with patient/caregiver?  Yes   Is the patient having any side effects they believe may be caused by any medication additions or changes?  No   Does the patient have all medications ordered at discharge?  Yes   Is the patient taking all medications as directed (includes completed medication regime)?  Yes   Does the patient have a primary care provider?   Yes   Does the patient have an appointment with their PCP within 7 days of discharge?  N/A   Comments regarding PCP  PCP f/u not scheduled due to COVID   Has the patient kept scheduled appointments due by today?  N/A   Has home health visited the patient within 72 hours of discharge?  N/A   Psychosocial issues?  No   Did the patient receive a copy of their discharge instructions?  Yes   What is the patient's perception of their health status since discharge?  Improving   Is the patient/caregiver able to teach back the hierarchy of who to call/visit for symptoms/problems? PCP, Specialist, Home health nurse, Urgent Care, ED, 911  Yes   Additional teach back comments  Per spouse, pt is doing well, still a little weak but slowly improving, no questions or concerns at this time.   Week 1 call completed?  Yes          Irene Hutchins RN

## 2020-04-13 ENCOUNTER — HOSPITAL ENCOUNTER (OUTPATIENT)
Dept: CT IMAGING | Facility: HOSPITAL | Age: 75
End: 2020-04-13

## 2020-04-13 ENCOUNTER — READMISSION MANAGEMENT (OUTPATIENT)
Dept: CALL CENTER | Facility: HOSPITAL | Age: 75
End: 2020-04-13

## 2020-04-13 NOTE — OUTREACH NOTE
Medical Week 2 Survey      Responses   [facility] patient discharged from?  [place]   COVID-19 Test Status  Not tested   Does the patient have one of the following disease processes/diagnoses(primary or secondary)?  Other   Week 2 attempt successful?  Yes   Call start time  1422   Call end time  1425   Person spoke with today (if not patient) and relationship  spouse- Yvette   Meds reviewed with patient/caregiver?  Yes   Is the patient taking all medications as directed (includes completed medication regime)?  Yes   Has the patient kept scheduled appointments due by today?  Yes   What is the patient's perception of their health status since discharge?  Improving   Week 2 Call Completed?  Yes   Wrap up additional comments  Saw his PCP today.  Slowly improving.          Reema Frausto RN

## 2020-04-20 ENCOUNTER — READMISSION MANAGEMENT (OUTPATIENT)
Dept: CALL CENTER | Facility: HOSPITAL | Age: 75
End: 2020-04-20

## 2020-04-20 NOTE — OUTREACH NOTE
Medical Week 3 Survey      Responses   [facility] patient discharged from?  [place]   COVID-19 Test Status  Not tested   Does the patient have one of the following disease processes/diagnoses(primary or secondary)?  Other   Week 3 attempt successful?  Yes   Call start time  1329   Call end time  1331   Discharge diagnosis  CARLTON on CKD Rhabdomyolysis    Is patient permission given to speak with other caregiver?  Yes   List who call center can speak with  spouse- Yvette Doughertys reviewed with patient/caregiver?  Yes   Is the patient having any side effects they believe may be caused by any medication additions or changes?  No   Does the patient have all medications ordered at discharge?  Yes   Is the patient taking all medications as directed (includes completed medication regime)?  Yes   Does the patient have a primary care provider?   Yes   Does the patient have an appointment with their PCP within 7 days of discharge?  Yes   Has the patient kept scheduled appointments due by today?  Yes   Comments  Dr Bailey, phone visit with Dr. Bonilla   Has home health visited the patient within 72 hours of discharge?  N/A   Psychosocial issues?  No   Did the patient receive a copy of their discharge instructions?  Yes   Nursing interventions  Reviewed instructions with patient   What is the patient's perception of their health status since discharge?  Improving   Is the patient/caregiver able to teach back signs and symptoms related to disease process for when to call PCP?  Yes   Is the patient/caregiver able to teach back signs and symptoms related to disease process for when to call 911?  Yes   Is the patient/caregiver able to teach back the hierarchy of who to call/visit for symptoms/problems? PCP, Specialist, Home health nurse, Urgent Care, ED, 911  Yes   Additional teach back comments  Still weak, confusion is better, he is cold frequently.  Encouraged to ask about labs to check.   Week 3 Call Completed?  Yes    Wrap up additional comments  Improving, but still with some issues.          Leta Kennedy RN

## 2020-04-24 ENCOUNTER — APPOINTMENT (OUTPATIENT)
Dept: CT IMAGING | Facility: HOSPITAL | Age: 75
End: 2020-04-24

## 2020-04-27 ENCOUNTER — READMISSION MANAGEMENT (OUTPATIENT)
Dept: CALL CENTER | Facility: HOSPITAL | Age: 75
End: 2020-04-27

## 2020-04-27 NOTE — OUTREACH NOTE
Medical Week 4 Survey      Responses   [facility] patient discharged from?  [place]   COVID-19 Test Status  Not tested   Does the patient have one of the following disease processes/diagnoses(primary or secondary)?  Other   Week 4 attempt successful?  Yes   Call start time  1807   Call end time  1809   Discharge diagnosis  CARLTON on CKD Rhabdomyolysis    Meds reviewed with patient/caregiver?  Yes   Is the patient having any side effects they believe may be caused by any medication additions or changes?  No   Is the patient taking all medications as directed (includes completed medication regime)?  Yes   Has the patient kept scheduled appointments due by today?  Yes   Is the patient still receiving Home Health Services?  N/A   Psychosocial issues?  No   What is the patient's perception of their health status since discharge?  Improving   Is the patient/caregiver able to teach back signs and symptoms related to disease process for when to call PCP?  Yes   Is the patient/caregiver able to teach back signs and symptoms related to disease process for when to call 911?  Yes   Is the patient/caregiver able to teach back the hierarchy of who to call/visit for symptoms/problems? PCP, Specialist, Home health nurse, Urgent Care, ED, 911  Yes   Additional teach back comments  states strength is coming back a little at a time, able to perform chores around house   Week 4 Call Completed?  Yes   Would the patient like one additional call?  No   Graduated  Yes   Did the patient feel the follow up calls were helpful during their recovery period?  Yes   Was the number of calls appropriate?  Yes          Belle Saucedo RN

## 2020-04-29 ENCOUNTER — TELEMEDICINE (OUTPATIENT)
Dept: PULMONOLOGY | Facility: CLINIC | Age: 75
End: 2020-04-29

## 2020-04-29 ENCOUNTER — E-VISIT (OUTPATIENT)
Dept: FAMILY MEDICINE CLINIC | Facility: TELEHEALTH | Age: 75
End: 2020-04-29

## 2020-04-29 DIAGNOSIS — K21.00 GASTROESOPHAGEAL REFLUX DISEASE WITH ESOPHAGITIS: ICD-10-CM

## 2020-04-29 DIAGNOSIS — G47.33 OSA (OBSTRUCTIVE SLEEP APNEA): Chronic | ICD-10-CM

## 2020-04-29 DIAGNOSIS — J96.22 ACUTE ON CHRONIC RESPIRATORY FAILURE WITH HYPERCAPNIA (HCC): ICD-10-CM

## 2020-04-29 DIAGNOSIS — J44.9 CHRONIC OBSTRUCTIVE PULMONARY DISEASE, UNSPECIFIED COPD TYPE (HCC): Primary | Chronic | ICD-10-CM

## 2020-04-29 PROCEDURE — 99213 OFFICE O/P EST LOW 20 MIN: CPT | Performed by: NURSE PRACTITIONER

## 2020-04-29 NOTE — PROGRESS NOTES
[facility] Pulmonary Follow up    CHIEF COMPLAINT    COPD    HISTORY OF PRESENT ILLNESS    Joseph Rodriguez is a 74 y.o.male here today for a telemedicine visit.  He was last seen in the office in October.  He was recently admitted to [facility] on 3/31-4/6 for acute kidney injury.  We was transferred from [facility] for encephalopathy and hypoglycemia.  He also had a +UDS on chronic pain meds at home.  He was transferred to the ICU and started on a Narcan drip.  He became agitated.  His symptoms slowly improved and was discharged home.  He states he is doing better than he was.  He also had his pain medications adjusted at discharge.    He continues to use his Symbicort twice a day.  He does have some mild shortness of breath with activity.  He has albuterol to use but does not use this regularly.  He denies any respiratory illnesses since his last appointment.      He has a history of GAGE but has been intolerant to CPAP therapy.  He does have daytime somnolence and fatigue.  He had been on oxygen at night but had this turned in because he was not using it.      He denies fever, chills, sputum production, hemoptysis, night sweats, weight loss, chest pain or palpitations.  He denies any lower extremity edema.  He denies any sinus or allergy symptoms.  He denies any reflux symptoms.  He is on Protonix daily.     He is up-to-date on his current vaccinations.     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    • Anemia   • Acute tubular necrosis (CMS/HCC)   • B12 deficiency   • Monoclonal gammopathy   • CKD (chronic kidney disease), stage III (CMS/HCC)   • T2DM on oral agents    • Essential hypertension   • Gastroesophageal reflux disease with esophagitis   • COPD    • Sleep-disordered breathing   • CAD s/p stent    • Obesity (BMI 30-39.9)   • Frequent falls   • Dizziness   • BPH    • Acute encephalopathy   • Polypharmacy   • Hypoglycemia   • Acute mixed  acidosis   • Paroxysmal A-fib   • Acute on chronic respiratory failure with hypercapnia   • BPV    • Chronic anticoagulation on Eliquis    • Former smoker   • GAGE non-complaint w/CPAP    • Chronic narcotic use       Allergies   Allergen Reactions   • Atorvastatin    • Gabapentin Dizziness   • Sulfa Antibiotics        Current Outpatient Medications:   •  albuterol sulfate  (90 Base) MCG/ACT inhaler, Inhale 2 puffs Every 4 (Four) Hours As Needed for Wheezing., Disp: 6.7 g, Rfl: 11  •  amLODIPine (NORVASC) 10 MG tablet, Take 1 tablet by mouth Daily., Disp: 30 tablet, Rfl: 1  •  apixaban (ELIQUIS) 2.5 MG tablet tablet, Take 2.5 mg by mouth 2 (Two) Times a Day., Disp: , Rfl:   •  budesonide-formoterol (SYMBICORT) 160-4.5 MCG/ACT inhaler, Inhale 2 puffs 2 (Two) Times a Day., Disp: 1 inhaler, Rfl: 11  •  bumetanide (BUMEX) 1 MG tablet, Take 1 tablet by mouth Daily. Hold until seen by pcp, Disp: 30 tablet, Rfl: 5  •  citalopram (CeleXA) 20 MG tablet, Take 20 mg by mouth Daily., Disp: , Rfl:   •  clopidogrel (PLAVIX) 75 MG tablet, Take 1 tablet by mouth Daily. Hold until seen by pcp, Disp: 90 tablet, Rfl: 0  •  Coenzyme Q10 (CO Q 10) 100 MG capsule, Take 200 mg by mouth Daily., Disp: , Rfl:   •  docusate sodium (COLACE) 100 MG capsule, Take 100 mg by mouth 2 (Two) Times a Day., Disp: , Rfl:   •  donepezil (ARICEPT) 10 MG tablet, Take 10 mg by mouth Every Night., Disp: , Rfl:   •  doxazosin (CARDURA) 4 MG tablet, TAKE 1 TABLET BY MOUTH DAILY, Disp: , Rfl: 5  •  finasteride (PROSCAR) 5 MG tablet, Take 5 mg by mouth Daily., Disp: , Rfl:   •  insulin glargine (LANTUS) 100 UNIT/ML injection, Inject 19 Units under the skin into the appropriate area as directed Every Night., Disp: , Rfl:   •  linagliptin (TRADJENTA) 5 MG tablet tablet, Take 5 mg by mouth daily., Disp: , Rfl:   •  meclizine (ANTIVERT) 12.5 MG tablet, Take 1 tablet by mouth 3 (Three) Times a Day As Needed for Dizziness., Disp: 30 tablet, Rfl: 0  •  melatonin 5  MG tablet tablet, Take 5 mg by mouth Every Night., Disp: , Rfl:   •  nitroglycerin (NITROSTAT) 0.4 MG SL tablet, DISSOLVE 1 TABLET(S) UNDER THE TONGUE MAY REPEAT EVERY 5 MINUTES. MAXIMUM OF 3 DOSES IN 15 MINUTES, Disp: , Rfl: 5  •  pantoprazole (PROTONIX) 40 MG EC tablet, Take 40 mg by mouth 2 (Two) Times a Day., Disp: , Rfl:   •  QUEtiapine (SEROquel) 25 MG tablet, Take 1 tablet by mouth Every Night., Disp: 30 tablet, Rfl: 1  •  terazosin (HYTRIN) 5 MG capsule, Take 1 capsule by mouth Every Night., Disp: 30 capsule, Rfl: 1  MEDICATION LIST AND ALLERGIES REVIEWED.    Social History     Tobacco Use   • Smoking status: Former Smoker     Packs/day: 1.00     Types: Cigarettes     Last attempt to quit:      Years since quittin.3   • Smokeless tobacco: Never Used   Substance Use Topics   • Alcohol use: No   • Drug use: No       FAMILY AND SOCIAL HISTORY REVIEWED.    Review of Systems   Constitutional: Positive for fatigue. Negative for activity change, appetite change, fever and unexpected weight change.   HENT: Negative for congestion, postnasal drip, rhinorrhea, sinus pressure, sore throat and voice change.    Eyes: Negative for visual disturbance.   Respiratory: Positive for shortness of breath. Negative for cough, chest tightness and wheezing.    Cardiovascular: Negative for chest pain, palpitations and leg swelling.   Gastrointestinal: Negative for abdominal distention, abdominal pain, nausea and vomiting.   Endocrine: Negative for cold intolerance and heat intolerance.   Genitourinary: Negative for difficulty urinating and urgency.   Musculoskeletal: Negative for arthralgias, back pain and neck pain.   Skin: Negative for color change and pallor.   Allergic/Immunologic: Negative for environmental allergies and food allergies.   Neurological: Negative for dizziness, syncope, weakness and light-headedness.   Hematological: Negative for adenopathy. Does not bruise/bleed easily.   Psychiatric/Behavioral:  Negative for agitation and behavioral problems.   .    There were no vitals taken for this visit.    Immunization History   Administered Date(s) Administered   • Influenza, Unspecified 10/01/2019        Physical Exam   Constitutional: He appears well-developed and well-nourished.   HENT:   Head: Normocephalic and atraumatic.   Eyes: EOM are normal.   Neck: Neck normal appearance.  Pulmonary/Chest: Effort normal. No stridor.  No respiratory distress.  Neurological: He is alert.   Psychiatric: He has a normal mood and affect.         RESULTS    PROBLEM LIST    Problem List Items Addressed This Visit        Respiratory    COPD  - Primary (Chronic)    GAGE non-complaint w/CPAP  (Chronic)    Acute on chronic respiratory failure with hypercapnia       Digestive    Gastroesophageal reflux disease with esophagitis            DISCUSSION  You have chosen to receive care through a telehealth visit.  Do you consent to use a video/audio connection for your medical care today? Yes    Mr. Rodriguez did a telemedicine visit today.  He seems to be doing well from a pulmonary standpoint.  He was alert and seemed to be in no distress today over the phone.  He will continue to use symbicort twice a day for his COPD.  He will continue to use albuterol as needed for shortness of breath.      Unfortunately he is intolerant to CPAP and would not wear oxygen at night.      He will continue protonix daily and we discussed reflux precautions as well.    He will follow up in 6 months or sooner if needed.  He will need full PFT's at his next visit.  I advised him to call if he needed to be seen sooner.  I spent 15 minutes with the patient and family. I spent > 50% percent of this time counseling and discussing diagnosis, prognosis, diagnostic testing, evaluation, current status, treatment options and management.    YIMI Emanuel  04/29/20201:43 PM  Electronically signed     Please note that portions of this note were completed with a  voice recognition program. Efforts were made to edit the dictations, but occasionally words are mistranscribed.      CC: Dandy Bailey MD

## 2020-04-29 NOTE — PROGRESS NOTES
I counseled the patient to follow up with Patient Mistake/Pulm followup appt scheduled after hospitalization.    This encounter was created in error - please disregard.

## 2020-11-17 ENCOUNTER — APPOINTMENT (OUTPATIENT)
Dept: CARDIOLOGY | Facility: HOSPITAL | Age: 75
End: 2020-11-17

## 2020-11-17 ENCOUNTER — HOSPITAL ENCOUNTER (EMERGENCY)
Facility: HOSPITAL | Age: 75
Discharge: HOME | End: 2020-11-17
Attending: EMERGENCY MEDICINE | Admitting: EMERGENCY MEDICINE

## 2020-11-17 ENCOUNTER — APPOINTMENT (OUTPATIENT)
Dept: GENERAL RADIOLOGY | Facility: HOSPITAL | Age: 75
End: 2020-11-17

## 2020-11-17 VITALS
HEART RATE: 76 BPM | DIASTOLIC BLOOD PRESSURE: 78 MMHG | SYSTOLIC BLOOD PRESSURE: 160 MMHG | BODY MASS INDEX: 36.51 KG/M2 | HEIGHT: 70 IN | OXYGEN SATURATION: 94 % | RESPIRATION RATE: 18 BRPM | TEMPERATURE: 98.3 F | WEIGHT: 255 LBS

## 2020-11-17 DIAGNOSIS — C34.91 ADENOCARCINOMA OF RIGHT LUNG (HCC): ICD-10-CM

## 2020-11-17 DIAGNOSIS — N18.9 CHRONIC KIDNEY DISEASE, UNSPECIFIED CKD STAGE: ICD-10-CM

## 2020-11-17 DIAGNOSIS — N17.9 AKI (ACUTE KIDNEY INJURY) (HCC): Primary | ICD-10-CM

## 2020-11-17 DIAGNOSIS — R07.9 CHEST PAIN, UNSPECIFIED TYPE: ICD-10-CM

## 2020-11-17 DIAGNOSIS — I25.10 CORONARY ARTERY DISEASE INVOLVING NATIVE CORONARY ARTERY OF NATIVE HEART WITHOUT ANGINA PECTORIS: ICD-10-CM

## 2020-11-17 DIAGNOSIS — I48.91 ATRIAL FIBRILLATION, UNSPECIFIED TYPE (HCC): ICD-10-CM

## 2020-11-17 DIAGNOSIS — J44.9 CHRONIC OBSTRUCTIVE PULMONARY DISEASE, UNSPECIFIED COPD TYPE (HCC): ICD-10-CM

## 2020-11-17 LAB
ALBUMIN SERPL-MCNC: 4.1 G/DL (ref 3.5–5.2)
ALBUMIN/GLOB SERPL: 1.5 G/DL
ALP SERPL-CCNC: 81 U/L (ref 39–117)
ALT SERPL W P-5'-P-CCNC: 10 U/L (ref 1–41)
ANION GAP SERPL CALCULATED.3IONS-SCNC: 13 MMOL/L (ref 5–15)
AST SERPL-CCNC: 11 U/L (ref 1–40)
BASOPHILS # BLD AUTO: 0.04 10*3/MM3 (ref 0–0.2)
BASOPHILS NFR BLD AUTO: 0.6 % (ref 0–1.5)
BILIRUB SERPL-MCNC: 0.4 MG/DL (ref 0–1.2)
BUN SERPL-MCNC: 23 MG/DL (ref 8–23)
BUN/CREAT SERPL: 11.9 (ref 7–25)
CALCIUM SPEC-SCNC: 8.2 MG/DL (ref 8.6–10.5)
CHLORIDE SERPL-SCNC: 100 MMOL/L (ref 98–107)
CO2 SERPL-SCNC: 26 MMOL/L (ref 22–29)
CREAT SERPL-MCNC: 1.94 MG/DL (ref 0.76–1.27)
DEPRECATED RDW RBC AUTO: 51.9 FL (ref 37–54)
EOSINOPHIL # BLD AUTO: 0.15 10*3/MM3 (ref 0–0.4)
EOSINOPHIL NFR BLD AUTO: 2.2 % (ref 0.3–6.2)
ERYTHROCYTE [DISTWIDTH] IN BLOOD BY AUTOMATED COUNT: 16.7 % (ref 12.3–15.4)
GFR SERPL CREATININE-BSD FRML MDRD: 34 ML/MIN/1.73
GLOBULIN UR ELPH-MCNC: 2.8 GM/DL
GLUCOSE SERPL-MCNC: 278 MG/DL (ref 65–99)
HCT VFR BLD AUTO: 29.1 % (ref 37.5–51)
HGB BLD-MCNC: 8.6 G/DL (ref 13–17.7)
HOLD SPECIMEN: NORMAL
HOLD SPECIMEN: NORMAL
IMM GRANULOCYTES # BLD AUTO: 0.08 10*3/MM3 (ref 0–0.05)
IMM GRANULOCYTES NFR BLD AUTO: 1.2 % (ref 0–0.5)
LYMPHOCYTES # BLD AUTO: 0.89 10*3/MM3 (ref 0.7–3.1)
LYMPHOCYTES NFR BLD AUTO: 13 % (ref 19.6–45.3)
MCH RBC QN AUTO: 25.3 PG (ref 26.6–33)
MCHC RBC AUTO-ENTMCNC: 29.6 G/DL (ref 31.5–35.7)
MCV RBC AUTO: 85.6 FL (ref 79–97)
MONOCYTES # BLD AUTO: 0.4 10*3/MM3 (ref 0.1–0.9)
MONOCYTES NFR BLD AUTO: 5.8 % (ref 5–12)
NEUTROPHILS NFR BLD AUTO: 5.28 10*3/MM3 (ref 1.7–7)
NEUTROPHILS NFR BLD AUTO: 77.2 % (ref 42.7–76)
NRBC BLD AUTO-RTO: 0 /100 WBC (ref 0–0.2)
NT-PROBNP SERPL-MCNC: 818.3 PG/ML (ref 0–900)
PLATELET # BLD AUTO: 185 10*3/MM3 (ref 140–450)
PMV BLD AUTO: 10.6 FL (ref 6–12)
POTASSIUM SERPL-SCNC: 3.5 MMOL/L (ref 3.5–5.2)
PROT SERPL-MCNC: 6.9 G/DL (ref 6–8.5)
QT INTERVAL: 366 MS
QTC INTERVAL: 430 MS
RBC # BLD AUTO: 3.4 10*6/MM3 (ref 4.14–5.8)
SODIUM SERPL-SCNC: 139 MMOL/L (ref 136–145)
TROPONIN T SERPL-MCNC: <0.01 NG/ML (ref 0–0.03)
WBC # BLD AUTO: 6.84 10*3/MM3 (ref 3.4–10.8)
WHOLE BLOOD HOLD SPECIMEN: NORMAL
WHOLE BLOOD HOLD SPECIMEN: NORMAL

## 2020-11-17 PROCEDURE — 99284 EMERGENCY DEPT VISIT MOD MDM: CPT

## 2020-11-17 PROCEDURE — 84484 ASSAY OF TROPONIN QUANT: CPT | Performed by: EMERGENCY MEDICINE

## 2020-11-17 PROCEDURE — 85025 COMPLETE CBC W/AUTO DIFF WBC: CPT | Performed by: EMERGENCY MEDICINE

## 2020-11-17 PROCEDURE — 25010000002 FUROSEMIDE PER 20 MG: Performed by: NURSE PRACTITIONER

## 2020-11-17 PROCEDURE — 80053 COMPREHEN METABOLIC PANEL: CPT | Performed by: EMERGENCY MEDICINE

## 2020-11-17 PROCEDURE — 93005 ELECTROCARDIOGRAM TRACING: CPT | Performed by: EMERGENCY MEDICINE

## 2020-11-17 PROCEDURE — 83880 ASSAY OF NATRIURETIC PEPTIDE: CPT | Performed by: EMERGENCY MEDICINE

## 2020-11-17 PROCEDURE — 96374 THER/PROPH/DIAG INJ IV PUSH: CPT

## 2020-11-17 PROCEDURE — 71045 X-RAY EXAM CHEST 1 VIEW: CPT

## 2020-11-17 PROCEDURE — 94640 AIRWAY INHALATION TREATMENT: CPT

## 2020-11-17 RX ORDER — SODIUM CHLORIDE 0.9 % (FLUSH) 0.9 %
10 SYRINGE (ML) INJECTION AS NEEDED
Status: DISCONTINUED | OUTPATIENT
Start: 2020-11-17 | End: 2020-11-17 | Stop reason: HOSPADM

## 2020-11-17 RX ORDER — ALBUTEROL SULFATE 90 UG/1
2 AEROSOL, METERED RESPIRATORY (INHALATION) ONCE
Status: COMPLETED | OUTPATIENT
Start: 2020-11-17 | End: 2020-11-17

## 2020-11-17 RX ORDER — ROSUVASTATIN CALCIUM 20 MG/1
40 TABLET, COATED ORAL DAILY
COMMUNITY

## 2020-11-17 RX ORDER — FUROSEMIDE 10 MG/ML
40 INJECTION INTRAMUSCULAR; INTRAVENOUS ONCE
Status: COMPLETED | OUTPATIENT
Start: 2020-11-17 | End: 2020-11-17

## 2020-11-17 RX ADMIN — ALBUTEROL SULFATE 2 PUFF: 90 AEROSOL, METERED RESPIRATORY (INHALATION) at 11:52

## 2020-11-17 RX ADMIN — FUROSEMIDE 40 MG: 10 INJECTION, SOLUTION INTRAMUSCULAR; INTRAVENOUS at 12:54

## 2020-11-17 RX ADMIN — NITROGLYCERIN 1 INCH: 20 OINTMENT TOPICAL at 12:52

## 2020-11-17 NOTE — PROGRESS NOTES
Discharge Planning Assessment  [facility]     Patient Name: Joseph Rodriguez  MRN: [id number]  Today's Date: 11/17/2020    Admit Date: 11/17/2020    Discharge Needs Assessment     Row Name 11/17/20 1424       Living Environment    Lives With  spouse    Name(s) of Who Lives With Patient  Spouse - Yvette Rodriguez -[phone]    Current Living Arrangements  home/apartment/condo    Primary Care Provided by  self    Provides Primary Care For  no one    Family Caregiver if Needed  spouse    Family Caregiver Names  Spouse - Yvette Rodriguez -[phone]    Quality of Family Relationships  helpful;involved;supportive    Able to Return to Prior Arrangements  yes       Resource/Environmental Concerns    Resource/Environmental Concerns  none    Transportation Concerns  car, none       Transition Planning    Patient/Family Anticipates Transition to  home with family    Patient/Family Anticipated Services at Transition  none    Transportation Anticipated  family or friend will provide       Discharge Needs Assessment    Readmission Within the Last 30 Days  no previous admission in last 30 days    Equipment Currently Used at Home  walker, standard;wheelchair;commode;nebulizer;shower chair    Concerns to be Addressed  denies needs/concerns at this time    Anticipated Changes Related to Illness  none        Discharge Plan     Row Name 11/17/20 1426       Plan    Plan  Initial    Plan Comments  CM spoke with patient at bedside. Patient resides in [place] [facility] with his spouse. Patient is independent with ADL's/ Patient states he uses no DME, however, he has a rolling walker, wheelchair, nebulizer machine in his home. Patient denies any current HH or OP services. Patient states he would like to return home to spouse @ discharge. CM following.    Final Discharge Disposition Code  30 - still a patient        Continued Care and Services - Admitted Since 11/17/2020    Coordination has not been started for this encounter.          Demographic Summary     Row Name 11/17/20 1422       General Information    Arrived From  home    Referral Source  emergency department    Reason for Consult  discharge planning    Preferred Language  English     Used During This Interaction  no    General Information Comments  PCP: Dandy Bailey       Contact Information    Contact Information Comments  Spouse - Yvette Rodriguez -[phone]        Functional Status     Row Name 11/17/20 1424       Functional Status    Usual Activity Tolerance  fair    Current Activity Tolerance  fair       Functional Status, IADL    Medications  independent    Meal Preparation  assistive person    Housekeeping  assistive person    Laundry  assistive person    Shopping  assistive person       Mental Status    General Appearance WDL  WDL       Mental Status Summary    Recent Changes in Mental Status/Cognitive Functioning  no changes       Employment/    Employment Status  retired                Caprice Zambrano RN

## 2020-11-17 NOTE — DISCHARGE INSTRUCTIONS
I called and informed Dr. BERNAL's office of the need for oxygen and that he did not want to be admitted to the hospital.  I informed them that they could set up home O2 for you, AND CPAP if needed

## 2020-11-17 NOTE — ED PROVIDER NOTES
Subjective   Joseph Rodriguez is a 75 y.o. male who presents to the ED with c/o shortness of breath. The patient reports he has had difficulty breathing for the past 5 days with some chest discomfort while exerting himself. He was recently seen at [facility] for the same symptoms and was admitted due to shortness of breath. During his admission he tested negative for COVID-19 but he has not felt improved since being discharged, prompting him to come to the ED. The patient has a history of CHF and is currently taking diuretics, but he states he has not missed any dosages. He complains of dizziness and lightheadedness but denies nausea, vomiting, diarrhea, and cough. His cardiologist is Dr. Bennett. The patient has a past medical history of lung cancer, diabetes mellitus, hyperlipidemia, GERD, CAD, chronic kidney disease stage 3, hypertension, COPD, and obesity. His surgical history includes thoracotomy, carotid stent, bone marrow biopsy, right partial lung removal, coronary stent placement, and bronchoscopy. There are no other acute complaints at this time.      History provided by:  Patient  Shortness of Breath  Severity:  Moderate  Onset quality:  Sudden  Duration:  5 days  Timing:  Constant  Progression:  Worsening  Chronicity:  Recurrent  Context: not emotional upset and not weather changes    Relieved by:  Nothing  Worsened by:  Exertion  Ineffective treatments:  Rest  Associated symptoms: chest pain    Associated symptoms: no abdominal pain, no cough, no fever and no vomiting    Risk factors: hx of cancer    Risk factors: no recent alcohol use, no hx of PE/DVT and no tobacco use        Review of Systems   Constitutional: Negative for fever.   Respiratory: Positive for shortness of breath. Negative for cough.    Cardiovascular: Positive for chest pain.   Gastrointestinal: Negative for abdominal pain, diarrhea, nausea and vomiting.   Neurological: Positive for dizziness and light-headedness.   All  other systems reviewed and are negative.      Past Medical History:   Diagnosis Date   • Acute kidney injury (CMS/HCC)    • Acute tubular necrosis (CMS/HCC)    • Anemia    • Arthritis    • B12 deficiency    • Bone pain    • Carcinoma of lung (CMS/HCC)     stage IA   • Chronic kidney disease (CKD), stage III (moderate)    • Confusion, postoperative    • COPD (chronic obstructive pulmonary disease) (CMS/HCC)    • Coronary artery disease s/p stent    • Depression    • Diabetes mellitus (CMS/HCC)    • Fatigue    • Fractures    • GERD (gastroesophageal reflux disease)    • H/O colonoscopy 03/2016   • History of BPH    • Hyperlipidemia    • Hypertension    • Impotence    • Kidney stones    • Leaking of urine    • Lung cancer (CMS/HCC) 01/2016   • Lung mass    • Obesity    • Peptic ulcer disease    • Post-thoracotomy pain, mild        Allergies   Allergen Reactions   • Atorvastatin    • Gabapentin Dizziness   • Sulfa Antibiotics        Past Surgical History:   Procedure Laterality Date   • ANKLE SURGERY     • BACK SURGERY     • BONE MARROW BIOPSY     • BRONCHOSCOPY N/A 4/13/2017    Procedure: BRONCHOSCOPY WITH ENDOBRONCHIAL ULTRASOUND;  Surgeon: Kingsley Hodge MD;  Location: [facility] ENDOSCOPY;  Service:    • CAROTID STENT  1991   • CORONARY STENT PLACEMENT     • KIDNEY STONE SURGERY  1976   • KNEE SURGERY Bilateral 2010   • LUNG REMOVAL, PARTIAL Right     Right upper and lower lobe wedge resections (2016)   • THORACOTOMY      RT THORACOTOMY.  WIDE WEDGE EXCISION OG RT UPPER LOBE. WIDE WEDGE EXCISION OF RIGHT LOWER LOBE.       Family History   Problem Relation Age of Onset   • Diabetes Mother    • Breast cancer Sister         60s   • Colon cancer Sister         59   • Skin cancer Sister 45   • Cancer Sister    • Diabetes Other    • Aneurysm Father    • Heart attack Brother    • Heart disease Brother    • Heart disease Brother    • Diabetes Brother    • Diabetes Brother        Social History     Socioeconomic  History   • Marital status:      Spouse name: Not on file   • Number of children: Not on file   • Years of education: Not on file   • Highest education level: Not on file   Tobacco Use   • Smoking status: Former Smoker     Packs/day: 1.00     Types: Cigarettes     Quit date:      Years since quittin.8   • Smokeless tobacco: Never Used   Substance and Sexual Activity   • Alcohol use: No   • Drug use: No   • Sexual activity: Defer   Social History Narrative    , lives with wife. Denies home oxygen, hh or dme.           Objective   Physical Exam  Vitals signs and nursing note reviewed.   Constitutional:       General: He is not in acute distress.     Appearance: He is well-developed.   HENT:      Head: Normocephalic and atraumatic.   Eyes:      General: No scleral icterus.     Conjunctiva/sclera: Conjunctivae normal.   Neck:      Musculoskeletal: Normal range of motion and neck supple.   Cardiovascular:      Rate and Rhythm: Normal rate and regular rhythm.      Heart sounds: Murmur present. Systolic murmur present.      Comments: 2+ pedal pulses bilaterally.  Pulmonary:      Effort: Pulmonary effort is normal. No respiratory distress.      Breath sounds: Examination of the right-lower field reveals decreased breath sounds. Examination of the left-lower field reveals decreased breath sounds. Decreased breath sounds present.      Comments: Decreased breath sounds in the bilateral bases.  Abdominal:      Palpations: Abdomen is soft.      Tenderness: There is no abdominal tenderness.   Musculoskeletal: Normal range of motion.      Right lower leg: No edema.      Left lower leg: No edema.   Skin:     General: Skin is warm and dry.   Neurological:      General: No focal deficit present.      Mental Status: He is alert and oriented to person, place, and time.   Psychiatric:         Mood and Affect: Mood normal.         Behavior: Behavior normal.         Procedures         ED Course     Recent Results  (from the past 24 hour(s))   Comprehensive Metabolic Panel    Collection Time: 11/17/20 11:29 AM    Specimen: Blood   Result Value Ref Range    Glucose 278 (H) 65 - 99 mg/dL    BUN 23 8 - 23 mg/dL    Creatinine 1.94 (H) 0.76 - 1.27 mg/dL    Sodium 139 136 - 145 mmol/L    Potassium 3.5 3.5 - 5.2 mmol/L    Chloride 100 98 - 107 mmol/L    CO2 26.0 22.0 - 29.0 mmol/L    Calcium 8.2 (L) 8.6 - 10.5 mg/dL    Total Protein 6.9 6.0 - 8.5 g/dL    Albumin 4.10 3.50 - 5.20 g/dL    ALT (SGPT) 10 1 - 41 U/L    AST (SGOT) 11 1 - 40 U/L    Alkaline Phosphatase 81 39 - 117 U/L    Total Bilirubin 0.4 0.0 - 1.2 mg/dL    eGFR Non African Amer 34 (L) >60 mL/min/1.73    Globulin 2.8 gm/dL    A/G Ratio 1.5 g/dL    BUN/Creatinine Ratio 11.9 7.0 - 25.0    Anion Gap 13.0 5.0 - 15.0 mmol/L   BNP    Collection Time: 11/17/20 11:29 AM    Specimen: Blood   Result Value Ref Range    proBNP 818.3 0.0 - 900.0 pg/mL   Troponin    Collection Time: 11/17/20 11:29 AM    Specimen: Blood   Result Value Ref Range    Troponin T <0.010 0.000 - 0.030 ng/mL   Light Blue Top    Collection Time: 11/17/20 11:29 AM   Result Value Ref Range    Extra Tube hold for add-on    Green Top (Gel)    Collection Time: 11/17/20 11:29 AM   Result Value Ref Range    Extra Tube Hold for add-ons.    Lavender Top    Collection Time: 11/17/20 11:29 AM   Result Value Ref Range    Extra Tube hold for add-on    Gold Top - SST    Collection Time: 11/17/20 11:29 AM   Result Value Ref Range    Extra Tube Hold for add-ons.    CBC Auto Differential    Collection Time: 11/17/20 11:29 AM    Specimen: Blood   Result Value Ref Range    WBC 6.84 3.40 - 10.80 10*3/mm3    RBC 3.40 (L) 4.14 - 5.80 10*6/mm3    Hemoglobin 8.6 (L) 13.0 - 17.7 g/dL    Hematocrit 29.1 (L) 37.5 - 51.0 %    MCV 85.6 79.0 - 97.0 fL    MCH 25.3 (L) 26.6 - 33.0 pg    MCHC 29.6 (L) 31.5 - 35.7 g/dL    RDW 16.7 (H) 12.3 - 15.4 %    RDW-SD 51.9 37.0 - 54.0 fl    MPV 10.6 6.0 - 12.0 fL    Platelets 185 140 - 450 10*3/mm3     Neutrophil % 77.2 (H) 42.7 - 76.0 %    Lymphocyte % 13.0 (L) 19.6 - 45.3 %    Monocyte % 5.8 5.0 - 12.0 %    Eosinophil % 2.2 0.3 - 6.2 %    Basophil % 0.6 0.0 - 1.5 %    Immature Grans % 1.2 (H) 0.0 - 0.5 %    Neutrophils, Absolute 5.28 1.70 - 7.00 10*3/mm3    Lymphocytes, Absolute 0.89 0.70 - 3.10 10*3/mm3    Monocytes, Absolute 0.40 0.10 - 0.90 10*3/mm3    Eosinophils, Absolute 0.15 0.00 - 0.40 10*3/mm3    Basophils, Absolute 0.04 0.00 - 0.20 10*3/mm3    Immature Grans, Absolute 0.08 (H) 0.00 - 0.05 10*3/mm3    nRBC 0.0 0.0 - 0.2 /100 WBC     Note: In addition to lab results from this visit, the labs listed above may include labs taken at another facility or during a different encounter within the last 24 hours. Please correlate lab times with ED admission and discharge times for further clarification of the services performed during this visit.    XR Chest 1 View   Final Result   Mild cardiogenic interstitial edema with small right pleural   effusion.       This report was finalized on 11/17/2020 11:44 AM by Miki Lam.            Vitals:    11/17/20 1251 11/17/20 1252 11/17/20 1300 11/17/20 1339   BP:  (!) 134/104 160/78    BP Location:       Patient Position:       Pulse: 68 73 76    Resp:    18   Temp:       SpO2: (!) 88%  94%    Weight:       Height:         Medications   sodium chloride 0.9 % flush 10 mL (has no administration in time range)   albuterol sulfate HFA (PROVENTIL HFA;VENTOLIN HFA;PROAIR HFA) inhaler 2 puff (2 puffs Inhalation Given 11/17/20 1152)   nitroglycerin (NITROSTAT) ointment 1 inch (1 inch Topical Given 11/17/20 1252)   furosemide (LASIX) injection 40 mg (40 mg Intravenous Given 11/17/20 1254)     ECG/EMG Results (last 24 hours)     Procedure Component Value Units Date/Time    ECG 12 Lead [[id number]] Collected: 11/17/20 1122     Updated: 11/17/20 1123        ECG 12 Lead                  DISCHARGE    Patient discharged in stable condition.    Reviewed implications of results,  diagnosis, meds, responsibility to follow up, warning signs and symptoms of possible worsening, potential complications and reasons to return to ER.    Patient/Family voiced understanding of above instructions.    Discussed plan for discharge, as there is no emergent indication for admission.  Pt/family is agreeable and understands need for follow up and possible repeat testing.  Pt/family is aware that discharge does not mean that nothing is wrong but that it indicates no emergency is currently present that requires admission and they must continue care with follow-up as given below or with a physician of their choice.     FOLLOW-UP  Tasha Haynes MD  [address] [place] [address] [phone]    Today  HAS APPT AT 5 pm WITH DESHAUN HAYNES    [facility] Emergency Department  [address] [address] [phone]    As needed, If symptoms worsen         Medication List      No changes were made to your prescriptions during this visit.            Recommended that patient be admitted to the hospital, but patient refusing.  I explained to patient that his kidney function was worse, and he is getting hypoxic at times.  Advised that he probably needs home oxygen.  Case management was consulted but tells me that they are no longer able to set up home oxygen through the ER.  Patient tells me he wishes to go home and plans on keeping his appointment with Dr. Haynes at 5 PM today.    I called and spoke with Dr. Haynes' office and inform them of his refusal of admission, and the need for home O2.  They took the message and report that they will notify Dr. Haynes                             MDM    Final diagnoses:   CARLTON (acute kidney injury) (CMS/[facility])   Chronic kidney disease, unspecified CKD stage   Chronic obstructive pulmonary disease, unspecified COPD type (CMS/[facility])   Coronary artery disease involving native coronary artery of native heart without angina pectoris    Adenocarcinoma of right lung (CMS/HCC)   Chest pain, unspecified type   Atrial fibrillation, unspecified type (CMS/HCC)       Documentation assistance provided by philippe Duran.  Information recorded by the philippe was done at my direction and has been verified and validated by me.     Dale Duran  11/17/20 1147       Dale Duran  11/17/20 1521       Melania Morris APRN  11/17/20 193

## 2023-09-28 NOTE — H&P
"    [facility] Medicine Services  HISTORY AND PHYSICAL    Patient Name: Joseph Rodriguez  : 1945  MRN: [id number]  Primary Care Physician: Dandy Bailey MD  Date of admission: 2020      Subjective   Subjective     Chief Complaint:  Dizziness    HPI:  Joseph Rodriguez is a 74 y.o. male with a past medical history significant for PAF, CAD s/p stent, HTN, COPD, CKD, DM2 - non insulin dependent, lung cancer s/p RU lobectomy , GAGE - non compliant w/ CPAP [facility]. Tells me he went to bed last night in his usual state of health @ 2130 and woke up this morning @ 0300 with extreme dizziness and tells me he has been unable to stand up without assistance since this time. He denies unilateral weakness, visual changes, headache, speech difficulties, numbness or tinnitus. Tells me he has never had this sensation before. Also tells me the room does not seem to be spinning, his legs seem to \"crumble\" and he feels off balance. He also tells me he has fallen several times this week d/t his knees \"giving out\" on him. He has had knee replacements in the past. Prior to waking up feeling dizzy he has been in his usual state of health; denies chest pain, palpitations, syncope, shortness of breath, fever, chills, nausea, vomiting, dysuria, hematuria or hematochezia. He will be admitted to the hospital medicine group for further evaluation and management.     ER workup:   Troponin <0.010, creatinine 1.90, PT/INR 12.9/1.1, ptt 28.4, wbc 6.07, rbc 3.68, hgb 10.6, hct 33.3, chest xray = no active disease, CT head w/o contrast = no acute intracranial abnormality, no change since 17; CT angio head/neck = no intracranial or extracranial arterial flow limiting stenosis or aneurysm / bilateral carotid bulb stenosis measuring 77% on right and 50% on left; EKG w/ sinus rhythm, sinus arrythmia w/ 1st degree AVB (replacing AFIB from 10/18/19); MRI brain " Internal Medicine Progress Note      Subjective:      Patient seen and examined at the bedside denies any other complaint.    Review of Systems  Negative for all 14 systems except as per subjective    I/O's  No intake or output data in the 24 hours ending 09/27/23 1920    ALLERGIES:  Diazepam, Codeine, Hydrocodone-acetaminophen, and Hydromorphone     Hospital Meds  Current Facility-Administered Medications   Medication Dose Route Frequency Provider Last Rate Last Admin   • melatonin tablet 3 mg  3 mg Oral Nightly PRN Tc Abreu DO       • ketorolac (TORADOL) injection 15 mg  15 mg Intravenous BID PRN Tc Abreu DO       • ketorolac (TORADOL) injection 15 mg  15 mg Intravenous Q6H PRN Pat Welch MD   15 mg at 09/27/23 1154   • metoPROLOL tartrate (LOPRESSOR) tablet 25 mg  25 mg Oral 2 times per day Brian Nation MD   25 mg at 09/27/23 0909   • [Held by provider] heparin (porcine) injection 5,000 Units  5,000 Units Subcutaneous 3 times per day Ayaz Luciano MD   5,000 Units at 09/23/23 0630   • acetaminophen (TYLENOL) tablet 650 mg  650 mg Oral Q6H Ayaz Luciano MD   650 mg at 09/27/23 1525   • ibuprofen (MOTRIN) tablet 600 mg  600 mg Oral Q8H PRN Tc Abreu DO   600 mg at 09/21/23 1209   • hydroCHLOROthiazide (HYDRODIURIL) tablet 25 mg  25 mg Oral Daily Tc Abreu DO   25 mg at 09/27/23 0909   • pantoprazole (PROTONIX) EC tablet 40 mg  40 mg Oral 2 times per day Tc Abreu DO   40 mg at 09/27/23 0909   • sodium chloride 0.9% infusion   Intravenous Continuous PRN Alexis Smallwood       • sodium chloride 0.9% infusion   Intravenous Continuous PRN XSpike butler, DPM       • sucralfate (CARAFATE) 1 GM/10ML suspension 1 g  1 g Oral 4x Daily AC & HS Xoubi Spike, DPM   1 g at 09/27/23 1524   • haloperidol lactate (HALDOL) 5 MG/ML short-acting injection 0.5 mg  0.5 mg Intravenous BID PRN Tc Abreu DO   0.5 mg at 09/17/23 0110        Last Recorded Vitals:  Vitals with  min/max:  Vital Last Value 24 Hour Range   Temperature 97.7 °F (36.5 °C) (09/27/23 0900) Temp  Min: 97.5 °F (36.4 °C)  Max: 98.2 °F (36.8 °C)   Pulse 79 (09/27/23 0900) Pulse  Min: 79  Max: 91   Respiratory 16 (09/27/23 0900) Resp  Min: 16  Max: 16   Non-Invasive  Blood Pressure 137/70 (09/27/23 0900) BP  Min: 103/61  Max: 172/79   Pulse Oximetry 99 % (09/27/23 0900) SpO2  Min: 98 %  Max: 100 %           Physical Exam:  Physical Exam     Heart :-               Regular rate and rhythm no S3-S4 murmur  Lungs :-              Clear to auscultation ×2 no rales rhonchi wheezes  Abdomen :-        Soft nontender bowel sounds present ×4 no rebound rigidity or   guarding  Extremity :-         No cyanosis clubbing edema distal pulses present +2/4×4  Neurologically:-  Patient is alert and oriented ×4 no focal neurological deficit has been observed.    Lab Results:  CBC  Recent Labs   Lab 09/27/23  0456 09/26/23  0440 09/25/23  0507   WBC 7.3 9.6 10.8   RBC 3.41* 3.15* 3.13*   HGB 9.4* 8.7* 8.7*   HCT 29.1* 26.5* 26.0*   MCV 85.3 84.1 83.1   MCH 27.6 27.6 27.8   MCHC 32.3 32.8 33.5   RDW-CV 20.1* 20.1* 19.9*    436 407   Lymphocytes, Percent 17 15 14     CMP  Recent Labs   Lab 09/27/23  0456 09/26/23  0440 09/25/23  0507 09/24/23  0448   Sodium 136 138 139 139   Chloride 106 108 108 110   BUN 45* 47* 44* 37*   Potassium 3.7 3.6 3.3* 3.7   Glucose 93 102* 95 69*   Creatinine 1.13* 1.31* 1.42* 1.33*   Calcium 7.5* 7.8* 7.4* 7.8*     No results for input(s): \"RAPDTR\", \"NTPROB\" in the last 72 hours.  Recent Labs   Lab 09/27/23  0456   ALKPT 55   BILIRUBIN 0.2   ALBUMIN 1.3*   GPT 20   AST 32        Imaging      MRI BRAIN WO CONTRAST   Final Result      No acute intracranial abnormality within limitations of motion artifact      Electronically Signed by: CUBA HOLLIS MD    Signed on: 9/25/2023 8:15 AM    Workstation ID: [id number]      EGD   Final Result      XR FOOT 3 OR MORE VIEWS LEFT   Final Result  pending.      Review of Systems   HENT: Negative.    Eyes: Negative.    Respiratory: Negative.    Cardiovascular: Negative.    Gastrointestinal: Negative.    Endocrine: Negative.    Genitourinary: Negative.    Musculoskeletal: Positive for gait problem.   Skin: Negative.    Allergic/Immunologic: Negative.    Neurological: Positive for dizziness and weakness. Negative for tremors, seizures, syncope, facial asymmetry, speech difficulty, light-headedness, numbness and headaches.   Hematological: Negative.    Psychiatric/Behavioral: Negative.         All other systems reviewed and are negative.     Personal History     Past Medical History:   Diagnosis Date   • Acute kidney injury (CMS/HCC)    • Acute tubular necrosis (CMS/HCC)    • Anemia    • Arthritis    • B12 deficiency    • Bone pain    • Carcinoma of lung (CMS/HCC)     stage IA   • Chronic kidney disease (CKD), stage III (moderate) (CMS/HCC)    • Confusion, postoperative    • COPD (chronic obstructive pulmonary disease) (CMS/HCC)    • Coronary artery disease s/p stent    • Depression    • Diabetes mellitus (CMS/HCC)    • Fatigue    • Fractures    • GERD (gastroesophageal reflux disease)    • H/O colonoscopy 03/2016   • History of BPH    • Hyperlipidemia    • Hypertension    • Impotence    • Kidney stones    • Leaking of urine    • Lung cancer (CMS/HCC) 01/2016   • Lung mass    • Obesity    • Peptic ulcer disease    • Post-thoracotomy pain, mild        Past Surgical History:   Procedure Laterality Date   • ANKLE SURGERY     • BACK SURGERY     • BONE MARROW BIOPSY     • BRONCHOSCOPY N/A 4/13/2017    Procedure: BRONCHOSCOPY WITH ENDOBRONCHIAL ULTRASOUND;  Surgeon: Kingsley Hodge MD;  Location: [facility] ENDOSCOPY;  Service:    • CAROTID STENT  1991   • CORONARY STENT PLACEMENT     • KIDNEY STONE SURGERY  1976   • KNEE SURGERY Bilateral 2010   • LUNG REMOVAL, PARTIAL Right     Right upper and lower lobe wedge resections (2016)   • THORACOTOMY      RT    FINDINGS/IMPRESSION:   1.   Status post transmetatarsal amputation of the first through fifth toes   2.   No immediate postoperative complication   3.   Amputation margins are sharp   4.   Soft tissue and/or dressing covers the amputation stent   5.   Generalized osseous demineralization   6.   Vascular calcification present         Electronically Signed by: TERRY TORRES MD    Signed on: 9/16/2023 9:03 AM    Workstation ID: [id number]          Cultures  Microbiology Results     None           Assessment/Plan:     Severe peripheral arterial disease  Patient has undergone multiple intervention  Patient is recommended for the surgical intervention  S/p surgical intervention  We will continue supportive care      Hypertension  Blood pressure will be monitored closely      Dyslipidemia  Patient is stable.      Patient was seen in presence of patient is resting comfortably today's events noted.  We will continue supportive care we will have cardiology evaluation.  For SVT        Patient has severe protein calorie malnutrition  Present on admission  We will follow dietitian's recommendation.      Sepsis  Secondary to the gangrenous foot  Present on admission.        Patient kidney function is little bit worsening  We will continue the IV hydration    Patient   mental status changes may be secondary due to the metabolic encephalopathy  The patient will be considered for the frequent reorientation  We will consider for the physical therapy  We will start discharge planning upon consult clearance.      Patient is otherwise doing okay  Patient is a monitored closely that she is still having mental status changes covid confusion  Had a long discussion with the patient family members 2 daughters present in the room.  Patient yesterday evening was almost back to her baseline but this morning she was more confused.  We will continue supportive care  Family waiting for discussion with the neurology.        Left Coccyx stage 3  THORACOTOMY.  WIDE WEDGE EXCISION OG RT UPPER LOBE. WIDE WEDGE EXCISION OF RIGHT LOWER LOBE.       Family History: family history includes Aneurysm in his father; Breast cancer in his sister; Cancer in his sister; Colon cancer in his sister; Diabetes in his brother, brother, mother, and another family member; Heart attack in his brother; Heart disease in his brother and brother; Skin cancer (age of onset: 45) in his sister. Otherwise pertinent FHx was reviewed and unremarkable.     Social History:  reports that he quit smoking about 29 years ago. His smoking use included cigarettes. He smoked 1.00 pack per day. He has never used smokeless tobacco. He reports that he does not drink alcohol or use drugs.  Social History     Social History Narrative    , lives with wife. Denies home oxygen, hh or dme.         Medications:  Available home medication information reviewed.    (Not in a hospital admission)    Allergies   Allergen Reactions   • Atorvastatin    • Gabapentin Dizziness   • Sulfa Antibiotics        Objective   Objective     Vital Signs:   Temp:  [97.9 °F (36.6 °C)] 97.9 °F (36.6 °C)  Heart Rate:  [70-77] 70  Resp:  [18] 18  BP: (160-180)/(85-94) 180/85   Total (NIH Stroke Scale): 1    Physical Exam   Constitutional: He is oriented to person, place, and time. He appears well-developed and well-nourished. No distress.   HENT:   Head: Normocephalic and atraumatic.   Mouth/Throat: Oropharynx is clear and moist. No oropharyngeal exudate.   Eyes: Pupils are equal, round, and reactive to light. Conjunctivae and EOM are normal. No scleral icterus.   Neck: Normal range of motion. Neck supple.   Cardiovascular: Normal rate, regular rhythm, normal heart sounds and intact distal pulses.   No murmur heard.  Pulmonary/Chest: Effort normal and breath sounds normal. No respiratory distress. He has no wheezes. He has no rales.   Abdominal: Soft. Bowel sounds are normal. He exhibits no distension. There is no tenderness.    Musculoskeletal: Normal range of motion. He exhibits no edema, tenderness or deformity.   Neurological: He is alert and oriented to person, place, and time. He has normal strength. He displays no tremor. No sensory deficit. Coordination normal. GCS eye subscore is 4. GCS verbal subscore is 5. GCS motor subscore is 6.   No symptoms with sitting up in bed or turning side to side in bed; did not attempt to ambulate patient on exam     Skin: Skin is warm and dry. Capillary refill takes less than 2 seconds. No rash noted. No erythema. No pallor.   Psychiatric: He has a normal mood and affect. His behavior is normal. Judgment and thought content normal.      Results Reviewed:  I have personally reviewed current lab and radiology data.    Results from last 7 days   Lab Units 02/02/20  0500 02/02/20  0451   WBC 10*3/mm3 6.07  --    HEMOGLOBIN g/dL 10.6*  --    HEMATOCRIT % 33.3*  --    PLATELETS 10*3/mm3 148  --    INR   --  1.1     Results from last 7 days   Lab Units 02/02/20  0500 02/02/20  0445   CREATININE mg/dL  --  1.90*   ALT (SGPT) U/L 10  --    AST (SGOT) U/L 8  --    TROPONIN T ng/mL <0.010  --      Estimated Creatinine Clearance: 44.8 mL/min (A) (by C-G formula based on SCr of 1.9 mg/dL (H)).  Brief Urine Lab Results  (Last result in the past 365 days)      Color   Clarity   Blood   Leuk Est   Nitrite   Protein   CREAT   Urine HCG        10/17/19 1627 Yellow Clear Small (1+) Negative Negative 100 mg/dL (2+)             Imaging Results (Last 24 Hours)     Procedure Component Value Units Date/Time    MRI Brain Without Contrast [[id number]] Resulted:  02/02/20 0646     Updated:  02/02/20 0646    XR Chest 1 View [[id number]] Collected:  02/02/20 0632     Updated:  02/02/20 0634    Narrative:       CHEST X-RAY, 2/2/2020      HISTORY:    74-year-old male in the ED and undergoing stroke protocol.      TECHNIQUE:  AP portable upright chest x-ray.      FINDINGS:  Heart size and pulmonary vascularity are within normal  pressure injury, Right upper buttock stage 3 pressure injury, Right lower buttock stage 3 pressure injury and Coccyx stage 2 pressure injury -all present on admission      Patient is having some sinus tachycardia  Will discuss with the cardiology  We will continue supportive care.       we will have home health evaluation.      FEN  -Diet  -IVF  -Electrolyte protocol      Best Practices    DVT Prophylaxis  :- SCD and SQ Heparin  Deconditioning  :- Physical therapy and Occupational therapy  Diet :- per tolerance  GI Prophylaxis :- Pepcid 20 mg bid                              Colace 100 mg bid      Code Status    Code Status: Full Resuscitation    Tc Abreu DO  9/27/2023 7:20 PM         limits. Surgical staples and postoperative scarring at the right lung base, best demonstrated on prior chest CT 8/13/2019. The lungs appear clear. No visible airspace consolidation or pleural  effusion.      Impression:       1. No active disease.  2. Postoperative changes right lung base.    Signer Name: Jesus Zuluaga MD   Signed: 2/2/2020 6:32 AM   Workstation Name: [facility]    [facility]    CT Angiogram Head [[id number]] Collected:  02/02/20 0543     Updated:  02/02/20 0545    Narrative:       CT ANGIOGRAM, HEAD AND NECK, 2/2/2020    HISTORY:  74-year-old male in the ED with new onset dizziness, weakness. Stroke evaluation.    TECHNIQUE:  CT angiogram of the head and neck with contrast. 3-D postprocessing was performed and reviewed. Evaluation for a significant carotid arterial stenosis is based on NASCET criteria. Radiation dose reduction techniques included automated exposure control.  Radiation audit for known CT and nuclear cardiology exams in the last 12 months: 2.    COMPARISON:  Noncontrast CT head tonight.    CTA NECK:  Normal aortic arch branching pattern with no proximal great vessel stenosis. Left vertebral artery is dominant with predominant supply to the basilar artery. Right vertebral artery is small and terminates prior to the basilar junction. Dense atheromatous  plaque in the carotid bulb bilaterally with ICA stenosis at the bulbs measuring about 77% on the right and 50% on the left using NASCET criteria.    CTA HEAD:  Intracranially, there is symmetric distal vascular contrast distribution in the anterior, middle and posterior cerebral artery territories. No compelling evidence of intracranial arterial flow limiting stenosis. No intracranial aneurysm or vascular  malformation is identified. The dural venous sinuses appear normal. No intracranial mass or abnormal contrast enhancement.      Impression:       1.  No intracranial or extracranial arterial flow  limiting stenosis or aneurysm.  2.  Bilateral carotid bulb stenosis measuring 77% on the right and 50% on the left using NASCET criteria.    Signer Name: Jesus Zuluaga MD   Signed: 2/2/2020 5:43 AM   Workstation Name: [email or web address]    [facility]    CT Angiogram Neck [[id number]] Collected:  02/02/20 0543     Updated:  02/02/20 0545    Narrative:       CT ANGIOGRAM, HEAD AND NECK, 2/2/2020    HISTORY:  74-year-old male in the ED with new onset dizziness, weakness. Stroke evaluation.    TECHNIQUE:  CT angiogram of the head and neck with contrast. 3-D postprocessing was performed and reviewed. Evaluation for a significant carotid arterial stenosis is based on NASCET criteria. Radiation dose reduction techniques included automated exposure control.  Radiation audit for known CT and nuclear cardiology exams in the last 12 months: 2.    COMPARISON:  Noncontrast CT head tonight.    CTA NECK:  Normal aortic arch branching pattern with no proximal great vessel stenosis. Left vertebral artery is dominant with predominant supply to the basilar artery. Right vertebral artery is small and terminates prior to the basilar junction. Dense atheromatous  plaque in the carotid bulb bilaterally with ICA stenosis at the bulbs measuring about 77% on the right and 50% on the left using NASCET criteria.    CTA HEAD:  Intracranially, there is symmetric distal vascular contrast distribution in the anterior, middle and posterior cerebral artery territories. No compelling evidence of intracranial arterial flow limiting stenosis. No intracranial aneurysm or vascular  malformation is identified. The dural venous sinuses appear normal. No intracranial mass or abnormal contrast enhancement.      Impression:       1.  No intracranial or extracranial arterial flow limiting stenosis or aneurysm.  2.  Bilateral carotid bulb stenosis measuring 77% on the right and 50% on the left using NASCET criteria.    Signer Name:  Jesus Zuluaga MD   Signed: 2/2/2020 5:43 AM   Workstation Name: [facility]    [facility]    CT Head Without Contrast Stroke Protocol [[id number]] Collected:  02/02/20 0513     Updated:  02/02/20 0515    Narrative:       CT HEAD, NONCONTRAST, STROKE PROTOCOL, 2/2/2020    HISTORY:  74-year-old male presenting to the ED with new onset dizziness, weakness. Stroke evaluation.    TECHNIQUE:  CT imaging of the head without IV contrast. Radiation dose reduction techniques included automated exposure control. Radiation audit for CT and nuclear cardiology exams in the last 12 months: 2.  *  CT exam time, 04:48.  *  CT on line for interpretation, 05:07.  *  Stat interpretation time, 05:10.    COMPARISON:  *  CT head, 4/9/2017.    FINDINGS:  No acute intracranial abnormality is demonstrated.    Mild-to-moderate generalized cerebral cortical atrophy with a notable frontotemporal predominance. Moderate diffuse low-attenuation white matter changes, nonspecific but most commonly seen in the setting of chronic small vessel disease. These findings  are stable since the prior exam.    No evidence of intracranial hemorrhage, mass, mass effect, acute cerebral edema, extra-axial fluid collection or hydrocephalus.    Note is again made of extensive fluid opacification of left mastoid air spaces.      Impression:       1.  No acute intracranial abnormality.  2.  Stable diffuse chronic changes as noted above.  3.  Complete fluid opacification of the left mastoid sinus air spaces, unchanged.  4.  No change since 4/19/2017.    NOTIFICATION: Critical Value/emergent results were relayed from me to the ED treatment team by telephone through the CT technologist, Jessika, at 05:13 hours on 2/2/2020.    Signer Name: Jesus Zuluaga MD   Signed: 2/2/2020 5:13 AM   Workstation Name: [facility]    [facility]        Results for orders placed during the hospital encounter of 04/09/17   Adult  "Transthoracic Echo Complete With Contrast    Narrative · Left ventricular function is hyperdynamic (EF > 70).  · The left ventricular cavity is moderately dilated.  · Right ventricular cavity is moderately dilated.  · Bi-atrial enlargement is seen.  · Mild mitral valve regurgitation is present  · Mild tricuspid valve regurgitation is present.  · The estimated RVSP is > 55 mmHg.          Assessment/Plan   Assessment & Plan     Active Hospital Problems    Diagnosis POA   • **Dizziness [R42] Yes   • Frequent falls [R29.6] Not Applicable   • BPH (benign prostatic hyperplasia) [N40.0] Yes   • History of lung cancer [Z85.118] Not Applicable     S/p Rt upper lobe lobectomy 2016      • Coronary artery disease [I25.10] Yes   • Obesity (BMI 30-39.9) [E66.9] Yes   • Chronic obstructive pulmonary disease (CMS/HCC) [J44.9] Yes   • Sleep-disordered breathing [G47.30] Yes   • CKD (chronic kidney disease), stage III (CMS/HCC) [N18.3] Yes     - Baseline creatinine 1.4-1.6 (4/9/17)     • Essential hypertension [I10] Yes   • Type 2 diabetes mellitus without complication, without long-term current use of insulin (CMS/HCC) [E11.9] Yes     74 year old male with acute onset of dizziness, unable to stand d/t his legs feeling \"crumbly\" and feeling off balance.     Plan:    Dizziness:  - admit to tele  - MRI pending  - fall precautions  - neurology consult  - neuro checks  - continue aspirin  - ECHO today w/ saline study    Frequent falls:  - fall risk w/ fall precautions  - chronic benzos and opiates, will continue PRN @ 1/2 dose; hold for sedation    BPH:  - continue terazosin, finasteride  - monitor for signs or urinary retention  - standing bladder retention orders    S/p lung cancer:  - chronic, stable  - follows w/ pulmonary associates and Dr. Bui (previously)    CAD / HTN / HLD:  - chronic, stable  - continue plavix, statin    CKD:  - chronic, mild elevation from baseline (currently 1.9 w/ baseline ~ 1.5-1.7)  - avoid nephrotoxic " medications  - monitor    DM2 - non insulin dependent:    COPD:  - chronic, stable  - continue home regimen of albuterol PRN and symbicort BID    DVT prophylaxis:  SCDS    CODE STATUS:    Code Status and Medical Interventions:   Ordered at: 02/02/20 0624     Code Status:    CPR     Medical Interventions (Level of Support Prior to Arrest):    Full       Admission Status:  I believe this patient meets INPATIENT status due to need of stroke wup, I feel patient’s risk for adverse outcomes and need for care warrant INPATIENT evaluation and I predict the patient’s care encounter to likely last beyond 2 midnights.      Electronically signed by YIMI Washington, 02/02/20, 6:25 AM.  Brief Attending Admission Attestation     I have seen and examined the patient, performing an independent face-to-face diagnostic evaluation with plan of care reviewed and developed with the advanced practice clinician (APC).         Vitals:    02/02/20 0545   BP: 180/85   Pulse: 70   Resp:  20   Temp:  Afebrile   SpO2: 98% on 2 L nasal cannula.       Attending Physical Exam:  RS- CTA-BL, ,  No wheezing , no crackles, good effort.  CVS- s1s2 regular, no murmur.  ABD- soft, distended, no organomegaly.  EXT- no edema.  NEURO- AAO-3, right upper extremity ataxia, sitting up on the bed patient was ataxic.  No focal extremity weakness noted, no facial droop noted, speech is groggy slurred though good output and comprehension.    Old records reviewed and summarized in PM hx.    Brief Summary Statement:   74-year-old male presented to ED with acute onset of dizziness/ataxia-denies any ear complaints, no diplopia, do not complain of any focal weakness, denies any lower extremity weakness, complain of any bowel bladder incontinence or worsening lower extremity pain.  Do not complain of any diarrhea.  Do not complain of any fever or chills.  Do not complain of any tingling or numbness or facial droop.  On ED evaluation patient was found to be very  ataxic, also his speech was slurred.    Remainder of detailed HPI is as noted above and has been reviewed and/or edited by me for completeness.    PM HX :  CAD- status post stents-aspirin 81 mg, Plavix 75 mg.  COPD  Morbidly obese  History of lung CA status post right upper lobectomy in 2016.  CKD stage III  Chronic back pain-on morphine 200 mg twice a day, Valium 10 mg every 6 hours  History of renal stone.  DM 2-glipizide, Tradjenta  BPH  Chronic constipation/GERD  Mild dementia    LABS:  Echo 4/17-EF of 70% RVSP 55 mmHg  EKG sinus rhythm at 72 bpm T wave flattening in inferior leads .  Angiogram of head and neck- 77% stenosis on the right carotid artery and 50% in left carotid artery-please see the official report.  Troponin-less than 0.01,  CMP still pending, creatinine-1.9.    Brief Assessment/Plan :  Vertigo/ataxia-rule out posterior cerebellar stroke-neurology consult in a.m.  [place] on CKD-mild hydration.  Chronic anemia-hemoglobin stable.  Patient has been on high doses of morphine and Valium which he has been taking it for long time- given his worsening renal function we have decreased the dose into half.    See above for further detailed assessment and plan developed with [facility] which I have reviewed and/or edited for completeness.e     Electronically signed by Huey Anderson MD, 02/02/20, 6:41 AM.

## (undated) DEVICE — CANNULA,ADULT,SOFT-TOUCH,7TUBE,SC: Brand: MEDLINE

## (undated) DEVICE — CATH IV ANGIOCATH/AUTOGARD 22G 1IN BLU

## (undated) DEVICE — Device: Brand: BALLOON

## (undated) DEVICE — ELECTRODE,ECG,FOAM, 3/PK: Brand: MEDLINE

## (undated) DEVICE — ST INF 10DRP 4 PORT 4WY/STPCOCK 112IN

## (undated) DEVICE — CATH IV ANGIOCATH/AUTOGARD 20G 1IN PNK

## (undated) DEVICE — BOWL UTIL STRL 32OZ

## (undated) DEVICE — TBG 02 CRUSH RESIST LF CLR 7FT

## (undated) DEVICE — SOL NACL 0.9PCT 1000ML

## (undated) DEVICE — SENSR O2 OXIMAX FNGR A/ 18IN NONSTR

## (undated) DEVICE — SINGLE USE BIOPSY VALVE MAJ-210: Brand: SINGLE USE BIOPSY VALVE (STERILE)

## (undated) DEVICE — AIRWY SZ11

## (undated) DEVICE — MEDI-VAC YANKAUER SUCTION HANDLE W/BULBOUS TIP: Brand: CARDINAL HEALTH

## (undated) DEVICE — MEDI-VAC NON-CONDUCTIVE SUCTION TUBING: Brand: CARDINAL HEALTH

## (undated) DEVICE — ADAPT SWVL FIBROPTIC BRONCH

## (undated) DEVICE — SINGLE USE SUCTION VALVE MAJ-209: Brand: SINGLE USE SUCTION VALVE (STERILE)

## (undated) DEVICE — SOL IRR NACL 0.9PCT BT 1000ML

## (undated) DEVICE — TRAP,MUCUS SPECIMEN,40CC: Brand: MEDLINE

## (undated) DEVICE — CATH IV INSYTE AUTOGARD SHLD 24G .56IN

## (undated) DEVICE — Device

## (undated) DEVICE — CATH IV ANGIOCATH/AUTOGARD 18G 1.25IN GR

## (undated) DEVICE — NDL ASP VIZISHOT FLX 19GA

## (undated) DEVICE — CONN STD FOR/O2 TBG

## (undated) DEVICE — ST EXT MICROBORE FIX M LL 38IN

## (undated) DEVICE — CANNULA,ADULT,SOFT-TOUCH,7'TUBE,UC: Brand: PENDING

## (undated) DEVICE — SOL LR 1000ML

## (undated) DEVICE — 3-WAY STOPCOCK: Brand: MAXGUARD